# Patient Record
Sex: FEMALE | Race: BLACK OR AFRICAN AMERICAN | Employment: OTHER | URBAN - METROPOLITAN AREA
[De-identification: names, ages, dates, MRNs, and addresses within clinical notes are randomized per-mention and may not be internally consistent; named-entity substitution may affect disease eponyms.]

---

## 2021-04-23 ENCOUNTER — APPOINTMENT (OUTPATIENT)
Dept: GENERAL RADIOLOGY | Age: 71
End: 2021-04-23
Payer: MEDICARE

## 2021-04-23 ENCOUNTER — HOSPITAL ENCOUNTER (OUTPATIENT)
Age: 71
Setting detail: OBSERVATION
Discharge: HOME | End: 2021-04-24
Attending: HOSPITALIST | Admitting: HOSPITALIST
Payer: MEDICARE

## 2021-04-23 DIAGNOSIS — R06.01 ORTHOPNEA: ICD-10-CM

## 2021-04-23 DIAGNOSIS — R06.02 SHORTNESS OF BREATH: Primary | ICD-10-CM

## 2021-04-23 DIAGNOSIS — R06.09 DOE (DYSPNEA ON EXERTION): ICD-10-CM

## 2021-04-23 LAB
A/G RATIO: 1.4 (ref 1.1–2.2)
ALBUMIN SERPL-MCNC: 4.3 G/DL (ref 3.4–5)
ALP BLD-CCNC: 78 U/L (ref 40–129)
ALT SERPL-CCNC: 14 U/L (ref 10–40)
ANION GAP SERPL CALCULATED.3IONS-SCNC: 8 MMOL/L (ref 3–16)
AST SERPL-CCNC: 17 U/L (ref 15–37)
BACTERIA: ABNORMAL /HPF
BASOPHILS ABSOLUTE: 0 K/UL (ref 0–0.2)
BASOPHILS RELATIVE PERCENT: 0.4 %
BILIRUB SERPL-MCNC: 0.3 MG/DL (ref 0–1)
BILIRUBIN URINE: NEGATIVE
BLOOD, URINE: NEGATIVE
BUN BLDV-MCNC: 12 MG/DL (ref 7–20)
CALCIUM SERPL-MCNC: 9.6 MG/DL (ref 8.3–10.6)
CHLORIDE BLD-SCNC: 100 MMOL/L (ref 99–110)
CLARITY: CLEAR
CO2: 32 MMOL/L (ref 21–32)
COLOR: YELLOW
CREAT SERPL-MCNC: 0.8 MG/DL (ref 0.6–1.2)
D DIMER: 206 NG/ML DDU (ref 0–229)
EKG ATRIAL RATE: 80 BPM
EKG DIAGNOSIS: NORMAL
EKG P AXIS: 48 DEGREES
EKG P-R INTERVAL: 182 MS
EKG Q-T INTERVAL: 408 MS
EKG QRS DURATION: 88 MS
EKG QTC CALCULATION (BAZETT): 470 MS
EKG R AXIS: 0 DEGREES
EKG T AXIS: 55 DEGREES
EKG VENTRICULAR RATE: 80 BPM
EOSINOPHILS ABSOLUTE: 0.3 K/UL (ref 0–0.6)
EOSINOPHILS RELATIVE PERCENT: 5.3 %
EPITHELIAL CELLS, UA: 2 /HPF (ref 0–5)
GFR AFRICAN AMERICAN: >60
GFR NON-AFRICAN AMERICAN: >60
GLOBULIN: 3.1 G/DL
GLUCOSE BLD-MCNC: 111 MG/DL (ref 70–99)
GLUCOSE BLD-MCNC: 111 MG/DL (ref 70–99)
GLUCOSE BLD-MCNC: 79 MG/DL (ref 70–99)
GLUCOSE BLD-MCNC: 94 MG/DL (ref 70–99)
GLUCOSE URINE: NEGATIVE MG/DL
HCT VFR BLD CALC: 38.1 % (ref 36–48)
HEMOGLOBIN: 12.1 G/DL (ref 12–16)
HYALINE CASTS: 0 /LPF (ref 0–8)
KETONES, URINE: NEGATIVE MG/DL
LEUKOCYTE ESTERASE, URINE: ABNORMAL
LYMPHOCYTES ABSOLUTE: 1.8 K/UL (ref 1–5.1)
LYMPHOCYTES RELATIVE PERCENT: 28.1 %
MCH RBC QN AUTO: 27 PG (ref 26–34)
MCHC RBC AUTO-ENTMCNC: 31.7 G/DL (ref 31–36)
MCV RBC AUTO: 85.1 FL (ref 80–100)
MICROSCOPIC EXAMINATION: YES
MONOCYTES ABSOLUTE: 0.4 K/UL (ref 0–1.3)
MONOCYTES RELATIVE PERCENT: 6 %
NEUTROPHILS ABSOLUTE: 3.8 K/UL (ref 1.7–7.7)
NEUTROPHILS RELATIVE PERCENT: 60.2 %
NITRITE, URINE: NEGATIVE
PDW BLD-RTO: 15.3 % (ref 12.4–15.4)
PERFORMED ON: ABNORMAL
PERFORMED ON: NORMAL
PERFORMED ON: NORMAL
PH UA: 7.5 (ref 5–8)
PLATELET # BLD: 193 K/UL (ref 135–450)
PMV BLD AUTO: 8.9 FL (ref 5–10.5)
POTASSIUM SERPL-SCNC: 4.2 MMOL/L (ref 3.5–5.1)
PRO-BNP: 361 PG/ML (ref 0–124)
PROTEIN UA: NEGATIVE MG/DL
RBC # BLD: 4.48 M/UL (ref 4–5.2)
RBC UA: 1 /HPF (ref 0–4)
SARS-COV-2, NAAT: NOT DETECTED
SODIUM BLD-SCNC: 140 MMOL/L (ref 136–145)
SPECIFIC GRAVITY UA: 1.01 (ref 1–1.03)
TOTAL PROTEIN: 7.4 G/DL (ref 6.4–8.2)
TROPONIN: <0.01 NG/ML
URINE REFLEX TO CULTURE: ABNORMAL
URINE TYPE: ABNORMAL
UROBILINOGEN, URINE: 0.2 E.U./DL
WBC # BLD: 6.4 K/UL (ref 4–11)
WBC UA: 2 /HPF (ref 0–5)

## 2021-04-23 PROCEDURE — 85379 FIBRIN DEGRADATION QUANT: CPT

## 2021-04-23 PROCEDURE — 80053 COMPREHEN METABOLIC PANEL: CPT

## 2021-04-23 PROCEDURE — 93005 ELECTROCARDIOGRAM TRACING: CPT | Performed by: PHYSICIAN ASSISTANT

## 2021-04-23 PROCEDURE — 83880 ASSAY OF NATRIURETIC PEPTIDE: CPT

## 2021-04-23 PROCEDURE — 93010 ELECTROCARDIOGRAM REPORT: CPT | Performed by: INTERNAL MEDICINE

## 2021-04-23 PROCEDURE — 36415 COLL VENOUS BLD VENIPUNCTURE: CPT

## 2021-04-23 PROCEDURE — 96372 THER/PROPH/DIAG INJ SC/IM: CPT

## 2021-04-23 PROCEDURE — 87635 SARS-COV-2 COVID-19 AMP PRB: CPT

## 2021-04-23 PROCEDURE — 85025 COMPLETE CBC W/AUTO DIFF WBC: CPT

## 2021-04-23 PROCEDURE — 6360000002 HC RX W HCPCS: Performed by: HOSPITALIST

## 2021-04-23 PROCEDURE — 81001 URINALYSIS AUTO W/SCOPE: CPT

## 2021-04-23 PROCEDURE — G0378 HOSPITAL OBSERVATION PER HR: HCPCS

## 2021-04-23 PROCEDURE — 6370000000 HC RX 637 (ALT 250 FOR IP): Performed by: HOSPITALIST

## 2021-04-23 PROCEDURE — 83036 HEMOGLOBIN GLYCOSYLATED A1C: CPT

## 2021-04-23 PROCEDURE — 2580000003 HC RX 258: Performed by: HOSPITALIST

## 2021-04-23 PROCEDURE — 99283 EMERGENCY DEPT VISIT LOW MDM: CPT

## 2021-04-23 PROCEDURE — 71045 X-RAY EXAM CHEST 1 VIEW: CPT

## 2021-04-23 PROCEDURE — 84484 ASSAY OF TROPONIN QUANT: CPT

## 2021-04-23 PROCEDURE — 6370000000 HC RX 637 (ALT 250 FOR IP): Performed by: PHYSICIAN ASSISTANT

## 2021-04-23 RX ORDER — OMEGA-3S/DHA/EPA/FISH OIL/D3 300MG-1000
5000 CAPSULE ORAL DAILY
COMMUNITY
End: 2021-04-27

## 2021-04-23 RX ORDER — ASPIRIN 81 MG/1
81 TABLET, CHEWABLE ORAL DAILY
Status: DISCONTINUED | OUTPATIENT
Start: 2021-04-23 | End: 2021-04-23 | Stop reason: SDUPTHER

## 2021-04-23 RX ORDER — FUROSEMIDE 20 MG/1
20 TABLET ORAL DAILY
Status: DISCONTINUED | OUTPATIENT
Start: 2021-04-23 | End: 2021-04-24 | Stop reason: HOSPADM

## 2021-04-23 RX ORDER — ACETAMINOPHEN 325 MG/1
650 TABLET ORAL EVERY 6 HOURS PRN
Status: DISCONTINUED | OUTPATIENT
Start: 2021-04-23 | End: 2021-04-24 | Stop reason: HOSPADM

## 2021-04-23 RX ORDER — POLYETHYLENE GLYCOL 3350 17 G/17G
17 POWDER, FOR SOLUTION ORAL DAILY PRN
Status: DISCONTINUED | OUTPATIENT
Start: 2021-04-23 | End: 2021-04-24 | Stop reason: HOSPADM

## 2021-04-23 RX ORDER — SODIUM CHLORIDE 9 MG/ML
25 INJECTION, SOLUTION INTRAVENOUS PRN
Status: DISCONTINUED | OUTPATIENT
Start: 2021-04-23 | End: 2021-04-24 | Stop reason: HOSPADM

## 2021-04-23 RX ORDER — ONDANSETRON 2 MG/ML
4 INJECTION INTRAMUSCULAR; INTRAVENOUS EVERY 6 HOURS PRN
Status: DISCONTINUED | OUTPATIENT
Start: 2021-04-23 | End: 2021-04-24 | Stop reason: HOSPADM

## 2021-04-23 RX ORDER — ASPIRIN 81 MG/1
81 TABLET, CHEWABLE ORAL DAILY
Status: DISCONTINUED | OUTPATIENT
Start: 2021-04-24 | End: 2021-04-24 | Stop reason: HOSPADM

## 2021-04-23 RX ORDER — FAMOTIDINE 40 MG/1
40 TABLET, FILM COATED ORAL DAILY
COMMUNITY

## 2021-04-23 RX ORDER — FAMOTIDINE 20 MG/1
20 TABLET, FILM COATED ORAL 2 TIMES DAILY
Status: DISCONTINUED | OUTPATIENT
Start: 2021-04-23 | End: 2021-04-24 | Stop reason: HOSPADM

## 2021-04-23 RX ORDER — PRAVASTATIN SODIUM 40 MG
40 TABLET ORAL DAILY
Status: DISCONTINUED | OUTPATIENT
Start: 2021-04-23 | End: 2021-04-23 | Stop reason: SDUPTHER

## 2021-04-23 RX ORDER — TELMISARTAN AND HYDROCHLORTHIAZIDE 40; 12.5 MG/1; MG/1
1 TABLET ORAL DAILY
Status: ON HOLD | COMMUNITY
Start: 2021-04-06 | End: 2021-04-23

## 2021-04-23 RX ORDER — ASCORBIC ACID 500 MG
250 TABLET ORAL DAILY
Status: DISCONTINUED | OUTPATIENT
Start: 2021-04-24 | End: 2021-04-24 | Stop reason: HOSPADM

## 2021-04-23 RX ORDER — DEXTROSE MONOHYDRATE 50 MG/ML
100 INJECTION, SOLUTION INTRAVENOUS PRN
Status: DISCONTINUED | OUTPATIENT
Start: 2021-04-23 | End: 2021-04-24 | Stop reason: HOSPADM

## 2021-04-23 RX ORDER — INSULIN GLARGINE 100 [IU]/ML
30 INJECTION, SOLUTION SUBCUTANEOUS NIGHTLY
COMMUNITY

## 2021-04-23 RX ORDER — PROMETHAZINE HYDROCHLORIDE 25 MG/1
12.5 TABLET ORAL EVERY 6 HOURS PRN
Status: DISCONTINUED | OUTPATIENT
Start: 2021-04-23 | End: 2021-04-24 | Stop reason: HOSPADM

## 2021-04-23 RX ORDER — ACETAMINOPHEN 650 MG/1
650 SUPPOSITORY RECTAL EVERY 6 HOURS PRN
Status: DISCONTINUED | OUTPATIENT
Start: 2021-04-23 | End: 2021-04-24 | Stop reason: HOSPADM

## 2021-04-23 RX ORDER — ACETAMINOPHEN 500 MG
500 TABLET ORAL EVERY 6 HOURS PRN
Status: DISCONTINUED | OUTPATIENT
Start: 2021-04-23 | End: 2021-04-23

## 2021-04-23 RX ORDER — SODIUM CHLORIDE 0.9 % (FLUSH) 0.9 %
5-40 SYRINGE (ML) INJECTION PRN
Status: DISCONTINUED | OUTPATIENT
Start: 2021-04-23 | End: 2021-04-24 | Stop reason: HOSPADM

## 2021-04-23 RX ORDER — INSULIN LISPRO 100 [IU]/ML
0-6 INJECTION, SOLUTION INTRAVENOUS; SUBCUTANEOUS
Status: DISCONTINUED | OUTPATIENT
Start: 2021-04-23 | End: 2021-04-24 | Stop reason: HOSPADM

## 2021-04-23 RX ORDER — ATORVASTATIN CALCIUM 80 MG/1
80 TABLET, FILM COATED ORAL NIGHTLY
Status: DISCONTINUED | OUTPATIENT
Start: 2021-04-23 | End: 2021-04-24 | Stop reason: HOSPADM

## 2021-04-23 RX ORDER — INSULIN LISPRO 100 [IU]/ML
0-3 INJECTION, SOLUTION INTRAVENOUS; SUBCUTANEOUS NIGHTLY
Status: DISCONTINUED | OUTPATIENT
Start: 2021-04-23 | End: 2021-04-24 | Stop reason: HOSPADM

## 2021-04-23 RX ORDER — LISINOPRIL AND HYDROCHLOROTHIAZIDE 12.5; 1 MG/1; MG/1
1 TABLET ORAL DAILY
Status: DISCONTINUED | OUTPATIENT
Start: 2021-04-23 | End: 2021-04-23 | Stop reason: CLARIF

## 2021-04-23 RX ORDER — NICOTINE POLACRILEX 4 MG
15 LOZENGE BUCCAL PRN
Status: DISCONTINUED | OUTPATIENT
Start: 2021-04-23 | End: 2021-04-24 | Stop reason: HOSPADM

## 2021-04-23 RX ORDER — ASCORBIC ACID 250 MG
250 TABLET ORAL DAILY
COMMUNITY
End: 2022-02-15

## 2021-04-23 RX ORDER — METOPROLOL SUCCINATE 50 MG/1
50 TABLET, EXTENDED RELEASE ORAL DAILY
COMMUNITY
Start: 2021-04-21 | End: 2022-02-15 | Stop reason: SDUPTHER

## 2021-04-23 RX ORDER — LOSARTAN POTASSIUM 50 MG/1
50 TABLET ORAL DAILY
COMMUNITY
Start: 2021-04-21 | End: 2022-02-11

## 2021-04-23 RX ORDER — POTASSIUM CHLORIDE 750 MG/1
10 TABLET, EXTENDED RELEASE ORAL 2 TIMES DAILY
COMMUNITY
Start: 2021-03-23 | End: 2021-04-27 | Stop reason: ALTCHOICE

## 2021-04-23 RX ORDER — FUROSEMIDE 40 MG/1
40 TABLET ORAL DAILY
Status: ON HOLD | COMMUNITY
End: 2021-04-24 | Stop reason: SDUPTHER

## 2021-04-23 RX ORDER — METOPROLOL SUCCINATE 50 MG/1
50 TABLET, EXTENDED RELEASE ORAL DAILY
Status: DISCONTINUED | OUTPATIENT
Start: 2021-04-24 | End: 2021-04-24 | Stop reason: HOSPADM

## 2021-04-23 RX ORDER — ASPIRIN 81 MG/1
243 TABLET, CHEWABLE ORAL ONCE
Status: COMPLETED | OUTPATIENT
Start: 2021-04-23 | End: 2021-04-23

## 2021-04-23 RX ORDER — LOSARTAN POTASSIUM 100 MG/1
50 TABLET ORAL DAILY
Status: DISCONTINUED | OUTPATIENT
Start: 2021-04-23 | End: 2021-04-24 | Stop reason: HOSPADM

## 2021-04-23 RX ORDER — SODIUM CHLORIDE 0.9 % (FLUSH) 0.9 %
5-40 SYRINGE (ML) INJECTION EVERY 12 HOURS SCHEDULED
Status: DISCONTINUED | OUTPATIENT
Start: 2021-04-23 | End: 2021-04-24 | Stop reason: HOSPADM

## 2021-04-23 RX ORDER — DEXTROSE MONOHYDRATE 25 G/50ML
12.5 INJECTION, SOLUTION INTRAVENOUS PRN
Status: DISCONTINUED | OUTPATIENT
Start: 2021-04-23 | End: 2021-04-24 | Stop reason: HOSPADM

## 2021-04-23 RX ORDER — PANTOPRAZOLE SODIUM 40 MG/1
40 TABLET, DELAYED RELEASE ORAL DAILY
COMMUNITY
Start: 2020-12-29

## 2021-04-23 RX ADMIN — ENOXAPARIN SODIUM 40 MG: 40 INJECTION SUBCUTANEOUS at 20:37

## 2021-04-23 RX ADMIN — LOSARTAN POTASSIUM 50 MG: 100 TABLET, FILM COATED ORAL at 18:38

## 2021-04-23 RX ADMIN — INSULIN GLARGINE 28 UNITS: 100 INJECTION, SOLUTION SUBCUTANEOUS at 20:47

## 2021-04-23 RX ADMIN — ATORVASTATIN CALCIUM 80 MG: 80 TABLET, FILM COATED ORAL at 20:36

## 2021-04-23 RX ADMIN — NITROGLYCERIN 1 INCH: 20 OINTMENT TOPICAL at 13:24

## 2021-04-23 RX ADMIN — FUROSEMIDE 20 MG: 20 TABLET ORAL at 18:38

## 2021-04-23 RX ADMIN — FAMOTIDINE 20 MG: 20 TABLET ORAL at 20:36

## 2021-04-23 RX ADMIN — Medication 10 ML: at 20:37

## 2021-04-23 RX ADMIN — ASPIRIN 243 MG: 81 TABLET, CHEWABLE ORAL at 13:24

## 2021-04-23 RX ADMIN — ACETAMINOPHEN 650 MG: 325 TABLET ORAL at 18:41

## 2021-04-23 ASSESSMENT — ENCOUNTER SYMPTOMS
COUGH: 1
WHEEZING: 0
BACK PAIN: 0
CONSTIPATION: 0
STRIDOR: 0
DIARRHEA: 0
SHORTNESS OF BREATH: 1
NAUSEA: 0
ABDOMINAL PAIN: 0
VOMITING: 0
COLOR CHANGE: 0
ABDOMINAL DISTENTION: 0

## 2021-04-23 ASSESSMENT — PAIN SCALES - GENERAL: PAINLEVEL_OUTOF10: 0

## 2021-04-23 NOTE — ED NOTES
Bedside report given to ZACKERY Gautam. Chart provided. VSS & alert at this time. Taken by stretcher without incident.        Troy Singer RN  04/23/21 4470

## 2021-04-23 NOTE — ED PROVIDER NOTES
EKG NOTE:    Sinus rhythm at a rate of 80 beats a minute with no acute ST elevations or depressions or pathologic Q waves. Normal axis. I was not involved with the care of this patient.        Manfred Beyer MD  04/23/21 0032

## 2021-04-23 NOTE — ED NOTES
Presents to the ED rt SOB, worsening with exertion that began in March. Hx CHF; takes \"water pill. \"  Denies increased swelling or weight gain at this time. Reports seeing a cardiologist that would like her to be evaluated for sleep apnea. Complaints of intermittent headaches, dizziness & fatigue. Monitors in place.        Tawana Holloway, ZACKERY  04/23/21 9878

## 2021-04-23 NOTE — H&P
[facility]    4/23/2021 1:15 PM    Patient Information:  [place] is a 65 y.o. female [id number]  PCP:  Rupal Xavier MD (Tel: [phone] )    Chief complaint:    Chief Complaint   Patient presents with    Shortness of Breath     Pt coming in with shortness of breath, weakness, dizziness, unable to lay flat        History of Present Illness:    Nelida Kamara is a 65 y.o. present with chest pain presented with shortness of breath dizziness unable to lay flat. Patient has ongoing symptoms for 1 has been getting worse. Was seen by cardiology. Echocardiogram done recently at [address] [place] [address]. Patient said no fevers no chills no cough had both Covid vaccines. Patient takes water pill for his leg swelling. Denies any nausea vomiting or diarrhea   onset about denies any nausea vomiting or diarrhea      History obtained from patient  Old medical records show   REVIEW OF SYSTEMS:   Constitutional: Negative for fever,chills or night sweats  ENT: Negative for rhinorrhea, epistaxis, hoarseness, sore throat. Respiratory: Negative for shortness of breath,wheezing  Cardiovascular:postive  for chest pain, negative for palpitations   Gastrointestinal: Negative for nausea, vomiting, diarrhea  Genitourinary: Negative for polyuria, dysuria   Hematologic/Lymphatic: Negative for bleeding tendency, easy bruising  Musculoskeletal: Negative for myalgias and arthralgias  Neurologic: Negative for confusion,dysarthria. Skin: Negative for itching,rash  Psychiatric: Negative for depression,anxiety, agitation. Endocrine: Negative for polydipsia,polyuria,heat /cold intolerance.     Past Medical History:   has a past medical history of Arthritis, Back pain, CHF (congestive heart failure) ([facility] Chronic diastolic heart failure ([facility] [facility] Diabetes mellitus ([facility] [facility] Diabetes mellitus ([facility] [address] [facility] GERD (gastroesophageal reflux disease), High cholesterol, Hypertension, Polio, Polio, Shingles, and Urinary problem. Past Surgical History:   has a past surgical history that includes joint replacement; back surgery; knee surgery; Colonoscopy; other surgical history (01/13/14); hernia repair; joint replacement; and back surgery. Medications:  No current facility-administered medications on file prior to encounter. Current Outpatient Medications on File Prior to Encounter   Medication Sig Dispense Refill    losartan (COZAAR) 50 MG tablet Take 50 mg by mouth daily      metoprolol succinate (TOPROL XL) 50 MG extended release tablet Take 50 mg by mouth daily      pantoprazole (PROTONIX) 40 MG tablet Take 40 mg by mouth daily      potassium chloride (KLOR-CON M) 10 MEQ extended release tablet Take 10 mEq by mouth 2 times daily      telmisartan-hydrochlorothiazide (MICARDIS HCT) 40-12.5 MG per tablet Take 1 tablet by mouth daily      furosemide (LASIX) 40 MG tablet Take 40 mg by mouth daily      insulin glargine (LANTUS) 100 UNIT/ML injection vial Inject 28 Units into the skin nightly      ascorbic acid (VITAMIN C) 250 MG tablet Take 250 mg by mouth daily      vitamin D3 (CHOLECALCIFEROL) 10 MCG (400 UNIT) TABS tablet Take 5,000 Units by mouth daily      famotidine (PEPCID) 40 MG tablet Take 40 mg by mouth 2 times daily      aspirin 81 MG chewable tablet Take 1 tablet by mouth daily. 30 tablet 3    UNABLE TO FIND \" SOME KIND OF PCN TWICE A DAY\"      benzonatate (TESSALON) 100 MG capsule Take 200 mg by mouth 3 times daily as needed for Cough.  acetaminophen (TYLENOL) 500 MG tablet Take 500 mg by mouth every 6 hours as needed for Pain.  pravastatin (PRAVACHOL) 40 MG tablet Take 40 mg by mouth daily.  lisinopril-hydrochlorothiazide (PRINZIDE;ZESTORETIC) 20-25 MG per tablet Take 1 tablet by mouth daily.  meloxicam (MOBIC) 15 MG tablet Take 15 mg by mouth daily.       ranitidine (ZANTAC) 300 MG tablet Take 300 mg by mouth daily.  omeprazole (PRILOSEC) 20 MG capsule Take 20 mg by mouth daily.  Omeprazole Magnesium (PRILOSEC OTC PO) Take  by mouth.  Liraglutide (VICTOZA SC) Inject 1.2 mg/day into the skin.  lisinopril-hydrochlorothiazide (PRINZIDE;ZESTORETIC) 10-12.5 MG per tablet Take 1 tablet by mouth daily.  Liraglutide (VICTOZA SC) Inject 1.2 mg into the skin daily. Allergies: Allergies   Allergen Reactions    Adhesive Tape      Skin irritation and blisters        Social History:   reports that she has never smoked. She has never used smokeless tobacco. She reports that she does not drink alcohol or use drugs. Family History:  family history includes Cancer in her mother; Diabetes in her sister; Early Death in her father; Heart Disease in her mother. ,     Physical Exam:  BP (!) 158/58   Pulse 79   Temp 97.6 °F (36.4 °C) (Temporal)   Resp 16   Ht 5' 6\" (1.676 m)   Wt 258 lb (117 kg)   SpO2 97%   BMI 41.64 kg/m²     General appearance:  Appears comfortable. Well nourished  Eyes: Sclera clear, pupils equal  ENT: Moist mucus membranes, no thrush. Trachea midline. Cardiovascular: Regular rhythm, normal S1, S2. No murmur, gallop, rub. No edema in lower extremities  Respiratory: Clear to auscultation bilaterally, no wheeze, good inspiratory effort  Gastrointestinal: Abdomen soft, non-tender, not distended, normal bowel sounds  Musculoskeletal: No cyanosis in digits, neck supple  Neurology: Cranial nerves grossly intact. Alert and oriented in time, place and person. No speech or motor deficits  Psychiatry: Appropriate affect.  Not agitated  Skin: Warm, dry, normal turgor, no rash    Labs:  CBC:   Lab Results   Component Value Date    WBC 6.4 04/23/2021    RBC 4.48 04/23/2021    HGB 12.1 04/23/2021    HCT 38.1 04/23/2021    MCV 85.1 04/23/2021    MCH 27.0 04/23/2021    MCHC 31.7 04/23/2021    RDW 15.3 04/23/2021     04/23/2021    MPV 8.9 04/23/2021     BMP:    Lab Results

## 2021-04-23 NOTE — ED NOTES
Bed: 22  Expected date:   Expected time:   Means of arrival:   Comments:  Amita Rooney, ZACKERY  04/23/21 1151

## 2021-04-23 NOTE — ED PROVIDER NOTES
[facility]        Pt Name: Candace Francisco  MRN: [id number]  [place] 1950  Date of evaluation: 4/23/2021  Provider: Mary Do PA-C  PCP: Lucille Chapman MD    ANTONIO. I have evaluated this patient. My supervising physician was available for consultation. CHIEF COMPLAINT       Chief Complaint   Patient presents with    Shortness of Breath     Pt coming in with shortness of breath, weakness, dizziness, unable to lay flat       HISTORY OF PRESENT ILLNESS   (Location, Timing/Onset, Context/Setting, Quality, Duration, Modifying Factors, Severity, Associated Signs and Symptoms)  Note limiting factors. Candace Francisco is a 65 y.o. female with history of obesity, CHF, diabetes, hyperlipidemia, hypertension, polio who presents to the emergency department complaining of shortness of breath, generalized weakness and fatigue and lightheadedness. Shortness of breath tends to get worse when she lays flat or does any minor exertion. She reports bilateral leg swelling but does improve with water pills. She just followed up with cardiologist, Dr. Brynn Hills, at [facility].  She states that she had a recent echocardiogram.  She has not had a recent stress test.  She denies chest pain or productive cough. She reports dry cough. She did get both Covid vaccinations this year. 3/25/21 echocardiogram  Study Conclusions    - Left ventricle: The cavity size is normal. Wall thickness is    normal. Systolic function was mildly reduced. The calculated    ejection fraction was in the range of 47% to 53%. Wall motion was    normal; there were no regional wall motion abnormalities. Doppler    parameters are consistent with abnormal left ventricular    relaxation (grade 1 diastolic dysfunction). The stroke volume is    94ml. The stroke index is 42ml/m^2. - Aortic valve: There was mild regurgitation.  The mean systolic    gradient is 10mm Hg. The peak systolic gradient is 94CX Hg. The    valve area by the velocity-time integral method is 2.1cm^2. The    valve area index by the velocity-time integral method is    0.92cm^2/m^2. The valve area by the mean velocity method is    1.6cm^2.  - Inferior vena cava: The vessel was normal in size; the    respirophasic diameter changes were in the normal range (&gt;= 50%);    findings are consistent with normal central venous pressure. Impressions:    - Prior study date 2018. - Compared to the prior study, there has been no significant    interval change. Nursing Notes were all reviewed and agreed with or any disagreements were addressed in the HPI. REVIEW OF SYSTEMS    (2-9 systems for level 4, 10 or more for level 5)     Review of Systems   Constitutional: Positive for fatigue. Negative for chills and fever. HENT: Negative. Eyes: Negative for visual disturbance. Respiratory: Positive for cough and shortness of breath. Negative for wheezing and stridor. Cardiovascular: Positive for leg swelling. Negative for chest pain and palpitations. Gastrointestinal: Negative for abdominal distention, abdominal pain, constipation, diarrhea, nausea and vomiting. Endocrine: Negative. Genitourinary: Negative. Musculoskeletal: Negative for back pain, neck pain and neck stiffness. Skin: Negative for color change, pallor, rash and wound. Neurological: Positive for weakness and light-headedness. Negative for dizziness, tremors, seizures, syncope, facial asymmetry, speech difficulty, numbness and headaches. Psychiatric/Behavioral: Negative for confusion. All other systems reviewed and are negative. Positives and Pertinent negatives as per HPI. Except as noted above in the ROS, all other systems were reviewed and negative.        PAST MEDICAL HISTORY     Past Medical History:   Diagnosis Date    Arthritis     Back pain     CHF (congestive heart failure) (HCC)     Chronic diastolic heart failure (HCC)     Diabetes mellitus ([facility]     Diabetes mellitus ([facility] [facility]     TYPE 2    GERD (gastroesophageal reflux disease)     High cholesterol     Hypertension     Polio     Polio     AS INFANT    Shingles     OCT 2013    Urinary problem     HAS BASHFUL BLADDER         SURGICAL HISTORY     Past Surgical History:   Procedure Laterality Date    BACK SURGERY      BACK SURGERY      1/13/14    COLONOSCOPY      HERNIA REPAIR      JOINT REPLACEMENT      bilateral knee replacements    JOINT REPLACEMENT      [place] KNEES    KNEE SURGERY      OTHER SURGICAL HISTORY  01/13/14    L4-5 DECOMPRESSION, POSTERIOR LATERAL FUSION AND PEDICLE         CURRENTMEDICATIONS       Previous Medications    ACETAMINOPHEN (TYLENOL) 500 MG TABLET    Take 500 mg by mouth every 6 hours as needed for Pain. ASCORBIC ACID (VITAMIN C) 250 MG TABLET    Take 250 mg by mouth daily    ASPIRIN 81 MG CHEWABLE TABLET    Take 1 tablet by mouth daily. BENZONATATE (TESSALON) 100 MG CAPSULE    Take 200 mg by mouth 3 times daily as needed for Cough. FAMOTIDINE (PEPCID) 40 MG TABLET    Take 40 mg by mouth 2 times daily    FUROSEMIDE (LASIX) 40 MG TABLET    Take 40 mg by mouth daily    INSULIN GLARGINE (LANTUS) 100 UNIT/ML INJECTION VIAL    Inject 28 Units into the skin nightly    LIRAGLUTIDE (VICTOZA SC)    Inject 1.2 mg into the skin daily. LIRAGLUTIDE (VICTOZA SC)    Inject 1.2 mg/day into the skin. LISINOPRIL-HYDROCHLOROTHIAZIDE (PRINZIDE;ZESTORETIC) 10-12.5 MG PER TABLET    Take 1 tablet by mouth daily. LISINOPRIL-HYDROCHLOROTHIAZIDE (PRINZIDE;ZESTORETIC) 20-25 MG PER TABLET    Take 1 tablet by mouth daily. LOSARTAN (COZAAR) 50 MG TABLET    Take 50 mg by mouth daily    MELOXICAM (MOBIC) 15 MG TABLET    Take 15 mg by mouth daily. METOPROLOL SUCCINATE (TOPROL XL) 50 MG EXTENDED RELEASE TABLET    Take 50 mg by mouth daily    OMEPRAZOLE (PRILOSEC) 20 MG CAPSULE    Take 20 mg by mouth daily.     OMEPRAZOLE MAGNESIUM (PRILOSEC OTC PO)    Take  by mouth. PANTOPRAZOLE (PROTONIX) 40 MG TABLET    Take 40 mg by mouth daily    POTASSIUM CHLORIDE (KLOR-CON M) 10 MEQ EXTENDED RELEASE TABLET    Take 10 mEq by mouth 2 times daily    PRAVASTATIN (PRAVACHOL) 40 MG TABLET    Take 40 mg by mouth daily. RANITIDINE (ZANTAC) 300 MG TABLET    Take 300 mg by mouth daily. TELMISARTAN-HYDROCHLOROTHIAZIDE (MICARDIS HCT) 40-12.5 MG PER TABLET    Take 1 tablet by mouth daily    UNABLE TO FIND    \" SOME KIND OF PCN TWICE A DAY\"    VITAMIN D3 (CHOLECALCIFEROL) 10 MCG (400 UNIT) TABS TABLET    Take 5,000 Units by mouth daily         ALLERGIES     Adhesive tape    FAMILYHISTORY       Family History   Problem Relation Age of Onset    Heart Disease Mother     Cancer Mother         breast cancer, 5    Early Death Father     Diabetes Sister           SOCIAL HISTORY       Social History     Tobacco Use    Smoking status: Never Smoker    Smokeless tobacco: Never Used   Substance Use Topics    Alcohol use: No    Drug use: No       SCREENINGS             PHYSICAL EXAM    (up to 7 for level 4, 8 or more for level 5)     ED Triage Vitals   BP Temp Temp Source Pulse Resp SpO2 Height Weight   04/23/21 1050 04/23/21 1125 04/23/21 1125 04/23/21 1050 04/23/21 1050 04/23/21 1050 04/23/21 1050 04/23/21 1050   (!) 207/76 97.6 °F (36.4 °C) Temporal 86 26 100 % 5' 6\" (1.676 m) 258 lb (117 kg)       Physical Exam  Vitals signs and nursing note reviewed. Constitutional:       Appearance: She is well-developed. She is obese. She is not toxic-appearing or diaphoretic. HENT:      Head: Normocephalic and atraumatic. Right Ear: External ear normal.      Left Ear: External ear normal.      Nose: Nose normal.      Mouth/Throat:      Mouth: Mucous membranes are moist.      Pharynx: Oropharynx is clear. Eyes:      General: No scleral icterus. Right eye: No discharge. Left eye: No discharge.       Extraocular Movements: Extraocular Performed at:  [facility] [address] [facility]  [place], [address]   Phone [phone]   MICROSCOPIC URINALYSIS - Abnormal; Notable for the following components:    Bacteria, UA RARE (*)     All other components within normal limits    Narrative:     Performed at:  [facility] [address] [facility],  [place], [address]   Phone [phone], RAPID   CBC WITH AUTO DIFFERENTIAL    Narrative:     Performed at:  [facility] [address], [place], [address]   Phone [phone]   TROPONIN    Narrative:     Performed at:  [facility] [address], [place], [address]   Phone [phone]   D-DIMER, QUANTITATIVE    Narrative:     Performed at:  [facility] [address] [facility], [address]   Phone [phone]       All other labs were within normal range or not returned as of this dictation. EKG: All EKG's are interpreted by the Emergency Department Physician in the absence of a cardiologist.  Please see their note for interpretation of EKG. RADIOLOGY:   Non-plain film images such as CT, Ultrasound and MRI are read by the radiologist. Plain radiographic images are visualized and preliminarily interpreted by the ED Provider with the below findings:        Interpretation per the Radiologist below, if available at the time of this note:    XR CHEST PORTABLE   Final Result   Stable appearance of the chest with no acute abnormality.                  PROCEDURES   Unless otherwise noted below, none     Procedures    CRITICAL CARE TIME   N/A    CONSULTS:  IP CONSULT TO HOSPITALIST      EMERGENCY DEPARTMENT COURSE and DIFFERENTIAL DIAGNOSIS/MDM:   Vitals:    Vitals:    04/23/21 1050 04/23/21 1125 04/23/21 1245   BP: (!) 207/76  (!) 158/58   Pulse: 86  79   Resp: 26  16   Temp:  97.6 °F (36.4 °C)    TempSrc:  Temporal    SpO2: 100%  97% Weight: 258 lb (117 kg)     Height: 5' 6\" (1.676 m)         Patient was given the following medications:  Medications   nitroglycerin (NITRO-BID) 2 % ointment 1 inch (has no administration in time range)   aspirin chewable tablet 243 mg (has no administration in time range)           This patient presents to the emergency department complaining of orthopnea and dyspnea on exertion for several weeks. Denies productive cough. Chest x-ray does not show any evidence of acute intrathoracic pathology at this time. EKG morphology appears stable. Although she did just have an echocardiogram, she does have several risk factors and I do feel admission is warranted to rule out anginal equivalent. At this time, troponin is negative. I had lengthy discussion with the patient and she understands and agrees with plan for admission. My suspicion is low for ACS, PE, myocarditis, pericarditis, endocarditis, acute pulmonary edema, pleural effusion, pericardial effusion, cardiac tamponade,  thoracic aortic dissection, esophageal rupture, other life-threatening arrhythmia, hypertensive urgency or emergency, hemothorax, pulmonary contusion, subcutaneous emphysema, flail chest, pneumo mediastinum, rib fracture, pneumonia, pneumothorax, COVID-19, cerebellar compromise, posterior stroke, carotid dissection, sinus abscess, acute fracture, acute CVA, ICH, SAH, TIA, meningitis, encephalitis, pseudotumor cerebri, temporal arteritis, sentinel bleed from ruptured aneurysm, h subdural hematoma, epidural hematoma or other concerning pathology. FINAL IMPRESSION      1. Shortness of breath    2. Orthopnea    3. GAYLE (dyspnea on exertion)          DISPOSITION/PLAN   DISPOSITION Decision To Admit 04/23/2021 12:58:58 PM      PATIENT REFERREDTO:  No follow-up provider specified.     DISCHARGE MEDICATIONS:  New Prescriptions    No medications on file       DISCONTINUED MEDICATIONS:  Discontinued Medications    No medications on file (Please note that portions of this note were completed with a voice recognition program.  Efforts were made to edit the dictations but occasionally words are mis-transcribed.)    Miguelina Lieberman PA-C (electronically signed)           Miguelina Lieberman PA-C  04/23/21 6185

## 2021-04-24 VITALS
HEART RATE: 81 BPM | BODY MASS INDEX: 40.82 KG/M2 | SYSTOLIC BLOOD PRESSURE: 157 MMHG | RESPIRATION RATE: 18 BRPM | WEIGHT: 254 LBS | OXYGEN SATURATION: 97 % | HEIGHT: 66 IN | DIASTOLIC BLOOD PRESSURE: 69 MMHG | TEMPERATURE: 97.9 F

## 2021-04-24 LAB
EKG ATRIAL RATE: 77 BPM
EKG DIAGNOSIS: NORMAL
EKG P AXIS: 55 DEGREES
EKG P-R INTERVAL: 200 MS
EKG Q-T INTERVAL: 414 MS
EKG QRS DURATION: 90 MS
EKG QTC CALCULATION (BAZETT): 468 MS
EKG R AXIS: 5 DEGREES
EKG T AXIS: 71 DEGREES
EKG VENTRICULAR RATE: 77 BPM
ESTIMATED AVERAGE GLUCOSE: 162.8 MG/DL
GLUCOSE BLD-MCNC: 112 MG/DL (ref 70–99)
HBA1C MFR BLD: 7.3 %
HCT VFR BLD CALC: 35.2 % (ref 36–48)
HEMOGLOBIN: 11.3 G/DL (ref 12–16)
LV EF: 40 %
LVEF MODALITY: NORMAL
MCH RBC QN AUTO: 27.4 PG (ref 26–34)
MCHC RBC AUTO-ENTMCNC: 32.2 G/DL (ref 31–36)
MCV RBC AUTO: 85.2 FL (ref 80–100)
PDW BLD-RTO: 15.5 % (ref 12.4–15.4)
PERFORMED ON: ABNORMAL
PLATELET # BLD: 182 K/UL (ref 135–450)
PMV BLD AUTO: 8.7 FL (ref 5–10.5)
RBC # BLD: 4.13 M/UL (ref 4–5.2)
WBC # BLD: 6.5 K/UL (ref 4–11)

## 2021-04-24 PROCEDURE — G0378 HOSPITAL OBSERVATION PER HR: HCPCS

## 2021-04-24 PROCEDURE — A9502 TC99M TETROFOSMIN: HCPCS | Performed by: HOSPITALIST

## 2021-04-24 PROCEDURE — 93005 ELECTROCARDIOGRAM TRACING: CPT | Performed by: HOSPITALIST

## 2021-04-24 PROCEDURE — 93010 ELECTROCARDIOGRAM REPORT: CPT | Performed by: INTERNAL MEDICINE

## 2021-04-24 PROCEDURE — 3430000000 HC RX DIAGNOSTIC RADIOPHARMACEUTICAL: Performed by: HOSPITALIST

## 2021-04-24 PROCEDURE — 78452 HT MUSCLE IMAGE SPECT MULT: CPT | Performed by: INTERNAL MEDICINE

## 2021-04-24 PROCEDURE — 93017 CV STRESS TEST TRACING ONLY: CPT | Performed by: INTERNAL MEDICINE

## 2021-04-24 PROCEDURE — 36415 COLL VENOUS BLD VENIPUNCTURE: CPT

## 2021-04-24 PROCEDURE — 85027 COMPLETE CBC AUTOMATED: CPT

## 2021-04-24 PROCEDURE — 6370000000 HC RX 637 (ALT 250 FOR IP): Performed by: HOSPITALIST

## 2021-04-24 RX ORDER — FUROSEMIDE 40 MG/1
40 TABLET ORAL DAILY
Qty: 60 TABLET | Refills: 1 | Status: SHIPPED | OUTPATIENT
Start: 2021-04-24 | End: 2021-04-27

## 2021-04-24 RX ADMIN — OXYCODONE HYDROCHLORIDE AND ACETAMINOPHEN 250 MG: 500 TABLET ORAL at 10:24

## 2021-04-24 RX ADMIN — ASPIRIN 81 MG: 81 TABLET, CHEWABLE ORAL at 10:24

## 2021-04-24 RX ADMIN — LOSARTAN POTASSIUM 50 MG: 100 TABLET, FILM COATED ORAL at 10:24

## 2021-04-24 RX ADMIN — FAMOTIDINE 20 MG: 20 TABLET ORAL at 10:24

## 2021-04-24 RX ADMIN — METOPROLOL SUCCINATE 50 MG: 50 TABLET, EXTENDED RELEASE ORAL at 10:24

## 2021-04-24 RX ADMIN — FUROSEMIDE 20 MG: 20 TABLET ORAL at 10:24

## 2021-04-24 RX ADMIN — TETROFOSMIN 30 MILLICURIE: 1.38 INJECTION, POWDER, LYOPHILIZED, FOR SOLUTION INTRAVENOUS at 10:11

## 2021-04-24 RX ADMIN — TETROFOSMIN 10 MILLICURIE: 1.38 INJECTION, POWDER, LYOPHILIZED, FOR SOLUTION INTRAVENOUS at 07:58

## 2021-04-24 ASSESSMENT — PAIN SCALES - GENERAL: PAINLEVEL_OUTOF10: 0

## 2021-04-24 NOTE — DISCHARGE SUMMARY
[facility] DISCHARGE SUMMARY    Patient Demographics    Patient. Faraz Lopez  Date of Birth. 1950  MRN. [id number]     Primary care provider. Tylor Bobo MD  (Tel: [phone])    Admit date: 4/23/2021    Discharge date (blank if same as Note Date): Note Date: 4/24/2021     Reason for Hospitalization. Chief Complaint   Patient presents with    Shortness of Breath     Pt coming in with shortness of breath, weakness, dizziness, unable to lay flat           [facility] Course. Chest pain   Patient admitted for chest pain. Initial evaluation w as negative for any acute ischemia. Underwent stress test which showed no acute ischemia  troponin and ekg negative  Pt was discharged stable       Consults. IP CONSULT TO HOSPITALIST    Physical examination on discharge day. BP (!) 157/69   Pulse 81   Temp 97.9 °F (36.6 °C) (Oral)   Resp 18   Ht 5' 6\" (1.676 m)   Wt 254 lb (115.2 kg)   SpO2 97%   BMI 41.00 kg/m²   General appearance. Alert. Looks comfortable. HEENT. Sclera clear. Moist mucus membranes. Cardiovascular. Regular rate and rhythm, normal S1, S2. No murmur. Respiratory. Not using accessory muscles. Clear to auscultation bilaterally, no wheeze. Gastrointestinal. Abdomen soft, non-tender, not distended, normal bowel sounds  Neurology. Facial symmetry. No speech deficits. Moving all extremities equally. Extremities. No edema in lower extremities. Skin. Warm, dry, normal turgor    Condition at time of discharge stable     Medication instructions provided to patient at discharge. Medication List      CONTINUE taking these medications    acetaminophen 500 MG tablet  Commonly known as: TYLENOL     ascorbic acid 250 MG tablet  Commonly known as: VITAMIN C     aspirin 81 MG chewable tablet  Take 1 tablet by mouth daily.      famotidine 40 MG tablet  Commonly known as: PEPCID     furosemide 40 MG tablet Commonly known as: LASIX  Take 1 tablet by mouth daily     insulin glargine 100 UNIT/ML injection vial  Commonly known as: LANTUS     losartan 50 MG tablet  Commonly known as: COZAAR     meloxicam 15 MG tablet  Commonly known as: MOBIC     metoprolol succinate 50 MG extended release tablet  Commonly known as: TOPROL XL     pantoprazole 40 MG tablet  Commonly known as: PROTONIX     potassium chloride 10 MEQ extended release tablet  Commonly known as: KLOR-CON M     pravastatin 40 MG tablet  Commonly known as: PRAVACHOL     VICTOZA SC     vitamin D3 10 MCG (400 UNIT) Tabs tablet  Commonly known as: CHOLECALCIFEROL           Where to Get Your Medications      These medications were sent to [address], [address]  [address] [place] [address]    Phone: [phone]   · furosemide 40 MG tablet         Discharge recommendations given to patient. Follow Up. pcp in 1 week   Disposition. home  Activity. activity as tolerated  Diet: DIET CARB CONTROL;      Spent 45  minutes in discharge process.     Signed:  Christine Marshall MD     4/24/2021 3:23 PM

## 2021-04-24 NOTE — PROGRESS NOTES
Pt given discharge instructions including to f/u with cardiologist at [facility] in 1 week, patient verbalized understanding of discharge instructions, no homecare needs, discharged at 1655.

## 2021-04-24 NOTE — DISCHARGE INSTR - DIET
-Follow a cardiac diet including low sodium 2gram/day and 2 Liter/day fluid restriction, no added salt    -daily weight

## 2021-04-26 NOTE — PROGRESS NOTES
[address]   Cardiac Consultation    Referring Provider:  Frederick Celestin MD     Chief Complaint   Patient presents with    New Patient    Establish Cardiologist    Hypertension    Hyperlipidemia        History of Present Illness:  Beth Xie is a 65 y.o. female with a history of diabetes, hyperlipidemia, and hypertension. She was previously following with Dr. Bob Araiza with [facility].     She reports in March she began with sudden onset exertional shortness of breath, short of breath just walking across the room. She saw her Cardiologist at the time, Echo was ordered, Lasix started, she felt some improvement with Lasix at the time. Over the weekend she began to feel very shortness of breath again, generalized weakness and fatigue and lightheadedness. Shortness of breath was worse lying or minor exertion. Today she feels \"much better\". She feels this is due to CLASEMOVIL coming off\" , weight is back to baseline. Past Medical History:   has a past medical history of Arthritis, Back pain, CHF (congestive heart failure) ([address] Chronic diastolic heart failure ([address] Diabetes mellitus ([address] Diabetes mellitus ([address] GERD (gastroesophageal reflux disease), High cholesterol, Hypertension, Polio, Polio, Shingles, and Urinary problem. Surgical History:   has a past surgical history that includes joint replacement; back surgery; knee surgery; Colonoscopy; other surgical history (01/13/14); hernia repair; joint replacement; and back surgery. Social History:   reports that she has never smoked. She has never used smokeless tobacco. She reports that she does not drink alcohol or use drugs. Family History:  family history includes Cancer in her mother; Diabetes in her sister; Early Death in her father; Heart Disease in her mother. Home Medications:  Prior to Admission medications    Medication Sig Start Date End Date Taking?  Authorizing Provider   Cholecalciferol (VITAMIN D3) 125 MCG (5000 UT) TABS Take by mouth   Yes Historical Provider, MD   meloxicam (MOBIC) 7.5 MG tablet Take 7.5 mg by mouth daily 1/15/21  Yes Historical Provider, MD   spironolactone (ALDACTONE) 25 MG tablet Take 0.5 tablets by mouth daily 4/27/21  Yes Gasper Jarrell MD   furosemide (LASIX) 20 MG tablet Take 1 tablet by mouth daily 4/27/21  Yes Gasper Pill, MD   insulin glargine (LANTUS) 100 UNIT/ML injection vial Inject 28 Units into the skin nightly   Yes Historical Provider, MD   losartan (COZAAR) 50 MG tablet Take 50 mg by mouth daily 4/21/21 10/18/21 Yes Historical Provider, MD   metoprolol succinate (TOPROL XL) 50 MG extended release tablet Take 50 mg by mouth daily 4/21/21  Yes Historical Provider, MD   pantoprazole (PROTONIX) 40 MG tablet Take 40 mg by mouth daily 12/29/20  Yes Historical Provider, MD   ascorbic acid (VITAMIN C) 250 MG tablet Take 250 mg by mouth daily   Yes Historical Provider, MD   famotidine (PEPCID) 40 MG tablet Take 40 mg by mouth 2 times daily   Yes Historical Provider, MD   aspirin 81 MG chewable tablet Take 1 tablet by mouth daily. 2/26/15  Yes Iraida Garcia MD   acetaminophen (TYLENOL) 500 MG tablet Take 500 mg by mouth every 6 hours as needed for Pain. Yes Historical Provider, MD   Liraglutide (VICTOZA SC) Inject 1.8 mg into the skin daily    Yes Historical Provider, MD   pravastatin (PRAVACHOL) 40 MG tablet Take 40 mg by mouth daily. Historical Provider, MD        Allergies:  Adhesive tape     Review of Systems:   · Constitutional: there has been no unanticipated weight loss. There's been no change in energy level, sleep pattern, or activity level. · Eyes: No visual changes or diplopia. No scleral icterus. · ENT: No Headaches, hearing loss or vertigo. No mouth sores or sore throat. · Cardiovascular: Reviewed in HPI  · Respiratory: No cough or wheezing, no sputum production. No hematemesis. · Gastrointestinal: No abdominal pain, appetite loss, blood in stools.  No change Renal panel in 1 month    2. Essential hypertension -controlled-Blood pressure 128/66, pulse 86, resp. rate 20, height 5' 6\" (1.676 m), weight 252 lb (114.3 kg), SpO2 98 %. 3. Hyperlipidemia- Pravastatin 40mg    4. Type 2 diabetes mellitus -controlled- managed by PCP    5. Likely JOSE MANUEL (obstructive sleep apnea) - referral  to sleep medicine -Referral to sleep medicine for suspected sleep apnea- snores, daytime fatigue          Plan:  Rosalva Toussaint has a stable cardiac status. Cardiac test and lab results personally reviewed by me during this office visit and discussed. Decrease Lasix to 20mg daily   Stop Potassium   Start spirolactone 12.5mg daily   Renal panel in 1 month   Follow up in 1 month   Referral to sleep medicine for suspected sleep apnea- snores, daytime fatigue       I appreciate the opportunity of cooperating in the care of this individual.    Werner Mcelroy M.D., [facility]    Patient's problem list, medications, allergies, past medical, surgical, social and family histories were reviewed and updated as appropriate. Scribe's attestation: This note was scribed in the presence of Dr. Arti Mcelroy MD, by Rigo Reddy RN. The scribe's documentation has been prepared under my direction and personally reviewed by me in its entirety. I confirm that the note above accurately reflects all work, treatment, procedures, and medical decision making performed by me.

## 2021-04-27 ENCOUNTER — TELEPHONE (OUTPATIENT)
Dept: CARDIOLOGY CLINIC | Age: 71
End: 2021-04-27

## 2021-04-27 ENCOUNTER — OFFICE VISIT (OUTPATIENT)
Dept: CARDIOLOGY CLINIC | Age: 71
End: 2021-04-27
Payer: MEDICARE

## 2021-04-27 VITALS
RESPIRATION RATE: 20 BRPM | WEIGHT: 252 LBS | HEART RATE: 86 BPM | HEIGHT: 66 IN | DIASTOLIC BLOOD PRESSURE: 66 MMHG | SYSTOLIC BLOOD PRESSURE: 128 MMHG | OXYGEN SATURATION: 98 % | BODY MASS INDEX: 40.5 KG/M2

## 2021-04-27 DIAGNOSIS — I10 ESSENTIAL HYPERTENSION: ICD-10-CM

## 2021-04-27 DIAGNOSIS — Z79.4 TYPE 2 DIABETES MELLITUS WITHOUT COMPLICATION, WITH LONG-TERM CURRENT USE OF INSULIN (HCC): ICD-10-CM

## 2021-04-27 DIAGNOSIS — E78.5 HYPERLIPIDEMIA, UNSPECIFIED HYPERLIPIDEMIA TYPE: ICD-10-CM

## 2021-04-27 DIAGNOSIS — I51.89 DIASTOLIC DYSFUNCTION: Primary | ICD-10-CM

## 2021-04-27 DIAGNOSIS — G47.33 OSA (OBSTRUCTIVE SLEEP APNEA): ICD-10-CM

## 2021-04-27 DIAGNOSIS — E11.9 TYPE 2 DIABETES MELLITUS WITHOUT COMPLICATION, WITH LONG-TERM CURRENT USE OF INSULIN (HCC): ICD-10-CM

## 2021-04-27 PROCEDURE — 99203 OFFICE O/P NEW LOW 30 MIN: CPT | Performed by: INTERNAL MEDICINE

## 2021-04-27 PROCEDURE — 1123F ACP DISCUSS/DSCN MKR DOCD: CPT | Performed by: INTERNAL MEDICINE

## 2021-04-27 PROCEDURE — 4040F PNEUMOC VAC/ADMIN/RCVD: CPT | Performed by: INTERNAL MEDICINE

## 2021-04-27 PROCEDURE — G8399 PT W/DXA RESULTS DOCUMENT: HCPCS | Performed by: INTERNAL MEDICINE

## 2021-04-27 PROCEDURE — G8417 CALC BMI ABV UP PARAM F/U: HCPCS | Performed by: INTERNAL MEDICINE

## 2021-04-27 PROCEDURE — 1036F TOBACCO NON-USER: CPT | Performed by: INTERNAL MEDICINE

## 2021-04-27 PROCEDURE — G8427 DOCREV CUR MEDS BY ELIG CLIN: HCPCS | Performed by: INTERNAL MEDICINE

## 2021-04-27 PROCEDURE — 2022F DILAT RTA XM EVC RTNOPTHY: CPT | Performed by: INTERNAL MEDICINE

## 2021-04-27 PROCEDURE — 1090F PRES/ABSN URINE INCON ASSESS: CPT | Performed by: INTERNAL MEDICINE

## 2021-04-27 PROCEDURE — 3017F COLORECTAL CA SCREEN DOC REV: CPT | Performed by: INTERNAL MEDICINE

## 2021-04-27 PROCEDURE — 3051F HG A1C>EQUAL 7.0%<8.0%: CPT | Performed by: INTERNAL MEDICINE

## 2021-04-27 RX ORDER — FUROSEMIDE 20 MG/1
20 TABLET ORAL DAILY
Qty: 90 TABLET | Refills: 3 | Status: SHIPPED | OUTPATIENT
Start: 2021-04-27 | End: 2022-02-15 | Stop reason: SDUPTHER

## 2021-04-27 RX ORDER — MELOXICAM 7.5 MG/1
7.5 TABLET ORAL DAILY
COMMUNITY
Start: 2021-01-15 | End: 2021-05-26

## 2021-04-27 RX ORDER — SPIRONOLACTONE 25 MG/1
12.5 TABLET ORAL DAILY
Qty: 90 TABLET | Refills: 1 | Status: SHIPPED | OUTPATIENT
Start: 2021-04-27 | End: 2021-09-09

## 2021-04-27 NOTE — PATIENT INSTRUCTIONS
Decrease Lasix to 20mg daily   Stop Potassium   spirolactone 12.5mg daily   Have your labs drawn a few days before your next visit   Follow up in 4-6 weeks       [facility], [address] [facility], [address], [address]   phone [phone] fax [phone]

## 2021-04-27 NOTE — TELEPHONE ENCOUNTER
Pt calling she was in to see LES today and he wants her to see a sleep study Dr and return to see him in 4-6 wks. Pt got a call from Dr Jasmin Bowman office and they shanti her for 08/03/2021 3pm. Pt has an appt with LES 06/08/2021 3pm. Pt thought she was supposed to have the sleep study done before seeing LES back. Is this true? Can LES get Dr Jasmin Bowman appt moved up? Needs to know what to do?  Pls call to advise Thank you

## 2021-05-03 ENCOUNTER — VIRTUAL VISIT (OUTPATIENT)
Dept: PULMONOLOGY | Age: 71
End: 2021-05-03
Payer: MEDICARE

## 2021-05-03 DIAGNOSIS — I10 ESSENTIAL HYPERTENSION: ICD-10-CM

## 2021-05-03 DIAGNOSIS — I50.32 CHRONIC DIASTOLIC HEART FAILURE (HCC): ICD-10-CM

## 2021-05-03 DIAGNOSIS — G47.10 HYPERSOMNOLENCE: Primary | ICD-10-CM

## 2021-05-03 DIAGNOSIS — E11.69 DIABETES MELLITUS TYPE 2 IN OBESE (HCC): ICD-10-CM

## 2021-05-03 DIAGNOSIS — E66.9 DIABETES MELLITUS TYPE 2 IN OBESE (HCC): ICD-10-CM

## 2021-05-03 DIAGNOSIS — R06.83 SNORING: ICD-10-CM

## 2021-05-03 PROCEDURE — 4040F PNEUMOC VAC/ADMIN/RCVD: CPT | Performed by: NURSE PRACTITIONER

## 2021-05-03 PROCEDURE — 3017F COLORECTAL CA SCREEN DOC REV: CPT | Performed by: NURSE PRACTITIONER

## 2021-05-03 PROCEDURE — 1090F PRES/ABSN URINE INCON ASSESS: CPT | Performed by: NURSE PRACTITIONER

## 2021-05-03 PROCEDURE — 2022F DILAT RTA XM EVC RTNOPTHY: CPT | Performed by: NURSE PRACTITIONER

## 2021-05-03 PROCEDURE — G8427 DOCREV CUR MEDS BY ELIG CLIN: HCPCS | Performed by: NURSE PRACTITIONER

## 2021-05-03 PROCEDURE — 3051F HG A1C>EQUAL 7.0%<8.0%: CPT | Performed by: NURSE PRACTITIONER

## 2021-05-03 PROCEDURE — 99204 OFFICE O/P NEW MOD 45 MIN: CPT | Performed by: NURSE PRACTITIONER

## 2021-05-03 PROCEDURE — 1123F ACP DISCUSS/DSCN MKR DOCD: CPT | Performed by: NURSE PRACTITIONER

## 2021-05-03 PROCEDURE — G8399 PT W/DXA RESULTS DOCUMENT: HCPCS | Performed by: NURSE PRACTITIONER

## 2021-05-03 ASSESSMENT — SLEEP AND FATIGUE QUESTIONNAIRES
HOW LIKELY ARE YOU TO NOD OFF OR FALL ASLEEP WHILE SITTING AND READING: 2
HOW LIKELY ARE YOU TO NOD OFF OR FALL ASLEEP WHILE WATCHING TV: 3
HOW LIKELY ARE YOU TO NOD OFF OR FALL ASLEEP WHILE SITTING AND TALKING TO SOMEONE: 0

## 2021-05-03 ASSESSMENT — ENCOUNTER SYMPTOMS
CHEST TIGHTNESS: 1
SHORTNESS OF BREATH: 1
APNEA: 1
CHOKING: 1
COUGH: 1

## 2021-05-03 NOTE — PROGRESS NOTES
Nya Enriquez MD, Arva Duverney, [facility]  Tiffanie Kehrt [facility]  Sleep   [address],   [address]  [address] [place], [address] [phone]   [facility] [address] Letty Kehr. [address] [facility] [address] [phone]     Video Visit- Consult    Pursuant to the emergency declaration under the [facility], [facility] waiver authority and the [employer] Act, this Virtual  Visit was conducted, with patient's consent, to reduce the patient's risk of exposure to COVID-19. Services were provided through a video synchronous discussion virtually to substitute for in-person clinic visit. Patient was located in their home. Assessment:   1. Hypersomnolence  Assessment & Plan:  Needing follow up   Orders:  -     Baseline Diagnostic Sleep Study; Future  2. Essential hypertension  Assessment & Plan:  Chronic- stable. After speaking with patient:    Agree with current plan, and would agree to continue this plan per prescribing and managing physician. Orders:  -     Baseline Diagnostic Sleep Study; Future  3. Diabetes mellitus type 2 in obese [facility])  Assessment & Plan:  Chronic- stable. After speaking with patient:    Agree with current plan, and would agree to continue this plan per prescribing and managing physician. Orders:  -     Baseline Diagnostic Sleep Study; Future  4. Chronic diastolic heart failure (HCC)  Assessment & Plan:  Chronic- stable. After speaking with patient:    Agree with current plan, and would agree to continue this plan per prescribing and managing physician. Orders:  -     Baseline Diagnostic Sleep Study; Future  5. Snoring  Assessment & Plan:  Needing follow up   Orders:  -     Baseline Diagnostic Sleep Study;  Future       Visit Diagnoses and Associated Orders     Hypersomnolence    -  Primary    Baseline Diagnostic Sleep Study [[id number] Custom]   - Future Order Essential hypertension        Baseline Diagnostic Sleep Study [[id number] Custom]   - Future Order         Diabetes mellitus type 2 in obese [facility])        Baseline Diagnostic Sleep Study [[id number] Custom]   - Future Order         Chronic diastolic heart failure [facility])        Baseline Diagnostic Sleep Study [85620 Custom]   - Future Order         Snoring        Baseline Diagnostic Sleep Study [[id number] Custom]   - Future Order                Plan: Will order a formal in lab study and potentially titration     Continue meds for: HTN, DM. CHF. Pt would medically benefit from wt loss for JOSE MANUEL (diet, exercise, surgical). Orders Placed This Encounter   Procedures    Baseline Diagnostic Sleep Study          Subjective:     Patient ID: Denis Ruff is a 65 y.o. female. Chief Complaint   Patient presents with    Snoring       HPI:      Denis Ruff is a 65 y.o. female referred by Prashant Fiore MD for a sleep evaluation. She complains of: snoring, excessive daytime sleepiness , non-restorative sleep, nocturia, waking choking/gasping, snorting awake and tossing and turning at night. She denies: cataplexy and hypnagogic hallucinations. Symptoms began 3 month ago, gradually worsening since that time. Bedtime 8:30-9:30pm     Waking 3-4 am     Napping <60 min     Previous evaluation and treatment has included- none    DOT/CDL - No  FAA/'s license -No    Previous Report(s) Reviewed: historical medical records, office notes, andreferral letter(s). Pertinent data has been documented.     Lejunior - Total score: 9    Caffeine Intake - Coffee: 3 cup(s) per week    Social History     Socioeconomic History    Marital status:      Spouse name: Not on file    Number of children: Not on file    Years of education: Not on file    Highest education level: Not on file   Occupational History    Not on file   Social Needs    Financial resource strain: Not on file    Food insecurity     Worry: Not on file Inability: Not on file    Transportation needs     Medical: Not on file     Non-medical: Not on file   Tobacco Use    Smoking status: Never Smoker    Smokeless tobacco: Never Used   Substance and Sexual Activity    Alcohol use: No    Drug use: No    Sexual activity: Yes     Partners: Male   Lifestyle    Physical activity     Days per week: Not on file     Minutes per session: Not on file    Stress: Not on file   Relationships    Social connections     Talks on phone: Not on file     Gets together: Not on file     Attends Sabianism service: Not on file     Active member of club or organization: Not on file     Attends meetings of clubs or organizations: Not on file     Relationship status: Not on file    Intimate partner violence     Fear of current or ex partner: Not on file     Emotionally abused: Not on file     Physically abused: Not on file     Forced sexual activity: Not on file   Other Topics Concern    Not on file   Social History Narrative    ** Merged History Encounter **             Current Outpatient Medications   Medication Sig Dispense Refill    Cholecalciferol (VITAMIN D3) 125 MCG (5000 UT) TABS Take by mouth      meloxicam (MOBIC) 7.5 MG tablet Take 7.5 mg by mouth daily      spironolactone (ALDACTONE) 25 MG tablet Take 0.5 tablets by mouth daily 90 tablet 1    furosemide (LASIX) 20 MG tablet Take 1 tablet by mouth daily 90 tablet 3    insulin glargine (LANTUS) 100 UNIT/ML injection vial Inject 28 Units into the skin nightly      losartan (COZAAR) 50 MG tablet Take 50 mg by mouth daily      metoprolol succinate (TOPROL XL) 50 MG extended release tablet Take 50 mg by mouth daily      pantoprazole (PROTONIX) 40 MG tablet Take 40 mg by mouth daily      ascorbic acid (VITAMIN C) 250 MG tablet Take 250 mg by mouth daily      famotidine (PEPCID) 40 MG tablet Take 40 mg by mouth 2 times daily      aspirin 81 MG chewable tablet Take 1 tablet by mouth daily.  30 tablet 3    acetaminophen (TYLENOL) 500 MG tablet Take 500 mg by mouth every 6 hours as needed for Pain.  pravastatin (PRAVACHOL) 40 MG tablet Take 40 mg by mouth daily.  Liraglutide (VICTOZA SC) Inject 1.8 mg into the skin daily        No current facility-administered medications for this visit. Allergies as of 05/03/2021 - Review Complete 05/03/2021   Allergen Reaction Noted    Adhesive tape  02/01/2014       Patient Active Problem List   Diagnosis    HTN (hypertension)    High cholesterol    Diabetes mellitus ([address]    Lumbar spinal stenosis    TIA (transient ischemic attack)    Chest pain    Diabetes mellitus type 2 in obese (HCC)    Chronic diastolic heart failure (HCC)    Snoring    Hypersomnolence       Past Medical History:   Diagnosis Date    Arthritis     Back pain     CHF (congestive heart failure) (HCC)     Chronic diastolic heart failure (HCC)     Diabetes mellitus ([address]     Diabetes mellitus ([address]     TYPE 2    GERD (gastroesophageal reflux disease)     High cholesterol     Hypertension     Polio     Polio     AS INFANT    Shingles     OCT 2013    Urinary problem     HAS BASHFUL BLADDER       Past Surgical History:   Procedure Laterality Date    BACK SURGERY      BACK SURGERY      1/13/14    COLONOSCOPY      HERNIA REPAIR      JOINT REPLACEMENT      bilateral knee replacements    JOINT REPLACEMENT      [place] KNEES    KNEE SURGERY      OTHER SURGICAL HISTORY  01/13/14    L4-5 DECOMPRESSION, POSTERIOR LATERAL FUSION AND PEDICLE       Family History   Problem Relation Age of Onset    Heart Disease Mother     Cancer Mother         breast cancer, 5    Early Death Father     Diabetes Sister        Review of Systems   Constitutional: Positive for fatigue. Respiratory: Positive for apnea, cough, choking, chest tightness and shortness of breath. All other systems reviewed and are negative.       Objective:     Vitals:  Weight BMI   Wt Readings from Last 3 Encounters:   04/27/21 252 lb (114.3 kg)   04/24/21 254 lb (115.2 kg)   01/02/14 247 lb 8 oz (112.3 kg)    There is no height or weight on file to calculate BMI. Due to COVID-19 this was a virtual visit and physical exam was deferred.     Electronically signed by BRICE Vazquez CNP on5/3/2021 at 1:38 PM

## 2021-05-14 ENCOUNTER — OFFICE VISIT (OUTPATIENT)
Dept: PRIMARY CARE CLINIC | Age: 71
End: 2021-05-14
Payer: MEDICARE

## 2021-05-14 DIAGNOSIS — Z01.811 PREOP PULMONARY/RESPIRATORY EXAM: Primary | ICD-10-CM

## 2021-05-14 LAB — SARS-COV-2: NOT DETECTED

## 2021-05-14 PROCEDURE — G8428 CUR MEDS NOT DOCUMENT: HCPCS | Performed by: NURSE PRACTITIONER

## 2021-05-14 PROCEDURE — 99211 OFF/OP EST MAY X REQ PHY/QHP: CPT | Performed by: NURSE PRACTITIONER

## 2021-05-14 PROCEDURE — G8417 CALC BMI ABV UP PARAM F/U: HCPCS | Performed by: NURSE PRACTITIONER

## 2021-05-18 ENCOUNTER — HOSPITAL ENCOUNTER (OUTPATIENT)
Dept: SLEEP CENTER | Age: 71
Discharge: HOME | End: 2021-05-18
Payer: MEDICARE

## 2021-05-18 DIAGNOSIS — I50.32 CHRONIC DIASTOLIC HEART FAILURE (HCC): ICD-10-CM

## 2021-05-18 DIAGNOSIS — G47.10 HYPERSOMNOLENCE: ICD-10-CM

## 2021-05-18 DIAGNOSIS — I10 ESSENTIAL HYPERTENSION: ICD-10-CM

## 2021-05-18 DIAGNOSIS — R06.83 SNORING: ICD-10-CM

## 2021-05-18 DIAGNOSIS — E11.69 DIABETES MELLITUS TYPE 2 IN OBESE (HCC): ICD-10-CM

## 2021-05-18 DIAGNOSIS — E66.9 DIABETES MELLITUS TYPE 2 IN OBESE (HCC): ICD-10-CM

## 2021-05-18 PROCEDURE — 95810 POLYSOM 6/> YRS 4/> PARAM: CPT | Performed by: INTERNAL MEDICINE

## 2021-05-18 PROCEDURE — 95810 POLYSOM 6/> YRS 4/> PARAM: CPT

## 2021-05-20 NOTE — PATIENT INSTRUCTIONS
You have received a viral test for COVID-19. Below is education on quarantine per the CDC guidelines. For any symptoms, seek care from your PCP, call [phone] to establish care with a doctor, or go directly to an urgent care or the emergency room. Test results will take 2-7 days and will be sent to you in your [employer] account. If you test positive, you will be contacted via phone. If you test negative, the ONLY communication will be through [address] GO TO [employer] AND SIGN UP FOR [employer]  (LOWER LEFT OF THE HOME PAGE)  No test is 100%. If you have symptoms, you should follow the guidance of quarantine as previously stated. You can still be contagious if you have symptoms. Your [facility] will reach out to you if you have a positive result. They will provide you with a return to work date and note. If you were tested for a pre-op, then you should remain in quarantine until your procedure. How do I know if I need to be in quarantine? If you live in a community where COVID-19 is or might be spreading (currently, that is virtually everywhere in the [place])  Be alert for symptoms. Watch for fever, cough, shortness of breath, or other symptoms of COVID-19.  Take your temperature if symptoms develop.  Practice social distancing. Maintain 6 feet of distance from others and stay out of crowded places.  Follow CDC guidance if symptoms develop. If you feel healthy but:   Recently had close contact with a person with COVID-19 you need to Quarantine:   Stay home until 14 days after your last exposure.  Check your temperature twice a day and watch for symptoms of COVID-19.  If possible, stay away from people who are at higher-risk for getting very sick from COVID-19.   Stay Home and Monitor Your Health if you:   Have been diagnosed with COVID-19, or   Are waiting for test results, or   Have cough, fever, or shortness of breath, or symptoms of COVID-19      When You Can

## 2021-05-21 ENCOUNTER — TELEPHONE (OUTPATIENT)
Dept: PULMONOLOGY | Age: 71
End: 2021-05-21

## 2021-05-21 DIAGNOSIS — G47.33 OSA (OBSTRUCTIVE SLEEP APNEA): Primary | ICD-10-CM

## 2021-05-21 NOTE — TELEPHONE ENCOUNTER
Spoke with the patient to let her know that her PSG showed severe obstructive sleep apnea with low )2 81%. Informed the patient the next step would be to have a formal in lab titration, then we would discuss the options for DME.      Patient states understanding at this time will have the sleep lab call to schedule the titration at this tiim

## 2021-05-24 ENCOUNTER — TELEPHONE (OUTPATIENT)
Dept: SLEEP CENTER | Age: 71
End: 2021-05-24

## 2021-05-24 ENCOUNTER — TELEPHONE (OUTPATIENT)
Dept: PULMONOLOGY | Age: 71
End: 2021-05-24

## 2021-05-24 NOTE — TELEPHONE ENCOUNTER
I just spoke with pt and scheduled cpap sleep study on 6/17 at [place]. She only had a virtual appt thus far with Radha and had PSG done and now is scheduled for CPAP. She would like to set up a f/u appt from this sleep study. She has questions/ wants to know about other routes if she's not wanting to use the machine route / asked about an implant, etc.     Put msg into physicians office to have pt called.

## 2021-05-26 ENCOUNTER — TELEPHONE (OUTPATIENT)
Dept: CARDIOLOGY CLINIC | Age: 71
End: 2021-05-26

## 2021-05-26 ENCOUNTER — HOSPITAL ENCOUNTER (OUTPATIENT)
Age: 71
Discharge: HOME | End: 2021-05-26
Payer: MEDICARE

## 2021-05-26 LAB
ALBUMIN SERPL-MCNC: 4 G/DL (ref 3.4–5)
ANION GAP SERPL CALCULATED.3IONS-SCNC: 12 MMOL/L (ref 3–16)
BUN BLDV-MCNC: 11 MG/DL (ref 7–20)
CALCIUM SERPL-MCNC: 9.1 MG/DL (ref 8.3–10.6)
CHLORIDE BLD-SCNC: 102 MMOL/L (ref 99–110)
CO2: 27 MMOL/L (ref 21–32)
CREAT SERPL-MCNC: 0.8 MG/DL (ref 0.6–1.2)
GFR AFRICAN AMERICAN: >60
GFR NON-AFRICAN AMERICAN: >60
GLUCOSE BLD-MCNC: 121 MG/DL (ref 70–99)
PHOSPHORUS: 3.3 MG/DL (ref 2.5–4.9)
POTASSIUM SERPL-SCNC: 4.2 MMOL/L (ref 3.5–5.1)
SODIUM BLD-SCNC: 141 MMOL/L (ref 136–145)

## 2021-05-26 PROCEDURE — 80069 RENAL FUNCTION PANEL: CPT

## 2021-05-26 PROCEDURE — 36415 COLL VENOUS BLD VENIPUNCTURE: CPT

## 2021-05-26 RX ORDER — MELOXICAM 15 MG/1
7.5 TABLET ORAL DAILY
COMMUNITY

## 2021-05-26 NOTE — TELEPHONE ENCOUNTER
Patient is wanting to go back on the meloxicam or have something else sent into the pharmacy for her arthritic pain. Please see and advise.

## 2021-05-26 NOTE — TELEPHONE ENCOUNTER
Pt asking if she can go back on meloxicam 7.5 mg or some thing else for her arthritis. Tylenol is not working.  Please call to advise

## 2021-05-26 NOTE — TELEPHONE ENCOUNTER
Called and spoke to the patient about the message below and she verbalized understanding. Patient stated that she didn't need a RX because she still has some.

## 2021-06-08 ENCOUNTER — OFFICE VISIT (OUTPATIENT)
Dept: CARDIOLOGY CLINIC | Age: 71
End: 2021-06-08
Payer: MEDICARE

## 2021-06-08 VITALS
BODY MASS INDEX: 41.14 KG/M2 | DIASTOLIC BLOOD PRESSURE: 74 MMHG | HEIGHT: 66 IN | WEIGHT: 256 LBS | SYSTOLIC BLOOD PRESSURE: 146 MMHG | HEART RATE: 84 BPM | OXYGEN SATURATION: 98 % | RESPIRATION RATE: 18 BRPM

## 2021-06-08 DIAGNOSIS — Z79.4 TYPE 2 DIABETES MELLITUS WITHOUT COMPLICATION, WITH LONG-TERM CURRENT USE OF INSULIN (HCC): ICD-10-CM

## 2021-06-08 DIAGNOSIS — E11.9 TYPE 2 DIABETES MELLITUS WITHOUT COMPLICATION, WITH LONG-TERM CURRENT USE OF INSULIN (HCC): ICD-10-CM

## 2021-06-08 DIAGNOSIS — I10 ESSENTIAL HYPERTENSION: ICD-10-CM

## 2021-06-08 DIAGNOSIS — I51.89 DIASTOLIC DYSFUNCTION: Primary | ICD-10-CM

## 2021-06-08 DIAGNOSIS — E78.5 HYPERLIPIDEMIA, UNSPECIFIED HYPERLIPIDEMIA TYPE: ICD-10-CM

## 2021-06-08 DIAGNOSIS — G47.33 OSA (OBSTRUCTIVE SLEEP APNEA): ICD-10-CM

## 2021-06-08 PROCEDURE — 4040F PNEUMOC VAC/ADMIN/RCVD: CPT | Performed by: INTERNAL MEDICINE

## 2021-06-08 PROCEDURE — 1036F TOBACCO NON-USER: CPT | Performed by: INTERNAL MEDICINE

## 2021-06-08 PROCEDURE — 1090F PRES/ABSN URINE INCON ASSESS: CPT | Performed by: INTERNAL MEDICINE

## 2021-06-08 PROCEDURE — G8417 CALC BMI ABV UP PARAM F/U: HCPCS | Performed by: INTERNAL MEDICINE

## 2021-06-08 PROCEDURE — 1123F ACP DISCUSS/DSCN MKR DOCD: CPT | Performed by: INTERNAL MEDICINE

## 2021-06-08 PROCEDURE — G8399 PT W/DXA RESULTS DOCUMENT: HCPCS | Performed by: INTERNAL MEDICINE

## 2021-06-08 PROCEDURE — 3051F HG A1C>EQUAL 7.0%<8.0%: CPT | Performed by: INTERNAL MEDICINE

## 2021-06-08 PROCEDURE — 3017F COLORECTAL CA SCREEN DOC REV: CPT | Performed by: INTERNAL MEDICINE

## 2021-06-08 PROCEDURE — G8427 DOCREV CUR MEDS BY ELIG CLIN: HCPCS | Performed by: INTERNAL MEDICINE

## 2021-06-08 PROCEDURE — 2022F DILAT RTA XM EVC RTNOPTHY: CPT | Performed by: INTERNAL MEDICINE

## 2021-06-08 PROCEDURE — 99214 OFFICE O/P EST MOD 30 MIN: CPT | Performed by: INTERNAL MEDICINE

## 2021-07-16 ENCOUNTER — HOSPITAL ENCOUNTER (OUTPATIENT)
Dept: NEUROLOGY | Age: 71
Discharge: HOME | End: 2021-07-16
Payer: MEDICARE

## 2021-07-16 DIAGNOSIS — M79.605 BILATERAL LEG PAIN: ICD-10-CM

## 2021-07-16 DIAGNOSIS — M79.604 BILATERAL LEG PAIN: ICD-10-CM

## 2021-07-16 PROCEDURE — 95886 MUSC TEST DONE W/N TEST COMP: CPT

## 2021-07-16 PROCEDURE — 95909 NRV CNDJ TST 5-6 STUDIES: CPT

## 2021-07-16 PROCEDURE — 95909 NRV CNDJ TST 5-6 STUDIES: CPT | Performed by: PSYCHIATRY & NEUROLOGY

## 2021-07-16 PROCEDURE — 95886 MUSC TEST DONE W/N TEST COMP: CPT | Performed by: PSYCHIATRY & NEUROLOGY

## 2021-07-16 NOTE — PROCEDURES
upt\[facility]\"" [address]                     [address] [facility], [address]                             ELECTROMYOGRAM REPORT    PATIENT NAME: Shannon Thornton                  :        1950  MED REC NO:   [id number]                          ROOM:  ACCOUNT NO:   [de-identified]                           ADMIT DATE: 2021  PROVIDER:     Lexus Pelletier MD    DATE OF EM2021    REASON FOR EMG:  Bilateral leg pain and numbness, probable peripheral  neuropathy. SUMMARY:  The left peroneal motor nerve study had a low amplitude and  borderline conduction velocity. The left posterior tibial motor had a  low amplitude and a total conduction block between the knee and the  ankle. The right peroneal and posterior tibial motor nerve studies had  low amplitudes and slow conduction velocities. Bilateral superficial  peroneal sensory nerve studies were not recordable. Needle EMG of  several muscles in both lower extremities was normal.    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         Fay Jarvis MD    D: 2021 13:33:55       T: 2021 13:43:19     [email or web address]  Job#: [id number]     Doc#: [id number]    CC:

## 2021-07-22 ENCOUNTER — HOSPITAL ENCOUNTER (OUTPATIENT)
Dept: SLEEP CENTER | Age: 71
Discharge: HOME | End: 2021-07-22
Payer: MEDICARE

## 2021-07-22 DIAGNOSIS — G47.33 OSA (OBSTRUCTIVE SLEEP APNEA): ICD-10-CM

## 2021-07-22 PROCEDURE — 95811 POLYSOM 6/>YRS CPAP 4/> PARM: CPT

## 2021-07-27 PROCEDURE — 95811 POLYSOM 6/>YRS CPAP 4/> PARM: CPT | Performed by: INTERNAL MEDICINE

## 2021-07-28 ENCOUNTER — TELEPHONE (OUTPATIENT)
Dept: PULMONOLOGY | Age: 71
End: 2021-07-28

## 2021-07-28 NOTE — PROGRESS NOTES
Cecy Mayberry         : 1950    Diagnosis: [x] JOSE MANUEL (G47.33) [] CSA (G47.31) [] Apnea (G47.30)   Length of Need: [x] 12 Months [] 99 Months [] Other:    Machine (APARNA!): [] Respironics Dream Station   2   Auto [] ResMed AirSense     Auto [] Other:     []  CPAP () [] Bilevel ()   Mode: [] Auto [] Spontaneous    Mode: [] Auto [] Spontaneous      APAP - Settings  Pressure Min: 13 cmH2O  Pressure Max: 20 cmH2O  APAP - Settings  Pressure Min: 13 cmH2O  Pressure Max: 20 cmH2O             Comfort Settings:   - Ramp Pressure:  cmH2O                                        - Ramp time: 15 min                                     -  Flex/EPR - 3 full time                                    - For ResMed Bilevel (TiMax-4 sec   TiMin- 0.2 sec)     Humidifier: [x] Heated ()        [x] Water chamber replacement ()/ 1 per 6 months        Mask:   [] Nasal () /1 per 3 months [x] Full Face () /1 per 3 months   [] Patient choice -Size and fit mask [x] Patient Choice - Size and fit mask   [] Dispense:  [] Dispense:    [] Headgear () / 1 per 3 months [x] Headgear () / 1 per 3 months   [] Replacement Nasal Cushion ()/2 per month [x] Interface Replacement ()/1 per month   [] Replacement Nasal Pillows ()/2 per month         Tubing: [x] Heated ()/1 per 3 months    [] Standard ()/1 per 3 months [] Other:           Filters: [x] Non-disposable ()/1 per 6 months     [x] Ultra-Fine, Disposable ()/2 per month        Miscellaneous: [] Chin Strap ()/ 1 per 6 months [] O2 bleed-in:       LPM   [] Oximetry on CPAP/Bilevel []  Other:    [x] Modem: ()         Start Order Date: 21    MEDICAL JUSTIFICATION:  I, the undersigned, certify that the above prescribed supplies are medically necessary for this patients wellbeing.   In my opinion, the supplies are both reasonable and necessary in reference to accepted standards of medicalpractice in treatment of this patients condition. BRICE Barrios CNP      NPI: [id number]       Order Signed Date: 21    Electronically signed by BRICE Barrios CNP on 2021 at 12:06 PM    Modesta Coffman  1950  [address] [place] [address]  [phone] (home)   [phone] (mobile)      Insurance Info (confirm with patient if correct):  Payor/Plan Subscr  Sex Relation Sub.  Ins. ID Effective Group Num

## 2021-07-28 NOTE — PROGRESS NOTES
Adriana Lay         : 1950    Diagnosis: [x] JOSE MANUEL (G47.33) [] CSA (G47.31) [] Apnea (G47.30)   Length of Need: [x] 12 Months [] 99 Months [] Other:    Machine (APARNA!): [] Respironics Dream Station   2   Auto [x] ResMed AirSense     Auto [] Other:     [x]  CPAP () [] Bilevel ()   Mode: [x] Auto [] Spontaneous    Mode: [] Auto [] Spontaneous      APAP - Settings  Pressure Min: 13 cmH2O  Pressure Max: 20 cmH2O  APAP - Settings  Pressure Min: 13 cmH2O  Pressure Max: 20 cmH2O             Comfort Settings:   - Ramp Pressure: 6 cmH2O                                        - Ramp time: 15 min                                     -  Flex/EPR - 3 full time                                    - For ResMed Bilevel (TiMax-4 sec   TiMin- 0.2 sec)     Humidifier: [x] Heated ()        [x] Water chamber replacement ()/ 1 per 6 months        Mask:   [] Nasal () /1 per 3 months [] Full Face () /1 per 3 months   [] Patient choice -Size and fit mask [] Patient Choice - Size and fit mask   [] Dispense:  [] Dispense:    [] Headgear () / 1 per 3 months [] Headgear () / 1 per 3 months   [] Replacement Nasal Cushion ()/2 per month [] Interface Replacement ()/1 per month   [] Replacement Nasal Pillows ()/2 per month         Tubing: [x] Heated ()/1 per 3 months    [] Standard ()/1 per 3 months [] Other:           Filters: [x] Non-disposable ()/1 per 6 months     [x] Ultra-Fine, Disposable ()/2 per month        Miscellaneous: [] Chin Strap ()/ 1 per 6 months [] O2 bleed-in:       LPM   [] Oximetry on CPAP/Bilevel []  Other:    [x] Modem: ()         Start Order Date: 21    MEDICAL JUSTIFICATION:  I, the undersigned, certify that the above prescribed supplies are medically necessary for this patients wellbeing.   In my opinion, the supplies are both reasonable and necessary in reference to accepted standards of medicalpractice in treatment of this patients condition. BRICE Mccord CNP      NPI: [id number]       Order Signed Date: 21    Electronically signed by BRICE Mccord CNP on 2021 at 12:06 PM    Galen Kam  1950  [address] [place] [address]  [phone] (home)   [phone] (mobile)      Insurance Info (confirm with patient if correct):  Payor/Plan Subscr  Sex Relation Sub.  Ins. ID Effective Group Num

## 2021-07-28 NOTE — PROGRESS NOTES
Hortencia Shelter         : 1950    Diagnosis: [x] JOSE MANUEL (G47.33) [] CSA (G47.31) [] Apnea (G47.30)   Length of Need: [x] 12 Months [] 99 Months [] Other:    Machine (APARNA!): [] Respironics Dream Station   2   Auto [] ResMed AirSense     Auto [] Other:     []  CPAP () [] Bilevel ()   Mode: [] Auto [] Spontaneous    Mode: [] Auto [] Spontaneous      APAP - Settings  Pressure Min: 13 cmH2O  Pressure Max: 20 cmH2O  APAP - Settings  Pressure Min: 13 cmH2O  Pressure Max: 20 cmH2O             Comfort Settings:   - Ramp Pressure:  cmH2O                                        - Ramp time: 15 min                                     -  Flex/EPR - 3 full time                                    - For ResMed Bilevel (TiMax-4 sec   TiMin- 0.2 sec)     Humidifier: [x] Heated ()        [x] Water chamber replacement ()/ 1 per 6 months        Mask:   [x] Nasal () /1 per 3 months [] Full Face () /1 per 3 months   [x] Patient choice -Size and fit mask [] Patient Choice - Size and fit mask   [] Dispense:  [] Dispense:    [x] Headgear () / 1 per 3 months [] Headgear () / 1 per 3 months   [x] Replacement Nasal Cushion ()/2 per month [] Interface Replacement ()/1 per month   [] Replacement Nasal Pillows ()/2 per month         Tubing: [x] Heated ()/1 per 3 months    [] Standard ()/1 per 3 months [] Other:           Filters: [x] Non-disposable ()/1 per 6 months     [x] Ultra-Fine, Disposable ()/2 per month        Miscellaneous: [] Chin Strap ()/ 1 per 6 months [] O2 bleed-in:       LPM   [] Oximetry on CPAP/Bilevel []  Other:    [x] Modem: ()         Start Order Date: 21    MEDICAL JUSTIFICATION:  I, the undersigned, certify that the above prescribed supplies are medically necessary for this patients wellbeing.   In my opinion, the supplies are both reasonable and necessary in reference to accepted standards of medicalpractice in treatment of this patients condition. BRICE Buckley CNP      NPI: [id number]       Order Signed Date: 21    Electronically signed by BRICE Buckley CNP on 2021 at 12:06 PM    Cecy Mayberry  1950  [address] [place], [place] [address]  [phone] (home)   [phone] (mobile)      Insurance Info (confirm with patient if correct):  Payor/Plan Subscr  Sex Relation Sub.  Ins. ID Effective Group Num

## 2021-07-28 NOTE — TELEPHONE ENCOUNTER
Called patient to let her know that we have all of our results at this time.     LMOM    Will send orders once the patient returns the call with a DME choice     Scheduled follow up also needed

## 2021-07-28 NOTE — PROGRESS NOTES
Adriana Lay         : 1950    Diagnosis: [x] JOSE MANUEL (G47.33) [] CSA (G47.31) [] Apnea (G47.30)   Length of Need: [x] 12 Months [] 99 Months [] Other:    Machine (APARNA!): [] Respironics Dream Station   2   Auto [] ResMed AirSense     Auto [] Other:     []  CPAP () [] Bilevel ()   Mode: [] Auto [] Spontaneous    Mode: [] Auto [] Spontaneous      APAP - Settings  Pressure Min: 13 cmH2O  Pressure Max: 20 cmH2O  APAP - Settings  Pressure Min: 13 cmH2O  Pressure Max: 20 cmH2O             Comfort Settings:   - Ramp Pressure:  cmH2O                                        - Ramp time: 15 min                                     -  Flex/EPR - 3 full time                                    - For ResMed Bilevel (TiMax-4 sec   TiMin- 0.2 sec)     Humidifier: [x] Heated ()        [x] Water chamber replacement ()/ 1 per 6 months        Mask:   [x] Nasal () /1 per 3 months [] Full Face () /1 per 3 months   [x] Patient choice -Size and fit mask [] Patient Choice - Size and fit mask   [] Dispense:  [] Dispense:    [x] Headgear () / 1 per 3 months [] Headgear () / 1 per 3 months   [] Replacement Nasal Cushion ()/2 per month [] Interface Replacement ()/1 per month   [x] Replacement Nasal Pillows ()/2 per month         Tubing: [x] Heated ()/1 per 3 months    [] Standard ()/1 per 3 months [] Other:           Filters: [x] Non-disposable ()/1 per 6 months     [x] Ultra-Fine, Disposable ()/2 per month        Miscellaneous: [] Chin Strap ()/ 1 per 6 months [] O2 bleed-in:       LPM   [] Oximetry on CPAP/Bilevel []  Other:    [x] Modem: ()         Start Order Date: 21    MEDICAL JUSTIFICATION:  I, the undersigned, certify that the above prescribed supplies are medically necessary for this patients wellbeing.   In my opinion, the supplies are both reasonable and necessary in reference to accepted standards of medicalpractice in treatment of this patients condition. BRICE Garcia CNP      NPI: [id number]       Order Signed Date: 21    Electronically signed by BRICE Garcia CNP on 2021 at 12:06 PM    Marcos Snyder  1950  [address] [place], [place] [id number]  [phone] (home)   [phone] (mobile)      Insurance Info (confirm with patient if correct):  Payor/Plan Subscr  Sex Relation Sub.  Ins. ID Effective Group Num

## 2021-07-29 ENCOUNTER — TELEPHONE (OUTPATIENT)
Dept: PULMONOLOGY | Age: 71
End: 2021-07-29

## 2021-07-29 NOTE — TELEPHONE ENCOUNTER
Patients DME has called regarding order done on (7-28-21). Wants to know if order is just for supplies or a machine. Call Back# ([phone]). Carlos Eduardo Tavera). Fax# ([phone]).

## 2021-07-29 NOTE — TELEPHONE ENCOUNTER
Patient called and choose [facility] in [place]. Order, study, OV, demographics, and insurance were faxed. Scheduled patient for f/u appointment. Patient instructed to call if she doesn't receive machine within a month.

## 2021-07-30 NOTE — TELEPHONE ENCOUNTER
Clarified with [facility] that they did not have an order for a machine. Efaxed order for machine to fax number given.

## 2021-09-02 NOTE — PROGRESS NOTES
[facility]   Cardiac Consultation    Referring Provider:  Linda Hall MD     Chief Complaint   Patient presents with    3 Month Follow-Up    Hypertension    Hyperlipidemia        History of Present Illness:  Abena Mcfarlane is a 66 y.o. female with a history of diabetes, hyperlipidemia, and hypertension. She was previously following with Dr. Satnam Mendoza with [facility] is feeling good today. She is working on Hybrente with low salt. Her swelling has improved. She says she has never received her cpap machine and has not contacted her regarding the CPAP machine. She is now sleeping in a bed that elevates and is feeling good. Past Medical History:   has a past medical history of Arthritis, Back pain, CHF (congestive heart failure) ([facility] [address] Chronic diastolic heart failure ([facility] [address] Diabetes mellitus ([facility] [address] Diabetes mellitus ([facility] [address] GERD (gastroesophageal reflux disease), High cholesterol, Hypertension, Polio, Polio, Shingles, and Urinary problem. Surgical History:   has a past surgical history that includes joint replacement; back surgery; knee surgery; Colonoscopy; other surgical history (01/13/14); hernia repair; joint replacement; and back surgery. Social History:   reports that she has never smoked. She has never used smokeless tobacco. She reports that she does not drink alcohol and does not use drugs. Family History:  family history includes Cancer in her mother; Diabetes in her sister; Early Death in her father; Heart Disease in her mother. Home Medications:  Prior to Admission medications    Medication Sig Start Date End Date Taking?  Authorizing Provider   Semaglutide,0.25 or 0.5MG/DOS, (OZEMPIC, 0.25 OR 0.5 MG/DOSE,) 2 MG/1.5ML SOPN 4 samples boxes given   3- [id number]  2-[id number] 7/1/21  Yes Historical Provider, MD   meloxicam (MOBIC) 15 MG tablet Take 7.5 mg by mouth daily   Yes Historical Provider, MD   Cholecalciferol (VITAMIN D3) 125 MCG (5000 UT) TABS Take by mouth   Yes Historical Provider, MD   spironolactone (ALDACTONE) 25 MG tablet Take 0.5 tablets by mouth daily 4/27/21  Yes Kevin Gallego MD   furosemide (LASIX) 20 MG tablet Take 1 tablet by mouth daily 4/27/21  Yes Kevin Gallego MD   insulin glargine (LANTUS) 100 UNIT/ML injection vial Inject 28 Units into the skin nightly   Yes Historical Provider, MD   losartan (COZAAR) 50 MG tablet Take 50 mg by mouth daily 4/21/21 10/18/21 Yes Historical Provider, MD   metoprolol succinate (TOPROL XL) 50 MG extended release tablet Take 50 mg by mouth daily 4/21/21  Yes Historical Provider, MD   pantoprazole (PROTONIX) 40 MG tablet Take 40 mg by mouth daily 12/29/20  Yes Historical Provider, MD   ascorbic acid (VITAMIN C) 250 MG tablet Take 250 mg by mouth daily   Yes Historical Provider, MD   famotidine (PEPCID) 40 MG tablet Take 40 mg by mouth daily    Yes Historical Provider, MD   aspirin 81 MG chewable tablet Take 1 tablet by mouth daily. 2/26/15  Yes Eliza Mathias MD   acetaminophen (TYLENOL) 500 MG tablet Take 500 mg by mouth every 6 hours as needed for Pain. Yes Historical Provider, MD        Allergies:  Adhesive tape     Review of Systems:   · Constitutional: there has been no unanticipated weight loss. There's been no change in energy level, sleep pattern, or activity level. · Eyes: No visual changes or diplopia. No scleral icterus. · ENT: No Headaches, hearing loss or vertigo. No mouth sores or sore throat. · Cardiovascular: Reviewed in HPI  · Respiratory: No cough or wheezing, no sputum production. No hematemesis. · Gastrointestinal: No abdominal pain, appetite loss, blood in stools. No change in bowel or bladder habits. · Genitourinary: No dysuria, trouble voiding, or hematuria. · Musculoskeletal:  No gait disturbance, weakness or joint complaints. · Integumentary: No rash or pruritis. · Neurological: No headache, diplopia, change in muscle strength, numbness or tingling.  No change in gait, balance, coordination, mood, affect, memory, mentation, behavior. · Psychiatric: No anxiety, no depression. · Endocrine: No malaise, fatigue or temperature intolerance. No excessive thirst, fluid intake, or urination. No tremor. · Hematologic/Lymphatic: No abnormal bruising or bleeding, blood clots or swollen lymph nodes. · Allergic/Immunologic: No nasal congestion or hives. Physical Examination:    Vitals:    09/09/21 1429   BP: (!) 146/64   Pulse: 86   SpO2: 98%        Wt Readings from Last 1 Encounters:   09/09/21 255 lb (115.7 kg)       Constitutional and General Appearance: NAD      Skin:good turgor,intact without lesions  HEENT: EOMI ,normal  Neck:no JVD    Respiratory:  · Normal excursion and expansion without use of accessory muscles  · Resp Auscultation: Normal breath sounds without dullness  Cardiovascular:  · The apical impulses not displaced  · Heart tones are crisp and normal  · Cervical veins are not engorged  · The carotid upstroke is normal in amplitude and contour without delay or bruit  · Peripheral pulses are symmetrical and full  · There is no clubbing, cyanosis of the extremities. · Mild BLE   · Femoral Arteries: 2+ and equal  · Pedal Pulses: 2+ and equal   Abdomen:  · No masses or tenderness  · Liver/Spleen: No Abnormalities Noted  Neurological/Psychiatric:  · Alert and oriented in all spheres  · Moves all extremities well  · Exhibits normal gait balance and coordination  · No abnormalities of mood, affect, memory, mentation, or behavior are noted      Assessment/Plan:    1. Diastolic dysfunction -Decrease Lasix to 20mg daily   Stop Potassium   Start spironolactone 12.5mg daily   No med changes today    2. Essential hypertension -controlled-Blood pressure (!) 146/64, pulse 86, height 5' 6\" (1.676 m), weight 255 lb (115.7 kg), SpO2 98 %. 3. Hyperlipidemia- Pravastatin 40mg   1/2021   HDL 55   8/2021  TG 89 HDL 49    4.  Type 2 diabetes mellitus -controlled- managed by PCP    5. JOSE MANUEL (obstructive sleep apnea) -  Will communicate with sleep medicine to assist in getting her machine. Plan:  Marc Waldron has a stable cardiac status. Cardiac test and lab results personally reviewed by me during this office visit and discussed. Will contact Dr Jeremy Costa office to get cpap  with patient  Increase spironolactone to 25 mg  Follow up in 6 months     I appreciate the opportunity of cooperating in the care of this individual.    Brandi Argueta M.D., [facility]    Patient's problem list, medications, allergies, past medical, surgical, social and family histories were reviewed and updated as appropriate. Scribe's Attestation: This note was scribed in the presence of Dr. Radha Coretz MD by Judy Joy RN. 09/09/21     The scribe's documentation has been prepared under my direction and personally reviewed by me in its entirety. I confirm that the note above accurately reflects all work, treatment, procedures, and medical decision making performed by me.

## 2021-09-09 ENCOUNTER — TELEPHONE (OUTPATIENT)
Dept: PULMONOLOGY | Age: 71
End: 2021-09-09

## 2021-09-09 ENCOUNTER — OFFICE VISIT (OUTPATIENT)
Dept: CARDIOLOGY CLINIC | Age: 71
End: 2021-09-09
Payer: MEDICARE

## 2021-09-09 VITALS
HEIGHT: 66 IN | HEART RATE: 86 BPM | SYSTOLIC BLOOD PRESSURE: 146 MMHG | BODY MASS INDEX: 40.98 KG/M2 | OXYGEN SATURATION: 98 % | DIASTOLIC BLOOD PRESSURE: 64 MMHG | WEIGHT: 255 LBS

## 2021-09-09 DIAGNOSIS — I10 ESSENTIAL HYPERTENSION: Primary | ICD-10-CM

## 2021-09-09 DIAGNOSIS — G47.33 OSA (OBSTRUCTIVE SLEEP APNEA): ICD-10-CM

## 2021-09-09 PROCEDURE — G8427 DOCREV CUR MEDS BY ELIG CLIN: HCPCS | Performed by: INTERNAL MEDICINE

## 2021-09-09 PROCEDURE — 1090F PRES/ABSN URINE INCON ASSESS: CPT | Performed by: INTERNAL MEDICINE

## 2021-09-09 PROCEDURE — 1123F ACP DISCUSS/DSCN MKR DOCD: CPT | Performed by: INTERNAL MEDICINE

## 2021-09-09 PROCEDURE — G8417 CALC BMI ABV UP PARAM F/U: HCPCS | Performed by: INTERNAL MEDICINE

## 2021-09-09 PROCEDURE — 99214 OFFICE O/P EST MOD 30 MIN: CPT | Performed by: INTERNAL MEDICINE

## 2021-09-09 PROCEDURE — 4040F PNEUMOC VAC/ADMIN/RCVD: CPT | Performed by: INTERNAL MEDICINE

## 2021-09-09 PROCEDURE — 3017F COLORECTAL CA SCREEN DOC REV: CPT | Performed by: INTERNAL MEDICINE

## 2021-09-09 PROCEDURE — G8399 PT W/DXA RESULTS DOCUMENT: HCPCS | Performed by: INTERNAL MEDICINE

## 2021-09-09 PROCEDURE — 1036F TOBACCO NON-USER: CPT | Performed by: INTERNAL MEDICINE

## 2021-09-09 RX ORDER — SPIRONOLACTONE 25 MG/1
25 TABLET ORAL DAILY
Qty: 90 TABLET | Refills: 3 | Status: SHIPPED | OUTPATIENT
Start: 2021-09-09

## 2021-09-09 RX ORDER — SEMAGLUTIDE 1.34 MG/ML
INJECTION, SOLUTION SUBCUTANEOUS WEEKLY
COMMUNITY
Start: 2021-07-01

## 2021-09-09 NOTE — TELEPHONE ENCOUNTER
Rep from [facility] to call patient. Called [facility] and canceled setup.

## 2021-09-09 NOTE — TELEPHONE ENCOUNTER
Patient called back and would like to have her order sent to a DME company that will come to her home for a set up and she lives in [facility]. Will discuss with RT to see if she is aware of any.

## 2021-09-09 NOTE — TELEPHONE ENCOUNTER
Order will be sent to [address] [place] [address] so patient can in-home set up. Will call rep to make him aware of order.

## 2021-09-09 NOTE — PATIENT INSTRUCTIONS
Dr Parth Almendarez office  [phone] - Dr Angie Vanegas will contact that office to hopefully get your machine to you soon.   Increase spironolactone to 25 mg (1 tab)  Follow up in 6 months

## 2021-09-09 NOTE — TELEPHONE ENCOUNTER
Notified by cardiology that patient was having issues with her CPAP machine. Called and left a message for patient to call office so we could help her with any issues.   [phone]

## 2021-11-02 ENCOUNTER — TELEPHONE (OUTPATIENT)
Dept: PULMONOLOGY | Age: 71
End: 2021-11-02

## 2021-11-17 ENCOUNTER — VIRTUAL VISIT (OUTPATIENT)
Dept: PULMONOLOGY | Age: 71
End: 2021-11-17
Payer: MEDICARE

## 2021-11-17 DIAGNOSIS — I10 PRIMARY HYPERTENSION: ICD-10-CM

## 2021-11-17 DIAGNOSIS — G47.33 OSA ON CPAP: Primary | ICD-10-CM

## 2021-11-17 DIAGNOSIS — E11.69 DIABETES MELLITUS TYPE 2 IN OBESE (HCC): ICD-10-CM

## 2021-11-17 DIAGNOSIS — E66.9 DIABETES MELLITUS TYPE 2 IN OBESE (HCC): ICD-10-CM

## 2021-11-17 DIAGNOSIS — Z99.89 OSA ON CPAP: Primary | ICD-10-CM

## 2021-11-17 DIAGNOSIS — I50.32 CHRONIC DIASTOLIC HEART FAILURE (HCC): ICD-10-CM

## 2021-11-17 PROCEDURE — 4040F PNEUMOC VAC/ADMIN/RCVD: CPT | Performed by: NURSE PRACTITIONER

## 2021-11-17 PROCEDURE — 3017F COLORECTAL CA SCREEN DOC REV: CPT | Performed by: NURSE PRACTITIONER

## 2021-11-17 PROCEDURE — G8427 DOCREV CUR MEDS BY ELIG CLIN: HCPCS | Performed by: NURSE PRACTITIONER

## 2021-11-17 PROCEDURE — 1090F PRES/ABSN URINE INCON ASSESS: CPT | Performed by: NURSE PRACTITIONER

## 2021-11-17 PROCEDURE — 3051F HG A1C>EQUAL 7.0%<8.0%: CPT | Performed by: NURSE PRACTITIONER

## 2021-11-17 PROCEDURE — G8399 PT W/DXA RESULTS DOCUMENT: HCPCS | Performed by: NURSE PRACTITIONER

## 2021-11-17 PROCEDURE — 1123F ACP DISCUSS/DSCN MKR DOCD: CPT | Performed by: NURSE PRACTITIONER

## 2021-11-17 PROCEDURE — 2022F DILAT RTA XM EVC RTNOPTHY: CPT | Performed by: NURSE PRACTITIONER

## 2021-11-17 PROCEDURE — 99214 OFFICE O/P EST MOD 30 MIN: CPT | Performed by: NURSE PRACTITIONER

## 2021-11-17 ASSESSMENT — SLEEP AND FATIGUE QUESTIONNAIRES
HOW LIKELY ARE YOU TO NOD OFF OR FALL ASLEEP WHILE SITTING QUIETLY AFTER LUNCH WITHOUT ALCOHOL: 0
HOW LIKELY ARE YOU TO NOD OFF OR FALL ASLEEP WHILE LYING DOWN TO REST IN THE AFTERNOON WHEN CIRCUMSTANCES PERMIT: 0
HOW LIKELY ARE YOU TO NOD OFF OR FALL ASLEEP WHILE SITTING AND TALKING TO SOMEONE: 0
HOW LIKELY ARE YOU TO NOD OFF OR FALL ASLEEP WHEN YOU ARE A PASSENGER IN A CAR FOR AN HOUR WITHOUT A BREAK: 0
ESS TOTAL SCORE: 0
HOW LIKELY ARE YOU TO NOD OFF OR FALL ASLEEP WHILE WATCHING TV: 0
HOW LIKELY ARE YOU TO NOD OFF OR FALL ASLEEP WHILE SITTING AND READING: 0
HOW LIKELY ARE YOU TO NOD OFF OR FALL ASLEEP IN A CAR, WHILE STOPPED FOR A FEW MINUTES IN TRAFFIC: 0
HOW LIKELY ARE YOU TO NOD OFF OR FALL ASLEEP WHILE SITTING INACTIVE IN A PUBLIC PLACE: 0

## 2021-11-17 NOTE — PROGRESS NOTES
Freddy Campbell         : 1950    Diagnosis: [x] JOSE MANUEL (G47.33) [] CSA (G47.31) [] Apnea (G47.30)   Length of Need: [x] 12 Months [] 99 Months [] Other:    Machine (APARNA!): [] Respironics Dream Station   2   Auto [] ResMed AirSense     Auto [] Other:     []  CPAP () [] Bilevel ()   Mode: [] Auto [] Spontaneous    Mode: [] Auto [] Spontaneous            Comfort Settings:   - Ramp Pressure:  cmH2O                                        - Ramp time: 15 min                                     -  Flex/EPR - 3 full time                                    - For ResMed Bilevel (TiMax-4 sec   TiMin- 0.2 sec)     Humidifier: [x] Heated ()        [x] Water chamber replacement ()/ 1 per 6 months        Mask:   [] Nasal () /1 per 3 months [x] Full Face () /1 per 3 months   [] Patient choice -Size and fit mask [x] Patient Choice - Size and fit mask   [] Dispense:  [] Dispense:    [] Headgear () / 1 per 3 months [x] Headgear () / 1 per 3 months   [] Replacement Nasal Cushion ()/2 per month [x] Interface Replacement ()/1 per month   [] Replacement Nasal Pillows ()/2 per month         Tubing: [x] Heated ()/1 per 3 months    [] Standard ()/1 per 3 months [] Other:           Filters: [x] Non-disposable ()/1 per 6 months     [x] Ultra-Fine, Disposable ()/2 per month        Miscellaneous: [] Chin Strap ()/ 1 per 6 months [] O2 bleed-in:       LPM   [] Oximetry on CPAP/Bilevel []  Other:    [x] Modem: ()         Start Order Date: 21    MEDICAL JUSTIFICATION:  I, the undersigned, certify that the above prescribed supplies are medically necessary for this patients wellbeing. In my opinion, the supplies are both reasonable and necessary in reference to accepted standards of medicalpractice in treatment of this patients condition.     BRICE Quintero CNP      NPI: [id number]       Order Signed Date: 21    Electronically signed by Mary Garcia, APRN - CNP on 2021 at 11:30 AM    Gina Diaz  1950  [address] [place] [address]  [phone] (home)   [phone] (mobile)      Insurance Info (confirm with patient if correct):  Payor/Plan Subscr  Sex Relation Sub.  Ins. ID Effective Group Num

## 2021-11-17 NOTE — PROGRESS NOTES
Jorge Crabtree MD, FAASM, [facility]  Eddi Martinez, MSN, RN, [address] [facility] [phone]  Dept: [phone]  Dept Fax: [phone]  Loc: [phone]    Subjective:     Patient ID: Krzysztof Billy is a 66 y.o. female. Chief Complaint   Patient presents with    Sleep Apnea       HPI:      Sleep Medicine Video Visit    Pursuant to the emergency declaration under the [facility] [place] [address] [facility], [facility] authority and the [employer] Act this Telephone Visit was insisted, with patient's consent, to reduce the patient's risk of exposure to COVID-19 and provide continuity of care for an established patient. Services were provided through a synchronous discussion over a telephone and/or Video chat to substitute for in-person clinic visit, and coded as such. While patient is at home.     Machine Modem/Download Info:  Compliance (hours/night): 5.5 hrs/night  Download AHI (/hour): 1.8 /HR  Average CPAP Pressure : 15.3 cmH2O           APAP - Settings  Pressure Min: 13 cmH2O  Pressure Max: 20 cmH2O                 Comfort Settings  Flex/EPR (0-3): 3 PAP Mask  Mask Type: Full Face mask  Clinically Relevant Leak: No     [place] - Total score: 0    Follow-up :     Last Visit : May 2021      Subjective Health Changes: None      Over Night Oximetry: [] Yes  [] No  [x] NA [] WNL   Using O2: [] Yes  [] No  [x] NA   Patient is compliant with the machine  [x] Yes  [] No [] Per patient   Feeling rested when using the machine   [x] Yes  [] No     Pressure is comfortable with inspiration and expiration  [x] Yes  [] No   ([x] NA   [] Feeling of suffocation  [] Feeling like not enough air    [] To much pressure)     Noticed changes in pressure  [x] NA  [] Yes    [] No     Mask is fitting well  [x] Yes  [] No   Noting Mask Air Leak  [] Yes  [x] No   Having painful Aerophagia  [] Yes  [x] No   Nocturia   0-1  per night. Having  HA upon waking  [] Yes  [x] No   Dry mouth upon waking   Dry Nose  Dry Eyes  [] Yes  [x] No   Congestion upon waking   [] Yes  [x] No    Nose Bleeds  [] Yes  [x] No   Using Sleep Aides  [x] NA  [] OTC  [] Per our office   [] Per another provider   Understands how to change humidification and/or tubing temperature for comfort while at home  [x] Yes  [] No     Difficulties falling asleep  [] Yes  [x] No   Difficulties staying asleep  [] Yes  [x] No   Approximate time to bed  9:15pm   Approximate wake time  6:30am   Taking Naps  no   If taking naps usual length  [x] NA   If taking naps using the machine [x] NA  [] Yes    [] No    [] With and With out    Drowsy when driving  [] Yes  [x] No     Does patient carry a DOT/CDL  [] Yes  [x] No     Does patient carry FAA/[employer] License   [] Yes  [x] No      Any concerns noted with the machine at this time  [] Yes  [x] No       Assessment/Plan:   1. JOSE MANUEL on CPAP  Assessment & Plan:  After downloading data and reviewing  Reviewed compliance download with pt. Supplies and parts as needed for his machine. These are medically necessary. Continue medications per his PCP and other physicians. Limit caffeine use after 3pm.    The chronic medical conditions listed are directly related to the primary diagnosis listed above. The management of the primary diagnosis affects the secondary diagnosis and vice versa    Patient is complaint encouraged to maintain compliance to aide on controlling other stated healthcare concerns. 2. Primary hypertension  Assessment & Plan:  Chronic- stable. After speaking with patient:    Agree with current plan, and would agree to continue this plan per prescribing and managing physician. 3. Diabetes mellitus type 2 in obese [facility])  Assessment & Plan:  Chronic- stable.       After speaking with patient:    Agree with current plan, and would agree to continue this plan per prescribing and managing physician. 4. Chronic diastolic heart failure (HCC)  Assessment & Plan:  Chronic- stable. After speaking with patient:    Agree with current plan, and would agree to continue this plan per prescribing and managing physician. - After pulling data and reviewing it   - Reviewed compliance download with patient    -Medically necessary supplies and parts as needed for her machine.   - Continue medications per his primary care provider and other physicians.   - Encouraged to limit caffeine use after 3pm.    -  Encouraged her to work on weight loss through diet and exercise  - Educated not to drive when feeling sleepy   - Patient [address] [facility]  Compliance  Follow up  The risk of undercontrolling Obstructive sleep apnea  After speaking to the patient, patient is currently stable. We will continue with the current machine settings    The chronic medical conditions listed are directly related to the primary diagnosis listed above. The management of the primary diagnosis affects the secondary diagnosis and vice versa.     - Will follow up in off in 12 months    Electronically signed by  Gerri Callahan, MSN, RN, CNP on 11/17/2021 at 11:27 AM

## 2021-12-06 NOTE — PROGRESS NOTES
[facility]   Cardiac Consultation    Referring Provider:  Renay Marte MD     Chief Complaint   Patient presents with    1 Month Follow-Up    Hypertension    Hyperlipidemia        History of Present Illness:  Quynh Dickens is a 65 y.o. female with a history of diabetes, hyperlipidemia, and hypertension. She was previously following with Dr. Esperanza Archuleta with [facility].     She reports she feels she has to sleep elevated in her adjustable bed to be able to breathe at night. She recently completed a sleep study showed severe sleep apnea. She has a visit next week for treatment. She continues with her diuretics. Recent labs reviewed. Past Medical History:   has a past medical history of Arthritis, Back pain, CHF (congestive heart failure) ([address] Chronic diastolic heart failure ([address] Diabetes mellitus ([address] Diabetes mellitus ([address] GERD (gastroesophageal reflux disease), High cholesterol, Hypertension, Polio, Polio, Shingles, and Urinary problem. Surgical History:   has a past surgical history that includes joint replacement; back surgery; knee surgery; Colonoscopy; other surgical history (01/13/14); hernia repair; joint replacement; and back surgery. Social History:   reports that she has never smoked. She has never used smokeless tobacco. She reports that she does not drink alcohol and does not use drugs. Family History:  family history includes Cancer in her mother; Diabetes in her sister; Early Death in her father; Heart Disease in her mother. Home Medications:  Prior to Admission medications    Medication Sig Start Date End Date Taking?  Authorizing Provider   meloxicam (MOBIC) 15 MG tablet Take 7.5 mg by mouth daily   Yes Historical Provider, MD   Cholecalciferol (VITAMIN D3) 125 MCG (5000 UT) TABS Take by mouth   Yes Historical Provider, MD   spironolactone (ALDACTONE) 25 MG tablet Take 0.5 tablets by mouth daily 4/27/21  Yes Natali Rogers MD   furosemide (LASIX) 20 MG Per vo of Dr. Dowd, f/u colon in 5 years.  Colonoscopy results letter mailed to patient and reminder sent to the nurse recall pool.     in muscle strength, numbness or tingling. No change in gait, balance, coordination, mood, affect, memory, mentation, behavior. · Psychiatric: No anxiety, no depression. · Endocrine: No malaise, fatigue or temperature intolerance. No excessive thirst, fluid intake, or urination. No tremor. · Hematologic/Lymphatic: No abnormal bruising or bleeding, blood clots or swollen lymph nodes. · Allergic/Immunologic: No nasal congestion or hives. Physical Examination:    Vitals:    06/08/21 1458   BP: (!) 146/74   Pulse: 84   Resp: 18   SpO2: 98%        Wt Readings from Last 1 Encounters:   06/08/21 256 lb (116.1 kg)       Constitutional and General Appearance: NAD      Skin:good turgor,intact without lesions  HEENT: EOMI ,normal  Neck:no JVD    Respiratory:  · Normal excursion and expansion without use of accessory muscles  · Resp Auscultation: Normal breath sounds without dullness  Cardiovascular:  · The apical impulses not displaced  · Heart tones are crisp and normal  · Cervical veins are not engorged  · The carotid upstroke is normal in amplitude and contour without delay or bruit  · Peripheral pulses are symmetrical and full  · There is no clubbing, cyanosis of the extremities. · Mild BLE   · Femoral Arteries: 2+ and equal  · Pedal Pulses: 2+ and equal   Abdomen:  · No masses or tenderness  · Liver/Spleen: No Abnormalities Noted  Neurological/Psychiatric:  · Alert and oriented in all spheres  · Moves all extremities well  · Exhibits normal gait balance and coordination  · No abnormalities of mood, affect, memory, mentation, or behavior are noted      Assessment/Plan:    1. Diastolic dysfunction -Decrease Lasix to 20mg daily   Stop Potassium   Start spironolactone 12.5mg daily   No med changes today    2. Essential hypertension -controlled-Blood pressure (!) 146/74, pulse 84, resp. rate 18, height 5' 6\" (1.676 m), weight 256 lb (116.1 kg), SpO2 98 %. 3. Hyperlipidemia- Pravastatin 40mg    4.  Type 2 diabetes mellitus -controlled- managed by PCP    5. JOSE MANUEL (obstructive sleep apnea) - follow up scheduled w/ sleep medicine next week          Plan:  Ne Oh has a stable cardiac status. Cardiac test and lab results personally reviewed by me during this office visit and discussed. No med changes   Follow up in 3 months         I appreciate the opportunity of cooperating in the care of this individual.    Dean Lucas M.D., [facility]    Patient's problem list, medications, allergies, past medical, surgical, social and family histories were reviewed and updated as appropriate. Scribe's attestation: This note was scribed in the presence of Dr. Yulissa Lucas MD, by Aaliyah Rose RN. The scribe's documentation has been prepared under my direction and personally reviewed by me in its entirety. I confirm that the note above accurately reflects all work, treatment, procedures, and medical decision making performed by me.

## 2022-02-11 ENCOUNTER — TELEPHONE (OUTPATIENT)
Dept: CARDIOLOGY CLINIC | Age: 72
End: 2022-02-11

## 2022-02-11 RX ORDER — LOSARTAN POTASSIUM 100 MG/1
100 TABLET ORAL DAILY
Qty: 30 TABLET | Refills: 5 | Status: SHIPPED | OUTPATIENT
Start: 2022-02-11 | End: 2022-07-28 | Stop reason: SINTOL

## 2022-02-11 NOTE — TELEPHONE ENCOUNTER
Pt has been having swelling in left foot going up leg. Pt having lightheadedness and  Unbalance  . Pt BP is increasing  2/9/22  166/76,  2/10/22 175/86 and today 208/85. Please call to advise.

## 2022-02-14 ENCOUNTER — TELEPHONE (OUTPATIENT)
Dept: CARDIOLOGY CLINIC | Age: 72
End: 2022-02-14

## 2022-02-14 NOTE — TELEPHONE ENCOUNTER
Pt called wanting to get appt with L[facility] today, her BP is 197/87, she does have SOB when is up moving around, pt feels unstable at times when she us walking but not all the time. Pt stated she does have a headache and she never has a head ache. Pt stated she did started the 100 mg of the losartan, 2/11. Pt thought she was going to see her PCP this week, however It is next week.     Pls advise thank you     Milton Graham  [phone]

## 2022-02-14 NOTE — TELEPHONE ENCOUNTER
Dr. Izabel Frey has an opening tomorrow at 1:15 pm and patient was scheduled in this slot. Called and left message for patient to come in at 1:15 pm instead of 12:45 pm if possible.

## 2022-02-15 ENCOUNTER — OFFICE VISIT (OUTPATIENT)
Dept: CARDIOLOGY CLINIC | Age: 72
End: 2022-02-15
Payer: MEDICARE

## 2022-02-15 VITALS
OXYGEN SATURATION: 95 % | HEART RATE: 85 BPM | RESPIRATION RATE: 20 BRPM | HEIGHT: 66 IN | SYSTOLIC BLOOD PRESSURE: 158 MMHG | WEIGHT: 262.5 LBS | BODY MASS INDEX: 42.19 KG/M2 | DIASTOLIC BLOOD PRESSURE: 62 MMHG

## 2022-02-15 DIAGNOSIS — R06.09 DOE (DYSPNEA ON EXERTION): ICD-10-CM

## 2022-02-15 DIAGNOSIS — I51.89 DIASTOLIC DYSFUNCTION: Primary | ICD-10-CM

## 2022-02-15 PROCEDURE — 1090F PRES/ABSN URINE INCON ASSESS: CPT | Performed by: INTERNAL MEDICINE

## 2022-02-15 PROCEDURE — G8399 PT W/DXA RESULTS DOCUMENT: HCPCS | Performed by: INTERNAL MEDICINE

## 2022-02-15 PROCEDURE — 3017F COLORECTAL CA SCREEN DOC REV: CPT | Performed by: INTERNAL MEDICINE

## 2022-02-15 PROCEDURE — G8428 CUR MEDS NOT DOCUMENT: HCPCS | Performed by: INTERNAL MEDICINE

## 2022-02-15 PROCEDURE — 4040F PNEUMOC VAC/ADMIN/RCVD: CPT | Performed by: INTERNAL MEDICINE

## 2022-02-15 PROCEDURE — 99214 OFFICE O/P EST MOD 30 MIN: CPT | Performed by: INTERNAL MEDICINE

## 2022-02-15 PROCEDURE — G8484 FLU IMMUNIZE NO ADMIN: HCPCS | Performed by: INTERNAL MEDICINE

## 2022-02-15 PROCEDURE — G8417 CALC BMI ABV UP PARAM F/U: HCPCS | Performed by: INTERNAL MEDICINE

## 2022-02-15 PROCEDURE — 1036F TOBACCO NON-USER: CPT | Performed by: INTERNAL MEDICINE

## 2022-02-15 PROCEDURE — 1123F ACP DISCUSS/DSCN MKR DOCD: CPT | Performed by: INTERNAL MEDICINE

## 2022-02-15 RX ORDER — MELATONIN
2000 DAILY
Qty: 60 TABLET | Refills: 0
Start: 2022-02-15 | End: 2022-03-22

## 2022-02-15 RX ORDER — METOPROLOL SUCCINATE 25 MG/1
25 TABLET, EXTENDED RELEASE ORAL DAILY
Qty: 90 TABLET | Refills: 3 | Status: SHIPPED | OUTPATIENT
Start: 2022-02-15

## 2022-02-15 RX ORDER — FUROSEMIDE 20 MG/1
40 TABLET ORAL DAILY
Qty: 180 TABLET | Refills: 3 | Status: SHIPPED | OUTPATIENT
Start: 2022-02-15

## 2022-02-15 RX ORDER — METOPROLOL SUCCINATE 50 MG/1
50 TABLET, EXTENDED RELEASE ORAL DAILY
Qty: 90 TABLET | Refills: 3 | Status: SHIPPED | OUTPATIENT
Start: 2022-02-15

## 2022-02-15 RX ORDER — M-VIT,TX,IRON,MINS/CALC/FOLIC 27MG-0.4MG
1 TABLET ORAL DAILY
COMMUNITY

## 2022-02-15 NOTE — PROGRESS NOTES
[address]   Cardiac Consultation    Referring Provider:  Rex Clay MD     Chief Complaint   Patient presents with    Hypertension     patient here today due to elevated blood pressure     Hyperlipidemia    Transient Ischemic Attack        History of Present Illness:  Adrienne Barbour is a 66 y.o. female with a history of diabetes, hyperlipidemia, and hypertension. She was previously following with Dr. Yi Meyers with [facility].     She started losartan 2/11/22 but B/P yesterday at home was 197/87. She called and asked to be seen. Today, she states she is mildly SOB but it is \"not that bad. \" She denies exertional chest pain, PND, palpitations, light-headedness, edema. She has been very concerned about her blood pressure, very careful with her salt intake. Past Medical History:   has a past medical history of Arthritis, Back pain, CHF (congestive heart failure) ([address] Chronic diastolic heart failure ([address] Diabetes mellitus ([address] Diabetes mellitus ([address] GERD (gastroesophageal reflux disease), High cholesterol, Hypertension, Polio, Polio, Shingles, and Urinary problem. Surgical History:   has a past surgical history that includes joint replacement; back surgery; knee surgery; Colonoscopy; other surgical history (01/13/14); hernia repair; joint replacement; and back surgery. Social History:   reports that she quit smoking about 54 years ago. Her smoking use included cigarettes. She has a 0.25 pack-year smoking history. She has never used smokeless tobacco. She reports that she does not drink alcohol and does not use drugs. Family History:  family history includes Cancer in her mother; Diabetes in her sister; Early Death in her father; Heart Disease in her mother. Home Medications:  Prior to Admission medications    Medication Sig Start Date End Date Taking?  Authorizing Provider   Multiple Vitamins-Minerals (THERAPEUTIC MULTIVITAMIN-MINERALS) tablet Take 1 tablet by mouth daily Essential 1 Pro caps   Yes Historical Provider, MD   vitamin D3 (CHOLECALCIFEROL) 25 MCG (1000 UT) TABS tablet Take 2 tablets by mouth daily 2/15/22  Yes Juan A Mann MD   losartan (COZAAR) 100 MG tablet Take 1 tablet by mouth daily 2/11/22 8/10/22 Yes Juan A Mann MD   Semaglutide,0.25 or 0.5MG/DOS, (OZEMPIC, 0.25 OR 0.5 MG/DOSE,) 2 MG/1.5ML SOPN 4 samples boxes given   3- [id number]  [id number] 7/1/21  Yes Historical Provider, MD   spironolactone (ALDACTONE) 25 MG tablet Take 1 tablet by mouth daily 9/9/21  Yes Juan A Mann MD   meloxicam (MOBIC) 15 MG tablet Take 7.5 mg by mouth daily   Yes Historical Provider, MD   furosemide (LASIX) 20 MG tablet Take 1 tablet by mouth daily 4/27/21  Yes Juan A Mann MD   insulin glargine (LANTUS) 100 UNIT/ML injection vial Inject 28 Units into the skin nightly   Yes Historical Provider, MD   metoprolol succinate (TOPROL XL) 50 MG extended release tablet Take 50 mg by mouth daily 4/21/21  Yes Historical Provider, MD   pantoprazole (PROTONIX) 40 MG tablet Take 40 mg by mouth daily 12/29/20  Yes Historical Provider, MD   famotidine (PEPCID) 40 MG tablet Take 40 mg by mouth daily    Yes Historical Provider, MD   acetaminophen (TYLENOL) 500 MG tablet Take 500 mg by mouth every 6 hours as needed for Pain. Yes Historical Provider, MD   aspirin 81 MG chewable tablet Take 1 tablet by mouth daily. Patient not taking: Reported on 2/15/2022 2/26/15   Tiffanie Lauren MD        Allergies:  Adhesive tape     Review of Systems:   · Constitutional: there has been no unanticipated weight loss. There's been no change in energy level, sleep pattern, or activity level. · Eyes: No visual changes or diplopia. No scleral icterus. · ENT: No Headaches, hearing loss or vertigo. No mouth sores or sore throat. · Cardiovascular: Reviewed in HPI  · Respiratory: No cough or wheezing, no sputum production. No hematemesis.     · Gastrointestinal: No abdominal pain, appetite loss, blood in stools. No change in bowel or bladder habits. · Genitourinary: No dysuria, trouble voiding, or hematuria. · Musculoskeletal:  No gait disturbance, weakness or joint complaints. · Integumentary: No rash or pruritis. · Neurological: No headache, diplopia, change in muscle strength, numbness or tingling. No change in gait, balance, coordination, mood, affect, memory, mentation, behavior. · Psychiatric: No anxiety, no depression. · Endocrine: No malaise, fatigue or temperature intolerance. No excessive thirst, fluid intake, or urination. No tremor. · Hematologic/Lymphatic: No abnormal bruising or bleeding, blood clots or swollen lymph nodes. · Allergic/Immunologic: No nasal congestion or hives. Physical Examination:    Vitals:    02/15/22 1308   BP: (!) 158/62   Pulse: 85   Resp: 20   SpO2: 95%        Wt Readings from Last 1 Encounters:   02/15/22 262 lb 8 oz (119.1 kg)       Constitutional and General Appearance: NAD      Skin:good turgor,intact without lesions  HEENT: EOMI ,normal  Neck:no JVD    Respiratory:  · Normal excursion and expansion without use of accessory muscles  · Resp Auscultation: Normal breath sounds without dullness  Cardiovascular:  · The apical impulses not displaced  · Heart tones are crisp and normal  · Cervical veins are not engorged  · The carotid upstroke is normal in amplitude and contour without delay or bruit  · Peripheral pulses are symmetrical and full  · There is no clubbing, cyanosis of the extremities. · Mild BLE   · Femoral Arteries: 2+ and equal  · Pedal Pulses: 2+ and equal   Abdomen:  · No masses or tenderness  · Liver/Spleen: No Abnormalities Noted  Neurological/Psychiatric:  · Alert and oriented in all spheres  · Moves all extremities well  · Exhibits normal gait balance and coordination  · No abnormalities of mood, affect, memory, mentation, or behavior are noted    Assessment:  1.  Diastolic dysfunction: Stable, compensated  -Remains on spironolactone 12.5mg qd  -Double Lasix to 40mg daily &     2. Essential hypertension: SBP elevated. 156/56 on recheck by me LA, 146/56 LA   Blood pressure (!) 158/62, pulse 85, resp. rate 20, height 5' 6\" (1.676 m), weight 262 lb 8 oz (119.1 kg), SpO2 95%  -Increase metoprolol 50mg qd to 75mg qd  -Continue losartan 100mg qd     3. Hyperlipidemia: Taking pravastatin 40mg   8/2021  TG 89 HDL 49   1/2021   HDL 55      4. JOSE MANUEL (obstructive sleep apnea): Using C-pap     5. Type 2 diabetes mellitus: Managed by PCP          Plan:  Loki Sorto has a stable cardiac status. Cardiac test and lab results personally reviewed by me during this office visit and discussed. 1. Increase BB & Lasix  2. BMP in 1 week  3. OV with ECHO same day in 4-5 weeks in [facility]    I appreciate the opportunity of cooperating in the care of this individual.    Andre Gaytan M.D., [facility]    Patient's problem list, medications, allergies, past medical, surgical, social and family histories were reviewed and updated as appropriate. Scribe's attestation: This note was scribed in the presence of Dr. Selvin Gaytan MD, by Cherry Talamantes RN. The scribe's documentation has been prepared under my direction and personally reviewed by me in its entirety. I confirm that the note above accurately reflects all work, treatment, procedures, and medical decision making performed by me.

## 2022-03-03 ENCOUNTER — TELEPHONE (OUTPATIENT)
Dept: PULMONOLOGY | Age: 72
End: 2022-03-03

## 2022-03-03 NOTE — TELEPHONE ENCOUNTER
Not sure I understand the message, is she asking us to review the compliance with her?   If so we will need to see the compliance off her machine

## 2022-03-03 NOTE — TELEPHONE ENCOUNTER
Pt called wanting documentation about her usage. Pt states she has been dealing with COVID and has not been able to use her pap unit. Pt states she will try to restart use.

## 2022-03-15 ENCOUNTER — HOSPITAL ENCOUNTER (OUTPATIENT)
Dept: WOMENS IMAGING | Age: 72
Discharge: HOME | End: 2022-03-15
Payer: MEDICARE

## 2022-03-15 VITALS — BODY MASS INDEX: 39.55 KG/M2 | HEIGHT: 67 IN | WEIGHT: 252 LBS

## 2022-03-15 DIAGNOSIS — Z12.31 BREAST CANCER SCREENING BY MAMMOGRAM: ICD-10-CM

## 2022-03-15 PROCEDURE — 77067 SCR MAMMO BI INCL CAD: CPT

## 2022-03-16 NOTE — PROGRESS NOTES
[facility]   Cardiac Consultation    Referring Provider:  Huy Addison MD     Chief Complaint   Patient presents with    Hypertension    Hyperlipidemia    Other     echo today        History of Present Illness:  Dick Arguelles is a 66 y.o. female with a history of diabetes, hyperlipidemia, and hypertension. She was previously following with Dr. Jefry Barfield with [facility].     Today, Ms Steph Eli is here for echo and follow up. She states she recently had Covid. She had all of her shots prior to; felt like she had the flu. She has recovered. Ms Steph Eli denies chest pain, palpitations, GAYLE, dizziness. She has edema in her LE's that comes and goes. Past Medical History:   has a past medical history of Arthritis, Back pain, CHF (congestive heart failure) ([address] Chronic diastolic heart failure ([address] Diabetes mellitus ([address] Diabetes mellitus ([address] GERD (gastroesophageal reflux disease), High cholesterol, Hypertension, Polio, Polio, Shingles, and Urinary problem. Surgical History:   has a past surgical history that includes joint replacement; back surgery; knee surgery; Colonoscopy; other surgical history (01/13/14); hernia repair; joint replacement; and back surgery. Social History:   reports that she quit smoking about 54 years ago. Her smoking use included cigarettes. She has a 0.25 pack-year smoking history. She has never used smokeless tobacco. She reports that she does not drink alcohol and does not use drugs. Family History:  family history includes Breast Cancer in her mother; Cancer in her mother; Diabetes in her sister; Early Death in her father; Heart Disease in her mother. Home Medications:  Prior to Admission medications    Medication Sig Start Date End Date Taking?  Authorizing Provider   vitamin C (ASCORBIC ACID) 500 MG tablet Take 500 mg by mouth daily   Yes Historical Provider, MD   Multiple Vitamins-Minerals (THERAPEUTIC MULTIVITAMIN-MINERALS) tablet Take 1 tablet by mouth daily Essential 1 Pro caps   Yes Historical Provider, MD   metoprolol succinate (TOPROL XL) 50 MG extended release tablet Take 1 tablet by mouth daily 2/15/22  Yes Betty Esteves MD   metoprolol succinate (TOPROL XL) 25 MG extended release tablet Take 1 tablet by mouth daily 2/15/22  Yes Betty Esteves MD   furosemide (LASIX) 20 MG tablet Take 2 tablets by mouth daily 2/15/22  Yes Betty Esteves MD   losartan (COZAAR) 100 MG tablet Take 1 tablet by mouth daily 2/11/22 8/10/22 Yes Betty Esteves MD   Semaglutide,0.25 or 0.5MG/DOS, (OZEMPIC, 0.25 OR 0.5 MG/DOSE,) 2 MG/1.5ML SOPN 4 samples boxes given   3- [id number]  [id number] 7/1/21  Yes Historical Provider, MD   spironolactone (ALDACTONE) 25 MG tablet Take 1 tablet by mouth daily 9/9/21  Yes Betty Esteves MD   meloxicam (MOBIC) 15 MG tablet Take 7.5 mg by mouth daily   Yes Historical Provider, MD   insulin glargine (LANTUS) 100 UNIT/ML injection vial Inject 30 Units into the skin nightly    Yes Historical Provider, MD   pantoprazole (PROTONIX) 40 MG tablet Take 40 mg by mouth daily 12/29/20  Yes Historical Provider, MD   famotidine (PEPCID) 40 MG tablet Take 40 mg by mouth daily    Yes Historical Provider, MD   aspirin 81 MG chewable tablet Take 1 tablet by mouth daily. 2/26/15  Yes Angie Ackerman MD   acetaminophen (TYLENOL) 500 MG tablet Take 500 mg by mouth every 6 hours as needed for Pain. Yes Historical Provider, MD        Allergies:  Metformin and Adhesive tape     Review of Systems:   · Constitutional: there has been no unanticipated weight loss. There's been no change in energy level, sleep pattern, or activity level. · Eyes: No visual changes or diplopia. No scleral icterus. · ENT: No Headaches, hearing loss or vertigo. No mouth sores or sore throat. · Cardiovascular: Reviewed in HPI  · Respiratory: No cough or wheezing, no sputum production. No hematemesis. · Gastrointestinal: No abdominal pain, appetite loss, blood in stools. No change in bowel or bladder habits. · Genitourinary: No dysuria, trouble voiding, or hematuria. · Musculoskeletal:  No gait disturbance, weakness or joint complaints. · Integumentary: No rash or pruritis. · Neurological: No headache, diplopia, change in muscle strength, numbness or tingling. No change in gait, balance, coordination, mood, affect, memory, mentation, behavior. · Psychiatric: No anxiety, no depression. · Endocrine: No malaise, fatigue or temperature intolerance. No excessive thirst, fluid intake, or urination. No tremor. · Hematologic/Lymphatic: No abnormal bruising or bleeding, blood clots or swollen lymph nodes. · Allergic/Immunologic: No nasal congestion or hives. Physical Examination:    Vitals:    03/22/22 0913   BP: 138/72   Pulse: 85   SpO2: 99%        Wt Readings from Last 1 Encounters:   03/22/22 261 lb 12.8 oz (118.8 kg)       Constitutional and General Appearance: NAD      Skin:good turgor,intact without lesions  HEENT: EOMI ,normal  Neck:no JVD    Respiratory:  · Normal excursion and expansion without use of accessory muscles  · Resp Auscultation: Normal breath sounds without dullness  Cardiovascular:  · The apical impulses not displaced  · Heart tones are crisp and normal  · Cervical veins are not engorged  · The carotid upstroke is normal in amplitude and contour without delay or bruit  · Peripheral pulses are symmetrical and full  · There is no clubbing, cyanosis of the extremities. · Mild BLE   · Femoral Arteries: 2+ and equal  · Pedal Pulses: 2+ and equal   Abdomen:  · No masses or tenderness  · Liver/Spleen: No Abnormalities Noted  Neurological/Psychiatric:  · Alert and oriented in all spheres  · Moves all extremities well  · Exhibits normal gait balance and coordination  · No abnormalities of mood, affect, memory, mentation, or behavior are noted    Assessment:  1.  Diastolic dysfunction: Stable, compensated  -Remains on spironolactone 25mg qd and Lasix 40mg daily    Echo 3/22/22: Left ventricular cavity size is normal. Normal left ventricular wall thickness. Left ventricular function is low normal with ejection fraction estimated at 50%. No regional wall motion abnormalities are noted. Diastolic filling parameters suggest grade II diastolic dysfunction. Thickening of leaflets of mitral valve. Mitral annular calcification is present. Mild mitral regurgitation is present. Bi-atrial enlargement. Mild aortic stenosis. Moderate aortic regurgitation. Mild tricuspid regurgitation. Estimated pulmonary artery systolic pressure is at 44 mmHg assuming a right   atrial pressure of 3 mmHg. 2. Essential hypertension: stable   -previously increased metoprolol to 75mg qd  -Continue losartan 100mg qd     3. Hyperlipidemia: ? statin  Lipids 2/18/22: ; TRIG 79; HDL 49;   8/2021  TG 89 HDL 49   1/2021   HDL 55      4. JOSE MANUEL (obstructive sleep apnea): Using C-pap         Plan:  Bucky Ferreira has a stable cardiac status, echo findings reviewed. B/p is stable. No med changes. FU 4 months. Cardiac test and lab results personally reviewed by me during this office visit and discussed. I appreciate the opportunity of cooperating in the care of this individual.    Gila Booker. Irma Rodriguez M.D., [facility]    Patient's problem list, medications, allergies, past medical, surgical, social and family histories were reviewed and updated as appropriate. Scribe's attestation: This note was scribed in the presence of Dr Irma Rodriguez MD by Marisol Retana RN. The scribe's documentation has been prepared under my direction and personally reviewed by me in its entirety. I confirm that the note above accurately reflects all work, treatment, procedures, and medical decision making performed by me.

## 2022-03-21 ENCOUNTER — TELEPHONE (OUTPATIENT)
Dept: WOMENS IMAGING | Age: 72
End: 2022-03-21

## 2022-03-21 NOTE — TELEPHONE ENCOUNTER
IN [facility] regarding screening mammogram results and follow up imaging recommendations.

## 2022-03-22 ENCOUNTER — OFFICE VISIT (OUTPATIENT)
Dept: CARDIOLOGY CLINIC | Age: 72
End: 2022-03-22
Payer: MEDICARE

## 2022-03-22 ENCOUNTER — PROCEDURE VISIT (OUTPATIENT)
Dept: CARDIOLOGY CLINIC | Age: 72
End: 2022-03-22
Payer: MEDICARE

## 2022-03-22 VITALS
BODY MASS INDEX: 41.09 KG/M2 | HEIGHT: 67 IN | OXYGEN SATURATION: 99 % | WEIGHT: 261.8 LBS | SYSTOLIC BLOOD PRESSURE: 138 MMHG | HEART RATE: 85 BPM | DIASTOLIC BLOOD PRESSURE: 72 MMHG

## 2022-03-22 DIAGNOSIS — I51.89 DIASTOLIC DYSFUNCTION: ICD-10-CM

## 2022-03-22 DIAGNOSIS — I10 PRIMARY HYPERTENSION: Primary | ICD-10-CM

## 2022-03-22 DIAGNOSIS — R06.09 DOE (DYSPNEA ON EXERTION): ICD-10-CM

## 2022-03-22 DIAGNOSIS — I50.32 CHRONIC DIASTOLIC HEART FAILURE (HCC): ICD-10-CM

## 2022-03-22 DIAGNOSIS — E78.00 HIGH CHOLESTEROL: ICD-10-CM

## 2022-03-22 LAB
LV EF: 50 %
LVEF MODALITY: NORMAL

## 2022-03-22 PROCEDURE — 3017F COLORECTAL CA SCREEN DOC REV: CPT | Performed by: INTERNAL MEDICINE

## 2022-03-22 PROCEDURE — G8417 CALC BMI ABV UP PARAM F/U: HCPCS | Performed by: INTERNAL MEDICINE

## 2022-03-22 PROCEDURE — G8399 PT W/DXA RESULTS DOCUMENT: HCPCS | Performed by: INTERNAL MEDICINE

## 2022-03-22 PROCEDURE — G8484 FLU IMMUNIZE NO ADMIN: HCPCS | Performed by: INTERNAL MEDICINE

## 2022-03-22 PROCEDURE — 1036F TOBACCO NON-USER: CPT | Performed by: INTERNAL MEDICINE

## 2022-03-22 PROCEDURE — 93306 TTE W/DOPPLER COMPLETE: CPT | Performed by: INTERNAL MEDICINE

## 2022-03-22 PROCEDURE — G8427 DOCREV CUR MEDS BY ELIG CLIN: HCPCS | Performed by: INTERNAL MEDICINE

## 2022-03-22 PROCEDURE — 1090F PRES/ABSN URINE INCON ASSESS: CPT | Performed by: INTERNAL MEDICINE

## 2022-03-22 PROCEDURE — 1123F ACP DISCUSS/DSCN MKR DOCD: CPT | Performed by: INTERNAL MEDICINE

## 2022-03-22 PROCEDURE — 4040F PNEUMOC VAC/ADMIN/RCVD: CPT | Performed by: INTERNAL MEDICINE

## 2022-03-22 PROCEDURE — 99214 OFFICE O/P EST MOD 30 MIN: CPT | Performed by: INTERNAL MEDICINE

## 2022-03-22 RX ORDER — ASCORBIC ACID 500 MG
500 TABLET ORAL DAILY
COMMUNITY

## 2022-04-01 ENCOUNTER — HOSPITAL ENCOUNTER (OUTPATIENT)
Dept: WOMENS IMAGING | Age: 72
Discharge: HOME | End: 2022-04-01
Payer: MEDICARE

## 2022-04-01 ENCOUNTER — PRE-PROCEDURE TELEPHONE (OUTPATIENT)
Dept: WOMENS IMAGING | Age: 72
End: 2022-04-01

## 2022-04-01 DIAGNOSIS — R92.8 ABNORMAL MAMMOGRAM: ICD-10-CM

## 2022-04-01 PROCEDURE — G0279 TOMOSYNTHESIS, MAMMO: HCPCS

## 2022-04-06 ENCOUNTER — HOSPITAL ENCOUNTER (OUTPATIENT)
Dept: WOMENS IMAGING | Age: 72
Discharge: HOME | End: 2022-04-06
Payer: MEDICARE

## 2022-04-06 DIAGNOSIS — R92.8 ABNORMAL MAMMOGRAM: ICD-10-CM

## 2022-04-06 PROCEDURE — 88341 IMHCHEM/IMCYTCHM EA ADD ANTB: CPT

## 2022-04-06 PROCEDURE — 76098 X-RAY EXAM SURGICAL SPECIMEN: CPT

## 2022-04-06 PROCEDURE — 88360 TUMOR IMMUNOHISTOCHEM/MANUAL: CPT

## 2022-04-06 PROCEDURE — 77065 DX MAMMO INCL CAD UNI: CPT

## 2022-04-06 PROCEDURE — A4648 IMPLANTABLE TISSUE MARKER: HCPCS

## 2022-04-06 PROCEDURE — 88305 TISSUE EXAM BY PATHOLOGIST: CPT

## 2022-04-06 PROCEDURE — 88342 IMHCHEM/IMCYTCHM 1ST ANTB: CPT

## 2022-04-06 PROCEDURE — 2500000003 HC RX 250 WO HCPCS: Performed by: INTERNAL MEDICINE

## 2022-04-06 RX ORDER — LIDOCAINE HYDROCHLORIDE 10 MG/ML
5 INJECTION, SOLUTION EPIDURAL; INFILTRATION; INTRACAUDAL; PERINEURAL ONCE
Status: COMPLETED | OUTPATIENT
Start: 2022-04-06 | End: 2022-04-06

## 2022-04-06 RX ORDER — LIDOCAINE HYDROCHLORIDE AND EPINEPHRINE 10; 10 MG/ML; UG/ML
20 INJECTION, SOLUTION INFILTRATION; PERINEURAL ONCE
Status: COMPLETED | OUTPATIENT
Start: 2022-04-06 | End: 2022-04-06

## 2022-04-06 RX ADMIN — LIDOCAINE HYDROCHLORIDE 5 ML: 10 INJECTION, SOLUTION EPIDURAL; INFILTRATION; INTRACAUDAL; PERINEURAL at 16:00

## 2022-04-06 RX ADMIN — LIDOCAINE HYDROCHLORIDE,EPINEPHRINE BITARTRATE 20 ML: 10; .01 INJECTION, SOLUTION INFILTRATION; PERINEURAL at 16:01

## 2022-04-06 NOTE — PROGRESS NOTES
Patient here for breast biopsy. NN reviewed the health history, allergies and medications. Family member  Daughter Constanza Cordero drove her. Radiologist reviews procedure with patient, consent signed. Patient tolerates procedure well. Compression held. Site cleansed with chloraprep, steri strips and dry dressing applied. Ice pack in place. Reviewed discharge instructions with patient and signed copy. Patient verbalized understanding and agreed to contact Nurse Navigator with any questions. Patient A&Ox3, steady on feet and discharged home.

## 2022-04-11 ENCOUNTER — FOLLOWUP TELEPHONE ENCOUNTER (OUTPATIENT)
Dept: WOMENS IMAGING | Age: 72
End: 2022-04-11

## 2022-04-11 NOTE — TELEPHONE ENCOUNTER
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 NN explained the terminology and reviewed the results for ER/ND and the additional HER2 test. We discussed several questions and process for establishing a care team and possible additional testing. Patient offered [address] [facility] and patient prefers to stay with [address] and would prefer Dr Keysha Morin at [facility]. Anxious to move forward quickly if possible. NN offered additional website reading if interested. Given ACS, [email or web address], NCCN pt, and [email or web address].  Pt/family given NN phone number for further assistance. Pt has been followed for anemia by an oncologist whom she really likes,  Dr Gurwinder Damian, in [facility]. Lili Bhakta has four daughters, one of whom lives in [facility] [place] and two out of state, four grandchildren. Lives alone, has been  twice, very strong eugenie.

## 2022-04-14 ENCOUNTER — TELEPHONE (OUTPATIENT)
Dept: CARDIOLOGY CLINIC | Age: 72
End: 2022-04-14

## 2022-04-14 ENCOUNTER — HOSPITAL ENCOUNTER (OUTPATIENT)
Age: 72
Setting detail: SPECIMEN
Discharge: HOME | End: 2022-04-14

## 2022-04-14 NOTE — TELEPHONE ENCOUNTER
Spoke w/ pt and let her know Dr Nicole Ely will return 4/18/22 and address at that time. She verbalized understanding and is agreeable to this.

## 2022-04-14 NOTE — LETTER
[address] [address] [address] [address]  Phone: [phone]  Fax: [phone]    Clotilde Pool MD        April 18, 2022     Patient: Natalya Hernandez   YOB: 1950   Date of Visit: 4/14/2022       To Whom It May Concern: It is my medical opinion that Gi Hughes is stable for this procedure. There are no apparent cardiac contraindications to the proposed procedure using standard anesthetic technique. There are no apparent interventions to ameliorate the cardiac risk. This clinical assessment assumes a full Anesthesia evaluation for overall risk of airway management, type and route of anesthetic, and other relevant anesthesia-specific considerations. If you have any questions or concerns, please don't hesitate to call.     Sincerely,        Clotilde Pool MD

## 2022-04-14 NOTE — TELEPHONE ENCOUNTER
CARDIAC CLEARANCE     What type of procedure are you having? Breast surgery    Which physician is performing your procedure? Dr Pop Frost    When is your procedure scheduled for? 05.03    Where are you having this procedure? [facility]     Are you taking Blood Thinners? If so what? Aspirin (Name/dose/frequesncy)     Does the surgeon want you to stop your blood thinner? If so for how long?  5 days    Phone Number and Contact Name for Physicians office: [phone]    Fax: [phone]

## 2022-04-22 ENCOUNTER — HOSPITAL ENCOUNTER (OUTPATIENT)
Dept: WOMENS IMAGING | Age: 72
Discharge: HOME | End: 2022-04-22
Payer: MEDICARE

## 2022-04-22 ENCOUNTER — HOSPITAL ENCOUNTER (OUTPATIENT)
Dept: ULTRASOUND IMAGING | Age: 72
Discharge: HOME | End: 2022-04-22
Payer: MEDICARE

## 2022-04-22 DIAGNOSIS — R92.8 ABNORMAL MAMMOGRAM: ICD-10-CM

## 2022-04-22 DIAGNOSIS — C50.111 MALIGNANT NEOPLASM OF CENTRAL PORTION OF RIGHT FEMALE BREAST, UNSPECIFIED ESTROGEN RECEPTOR STATUS (HCC): ICD-10-CM

## 2022-04-22 PROCEDURE — 2709999900 MAM NEEDLE BREAST LOCALIZATION RIGHT

## 2022-04-22 PROCEDURE — 76642 ULTRASOUND BREAST LIMITED: CPT

## 2022-04-22 NOTE — PROGRESS NOTES
Patient here for breast needle localization. NN reviewed the health history, allergies and medications. Family member amada Rob with her. Radiologist reviews procedure with patient, consent signed. Patient tolerates procedure well. Compression held. Site cleansed with chloraprep, steri strips and dry dressing applied. Ice pack in place. Reviewed discharge instructions with patient and signed copy. Patient verbalized understanding and agreed to contact Nurse Navigator with any questions. Patient A&Ox3, steady on feet and discharged home.

## 2022-04-22 NOTE — PROGRESS NOTES
Place patient label inside box (if no patient label, complete below)  Name:  :  MR#:   Roman Kip / PROCEDURE  1. I (we), David Analy (Patient Name) authorize CEFERINO [address] [place] [address] (Provider / [facility]) and/or such assistants as may be selected by him/her, to perform the following operation/procedure(s): RIGHT RADIOFREQUENCY IDENTIFICATION TAG LOCALIZED PARTIAL MASTECTOMY RIGHT SENTINEL LYMPH NODE BIOPSY        Note: If unable to obtain consent prior to an emergent procedure, document the emergent reason in the medical record. This procedure has been explained to my (our) satisfaction and included in the explanation was:  A) The intended benefit, nature, and extent of the procedure to be performed;  B) The significant risks involved and the probability of success;  C) Alternative procedures and methods of treatment;  D) The dangers and probable consequences of such alternatives (including no procedure or treatment); E) The expected consequences of the procedure on my future health;  F) Whether other qualified individuals would be performing important surgical tasks and/or whether  would be present to advise or support the procedure. I (we) understand that there are other risks of infection and other serious complications in the pre-operative/procedural and postoperative/procedural stages of my (our) care. I (we) have asked all of the questions which I (we) thought were important in deciding whether or not to undergo treatment or diagnosis. These questions have been answered to my (our) satisfaction. I (we) understand that no assurance can be given that the procedure will be a success, and no guarantee or warranty of success has been given to me (us).     2. It has been explained to me (us) that during the course of the operation/procedure, unforeseen conditions may be revealed that necessitate extension of the original procedure(s) or different procedure(s) than those set forth in Paragraph 1. I (we) authorize and request that the above-named physician, his/her assistants or his/her designees, perform procedures as necessary and desirable if deemed to be in my (our) best interest.     Revised 8/2/2021                                                                          Page 1 of 2         3. I acknowledge that health care personnel may be observing this procedure for the purpose of medical education or other specified purposes as may be necessary as requested and/or approved by my (our) physician. 4. I (we) consent to the disposal by the hospital Pathologist of the removed tissue, parts or organs in accordance with hospital policy. 5. I do ____ do not ____ consent to the use of a local infiltration pain blocking agent that will be used by my provider/surgical provider to help alleviate pain during my procedure. 6. I do ____ do not ____ consent to an emergent blood transfusion in the case of a life-threatening situation that requires blood components to be administered. This consent is valid for 24 hours from the beginning of the procedure. 7. This patient does ____ or does not ____ currently have a DNR status/order. If DNR order is in place, obtain Addendum to the Surgical Consent for ALL Patients with a DNR Order to address doris-operative status for limited intervention or DNR suspension.      8. I have read and fully understand the above Consent for Operation/Procedure and that all blanks were completed before I signed the consent.   _____________________________       _____________________      ____/____am/pm  Signature of Patient or legal representative      Printed Name / Relationship            Date / Time   ____________________________       _____________________      ____/____am/pm  Witness to Signature                                    Printed Name                    Date / Time     If patient is unable to sign or is a minor, complete the following)  Patient is a minor, ____ years of age, or unable to sign because:   ______________________________________________________________________________________________    Demetriusell Halim If a phone consent is obtained, consent will be documented by using two health care professionals, each affirming that the consenting party has no questions and gives consent for the procedure discussed with the physician/provider.   _____________________          ____________________       _____/_____am/pm   2nd witness to phone consent        Printed name           Date / Time    Informed Consent:  I have provided the explanation described above in section 1 to the patient and/or legal representative.  I have provided the patient and/or legal representative with an opportunity to ask any questions about the proposed operation/procedure.   ___________________________          ____________________         ____/____am/pm  Provider / Proceduralist                            Printed name            Date / Time  Revised 8/2/2021                                                                      Page 2 of 2

## 2022-04-22 NOTE — PROGRESS NOTES
[facility] PRE-SURGICAL TESTING INSTRUCTIONS                                  PRIOR TO PROCEDURE DATE:        1. PLEASE FOLLOW ANY  GUIDELINES/ INSTRUCTIONS PRIOR TO YOUR PROCEDURE AS ADVISED BY YOUR SURGEON. 2. Arrange for someone to drive you home and be with you for the first 24 hours after discharge for your safety after your procedure for which you received sedation. Ensure it is someone we can share information with regarding your discharge. 3. You must contact your surgeon for instructions IF:   You are taking any blood thinners, aspirin, anti-inflammatory or vitamin E.   There is a change in your physical condition such as a cold, fever, rash, cuts, sores or any other infection, especially near your surgical site. 4. Do not drink alcohol the day before or day of your procedure. 5. A Pre-op History and Physical for surgery MUST be completed by your Physician or Urgent Care within 30 days of your procedure date. Please bring a copy with you on the day of your procedure and along with any other testing performed. THE DAY OF YOUR PROCEDURE:  1. Follow instructions for ARRIVAL TIME as DIRECTED BY YOUR SURGEON. 2. Enter the MAIN entrance from [address] and follow the signs to the free [employer] or [employer] parking (offered free of charge 6am-5pm). 3. Enter the Main Entrance of the hospital (do not enter from the lower level of the parking garage). Upon entrance, check in with the  at the main desk on your left. If no one is available at the desk, proceed into the [facility] Waiting Room and go through the door directly into the [facility]. There is a Check-in desk ACROSS from Room 5 (marked with a sign hanging from the ceiling). The phone number for the surgery center is [phone]. 4. Please call [phone] option #2 option #2 if you have not been preregistered yet.   On the day of your procedure bring your insurance card and photo ID. You will be registered at your bedside once brought back to your room. 5. DO NOT EAT ANYTHING eight hours prior to your arrival for surgery. May have 8 ounces of water 4 hours prior to your arrival for surgery. NOTE: ALL Gastric, Bariatric and Bowel surgery patients MUST follow their surgeon's instructions. 6. MEDICATIONS    Take the following medications with a SMALL sip of water: metoprolol, protonix   Bariatric patient's call surgeon if on diabetic medications as some need to be stopped 1 week preop   Use your usual dose of inhalers the morning of surgery. BRING your rescue inhaler with you to hospital.    Anesthesia does NOT want you to take insulin the morning of surgery. They will control your blood sugar while you are at the hospital. Please contact your ordering physician for instructions regarding your insulin the night before your procedure. If you have an insulin pump, please keep it set on basal rate. 7. Do not swallow water when brushing teeth. No gum, candy, mints or ice chips. Refrain from smoking or at least decrease the amount. 8. Dress in loose, comfortable clothing appropriate for redressing after your procedure. Do not wear jewelry (including body piercings), make-up (especially NO eye make-up), fingernail polish (NO toenail polish if foot/leg surgery), lotion, powders or metal hairclips. 9. Dentures, glasses, or contacts will need to be removed before your procedure. Bring cases for your glasses, contacts, dentures, or hearing aids to protect them while you are in surgery. 10. If you use a CPAP, please bring it with you on the day of your procedure. 11. We recommend that valuable personal  belongings such as cash, cell phones, e-tablets or jewelry, be left at home during your stay. The hospital will not be responsible for valuables that are not secured in the hospital safe.  However, if your insurance requires a co-pay, you may want to bring a method of payment, i.e. Check or credit card, if you wish to pay your co-pay the day of surgery. 12. If you are to stay overnight, you may bring a bag with personal items. Please have any large items you may need brought in by your family after your arrival to your hospital room. 15. If you have a Living Will or Durable Power of , please bring a copy on the day of your procedure. 15. With your permission, one family member may accompany you while you are being prepared for surgery. Once you are ready, additional family members may join you. HOW WE KEEP YOU SAFE and WORK TO PREVENT SURGICAL SITE INFECTIONS:  1. Health care workers should always check your ID bracelet to verify your name and birth date. You will be asked many times to state your name, date of birth, and allergies. 2. Health care workers should always clean their hands with soap or alcohol gel before providing care to you. It is okay to ask anyone if they cleaned their hands before they touch you. 3. You will be actively involved in verifying the type of procedure you are having and ensuring the correct surgical site. This will be confirmed multiple times prior to your procedure. Do NOT jacky your surgery site UNLESS instructed to by your surgeon. 4. Do not shave or wax for 72 hours prior to procedure near your operative site. Shaving with a razor can irritate your skin and make it easier to develop an infection. On the day of your procedure, any hair that needs to be removed near the surgical site will be clipped by a healthcare worker using a special clippers designed to avoid skin irritation. 5. When you are in the operating room, your surgical site will be cleansed with a special soap, and in most cases, you will be given an antibiotic before the surgery begins. What to expect AFTER YOUR PROCEDURE:  1. Immediately following your procedure, your will be taken to the PACU for the first phase of your recovery.   Your nurse will help you recover from any potential side effects of anesthesia, such as extreme drowsiness, changes in your vital signs or breathing patterns. Nausea, headache, muscle aches, or sore throat may also occur after anesthesia. Your nurse will help you manage these potential side effects. 2. For comfort and safety, arrange to have someone at home with you for the first 24 hours after discharge. 3. You and your family will be given written instructions about your diet, activity, dressing care, medications, and return visits. 4. Once at home, should issues with nausea, pain, or bleeding occur, or should you notice any signs of infection, you should call your surgeon. 5. Always clean your hands before and after caring for your wound. Do not let your family touch your surgery site without cleaning their hands. 6. Narcotic pain medications can cause significant constipation. You may want to add a stool softener to your postoperative medication schedule or speak to your surgeon on how best to manage this SIDE EFFECT. SPECIAL INSTRUCTIONS     Thank you for allowing us to care for you. We strive to exceed your expectations in the delivery of care and service provided to you and your family. If you need to contact the [address] [address] staff for any reason, please call us at [phone]    Instructions reviewed with patient during preadmission testing phone interview.   Travon Mary RN.4/22/2022 .12:59 PM      ADDITIONAL EDUCATIONAL INFORMATION REVIEWED PER PHONE WITH YOU AND/OR YOUR FAMILY:  No Hibiclens® Bathing Instructions   Yes Antibacterial Soap

## 2022-05-03 ENCOUNTER — HOSPITAL ENCOUNTER (OUTPATIENT)
Dept: NUCLEAR MEDICINE | Age: 72
Discharge: HOME | End: 2022-05-03
Payer: MEDICARE

## 2022-05-03 ENCOUNTER — ANESTHESIA (OUTPATIENT)
Dept: OPERATING ROOM | Age: 72
End: 2022-05-03
Payer: MEDICARE

## 2022-05-03 ENCOUNTER — APPOINTMENT (OUTPATIENT)
Dept: MAMMOGRAPHY | Age: 72
End: 2022-05-03
Attending: SURGERY
Payer: MEDICARE

## 2022-05-03 ENCOUNTER — HOSPITAL ENCOUNTER (OUTPATIENT)
Age: 72
Setting detail: OUTPATIENT SURGERY
Discharge: HOME | End: 2022-05-03
Attending: SURGERY | Admitting: SURGERY
Payer: MEDICARE

## 2022-05-03 ENCOUNTER — ANESTHESIA EVENT (OUTPATIENT)
Dept: OPERATING ROOM | Age: 72
End: 2022-05-03
Payer: MEDICARE

## 2022-05-03 VITALS
RESPIRATION RATE: 13 BRPM | WEIGHT: 257.8 LBS | SYSTOLIC BLOOD PRESSURE: 145 MMHG | BODY MASS INDEX: 41.43 KG/M2 | OXYGEN SATURATION: 99 % | HEART RATE: 81 BPM | HEIGHT: 66 IN | DIASTOLIC BLOOD PRESSURE: 91 MMHG | TEMPERATURE: 97.5 F

## 2022-05-03 VITALS — DIASTOLIC BLOOD PRESSURE: 54 MMHG | OXYGEN SATURATION: 97 % | TEMPERATURE: 97 F | SYSTOLIC BLOOD PRESSURE: 98 MMHG

## 2022-05-03 DIAGNOSIS — R93.89 ABNORMAL FINDING ON IMAGING: ICD-10-CM

## 2022-05-03 DIAGNOSIS — C50.911 MALIGNANT NEOPLASM OF RIGHT FEMALE BREAST, UNSPECIFIED ESTROGEN RECEPTOR STATUS, UNSPECIFIED SITE OF BREAST (HCC): ICD-10-CM

## 2022-05-03 LAB
GLUCOSE BLD-MCNC: 119 MG/DL (ref 70–99)
GLUCOSE BLD-MCNC: 88 MG/DL (ref 70–99)
PERFORMED ON: ABNORMAL
PERFORMED ON: NORMAL

## 2022-05-03 PROCEDURE — A9520 TC99 TILMANOCEPT DIAG 0.5MCI: HCPCS

## 2022-05-03 PROCEDURE — 3600000014 HC SURGERY LEVEL 4 ADDTL 15MIN: Performed by: SURGERY

## 2022-05-03 PROCEDURE — 2720000010 HC SURG SUPPLY STERILE: Performed by: SURGERY

## 2022-05-03 PROCEDURE — 88305 TISSUE EXAM BY PATHOLOGIST: CPT

## 2022-05-03 PROCEDURE — 3430000000 HC RX DIAGNOSTIC RADIOPHARMACEUTICAL: Performed by: SURGERY

## 2022-05-03 PROCEDURE — 78195 LYMPH SYSTEM IMAGING: CPT

## 2022-05-03 PROCEDURE — 3700000001 HC ADD 15 MINUTES (ANESTHESIA): Performed by: SURGERY

## 2022-05-03 PROCEDURE — 6360000002 HC RX W HCPCS: Performed by: NURSE ANESTHETIST, CERTIFIED REGISTERED

## 2022-05-03 PROCEDURE — 3430000000 HC RX DIAGNOSTIC RADIOPHARMACEUTICAL

## 2022-05-03 PROCEDURE — 2500000003 HC RX 250 WO HCPCS: Performed by: SURGERY

## 2022-05-03 PROCEDURE — 7100000001 HC PACU RECOVERY - ADDTL 15 MIN: Performed by: SURGERY

## 2022-05-03 PROCEDURE — 2580000003 HC RX 258: Performed by: SURGERY

## 2022-05-03 PROCEDURE — 76098 X-RAY EXAM SURGICAL SPECIMEN: CPT

## 2022-05-03 PROCEDURE — 3700000000 HC ANESTHESIA ATTENDED CARE: Performed by: SURGERY

## 2022-05-03 PROCEDURE — 7100000011 HC PHASE II RECOVERY - ADDTL 15 MIN: Performed by: SURGERY

## 2022-05-03 PROCEDURE — 2580000003 HC RX 258: Performed by: ANESTHESIOLOGY

## 2022-05-03 PROCEDURE — 88307 TISSUE EXAM BY PATHOLOGIST: CPT

## 2022-05-03 PROCEDURE — A4217 STERILE WATER/SALINE, 500 ML: HCPCS | Performed by: SURGERY

## 2022-05-03 PROCEDURE — 3600000004 HC SURGERY LEVEL 4 BASE: Performed by: SURGERY

## 2022-05-03 PROCEDURE — A9520 TC99 TILMANOCEPT DIAG 0.5MCI: HCPCS | Performed by: SURGERY

## 2022-05-03 PROCEDURE — 2709999900 HC NON-CHARGEABLE SUPPLY: Performed by: SURGERY

## 2022-05-03 PROCEDURE — 2500000003 HC RX 250 WO HCPCS: Performed by: NURSE ANESTHETIST, CERTIFIED REGISTERED

## 2022-05-03 PROCEDURE — 7100000010 HC PHASE II RECOVERY - FIRST 15 MIN: Performed by: SURGERY

## 2022-05-03 PROCEDURE — 88342 IMHCHEM/IMCYTCHM 1ST ANTB: CPT

## 2022-05-03 PROCEDURE — 6360000002 HC RX W HCPCS: Performed by: SURGERY

## 2022-05-03 PROCEDURE — 7100000000 HC PACU RECOVERY - FIRST 15 MIN: Performed by: SURGERY

## 2022-05-03 RX ORDER — PROPOFOL 10 MG/ML
INJECTION, EMULSION INTRAVENOUS PRN
Status: DISCONTINUED | OUTPATIENT
Start: 2022-05-03 | End: 2022-05-03 | Stop reason: SDUPTHER

## 2022-05-03 RX ORDER — TRAMADOL HYDROCHLORIDE 50 MG/1
50 TABLET ORAL EVERY 6 HOURS PRN
Qty: 8 TABLET | Refills: 0 | Status: SHIPPED | OUTPATIENT
Start: 2022-05-03 | End: 2022-05-06

## 2022-05-03 RX ORDER — BUPIVACAINE HYDROCHLORIDE 5 MG/ML
INJECTION, SOLUTION EPIDURAL; INTRACAUDAL PRN
Status: DISCONTINUED | OUTPATIENT
Start: 2022-05-03 | End: 2022-05-03 | Stop reason: ALTCHOICE

## 2022-05-03 RX ORDER — SODIUM CHLORIDE 9 MG/ML
25 INJECTION, SOLUTION INTRAVENOUS PRN
Status: DISCONTINUED | OUTPATIENT
Start: 2022-05-03 | End: 2022-05-03 | Stop reason: HOSPADM

## 2022-05-03 RX ORDER — DIPHENHYDRAMINE HYDROCHLORIDE 50 MG/ML
12.5 INJECTION INTRAMUSCULAR; INTRAVENOUS
Status: DISCONTINUED | OUTPATIENT
Start: 2022-05-03 | End: 2022-05-03 | Stop reason: HOSPADM

## 2022-05-03 RX ORDER — MAGNESIUM HYDROXIDE 1200 MG/15ML
LIQUID ORAL CONTINUOUS PRN
Status: COMPLETED | OUTPATIENT
Start: 2022-05-03 | End: 2022-05-03

## 2022-05-03 RX ORDER — SODIUM CHLORIDE, SODIUM LACTATE, POTASSIUM CHLORIDE, CALCIUM CHLORIDE 600; 310; 30; 20 MG/100ML; MG/100ML; MG/100ML; MG/100ML
INJECTION, SOLUTION INTRAVENOUS CONTINUOUS
Status: DISCONTINUED | OUTPATIENT
Start: 2022-05-03 | End: 2022-05-03 | Stop reason: HOSPADM

## 2022-05-03 RX ORDER — SODIUM CHLORIDE 0.9 % (FLUSH) 0.9 %
5-40 SYRINGE (ML) INJECTION EVERY 12 HOURS SCHEDULED
Status: DISCONTINUED | OUTPATIENT
Start: 2022-05-03 | End: 2022-05-03 | Stop reason: HOSPADM

## 2022-05-03 RX ORDER — SODIUM CHLORIDE 9 MG/ML
INJECTION, SOLUTION INTRAVENOUS CONTINUOUS
Status: DISCONTINUED | OUTPATIENT
Start: 2022-05-03 | End: 2022-05-03 | Stop reason: HOSPADM

## 2022-05-03 RX ORDER — SODIUM CHLORIDE 0.9 % (FLUSH) 0.9 %
5-40 SYRINGE (ML) INJECTION PRN
Status: DISCONTINUED | OUTPATIENT
Start: 2022-05-03 | End: 2022-05-03 | Stop reason: HOSPADM

## 2022-05-03 RX ORDER — MIDAZOLAM HYDROCHLORIDE 1 MG/ML
INJECTION INTRAMUSCULAR; INTRAVENOUS PRN
Status: DISCONTINUED | OUTPATIENT
Start: 2022-05-03 | End: 2022-05-03 | Stop reason: SDUPTHER

## 2022-05-03 RX ORDER — PHENYLEPHRINE HYDROCHLORIDE 10 MG/ML
INJECTION INTRAVENOUS PRN
Status: DISCONTINUED | OUTPATIENT
Start: 2022-05-03 | End: 2022-05-03 | Stop reason: SDUPTHER

## 2022-05-03 RX ORDER — GLYCOPYRROLATE 1 MG/5 ML
SYRINGE (ML) INTRAVENOUS PRN
Status: DISCONTINUED | OUTPATIENT
Start: 2022-05-03 | End: 2022-05-03 | Stop reason: SDUPTHER

## 2022-05-03 RX ORDER — ONDANSETRON 2 MG/ML
INJECTION INTRAMUSCULAR; INTRAVENOUS PRN
Status: DISCONTINUED | OUTPATIENT
Start: 2022-05-03 | End: 2022-05-03 | Stop reason: SDUPTHER

## 2022-05-03 RX ORDER — SODIUM CHLORIDE 9 MG/ML
INJECTION, SOLUTION INTRAVENOUS PRN
Status: DISCONTINUED | OUTPATIENT
Start: 2022-05-03 | End: 2022-05-03 | Stop reason: HOSPADM

## 2022-05-03 RX ORDER — ROCURONIUM BROMIDE 10 MG/ML
INJECTION, SOLUTION INTRAVENOUS PRN
Status: DISCONTINUED | OUTPATIENT
Start: 2022-05-03 | End: 2022-05-03 | Stop reason: SDUPTHER

## 2022-05-03 RX ORDER — ISOSULFAN BLUE 50 MG/5ML
INJECTION, SOLUTION SUBCUTANEOUS PRN
Status: DISCONTINUED | OUTPATIENT
Start: 2022-05-03 | End: 2022-05-03 | Stop reason: ALTCHOICE

## 2022-05-03 RX ORDER — LABETALOL HYDROCHLORIDE 5 MG/ML
10 INJECTION, SOLUTION INTRAVENOUS
Status: DISCONTINUED | OUTPATIENT
Start: 2022-05-03 | End: 2022-05-03 | Stop reason: HOSPADM

## 2022-05-03 RX ORDER — DEXAMETHASONE SODIUM PHOSPHATE 4 MG/ML
INJECTION, SOLUTION INTRA-ARTICULAR; INTRALESIONAL; INTRAMUSCULAR; INTRAVENOUS; SOFT TISSUE PRN
Status: DISCONTINUED | OUTPATIENT
Start: 2022-05-03 | End: 2022-05-03 | Stop reason: SDUPTHER

## 2022-05-03 RX ORDER — OXYCODONE HYDROCHLORIDE 5 MG/1
5 TABLET ORAL PRN
Status: DISCONTINUED | OUTPATIENT
Start: 2022-05-03 | End: 2022-05-03 | Stop reason: HOSPADM

## 2022-05-03 RX ORDER — LIDOCAINE HYDROCHLORIDE 10 MG/ML
1 INJECTION, SOLUTION EPIDURAL; INFILTRATION; INTRACAUDAL; PERINEURAL
Status: DISCONTINUED | OUTPATIENT
Start: 2022-05-03 | End: 2022-05-03 | Stop reason: HOSPADM

## 2022-05-03 RX ORDER — LORAZEPAM 2 MG/ML
0.5 INJECTION INTRAMUSCULAR
Status: DISCONTINUED | OUTPATIENT
Start: 2022-05-03 | End: 2022-05-03 | Stop reason: HOSPADM

## 2022-05-03 RX ORDER — FENTANYL CITRATE 50 UG/ML
25 INJECTION, SOLUTION INTRAMUSCULAR; INTRAVENOUS EVERY 5 MIN PRN
Status: DISCONTINUED | OUTPATIENT
Start: 2022-05-03 | End: 2022-05-03 | Stop reason: HOSPADM

## 2022-05-03 RX ORDER — FENTANYL CITRATE 50 UG/ML
INJECTION, SOLUTION INTRAMUSCULAR; INTRAVENOUS PRN
Status: DISCONTINUED | OUTPATIENT
Start: 2022-05-03 | End: 2022-05-03 | Stop reason: SDUPTHER

## 2022-05-03 RX ORDER — OXYCODONE HYDROCHLORIDE 5 MG/1
10 TABLET ORAL PRN
Status: DISCONTINUED | OUTPATIENT
Start: 2022-05-03 | End: 2022-05-03 | Stop reason: HOSPADM

## 2022-05-03 RX ORDER — ONDANSETRON 2 MG/ML
4 INJECTION INTRAMUSCULAR; INTRAVENOUS
Status: DISCONTINUED | OUTPATIENT
Start: 2022-05-03 | End: 2022-05-03 | Stop reason: HOSPADM

## 2022-05-03 RX ADMIN — PROPOFOL 160 MG: 10 INJECTION, EMULSION INTRAVENOUS at 07:37

## 2022-05-03 RX ADMIN — MIDAZOLAM HYDROCHLORIDE 2 MG: 2 INJECTION, SOLUTION INTRAMUSCULAR; INTRAVENOUS at 07:29

## 2022-05-03 RX ADMIN — FENTANYL CITRATE 50 MCG: 50 INJECTION, SOLUTION INTRAMUSCULAR; INTRAVENOUS at 08:06

## 2022-05-03 RX ADMIN — ROCURONIUM BROMIDE 40 MG: 50 INJECTION, SOLUTION INTRAVENOUS at 07:37

## 2022-05-03 RX ADMIN — PHENYLEPHRINE HYDROCHLORIDE 100 MCG: 10 INJECTION INTRAVENOUS at 08:33

## 2022-05-03 RX ADMIN — ONDANSETRON 4 MG: 2 INJECTION INTRAMUSCULAR; INTRAVENOUS at 08:02

## 2022-05-03 RX ADMIN — FENTANYL CITRATE 25 MCG: 50 INJECTION, SOLUTION INTRAMUSCULAR; INTRAVENOUS at 09:03

## 2022-05-03 RX ADMIN — PHENYLEPHRINE HYDROCHLORIDE 100 MCG: 10 INJECTION INTRAVENOUS at 10:14

## 2022-05-03 RX ADMIN — SODIUM CHLORIDE, POTASSIUM CHLORIDE, SODIUM LACTATE AND CALCIUM CHLORIDE: 600; 310; 30; 20 INJECTION, SOLUTION INTRAVENOUS at 10:02

## 2022-05-03 RX ADMIN — Medication 80 MG: at 07:37

## 2022-05-03 RX ADMIN — SODIUM CHLORIDE, POTASSIUM CHLORIDE, SODIUM LACTATE AND CALCIUM CHLORIDE: 600; 310; 30; 20 INJECTION, SOLUTION INTRAVENOUS at 07:09

## 2022-05-03 RX ADMIN — ROCURONIUM BROMIDE 10 MG: 50 INJECTION, SOLUTION INTRAVENOUS at 09:03

## 2022-05-03 RX ADMIN — DEXAMETHASONE SODIUM PHOSPHATE 4 MG: 4 INJECTION, SOLUTION INTRAMUSCULAR; INTRAVENOUS at 08:02

## 2022-05-03 RX ADMIN — Medication 20 MG: at 07:43

## 2022-05-03 RX ADMIN — PHENYLEPHRINE HYDROCHLORIDE 100 MCG: 10 INJECTION INTRAVENOUS at 08:51

## 2022-05-03 RX ADMIN — Medication 0.3 MG: at 08:22

## 2022-05-03 RX ADMIN — Medication 0.1 MG: at 08:33

## 2022-05-03 RX ADMIN — PROPOFOL 40 MG: 10 INJECTION, EMULSION INTRAVENOUS at 07:43

## 2022-05-03 RX ADMIN — PHENYLEPHRINE HYDROCHLORIDE 100 MCG: 10 INJECTION INTRAVENOUS at 08:43

## 2022-05-03 RX ADMIN — TILMANOCEPT 0.8 MILLICURIE: KIT at 07:52

## 2022-05-03 RX ADMIN — PHENYLEPHRINE HYDROCHLORIDE 100 MCG: 10 INJECTION INTRAVENOUS at 08:25

## 2022-05-03 RX ADMIN — FENTANYL CITRATE 25 MCG: 50 INJECTION, SOLUTION INTRAMUSCULAR; INTRAVENOUS at 08:02

## 2022-05-03 ASSESSMENT — PULMONARY FUNCTION TESTS
PIF_VALUE: 2
PIF_VALUE: 22
PIF_VALUE: 22
PIF_VALUE: 21
PIF_VALUE: 21
PIF_VALUE: 22
PIF_VALUE: 20
PIF_VALUE: 22
PIF_VALUE: 22
PIF_VALUE: 21
PIF_VALUE: 22
PIF_VALUE: 21
PIF_VALUE: 23
PIF_VALUE: 22
PIF_VALUE: 22
PIF_VALUE: 21
PIF_VALUE: 22
PIF_VALUE: 21
PIF_VALUE: 2
PIF_VALUE: 22
PIF_VALUE: 20
PIF_VALUE: 22
PIF_VALUE: 26
PIF_VALUE: 22
PIF_VALUE: 21
PIF_VALUE: 20
PIF_VALUE: 22
PIF_VALUE: 22
PIF_VALUE: 21
PIF_VALUE: 22
PIF_VALUE: 21
PIF_VALUE: 22
PIF_VALUE: 22
PIF_VALUE: 23
PIF_VALUE: 22
PIF_VALUE: 22
PIF_VALUE: 21
PIF_VALUE: 21
PIF_VALUE: 22
PIF_VALUE: 20
PIF_VALUE: 21
PIF_VALUE: 21
PIF_VALUE: 22
PIF_VALUE: 21
PIF_VALUE: 21
PIF_VALUE: 22
PIF_VALUE: 22
PIF_VALUE: 19
PIF_VALUE: 22
PIF_VALUE: 20
PIF_VALUE: 4
PIF_VALUE: 21
PIF_VALUE: 2
PIF_VALUE: 22
PIF_VALUE: 21
PIF_VALUE: 3
PIF_VALUE: 22
PIF_VALUE: 22
PIF_VALUE: 21
PIF_VALUE: 22
PIF_VALUE: 0
PIF_VALUE: 21
PIF_VALUE: 21
PIF_VALUE: 2
PIF_VALUE: 22
PIF_VALUE: 22
PIF_VALUE: 21
PIF_VALUE: 21
PIF_VALUE: 20
PIF_VALUE: 21
PIF_VALUE: 20
PIF_VALUE: 21
PIF_VALUE: 22
PIF_VALUE: 21
PIF_VALUE: 23
PIF_VALUE: 20
PIF_VALUE: 21
PIF_VALUE: 22
PIF_VALUE: 0
PIF_VALUE: 1
PIF_VALUE: 20
PIF_VALUE: 21
PIF_VALUE: 21
PIF_VALUE: 22
PIF_VALUE: 20
PIF_VALUE: 22
PIF_VALUE: 22
PIF_VALUE: 3
PIF_VALUE: 21
PIF_VALUE: 21
PIF_VALUE: 22
PIF_VALUE: 22
PIF_VALUE: 23
PIF_VALUE: 21
PIF_VALUE: 20
PIF_VALUE: 22
PIF_VALUE: 19
PIF_VALUE: 21
PIF_VALUE: 23
PIF_VALUE: 22
PIF_VALUE: 21
PIF_VALUE: 22
PIF_VALUE: 21
PIF_VALUE: 22
PIF_VALUE: 0
PIF_VALUE: 24
PIF_VALUE: 22
PIF_VALUE: 23
PIF_VALUE: 23
PIF_VALUE: 24
PIF_VALUE: 21
PIF_VALUE: 22
PIF_VALUE: 1
PIF_VALUE: 2
PIF_VALUE: 22
PIF_VALUE: 22
PIF_VALUE: 27
PIF_VALUE: 22
PIF_VALUE: 22
PIF_VALUE: 21
PIF_VALUE: 21
PIF_VALUE: 20
PIF_VALUE: 1
PIF_VALUE: 20
PIF_VALUE: 22
PIF_VALUE: 22
PIF_VALUE: 20
PIF_VALUE: 20
PIF_VALUE: 3
PIF_VALUE: 22
PIF_VALUE: 23
PIF_VALUE: 0
PIF_VALUE: 21
PIF_VALUE: 21
PIF_VALUE: 22
PIF_VALUE: 20
PIF_VALUE: 21
PIF_VALUE: 20
PIF_VALUE: 22
PIF_VALUE: 22
PIF_VALUE: 28
PIF_VALUE: 21
PIF_VALUE: 22
PIF_VALUE: 21
PIF_VALUE: 22
PIF_VALUE: 19
PIF_VALUE: 22
PIF_VALUE: 22
PIF_VALUE: 21
PIF_VALUE: 1
PIF_VALUE: 22
PIF_VALUE: 23
PIF_VALUE: 22
PIF_VALUE: 21
PIF_VALUE: 1
PIF_VALUE: 21
PIF_VALUE: 22
PIF_VALUE: 1
PIF_VALUE: 22
PIF_VALUE: 21
PIF_VALUE: 23
PIF_VALUE: 22
PIF_VALUE: 21
PIF_VALUE: 22
PIF_VALUE: 23
PIF_VALUE: 22
PIF_VALUE: 3
PIF_VALUE: 22
PIF_VALUE: 20
PIF_VALUE: 0
PIF_VALUE: 20
PIF_VALUE: 27
PIF_VALUE: 21
PIF_VALUE: 22
PIF_VALUE: 22
PIF_VALUE: 21
PIF_VALUE: 22
PIF_VALUE: 22

## 2022-05-03 ASSESSMENT — PAIN DESCRIPTION - FREQUENCY: FREQUENCY: CONTINUOUS

## 2022-05-03 ASSESSMENT — PAIN - FUNCTIONAL ASSESSMENT: PAIN_FUNCTIONAL_ASSESSMENT: NONE - DENIES PAIN

## 2022-05-03 ASSESSMENT — PAIN SCALES - GENERAL
PAINLEVEL_OUTOF10: 4
PAINLEVEL_OUTOF10: 4
PAINLEVEL_OUTOF10: 0

## 2022-05-03 ASSESSMENT — PAIN DESCRIPTION - DESCRIPTORS: DESCRIPTORS: ACHING

## 2022-05-03 ASSESSMENT — PAIN DESCRIPTION - PAIN TYPE: TYPE: SURGICAL PAIN

## 2022-05-03 ASSESSMENT — PAIN DESCRIPTION - ORIENTATION: ORIENTATION: RIGHT

## 2022-05-03 ASSESSMENT — PAIN DESCRIPTION - LOCATION: LOCATION: BREAST

## 2022-05-03 NOTE — PROGRESS NOTES
PACU Transfer to [facility]    Procedure(s):  RIGHT RADIOFREQUENCY IDENTIFICATION TAG LOCALIZED PARTIAL MASTECTOMY RIGHT SENTINEL LYMPH NODE BIOPSY  . Pt's Current Allergies: Metformin and Adhesive tape    Pt meets criteria to transfer to next phase of care per EVANS SCORE and ASPAN standards    Recent Labs     05/03/22  0712 05/03/22  1038   POCGLU 88 119*       Vitals:    05/03/22 1130   BP: (!) 167/70   Pulse: 71   Resp: 10   Temp: 97 °F (36.1 °C)   SpO2: 95%      BP within 20% of pt's admitting BP as per Evans Score      Intake/Output Summary (Last 24 hours) at 5/3/2022 1150  Last data filed at 5/3/2022 1040  Gross per 24 hour   Intake 1200 ml   Output 30 ml   Net 1170 ml       Pain assessment:  none  Pain Level: 4 (rates 4-5 and tolerable)    Patient was assessed for unknown alterations to skin integrity. There were not unknown alterations observed. Patient transferred to care of [facility] RN.    Daughter Aldair Guerra updated and directed to [facility]    5/3/2022 11:50 AM

## 2022-05-03 NOTE — PROGRESS NOTES
1240 Report given to [facility]  Tolerates po well  DC instructions given to pt and family with verbalization of understanding of pt and 2 daughters.    Inc to Right breast and axillary well approx with surgical glue intact  Assisted pt to BR to Void QS with blue colored urine noted X2

## 2022-05-03 NOTE — ANESTHESIA POSTPROCEDURE EVALUATION
Department of Anesthesiology  Postprocedure Note    Patient: Guillermo Solis  MRN: [id number]  YOB: 1950  Date of evaluation: 5/3/2022  Time:  1:33 PM     Procedure Summary     Date: 05/03/22 Room / Location: [address] [place] [address] [address] [place] [address] / [facility]    Anesthesia Start: 0730 Anesthesia Stop: 1042    Procedures:       RIGHT RADIOFREQUENCY IDENTIFICATION TAG LOCALIZED PARTIAL MASTECTOMY RIGHT SENTINEL LYMPH NODE BIOPSY (Right )      . (Right ) Diagnosis:       Malignant neoplasm of right female breast, unspecified estrogen receptor status, unspecified site of breast ([facility] [address]      (Malignant neoplasm of right female breast, unspecified estrogen receptor status, unspecified site of breast ([facility] [address] Herve Hightower)    Surgeons: Orlie Crigler, MD Responsible Provider: Catia Masters MD    Anesthesia Type: general ASA Status: 3          Anesthesia Type: general    Aimee Phase I: Aimee Score: 10    Aimee Phase II: Aimee Score: 10    Last vitals: Reviewed and per EMR flowsheets.        Anesthesia Post Evaluation    Patient location during evaluation: PACU  Level of consciousness: awake  Complications: no  Multimodal analgesia pain management approach

## 2022-05-03 NOTE — H&P
[facility] Same Day Surgery Update H & P  Department of General Surgery   Surgical Service   Pre-operative History and Physical  Last H & P within the last 30 days. DIAGNOSIS:   Malignant neoplasm of right female breast, unspecified estrogen receptor status, unspecified site of breast ([address] [C50.911]    Procedure(s):  RIGHT RADIOFREQUENCY IDENTIFICATION TAG LOCALIZED PARTIAL MASTECTOMY RIGHT SENTINEL LYMPH NODE BIOPSY  . History obtained from: Patient interview and EHR      HISTORY OF PRESENT ILLNESS:   Patient is a morbidly obese (Body mass index is 41.61 kg/m².), 66 y.o. female with biopsy demonstrated right breast cancer presents today for the above procedure. Illness Screening: Patient denies fever, chills, worsening cough, or close contact with sick individuals. Past Medical History:        Diagnosis Date    Arthritis     Back pain     CHF (congestive heart failure) (HCC)     Chronic diastolic heart failure (HCC)     Diabetes mellitus (HCC)     Diabetes mellitus (HCC)     TYPE 2    GERD (gastroesophageal reflux disease)     High cholesterol     Hypertension     Polio     Polio     AS INFANT    Shingles     OCT 2013    Urinary problem     HAS BASHFUL BLADDER     Past Surgical History:        Procedure Laterality Date    BACK SURGERY      BACK SURGERY      1/13/14 x2    COLONOSCOPY      HERNIA REPAIR      JOINT REPLACEMENT      bilateral knee replacements    JOINT REPLACEMENT      [place] KNEES    KNEE SURGERY      [facility] STEROTACT[facility] LOC BREAST BIOPSY RIGHT Right 4/6/2022    [facility] STEROTACT[facility] LOC BREAST BIOPSY RIGHT 4/6/2022 [facility] [address]    OTHER SURGICAL HISTORY  01/13/14    L4-5 DECOMPRESSION, POSTERIOR LATERAL FUSION AND PEDICLE       Medications Prior to Admission:      Prior to Admission medications    Medication Sig Start Date End Date Taking?  Authorizing Provider   Multiple Vitamins-Minerals (HAIR SKIN AND NAILS FORMULA) TABS Take by mouth   Yes Historical Provider, MD   vitamin C (ASCORBIC ACID) 500 MG tablet Take 500 mg by mouth daily    Historical Provider, MD   Multiple Vitamins-Minerals (THERAPEUTIC MULTIVITAMIN-MINERALS) tablet Take 1 tablet by mouth daily Essential 1 Pro caps    Historical Provider, MD   metoprolol succinate (TOPROL XL) 50 MG extended release tablet Take 1 tablet by mouth daily 2/15/22   Raysa Medrano MD   metoprolol succinate (TOPROL XL) 25 MG extended release tablet Take 1 tablet by mouth daily 2/15/22   Raysa Medrano MD   furosemide (LASIX) 20 MG tablet Take 2 tablets by mouth daily 2/15/22   Raysa Medrano MD   losartan (COZAAR) 100 MG tablet Take 1 tablet by mouth daily 2/11/22 8/10/22  Raysa Medrano MD   Semaglutide,0.25 or 0.5MG/DOS, (OZEMPIC, 0.25 OR 0.5 MG/DOSE,) 2 MG/1.5ML SOPN once a week  7/1/21   Historical Provider, MD   spironolactone (ALDACTONE) 25 MG tablet Take 1 tablet by mouth daily 9/9/21   Raysa Medrano MD   meloxicam (MOBIC) 15 MG tablet Take 7.5 mg by mouth daily    Historical Provider, MD   insulin glargine (LANTUS) 100 UNIT/ML injection vial Inject 30 Units into the skin nightly     Historical Provider, MD   pantoprazole (PROTONIX) 40 MG tablet Take 40 mg by mouth daily 12/29/20   Historical Provider, MD   famotidine (PEPCID) 40 MG tablet Take 40 mg by mouth daily     Historical Provider, MD   aspirin 81 MG chewable tablet Take 1 tablet by mouth daily. 2/26/15   Nohelia Srinivasan MD   acetaminophen (TYLENOL) 500 MG tablet Take 500 mg by mouth every 6 hours as needed for Pain.     Historical Provider, MD         Allergies:  Metformin and Adhesive tape    PHYSICAL EXAM:      BP (!) 142/68   Pulse 71   Resp 16   Ht 5' 6\" (1.676 m)   Wt 257 lb 12.8 oz (116.9 kg)   SpO2 98%   BMI 41.61 kg/m²      Airway:  Airway patent with no audible stridor    Heart:  Regular rate and rhythm, No murmur noted    Lungs:  No increased work of breathing, good air exchange, clear to auscultation bilaterally, no crackles or wheezing    Abdomen:  Soft, non-distended, non-tender, no rebound tenderness or guarding, and no masses palpated    ASSESSMENT AND PLAN     Patient is a 66 y.o. female with above specified procedure planned. 1.  The patients history and physical was obtained and signed off by the pre-admission testing department. Patient seen and focused exam done today- no new changes since last physical exam on 4/25/22    2. Access to ancillary services are available per request of the provider.     BRICE Malone - CNP     5/3/2022

## 2022-05-03 NOTE — ANESTHESIA PRE PROCEDURE
Department of Anesthesiology  Preprocedure Note       Name:  Kylee Park   Age:  66 y.o.  :  1950                                          MRN:  [id number]         Date:  5/3/2022      Surgeon: Aki Maxwell):  Nury España MD    Procedure: Procedure(s):  RIGHT RADIOFREQUENCY IDENTIFICATION TAG LOCALIZED PARTIAL MASTECTOMY RIGHT SENTINEL LYMPH NODE BIOPSY  . Medications prior to admission:   Prior to Admission medications    Medication Sig Start Date End Date Taking?  Authorizing Provider   Multiple Vitamins-Minerals (HAIR SKIN AND NAILS FORMULA) TABS Take by mouth   Yes Historical Provider, MD   vitamin C (ASCORBIC ACID) 500 MG tablet Take 500 mg by mouth daily    Historical Provider, MD   Multiple Vitamins-Minerals (THERAPEUTIC MULTIVITAMIN-MINERALS) tablet Take 1 tablet by mouth daily Essential 1 Pro caps    Historical Provider, MD   metoprolol succinate (TOPROL XL) 50 MG extended release tablet Take 1 tablet by mouth daily 2/15/22   Jorge Taylor MD   metoprolol succinate (TOPROL XL) 25 MG extended release tablet Take 1 tablet by mouth daily 2/15/22   Jorge Taylor MD   furosemide (LASIX) 20 MG tablet Take 2 tablets by mouth daily 2/15/22   Jorge Taylor MD   losartan (COZAAR) 100 MG tablet Take 1 tablet by mouth daily 2/11/22 8/10/22  Jorge Taylor MD   Semaglutide,0.25 or 0.5MG/DOS, (OZEMPIC, 0.25 OR 0.5 MG/DOSE,) 2 MG/1.5ML SOPN once a week  21   Historical Provider, MD   spironolactone (ALDACTONE) 25 MG tablet Take 1 tablet by mouth daily 21   Jorge Taylor MD   meloxicam (MOBIC) 15 MG tablet Take 7.5 mg by mouth daily    Historical Provider, MD   insulin glargine (LANTUS) 100 UNIT/ML injection vial Inject 30 Units into the skin nightly     Historical Provider, MD   pantoprazole (PROTONIX) 40 MG tablet Take 40 mg by mouth daily 20   Historical Provider, MD   famotidine (PEPCID) 40 MG tablet Take 40 mg by mouth daily     Historical Provider, MD   aspirin 81 MG chewable tablet Take 1 tablet by mouth daily. 2/26/15   Nadiya Murrell MD   acetaminophen (TYLENOL) 500 MG tablet Take 500 mg by mouth every 6 hours as needed for Pain.     Historical Provider, MD       Current medications:    Current Facility-Administered Medications   Medication Dose Route Frequency Provider Last Rate Last Admin    ceFAZolin (ANCEF) 2000 mg in dextrose 5 % 50 mL IVPB  2,000 mg IntraVENous Once Kamar Ray MD        lidocaine PF 1 % injection 1 mL  1 mL IntraDERmal Once PRN Alexander Grove MD        lactated ringers infusion   IntraVENous Continuous Alexander Grove MD        sodium chloride flush 0.9 % injection 5-40 mL  5-40 mL IntraVENous 2 times per day Alexander Grove MD        sodium chloride flush 0.9 % injection 5-40 mL  5-40 mL IntraVENous PRN Alexander Grove MD        0.9 % sodium chloride infusion   IntraVENous PRN Alexander Grove MD        0.9 % sodium chloride infusion   IntraVENous Continuous Emmanuel Barillas MD        sodium chloride flush 0.9 % injection 5-40 mL  5-40 mL IntraVENous 2 times per day Emmanuel Barillas MD        sodium chloride flush 0.9 % injection 5-40 mL  5-40 mL IntraVENous PRN Emmanuel Barillas MD        0.9 % sodium chloride infusion  25 mL IntraVENous PRN Emmanuel Barillas MD        fentaNYL (SUBLIMAZE) injection 25 mcg  25 mcg IntraVENous Q5 Min PRN Emmanuel Barillas MD        HYDROmorphone (DILAUDID) injection 0.5 mg  0.5 mg IntraVENous Q5 Min PRN Emmanuel Barillas MD        oxyCODONE (ROXICODONE) immediate release tablet 5 mg  5 mg Oral PRN Emmanuel Barillas MD        Or    oxyCODONE (ROXICODONE) immediate release tablet 10 mg  10 mg Oral PRN Emmanuel Barillas MD        [facility]ron [facility]) injection 4 mg  4 mg IntraVENous Once PRN Emmanuel Barillas MD        prochlorperazine (COMPAZINE) 5 mg in sodium chloride (PF) 10 mL injection  5 mg IntraVENous Once PRN Emmanuel Barillas MD        LORazepam (ATIVAN) injection 0.5 mg  0.5 mg IntraVENous Once PRN Real Nur Jazmín Espinosa MD        diphenhydrAMINE (BENADRYL) injection 12.5 mg  12.5 mg IntraVENous Once PRN Emmanuel Barillas MD        labetalol (NORMODYNE;TRANDATE) injection 10 mg  10 mg IntraVENous Q15 Min PRN Emmanuel Barillas MD           Allergies:     Allergies   Allergen Reactions    Metformin Nausea Only and Other (See Comments)     \"ate the lining of her stomach\"      Adhesive Tape      Skin irritation and blisters  - does okay with steri strips and tegaderm       Problem List:    Patient Active Problem List   Diagnosis Code    HTN (hypertension) I10    High cholesterol E78.00    [facility] E11.9    Lumbar spinal stenosis M48.061    TIA (transient ischemic attack) G45.9    Chest pain R07.9    Diabetes mellitus type 2 in obese ([facility] E11.69, E66.9    Chronic diastolic heart failure (HCC) I50.32    Snoring R06.83    Hypersomnolence G47.10    Bilateral leg pain M79.604, M79.605    JOSE MANUEL on CPAP G47.33, Z99.89       Past Medical History:        Diagnosis Date    Arthritis     Back pain     CHF (congestive heart failure) (HCC)     Chronic diastolic heart failure (HCC)     Diabetes mellitus (HCC)     Diabetes mellitus (HCC)     TYPE 2    GERD (gastroesophageal reflux disease)     High cholesterol     Hypertension     Polio     Polio     AS INFANT    Shingles     OCT 2013    Urinary problem     HAS BASHFUL BLADDER       Past Surgical History:        Procedure Laterality Date    BACK SURGERY      BACK SURGERY      1/13/14 x2    COLONOSCOPY      HERNIA REPAIR      JOINT REPLACEMENT      bilateral knee replacements    JOINT REPLACEMENT      [place] KNEES    KNEE SURGERY      S[facility] STEROTACTIC LOC BREAST BIOPSY RIGHT Right 4/6/2022    SABRINA STEROTACTIC LOC BREAST BIOPSY RIGHT 4/6/2022 [facility] [address]    OTHER SURGICAL HISTORY  01/13/14    L4-5 DECOMPRESSION, POSTERIOR LATERAL FUSION AND PEDICLE       Social History:    Social History     Tobacco Use    Smoking status: Former Smoker Packs/day: 0.25     Years: 1.00     Pack years: 0.25     Types: Cigarettes     Quit date: 1967     Years since quittin.5    Smokeless tobacco: Never Used   Substance Use Topics    Alcohol use: No                                Counseling given: Not Answered      Vital Signs (Current):   Vitals:    22 1253 22 0615   BP:  (!) 142/68   Pulse:  71   Resp:  16   SpO2:  98%   Weight: 259 lb (117.5 kg) 257 lb 12.8 oz (116.9 kg)   Height: 5' 6\" (1.676 m) 5' 6\" (1.676 m)                                              BP Readings from Last 3 Encounters:   22 (!) 142/68   22 138/72   02/15/22 (!) 158/62       NPO Status:                                                                                 BMI:   Wt Readings from Last 3 Encounters:   22 257 lb 12.8 oz (116.9 kg)   22 261 lb 12.8 oz (118.8 kg)   03/15/22 252 lb (114.3 kg)     Body mass index is 41.61 kg/m². CBC:   Lab Results   Component Value Date    WBC 6.5 2021    RBC 4.13 2021    HGB 11.3 2021    HCT 35.2 2021    MCV 85.2 2021    RDW 15.5 2021     2021       CMP:   Lab Results   Component Value Date     2022    K 4.2 2022     2022    CO2 29 2022    BUN 25 2022    CREATININE 0.81 2022    GFRAA >60 2021    AGRATIO 1.4 2021    LABGLOM >60 2021    GLUCOSE 112 2022    PROT 6.1 2022    PROT 7.1 2012    CALCIUM 9.5 2022    BILITOT 0.2 2022    ALKPHOS 72 2022    AST 15 2022    ALT 13 2022       POC Tests: No results for input(s): POCGLU, POCNA, POCK, POCCL, POCBUN, POCHEMO, POCHCT in the last 72 hours.     Coags:   Lab Results   Component Value Date    PROTIME 11.0 2015    INR 1.02 2015    APTT 28.5 2014       HCG (If Applicable): No results found for: PREGTESTUR, PREGSERUM, HCG, HCGQUANT     ABGs: No results found for: PHART, PO2ART, ZVP6PCQ, KUS0RKB, BEART, Y3PNIHKO     Type & Screen (If Applicable):  No results found for: LABABO, LABRH    Drug/Infectious Status (If Applicable):  No results found for: HIV, HEPCAB    COVID-19 Screening (If Applicable):   Lab Results   Component Value Date    COVID19 Not Detected 05/14/2021           Anesthesia Evaluation    Airway: Mallampati: II  TM distance: >3 FB   Neck ROM: full  Mouth opening: > = 3 FB Dental:          Pulmonary:   (+) sleep apnea:                             Cardiovascular:    (+) hypertension:, CHF:,         Rhythm: regular  Rate: normal                    Neuro/Psych:   (+) TIA,             GI/Hepatic/Renal:   (+) GERD:, morbid obesity          Endo/Other:    (+) Diabetes, . Abdominal:             Vascular: Other Findings:            Conclusions      Summary   Left ventricular cavity size is normal.   Normal left ventricular wall thickness. Left ventricular function is low normal with ejection fraction estimated at   50%. No regional wall motion abnormalities are noted. Diastolic filling parameters suggest grade II diastolic dysfunction. Thickening of leaflets of mitral valve. Mitral annular calcification is present. Mild mitral regurgitation is present. Bi-atrial enlargement. Mild aortic stenosis. Moderate aortic regurgitation. Mild tricuspid regurgitation. Estimated pulmonary artery systolic pressure is at 44 mmHg assuming a right   atrial pressure of 3 mmHg. Signature      ------------------------------------------------------------------   Electronically signed by Helga Ontiveros MD, [facility] (Interpreting   physician) on 03/22/2022 at 09:40 AM   ------------------------------------------------------------------       Anesthesia Plan      general     ASA 3       Induction: intravenous. Anesthetic plan and risks discussed with patient. Plan discussed with CRNA.                   Soledad Newton MD   5/3/2022

## 2022-05-03 NOTE — BRIEF OP NOTE
Brief Postoperative Note      Patient: Chilo Wick  YOB: 1950  MRN: [id number]    Date of Procedure: 5/3/2022    Pre-Op Diagnosis: Malignant neoplasm of right female breast, unspecified estrogen receptor status, unspecified site [facility] [C50.911]    Post-Op Diagnosis: Same       Procedure(s):  RIGHT RADIOFREQUENCY IDENTIFICATION TAG LOCALIZED PARTIAL MASTECTOMY RIGHT SENTINEL LYMPH NODE BIOPSY  .     Surgeon(s):  Gabriel Da Silva MD    Assistant:  Resident: Yue Aguirre DO    Anesthesia: General    Estimated Blood Loss (mL): Minimal    Complications: None    Specimens:   ID Type Source Tests Collected by Time Destination   A : RIGHT BREAST TISSUE SHORT STITCH SUPERIOR WITH 1 CLIP LONG STITCH LATERAL WITH 2 CLIPS  Tissue Tissue SURGICAL PATHOLOGY Gabriel Da Silva MD 5/3/2022 7987    B : RIGHT BREAST TISSUE NEW MEDIAL MARGIN, STITCH AT NEW MARGIN  Tissue Tissue SURGICAL PATHOLOGY Gabriel Da Silva MD 5/3/2022 0026    C : RIGHT BREAST TISSUE NEW SUPERIOR MARGIN STITCH AT NEW MARGIN Tissue Tissue SURGICAL PATHOLOGY Gabriel Da Silva MD 5/3/2022 1178    D : RIGHT BREAST TISSUE NEW INFERIOR MARGIN STITCH AT NEW MARGIN Tissue Tissue [address] Craig Baird MD 5/3/2022 4001    E : RIGHT BREAST TISSUE NEW POSTERIOR MARGIN STITCH AT NEW MARGIN Tissue Tissue SURGICAL PATHOLOGY Gabriel Da Silva MD 5/3/2022 3146    F : RIGHT BREAST TISSUE NEW LATERAL MARGIN STITCH AT NEW MARGIN Tissue Tissue 160 Craig Baird MD 5/3/2022 9993    G : RIGHT BREAST TISSUE ANTERIOR MARGIN SKIN IS ANTERIOR TO MARGIN Tissue Tissue SURGICAL PATHOLOGY Gabriel Da Silva MD 5/3/2022 0891    H : RIGHT AXILLARY SENTINEL NODES  Tissue Tissue SURGICAL PATHOLOGY Gabriel Da Silva MD 5/3/2022 9149        Implants:  * No implants in log *      Drains: * No LDAs found *    Findings: R breast lumpectomy, specimen with localizing clip and multiple calcifications; intraoperatively, mass-like lesion on lateral superior portion of R areola, overlying sin excised and sent for pathology; sentinel lymph node biopsy with highest value of 3800    Electronically signed by Mikki Turpin DO on 5/3/2022 at 10:39 AM

## 2022-05-03 NOTE — PROGRESS NOTES
Patient arrived from OR to PACU # 5 s/p    RIGHT RADIOFREQUENCY IDENTIFICATION TAG LOCALIZED PARTIAL MASTECTOMY RIGHT SENTINEL LYMPH NODE BIOPSY (Right ) per . Attached to PACU monitoring device, report received from CRNA who reported no problems intraoperatively. Arrived drowsy from anesthesia.

## 2022-05-04 NOTE — OP NOTE
Operative Note    Stanley Abarca  YOB: 1950  MRN: [id number]    PREOPERATIVE DIAGNOSIS  right breast cancer     POSTOPERATIVE DIAGNOSIS  right breast cancer    PROCEDURE  1. Injection of blue dye to the right breast  2. right radiofrequency identification tag localized partial mastectomy  3. right sentinel lymph node biopsy    ANESTHESIA  General anesthesia. SURGEON  Manish Caicedo MD     ASSISTANT  Resident: Mohini Lynne DO    ESTIMATED BLOOD LOSS  less than 50 ml    COMPLICATIONS  None apparent by completion of procedure    SPECIMENS REMOVED  1. right breast tissue which was marked with a short stitch on the superior margin and a long stitch on the lateral margin  2. right breast tissue, new medial margin which was marked with a stitch on the new true margin  3. right breast tissue, new superior margin which was marked with a stitch on the new true margin  4. right breast tissue, new inferior margin which was marked with a stitch on the new true margin  5. right breast tissue, new posterior margin which was marked with a stitch on the new true margin  6. right breast tissue, new lateral margin which was marked with a stitch on the new true margin  7. right breast tissue, new anterior margin, skin is anterior margin  8. right sentinel lymph nodes     FINDINGS  The right breast tissue was excised using radiofrequency identification tag localization. Specimen radiograph demonstrated that the lesion and clip and tag were located within the specimen. The right sentinel lymph nodes were identified and were grossly normal.     POST-OP CONDITION  Stable    DISPOSITION  To recovery room    INDICATION FOR PROCEDURE  Stanley Abarca is a 66 y.o. woman who was found to have an abnormality in her right breast on imaging.   A core biopsy was performed and revealed an invasive right breast cancer in the upper outer breast. I felt that she was an acceptable candidate for attempted breast conservation for which she was highly motivated. She presents today for that purpose as well as a sentinel lymph node biopsy for axillary staging. The indications for the planned procedure, along with the potential benefits and risks which include but are not limited to: anesthetic risk, bleeding, infection, wound complications, sensation changes, unappealing cosmetics, lymphedema, arm numbness, nerve injury, and the need to return to the operating room for a second procedure based on final pathology were reviewed. All questions were answered, and she agrees to proceed. DESCRIPTION OF PROCEDURE   Adalgisa Finley underwent an image guided localization procedure preoperatively in the radiology department. She was then brought to the operating room and placed supine on the operating room table with her arms extended on boards. She was appropriately positioned and padded. Bilateral sequential compression devices were applied to both lower extremities and a single dose of antibiotics was administered intravenously within 60 minutes prior to the incision time. Breast imaging was available in the room. After induction of anesthesia, a timeout procedure was performed. Radioactive colloid was injected into the right breast by the nuclear medicine technician. I then injected 5 ml of isosulfan blue dye in the right upper outer breast and a 5 minute massage was performed. The patient was prepped and draped in the standard surgical fashion. We directed our attention to the right breast.  A periareolar incision was planned based on the location of the greatest radiofrequency identification tag signal.  This was anteriormedial to the signal.  The incision was made and carried down through the dermis with electrocautery.   The radiofrequency identification tag signal was then used to create flaps and excise tissue anterior, superior, inferior, medial, and lateral to the signal.  During the anterior dissection beneath the areola, grossly it appeared that tumor was being transected due to close proximity to the skin. The anterior dissection was continued and completed as superficial as possible in order to obtain grossly clear margins. The tissue was then transected from the posterior attachments. Dissection was not carried to the chest wall. The main specimen was then marked with a short stitch on the superior margin and a long stitch on the lateral marginand a specimen radiograph was obtained. Specimen radiograph demonstrated that the lesion and clip and tag were located within the specimen. Additional shave margins were obtained superiorly, medially, inferiorly, laterally, posteriorly, and anteriorly. The new anterior margin was obtained as an ellipse of skin overlying where the grossly palpable abnormal tissue was. This ellipse of skin included the areola extending from the center of the periareolar incision to the base of the nipple. Attention was then turned to the wound. Aggressive hemostasis was obtained with electrocautery. The wound was irrigated with copious amounts of sterile saline. 0.5% bupivacaine was infiltrated into the soft tissues surrounding the cavity and hemostasis was again confirmed. Clips were placed in the lumpectomy cavity in the area where the tumor had been located. Hemostasis was again confirmed. Attention was then turned to the right axilla. A curvilinear incision was made at the lower edge of the axillary hairline and carried down through the dermis with electrocautery. The subcutaneous tissues were opened and the clavipectoral fascia was incised. Upon entering the axilla, the gamma probe and blue dye were utilized to guide localization of the sentinel nodes. 2 blue and radioactive lymph node was identified and excised. Ex vivo counts were 3800. These were then passed off the field.   The residual gamma probe activity within the axillary region was minimal.  The axilla was then assessed for other blue channels/nodes and palpably abnormal lymph nodes. All blue nodes, non colored nodes extending from colored lymphatics, radioactive and palpably suspicious nodes were removed. Attention was then turned to the wound. Aggressive hemostasis was obtained with electrocautery. The wound was irrigated with copious amounts of sterile saline. 0.5% bupivacaine was infiltrated into the soft tissues surrounding the cavity and hemostasis was again confirmed. The deep dermal layer was then reapproximated with interrupted 3-0 Vicryl stitches for both incisions. The skin of both incisions was reapproximated with a running subcuticular using 4-0 Monocryl. Surgical glue dressing was applied. The patient tolerated this procedure well, was awakened and transferred to the recovery room. The instrument, sponge, and needle counts were correct. Her family was notified of intraoperative findings.     Electronically signed by Sridhar Ferreira MD on 5/4/22 at 7:26 AM EDT

## 2022-07-25 NOTE — PROGRESS NOTES
[facility]   Cardiac Consultation    Referring Provider:  Lizzie French MD     Chief Complaint   Patient presents with    Hypertension    Hyperlipidemia    Congestive Heart Failure    Follow-up     4 mo          History of Present Illness:  Syed Olivares is a 74 y.o. female with a history of diabetes, dHF, hyperlipidemia, and hypertension. She was previously following with Dr. Jeferson Nova with [facility].     Today she reports that she was diagnosed with breast cancer, she has had it removed at [facility] and gone through 5 radiation treatments. She reports that between 2 pm and dinner she feels \"staggery\" and holds the wall, she has not thought to check her bp during this time, but also states that her bp is usually not as low as it is today in office. She does note swelling in her left leg, she tries to stay away from salt. Past Medical History:   has a past medical history of Arthritis, Back pain, CHF (congestive heart failure) ([address] Chronic diastolic heart failure ([address] Diabetes mellitus ([address] Diabetes mellitus ([address] GERD (gastroesophageal reflux disease), High cholesterol, Hypertension, Malignant neoplasm of right female breast ([address] Polio, Polio, Shingles, and Urinary problem. Surgical History:   has a past surgical history that includes joint replacement; knee surgery; Colonoscopy; other surgical history (01/13/14); hernia repair; joint replacement; SABRINA STEREO BREAST BX W LOC DEVICE 1ST LESION RIGHT (Right, 4/6/2022); back surgery; back surgery; Breast biopsy (Right, 5/3/2022); and Breast surgery (Right, 5/3/2022). Social History:   reports that she has never smoked. She has never used smokeless tobacco. She reports that she does not drink alcohol and does not use drugs. Family History:  family history includes Breast Cancer in her mother; Cancer in her mother; Diabetes in her sister; Early Death in her father; Heart Disease in her mother.      Home Medications:  Prior to Admission medications    Medication Sig Start Date End Date Taking? Authorizing Provider   Cholecalciferol (VITAMIN D3 PO) Take 1,000 mg by mouth   Yes Historical Provider, MD   Multiple Vitamins-Minerals (HAIR SKIN AND NAILS FORMULA) TABS Take by mouth   Yes Historical Provider, MD   vitamin C (ASCORBIC ACID) 500 MG tablet Take 500 mg by mouth daily   Yes Historical Provider, MD   Multiple Vitamins-Minerals (THERAPEUTIC MULTIVITAMIN-MINERALS) tablet Take 1 tablet by mouth daily Essential 1 Pro caps   Yes Historical Provider, MD   metoprolol succinate (TOPROL XL) 50 MG extended release tablet Take 1 tablet by mouth daily 2/15/22  Yes Yessi Barrera MD   metoprolol succinate (TOPROL XL) 25 MG extended release tablet Take 1 tablet by mouth daily 2/15/22  Yes Yessi Barrera MD   furosemide (LASIX) 20 MG tablet Take 2 tablets by mouth daily 2/15/22  Yes Yessi Barrera MD   losartan (COZAAR) 100 MG tablet Take 1 tablet by mouth daily 2/11/22 8/10/22 Yes Yessi Barrera MD   Semaglutide,0.25 or 0.5MG/DOS, (OZEMPIC, 0.25 OR 0.5 MG/DOSE,) 2 MG/1.5ML SOPN once a week  7/1/21  Yes Historical Provider, MD   spironolactone (ALDACTONE) 25 MG tablet Take 1 tablet by mouth daily 9/9/21  Yes Yessi Barrera MD   meloxicam (MOBIC) 15 MG tablet Take 7.5 mg by mouth daily   Yes Historical Provider, MD   insulin glargine (LANTUS) 100 UNIT/ML injection vial Inject 30 Units into the skin nightly    Yes Historical Provider, MD   pantoprazole (PROTONIX) 40 MG tablet Take 40 mg by mouth daily 12/29/20  Yes Historical Provider, MD   famotidine (PEPCID) 40 MG tablet Take 40 mg by mouth daily    Yes Historical Provider, MD   aspirin 81 MG chewable tablet Take 1 tablet by mouth daily. 2/26/15  Yes Samira Perkins MD   acetaminophen (TYLENOL) 500 MG tablet Take 500 mg by mouth every 6 hours as needed for Pain.    Yes Historical Provider, MD        Allergies:  Metformin and Adhesive tape     Review of Systems:   Constitutional: there has been no unanticipated weight loss. There's been no change in energy level, sleep pattern, or activity level. Eyes: No visual changes or diplopia. No scleral icterus. ENT: No Headaches, hearing loss or vertigo. No mouth sores or sore throat. Cardiovascular: Reviewed in HPI  Respiratory: No cough or wheezing, no sputum production. No hematemesis. Gastrointestinal: No abdominal pain, appetite loss, blood in stools. No change in bowel or bladder habits. Genitourinary: No dysuria, trouble voiding, or hematuria. Musculoskeletal:  No gait disturbance, weakness or joint complaints. Integumentary: No rash or pruritis. Neurological: No headache, diplopia, change in muscle strength, numbness or tingling. No change in gait, balance, coordination, mood, affect, memory, mentation, behavior. Psychiatric: No anxiety, no depression. Endocrine: No malaise, fatigue or temperature intolerance. No excessive thirst, fluid intake, or urination. No tremor. Hematologic/Lymphatic: No abnormal bruising or bleeding, blood clots or swollen lymph nodes. Allergic/Immunologic: No nasal congestion or hives. Physical Examination:    Vitals:    07/28/22 1420   BP: 116/62   Pulse: 79   SpO2: 98%          Wt Readings from Last 1 Encounters:   07/28/22 261 lb 4.8 oz (118.5 kg)       Constitutional and General Appearance: NAD      Skin:good turgor,intact without lesions  HEENT: EOMI ,normal  Neck:no JVD    Respiratory:  Normal excursion and expansion without use of accessory muscles  Resp Auscultation: Normal breath sounds without dullness  Cardiovascular: The apical impulses not displaced  Heart tones are crisp and normal  Cervical veins are not engorged  The carotid upstroke is normal in amplitude and contour without delay or bruit  Peripheral pulses are symmetrical and full  There is no clubbing, cyanosis of the extremities.   Mild BLE   Femoral Arteries: 2+ and equal  Pedal Pulses: 2+ and equal   Abdomen:  No masses or tenderness  Liver/Spleen: No Abnormalities Noted  Neurological/Psychiatric:  Alert and oriented in all spheres  Moves all extremities well  Exhibits normal gait balance and coordination  No abnormalities of mood, affect, memory, mentation, or behavior are noted  Stress 4/23/21  Summary The overall quality of the study is good. There is subdiaphragmatic  attenuation. Left ventricular cavity is noted to be moderately dilated. The  right ventricle is normal.   SPECT images demonstrate a small area of decreased perfusion of mild  intensity in the apical inferior and mid inferior segments at rest and  stress. There is no corresponding wall motion abnormality. The defect is  most consistent with artifact. Sum stress score of 3. No visual TID. Calculated TID of 1.08   Left ventricular ejection fraction is 40% with mild global hypokinesis. Abnormal study due to increased LV volumes and decreased ejection fraction. No reversible ischemia. There is an inferior defect most consistent with  subdiaphragmatic artifact. Moderate risk scan. Echo 3/22/22:   Left ventricular cavity size is normal. Normal left ventricular wall thickness. Left ventricular function is low normal with ejection fraction estimated at 50%. No regional wall motion abnormalities are noted. Diastolic filling parameters suggest grade II diastolic dysfunction. Thickening of leaflets of mitral valve. Mitral annular calcification is present. Mild mitral regurgitation is present. Bi-atrial enlargement. Mild aortic stenosis. Moderate aortic regurgitation. Mild tricuspid regurgitation. Estimated pulmonary artery systolic pressure is at 44 mmHg assuming a right atrial pressure of 3 mmHg. Assessment:  1. Diastolic dysfunction: Stable, compensated  -Remains on spironolactone 25mg qd and Lasix 40mg daily  -echo 3/2022 EF 50%  -continue daily weights   2.  Essential hypertension: feeling strange in the afternoon \"stagery\"  /62 (Site: Left Upper Arm, Position: Sitting, Cuff Size: Large Adult)   Pulse 79   Ht 5' 6\" (1.676 m)   Wt 261 lb 4.8 oz (118.5 kg)   SpO2 98%   BMI 42.17 kg/m²   -previously increased metoprolol to 75mg qd  -Continue losartan 100mg qd  - rechecked bp sitting and standing orthostatics slightly positive, she reports dizzy during this time   -stop losartan and recheck with Dr Ritika Cadena in august   3. Hyperlipidemia:   - stable   2/18/22: ; TRIG 79; HDL 49;   8/2021  TG 89 HDL 49        4.      5.  JOSE MANUEL (obstructive sleep apnea): Using C-pap       AR  - Echo 3/2022  Soft murmur on assessment   Stable          Plan:  Veronika Gordillo has a stable cardiac status. Cardiac test and lab results personally reviewed by me during this office visit and discussed. Stop your losartan - recheck with f/u in Dr Juan Coleman office. Continue risk factor modifications. Call for any change in symptoms. Return for regular follow up in 6 months. Cardiac test and lab results personally reviewed by me during this office visit and discussed. I appreciate the opportunity of cooperating in the care of this individual.    Baldev Camara. Lena Garcia M.D., [facility]    Patient's problem list, medications, allergies, past medical, surgical, social and family histories were reviewed and updated as appropriate. Scribe's Attestation: This note was scribed in the presence of Dr. Marco Alonzo MD by Enriqueta Funez RN. The scribe's documentation has been prepared under my direction and personally reviewed by me in its entirety. I confirm that the note above accurately reflects all work, treatment, procedures, and medical decision making performed by me.

## 2022-07-28 ENCOUNTER — OFFICE VISIT (OUTPATIENT)
Dept: CARDIOLOGY CLINIC | Age: 72
End: 2022-07-28
Payer: MEDICARE

## 2022-07-28 VITALS
BODY MASS INDEX: 41.99 KG/M2 | HEIGHT: 66 IN | DIASTOLIC BLOOD PRESSURE: 62 MMHG | SYSTOLIC BLOOD PRESSURE: 116 MMHG | OXYGEN SATURATION: 98 % | HEART RATE: 79 BPM | WEIGHT: 261.3 LBS

## 2022-07-28 DIAGNOSIS — I10 PRIMARY HYPERTENSION: ICD-10-CM

## 2022-07-28 DIAGNOSIS — I50.32 CHRONIC DIASTOLIC HEART FAILURE (HCC): Primary | ICD-10-CM

## 2022-07-28 DIAGNOSIS — E78.00 HIGH CHOLESTEROL: ICD-10-CM

## 2022-07-28 PROCEDURE — 1036F TOBACCO NON-USER: CPT | Performed by: INTERNAL MEDICINE

## 2022-07-28 PROCEDURE — G8417 CALC BMI ABV UP PARAM F/U: HCPCS | Performed by: INTERNAL MEDICINE

## 2022-07-28 PROCEDURE — G8399 PT W/DXA RESULTS DOCUMENT: HCPCS | Performed by: INTERNAL MEDICINE

## 2022-07-28 PROCEDURE — 99214 OFFICE O/P EST MOD 30 MIN: CPT | Performed by: INTERNAL MEDICINE

## 2022-07-28 PROCEDURE — 3017F COLORECTAL CA SCREEN DOC REV: CPT | Performed by: INTERNAL MEDICINE

## 2022-07-28 PROCEDURE — G8427 DOCREV CUR MEDS BY ELIG CLIN: HCPCS | Performed by: INTERNAL MEDICINE

## 2022-07-28 PROCEDURE — 1090F PRES/ABSN URINE INCON ASSESS: CPT | Performed by: INTERNAL MEDICINE

## 2022-07-28 PROCEDURE — G9899 SCRN MAM PERF RSLTS DOC: HCPCS | Performed by: INTERNAL MEDICINE

## 2022-07-28 PROCEDURE — 1123F ACP DISCUSS/DSCN MKR DOCD: CPT | Performed by: INTERNAL MEDICINE

## 2022-08-15 ENCOUNTER — APPOINTMENT (OUTPATIENT)
Dept: GENERAL RADIOLOGY | Age: 72
End: 2022-08-15
Payer: MEDICARE

## 2022-08-15 ENCOUNTER — HOSPITAL ENCOUNTER (EMERGENCY)
Age: 72
Discharge: HOME | End: 2022-08-16
Payer: MEDICARE

## 2022-08-15 DIAGNOSIS — S42.302A CLOSED FRACTURE OF SHAFT OF LEFT HUMERUS, UNSPECIFIED FRACTURE MORPHOLOGY, INITIAL ENCOUNTER: Primary | ICD-10-CM

## 2022-08-15 PROCEDURE — 99283 EMERGENCY DEPT VISIT LOW MDM: CPT

## 2022-08-15 PROCEDURE — 73030 X-RAY EXAM OF SHOULDER: CPT

## 2022-08-15 ASSESSMENT — PAIN - FUNCTIONAL ASSESSMENT: PAIN_FUNCTIONAL_ASSESSMENT: 0-10

## 2022-08-15 ASSESSMENT — PAIN SCALES - GENERAL: PAINLEVEL_OUTOF10: 9

## 2022-08-16 ENCOUNTER — APPOINTMENT (OUTPATIENT)
Dept: GENERAL RADIOLOGY | Age: 72
End: 2022-08-16
Payer: MEDICARE

## 2022-08-16 VITALS
SYSTOLIC BLOOD PRESSURE: 171 MMHG | OXYGEN SATURATION: 100 % | HEART RATE: 79 BPM | RESPIRATION RATE: 16 BRPM | TEMPERATURE: 98.1 F | DIASTOLIC BLOOD PRESSURE: 93 MMHG

## 2022-08-16 PROCEDURE — 6370000000 HC RX 637 (ALT 250 FOR IP): Performed by: PHYSICIAN ASSISTANT

## 2022-08-16 PROCEDURE — 6370000000 HC RX 637 (ALT 250 FOR IP): Performed by: EMERGENCY MEDICINE

## 2022-08-16 PROCEDURE — 73060 X-RAY EXAM OF HUMERUS: CPT

## 2022-08-16 RX ORDER — OXYCODONE HYDROCHLORIDE AND ACETAMINOPHEN 5; 325 MG/1; MG/1
1 TABLET ORAL ONCE
Status: COMPLETED | OUTPATIENT
Start: 2022-08-16 | End: 2022-08-16

## 2022-08-16 RX ADMIN — OXYCODONE HYDROCHLORIDE AND ACETAMINOPHEN 1 TABLET: 5; 325 TABLET ORAL at 03:36

## 2022-08-16 RX ADMIN — OXYCODONE HYDROCHLORIDE AND ACETAMINOPHEN 1 TABLET: 5; 325 TABLET ORAL at 00:18

## 2022-08-16 ASSESSMENT — PAIN SCALES - GENERAL
PAINLEVEL_OUTOF10: 10
PAINLEVEL_OUTOF10: 8

## 2022-08-16 ASSESSMENT — PAIN DESCRIPTION - LOCATION: LOCATION: ARM;HAND

## 2022-08-16 ASSESSMENT — PAIN DESCRIPTION - DESCRIPTORS: DESCRIPTORS: ACHING

## 2022-08-16 ASSESSMENT — PAIN DESCRIPTION - ORIENTATION: ORIENTATION: LEFT

## 2022-08-16 NOTE — ED PROVIDER NOTES
VITAMINS-MINERALS (THERAPEUTIC MULTIVITAMIN-MINERALS) TABLET    Take 1 tablet by mouth daily Essential 1 Pro caps    PANTOPRAZOLE (PROTONIX) 40 MG TABLET    Take 40 mg by mouth daily    SEMAGLUTIDE,0.25 OR 0.5MG/DOS, (OZEMPIC, 0.25 OR 0.5 MG/DOSE,) 2 MG/1.5ML SOPN    once a week     SPIRONOLACTONE (ALDACTONE) 25 MG TABLET    Take 1 tablet by mouth daily    VITAMIN C (ASCORBIC ACID) 500 MG TABLET    Take 500 mg by mouth daily         ALLERGIES     Metformin and Adhesive tape    FAMILYHISTORY       Family History   Problem Relation Age of Onset    Heart Disease Mother     Cancer Mother         breast cancer, 5    Breast Cancer Mother     Early Death Father     Diabetes Sister           SOCIAL HISTORY       Social History     Tobacco Use    Smoking status: Never    Smokeless tobacco: Never   Vaping Use    Vaping Use: Never used   Substance Use Topics    Alcohol use: No    Drug use: No       SCREENINGS    [place] Coma Scale  Eye Opening: Spontaneous  Best Verbal Response: Oriented  Best Motor Response: Obeys commands  [place] Coma Scale Score: 15        PHYSICAL EXAM    (up to 7 for level 4, 8 or more for level 5)     ED Triage Vitals   BP Temp Temp Source Heart Rate Resp SpO2 Height Weight   08/15/22 2145 08/15/22 2147 08/15/22 2145 08/15/22 2145 08/15/22 2145 08/15/22 2145 -- --   (!) 167/61 98.1 °F (36.7 °C) Oral 79 16 100 %         Physical Exam  Vitals and nursing note reviewed. Constitutional:       Appearance: She is well-developed. She is not diaphoretic. HENT:      Head: Atraumatic. Nose: Nose normal.   Eyes:      General:         Right eye: No discharge. Left eye: No discharge. Cardiovascular:      Pulses: Normal pulses. Comments: 2+ radial pulse in left wrist  Musculoskeletal:         General: Swelling and tenderness present. Cervical back: Normal range of motion. Comments: There are some tenderness and swelling with ecchymosis over the left shoulder.   Sensation light touch in the left upper extremity intact. Arm is warm to touch. Skin:     General: Skin is warm and dry. Findings: No erythema or rash. Neurological:      Mental Status: She is alert and oriented to person, place, and time. Cranial Nerves: No cranial nerve deficit. Psychiatric:         Behavior: Behavior normal.       DIAGNOSTIC RESULTS   LABS:    Labs Reviewed - No data to display    When ordered only abnormal lab results are displayed. All other labs were within normal range or not returned as of this dictation. EKG: When ordered, EKG's are interpreted by the Emergency Department Physician in the absence of a cardiologist.  Please see their note for interpretation of EKG. RADIOLOGY:   Non-plain film images such as CT, Ultrasound and MRI are read by the radiologist. Plain radiographic images are visualized and preliminarily interpreted by the ED Provider with the below findings:        Interpretation per the Radiologist below, if available at the time of this note:    XR SHOULDER LEFT (MIN 2 VIEWS)   Final Result   Acute comminuted and displaced fracture involving the proximal to mid   humerus. The main shaft components are displaced by of least a full shaft   with and angled. There is a large butterfly fragment with longitudinal   fracture line running up the humeral neck to the base of the greater   tubercle. The superior edge of the tubercle appears intact without cortical   disruption. The articular humeral head is spared and no dislocation from the glenoid. Arthritic changes at the acromioclavicular joint. XR HUMERUS LEFT (MIN 2 VIEWS)    (Results Pending)     No results found.         PROCEDURES   Unless otherwise noted below, none     Procedures    CRITICAL CARE TIME       CONSULTS: Dr. Melina Mann and DIFFERENTIAL DIAGNOSIS/MDM:   Vitals:    Vitals:    08/15/22 2145 08/15/22 2147   BP: (!) 167/61    Pulse: 79 Resp: 16    Temp:  98.1 °F (36.7 °C)   TempSrc: Oral    SpO2: 100%        Patient was given the following medications:  Medications   oxyCODONE-acetaminophen (PERCOCET) 5-325 MG per tablet 1 tablet (1 tablet Oral Given 8/16/22 0018)         Is this patient to be included in the SEP-1 Core Measure due to severe sepsis or septic shock? No   Exclusion criteria - the patient is NOT to be included for SEP-1 Core Measure due to: Infection is not suspected    Patient presented with left arm pain. Had some tingling in her left hand. Her splint that was placed on over 2 weeks ago was starting to fall off. This was removed and the tingling improved. She states she only has some tingling in the tips of her fourth and fifth finger now. Has good sensation over her thumb. Able to fully extend fingers. Some slight decreased range of motion of wrist secondary to pain but low suspicion for radial nerve injury. Note from care everywhere stating that an x-ray was done for surgical neck fracture. However it appears that she actually has a humeral shaft fracture that is displaced with a fragment that goes into the surgical neck. I did discuss this with orthopedics. They are recommending a sling at this time and visualized x-rays to. They are asking for a humerus x-ray. They can see the patient on 8/17 in office. Patient will call to make an appointment. Already has pain medication at home. Distally neurovascularly intact. Return here for any worsening of symptoms or problems at home. FINAL IMPRESSION      1.  Closed fracture of shaft of left humerus, unspecified fracture morphology, initial encounter          DISPOSITION/PLAN   DISPOSITION Decision To Discharge 08/16/2022 01:43:05 AM      PATIENT REFERRED TO:  Juana Sharpe MD  [address] [address]  [address]  [phone]    Schedule an appointment as soon as possible for a visit in 1 day  For re-check    [facility] [address] [facility]  [phone]    As needed    DISCHARGE MEDICATIONS:  New Prescriptions    No medications on file       DISCONTINUED MEDICATIONS:  Discontinued Medications    No medications on file              (Please note that portions of this note were completed with a voice recognition program.  Efforts were made to edit the dictations but occasionally words are mis-transcribed.)    Margoth Harris PA-C (electronically signed)           Margoth Harris PA-C  08/16/22 0145

## 2022-08-16 NOTE — DISCHARGE INSTR - COC
Continuity of Care Form    Patient Name: Veronika Gordillo   :  1950  MRN:  [id number]    Admit date:  8/15/2022  Discharge date:  ***    Code Status Order: Prior   Advance Directives:     Admitting Physician:  No admitting provider for patient encounter. PCP: Chata Frazier MD    Discharging Nurse: [facility] Unit/Room#: [place] [address]  Discharging Unit Phone Number: ***    Emergency Contact:   Extended Emergency Contact Information  Primary Emergency Contact: Krystal Rosario  Address: [address] [place] [address], [address] [place] [address] Phone: [phone]  Relation: Child  Secondary Emergency Contact: [address] Phone: [phone]  Relation: Child    Past Surgical History:  Past Surgical History:   Procedure Laterality Date    BACK SURGERY      BACK SURGERY      1/13/14 x2    BREAST BIOPSY Right 5/3/2022    RIGHT RADIOFREQUENCY IDENTIFICATION TAG LOCALIZED PARTIAL MASTECTOMY RIGHT SENTINEL LYMPH NODE BIOPSY performed by Jesus To MD at [address] [place] [address] Right 5/3/2022    .  performed by Jesus To MD at [id number] [place] [address]      bilateral knee replacements    JOINT REPLACEMENT      [place] KNEES    KNEE SURGERY      SABRINA STEROTACTIC LOC BREAST BIOPSY RIGHT Right 2022    [facility]ABRINA STEROTACTIC LOC BREAST [facility]OPSY RIGHT [facility]    OTHER SURGICAL HISTORY  14    L4-5 DECOMPRESSION, POSTERIOR LATERAL FUSION AND PEDICLE       Immunization History:   Immunization History   Administered Date(s) Administered    Influenza Whole 10/02/2013    Pneumococcal Conjugate 7-valent (Moises Miranda) 2005       Active Problems:  Patient Active Problem List   Diagnosis Code    HTN (hypertension) I10    High cholesterol E78.00    Diabetes mellitus ([facility] E11.9    Lumbar spinal stenosis M48.061    TIA (transient ischemic attack) G45.9    Chest pain R07.9    Diabetes mellitus type 2 in obese ([facility] E11.69, E66.9    Chronic diastolic heart failure (HCC) I50.32    Snoring R06.83    Hypersomnolence G47.10    Bilateral leg pain M79.604, M79.605    JOSE MANUEL on CPAP G47.33, Z99.89    Malignant neoplasm of right female breast ([facility] C50.911       Isolation/Infection:   Isolation            No Isolation           Unreconciled Outside Infections       Enable clinical decision support by reconciling outside information with the patient's chart. .      Infection Onset Last Indicated Last Received Source    COVID-19 22 [facility]           Patient Infection Status       Infection Onset Added Last Indicated Last Indicated By Review Planned Expiration Resolved Resolved By    None active    Resolved    COVID-19 (Rule Out) 21 COVID-19 (Ordered)   21     COVID-19 (Rule Out) 21 COVID-19, Rapid (Ordered)   21 Rule-Out Test Resulted            Nurse Assessment:  Last Vital Signs: BP (!) 167/61   Pulse 79   Temp 98.1 °F (36.7 °C)   Resp 16   SpO2 100%     Last documented pain score (0-10 scale): Pain Level: 10  Last Weight:   Wt Readings from Last 1 Encounters:   22 261 lb 4.8 oz (118.5 kg)     Mental Status:  {IP PT MENTAL STATUS:56927}    IV Access:  { YESENIA IV ACCESS:235007668}    Nursing Mobility/ADLs:  Walking   {CHP DME JJUN:600349595}  Transfer  {CHP DME VMI}  Bathing  {CHP DME EFTF:267276068}  Dressing  {CHP DME QSHJ:629206036}  Toileting  {CHP DME JMLU:929189232}  Feeding  {CHP DME LSWW:098172160}  Med Admin  {CHP DME PRWL:079984233}  Med Delivery   { YESENIA MED Delivery:134439105}    Wound Care Documentation and Therapy:  Incision 14 Back Lower (Active)   Number of days: 1297       Incision 22 Breast Right (Active)   Number of days: 105        Elimination:  Continence:    Bowel: {YES / XX:09368}  Bladder: {YES / LI:51685}  Urinary Catheter: {Urinary Catheter:919643356}   Colostomy/Ileostomy/Ileal Conduit: {YES / RC:31261}       Date of Last BM: ***  No intake or output data in the 24 hours ending 22 0143  No intake/output data recorded.     Safety Concerns:     [address] Safety Concerns:894533680}    Impairments/Disabilities:      [address] Impairments/Disabilities:418097971}    Nutrition Therapy:  Current Nutrition Therapy:   [address] Diet List:008706696}    Routes of Feeding: {CHP DME Other Feedings:178498198}  Liquids: {Slp liquid thickness:63997}  Daily Fluid Restriction: {CHP DME Yes amt example:742901024}  Last Modified Barium Swallow with Video (Video Swallowing Test): {Done Not Done QNVJ:503933650}    Treatments at the Time of Hospital Discharge:   Respiratory Treatments: ***  Oxygen Therapy:  {Therapy; copd oxygen:10701}  Ventilator:    { CC Vent DTVT:245766022}    Rehab Therapies: {THERAPEUTIC INTERVENTION:5124625199}  Weight Bearing Status/Restrictions: [facility] Terese Fernando  Weight Bearin}  Other Medical Equipment (for information only, NOT a DME order):  {EQUIPMENT:138614415}  Other Treatments: ***    Patient's personal belongings (please select all that are sent with patient):  {[facility] DME Belongings:886008929}    RN SIGNATURE:  {Esignature:673447592}    CASE MANAGEMENT/SOCIAL WORK SECTION    Inpatient Status Date: ***    Readmission Risk Assessment Score:  Readmission Risk              Risk of Unplanned Readmission:  0           Discharging to Facility/ Agency   Name:   Address:  Phone:  Fax:    Dialysis Facility (if applicable)   Name:  Address:  Dialysis Schedule:  Phone:  Fax:    / signature: {Esignature:703100897}    PHYSICIAN SECTION    Prognosis: {Prognosis:0594649060}    Condition at Discharge: [address] Patient Condition:493489300}    Rehab Potential (if transferring to Rehab): {Prognosis:9439081922}    Recommended Labs or Other Treatments After Discharge: ***    Physician Certification: I certify the above information and transfer of [place]  is necessary for the continuing treatment of the diagnosis listed and that she requires {Admit to Appropriate Level of Care:50347} for {GREATER/LESS:042679069} 30 days.      Update Admission H&P: {[facility]}    PHYSICIAN SIGNATURE:  {Esignature:312616789}

## 2022-08-19 ENCOUNTER — HOSPITAL ENCOUNTER (OUTPATIENT)
Age: 72
Discharge: HOME | End: 2022-08-19
Payer: MEDICARE

## 2022-08-19 ENCOUNTER — TELEPHONE (OUTPATIENT)
Dept: ORTHOPEDIC SURGERY | Age: 72
End: 2022-08-19

## 2022-08-19 ENCOUNTER — OFFICE VISIT (OUTPATIENT)
Dept: ORTHOPEDIC SURGERY | Age: 72
End: 2022-08-19
Payer: MEDICARE

## 2022-08-19 VITALS — HEIGHT: 66 IN | BODY MASS INDEX: 41.95 KG/M2 | WEIGHT: 261 LBS | RESPIRATION RATE: 16 BRPM

## 2022-08-19 DIAGNOSIS — Z01.818 PRE-OP TESTING: ICD-10-CM

## 2022-08-19 DIAGNOSIS — S42.352A CLOSED DISPLACED COMMINUTED FRACTURE OF SHAFT OF LEFT HUMERUS, INITIAL ENCOUNTER: ICD-10-CM

## 2022-08-19 DIAGNOSIS — S42.292A OTHER CLOSED DISPLACED FRACTURE OF PROXIMAL END OF LEFT HUMERUS, INITIAL ENCOUNTER: ICD-10-CM

## 2022-08-19 DIAGNOSIS — S42.292A OTHER CLOSED DISPLACED FRACTURE OF PROXIMAL END OF LEFT HUMERUS, INITIAL ENCOUNTER: Primary | ICD-10-CM

## 2022-08-19 LAB
A/G RATIO: 1.2 (ref 1.1–2.2)
ALBUMIN SERPL-MCNC: 3.9 G/DL (ref 3.4–5)
ALP BLD-CCNC: 119 U/L (ref 40–129)
ALT SERPL-CCNC: 15 U/L (ref 10–40)
ANION GAP SERPL CALCULATED.3IONS-SCNC: 11 MMOL/L (ref 3–16)
APTT: 25.8 SEC (ref 23–34.3)
AST SERPL-CCNC: 15 U/L (ref 15–37)
BASOPHILS ABSOLUTE: 0.1 K/UL (ref 0–0.2)
BASOPHILS RELATIVE PERCENT: 0.9 %
BILIRUB SERPL-MCNC: <0.2 MG/DL (ref 0–1)
BUN BLDV-MCNC: 12 MG/DL (ref 7–20)
CALCIUM SERPL-MCNC: 9.5 MG/DL (ref 8.3–10.6)
CHLORIDE BLD-SCNC: 102 MMOL/L (ref 99–110)
CO2: 27 MMOL/L (ref 21–32)
CREAT SERPL-MCNC: 0.9 MG/DL (ref 0.6–1.2)
EOSINOPHILS ABSOLUTE: 0.3 K/UL (ref 0–0.6)
EOSINOPHILS RELATIVE PERCENT: 5 %
GFR AFRICAN AMERICAN: >60
GFR NON-AFRICAN AMERICAN: >60
GLUCOSE BLD-MCNC: 140 MG/DL (ref 70–99)
HCT VFR BLD CALC: 32.7 % (ref 36–48)
HEMOGLOBIN: 10.6 G/DL (ref 12–16)
INR BLD: 1.04 (ref 0.87–1.14)
LYMPHOCYTES ABSOLUTE: 1.5 K/UL (ref 1–5.1)
LYMPHOCYTES RELATIVE PERCENT: 23 %
MCH RBC QN AUTO: 27.8 PG (ref 26–34)
MCHC RBC AUTO-ENTMCNC: 32.4 G/DL (ref 31–36)
MCV RBC AUTO: 85.7 FL (ref 80–100)
MONOCYTES ABSOLUTE: 0.4 K/UL (ref 0–1.3)
MONOCYTES RELATIVE PERCENT: 6.1 %
NEUTROPHILS ABSOLUTE: 4.3 K/UL (ref 1.7–7.7)
NEUTROPHILS RELATIVE PERCENT: 65 %
PDW BLD-RTO: 14.8 % (ref 12.4–15.4)
PLATELET # BLD: 294 K/UL (ref 135–450)
PMV BLD AUTO: 7.8 FL (ref 5–10.5)
POTASSIUM SERPL-SCNC: 4.4 MMOL/L (ref 3.5–5.1)
PROTHROMBIN TIME: 13.5 SEC (ref 11.7–14.5)
RBC # BLD: 3.81 M/UL (ref 4–5.2)
SODIUM BLD-SCNC: 140 MMOL/L (ref 136–145)
TOTAL PROTEIN: 7.1 G/DL (ref 6.4–8.2)
WBC # BLD: 6.6 K/UL (ref 4–11)

## 2022-08-19 PROCEDURE — 85730 THROMBOPLASTIN TIME PARTIAL: CPT

## 2022-08-19 PROCEDURE — 85610 PROTHROMBIN TIME: CPT

## 2022-08-19 PROCEDURE — G8417 CALC BMI ABV UP PARAM F/U: HCPCS | Performed by: ORTHOPAEDIC SURGERY

## 2022-08-19 PROCEDURE — G8427 DOCREV CUR MEDS BY ELIG CLIN: HCPCS | Performed by: ORTHOPAEDIC SURGERY

## 2022-08-19 PROCEDURE — 80053 COMPREHEN METABOLIC PANEL: CPT

## 2022-08-19 PROCEDURE — G9899 SCRN MAM PERF RSLTS DOC: HCPCS | Performed by: ORTHOPAEDIC SURGERY

## 2022-08-19 PROCEDURE — 1090F PRES/ABSN URINE INCON ASSESS: CPT | Performed by: ORTHOPAEDIC SURGERY

## 2022-08-19 PROCEDURE — 99204 OFFICE O/P NEW MOD 45 MIN: CPT | Performed by: ORTHOPAEDIC SURGERY

## 2022-08-19 PROCEDURE — 36415 COLL VENOUS BLD VENIPUNCTURE: CPT

## 2022-08-19 PROCEDURE — 85025 COMPLETE CBC W/AUTO DIFF WBC: CPT

## 2022-08-19 NOTE — PROGRESS NOTES
Name__Aiyana Catalan_____________________________________Printed:____________________  Date and time of surgery_8/23/22    [facility]_______________________Arrival Time:__1215______________   1. The instructions given regarding when and if a patient needs to stop oral intake prior to surgery varies. Follow the specific instructions you were given                  _x__Nothing to eat or to drink after Midnight the night before.                   ____Carbo loading or ERAS instructions will be given to select patients-if you have been given those instructions -please do the following                           The evening before your surgery after dinner before midnight drink 40 ounces of gatorade. If you are diabetic use sugar free. The morning of surgery drink 40 ounces of water. This needs to be finished 3 hours prior to your surgery start time. 2. Take the following pills with a small sip of water on the morning of surgery: pantoprazole and metoprolol. Do not take blood pressure medications ending in pril or sartan the desirae prior to surgery or the morning of surgery_   3. Aspirin, Ibuprofen, Advil, Naproxen, Vitamin E and other Anti-inflammatory products and supplements should be stopped for 5 -7days before surgery or as directed by your physician. 4. Check with your Doctor regarding stopping Plavix, Coumadin,Eliquis, Lovenox,Effient,Pradaxa,Xarelto, Fragmin or other blood thinners and follow their instructions. 5. Do not smoke, and do not drink any alcoholic beverages 24 hours prior to surgery. This includes NA Beer. Refrain from the usage of any recreational drugs. 6. You may brush your teeth and gargle the morning of surgery. DO NOT SWALLOW WATER   7. You MUST make arrangements for a responsible adult to stay on site while you are here and take you home after your surgery. You will not be allowed to leave alone or drive yourself home.   It is strongly suggested someone stay with you the first 24 hrs. Your surgery will be cancelled if you do not have a ride home. 8. A parent/legal guardian must accompany a child scheduled for surgery and plan to stay at the hospital until the child is discharged. Please do not bring other children with you. 9. Please wear simple, loose fitting clothing to the hospital.  Katia Whelan not bring valuables (money, credit cards, checkbooks, etc.) Do not wear any makeup (including no eye makeup) or nail polish on your fingers or toes. 10. DO NOT wear any jewelry or piercings on day of surgery. All body piercing jewelry must be removed. 11. If you have ___dentures, they will be removed before going to the OR; we will provide you a container. If you wear ___contact lenses or ___glasses, they will be removed; please bring a case for them. 12. Please see your family doctor/pediatrician for a history & physical and/or concerning medications. Bring any test results/reports from your physician's office. PCP__________________Phone___________H&P Appt. Date________             13 If you  have a Living Will and Durable Power of  for Healthcare, please bring in a copy. 15. Notify your Surgeon if you develop any illness between now and surgery  time, cough, cold, fever, sore throat, nausea, vomiting, etc.  Please notify your surgeon if you experience dizziness, shortness of breath or blurred vision between now & the time of your surgery             15. DO NOT shave your operative site 96 hours prior to surgery. For face & neck surgery, men may use an electric razor 48 hours prior to surgery. 16. Shower the night before or morning of surgery using an antibacterial soap or as you have been instructed. 17. To provide excellent care visitors will be limited to one in the room at any given time. 18.  Please bring picture ID and insurance card.              19.  Visit our web site for additional information:  [email or web address]              20.During flu season no children under the age of 15 are permitted in the hospital for the safety of all patients. 21. If you take a long acting insulin in the evening only  take half of your usual  dose the night  before your procedure              22. If you use a c-pap please bring DOS if staying overnight,             23.For your convenience [facility] has a pharmacy on site to fill your prescriptions. 24. If you use oxygen and have a portable tank please bring it  with you the DOS             25. Bring a complete list of all your medications with name and dose include any supplements. 26. Other: Please fax the H&P when completed to [phone]   *Please call pre admission testing if you any further questions   [place]ita [place] [id number]. [place]  [phone]   [address] [place] [address]       VISITOR POLICY(subject to change)    Current policy is 2 visitors per patient. No children. A mask is required. Visiting hours are 8a-8p. Overnight visitors will be at the discretion of the nurse. All above information reviewed with patient in person or by phone. Patient verbalizes understanding. All questions and concerns addressed.                                                                                                  Patient/Rep__Valarie__________________                                                                                                                                    PRE OP INSTRUCTIONS

## 2022-08-19 NOTE — PROGRESS NOTES
Name_______________________________________Printed:____________________  Date and time of surgery_8/23/22 [address] [facility]_______________________Arrival Time:__1215______________   1. The instructions given regarding when and if a patient needs to stop oral intake prior to surgery varies. Follow the specific instructions you were given                  __x_Nothing to eat or to drink after Midnight the night before.                   ____Carbo loading or ERAS instructions will be given to select patients-if you have been given those instructions -please do the following                           The evening before your surgery after dinner before midnight drink 40 ounces of gatorade. If you are diabetic use sugar free. The morning of surgery drink 40 ounces of water. This needs to be finished 3 hours prior to your surgery start time. 2. Take the following pills with a small sip of water on the morning of surgery: will verify with [facility] meds to be taken day of surgery                  Do not take blood pressure medications ending in pril or sartan the desirae prior to surgery or the morning of surgery_   3. Aspirin, Ibuprofen, Advil, Naproxen, Vitamin E and other Anti-inflammatory products and supplements should be stopped for 5 -7days before surgery or as directed by your physician. 4. Check with your Doctor regarding stopping Plavix, Coumadin,Eliquis, Lovenox,Effient,Pradaxa,Xarelto, Fragmin or other blood thinners and follow their instructions. 5. Do not smoke, and do not drink any alcoholic beverages 24 hours prior to surgery. This includes NA Beer. Refrain from the usage of any recreational drugs. 6. You may brush your teeth and gargle the morning of surgery. DO NOT SWALLOW WATER   7. You MUST make arrangements for a responsible adult to stay on site while you are here and take you home after your surgery. You will not be allowed to leave alone or drive yourself home.   It is strongly suggested someone stay with you the first 24 hrs. Your surgery will be cancelled if you do not have a ride home. 8. A parent/legal guardian must accompany a child scheduled for surgery and plan to stay at the hospital until the child is discharged. Please do not bring other children with you. 9. Please wear simple, loose fitting clothing to the hospital.  Lary Garayad not bring valuables (money, credit cards, checkbooks, etc.) Do not wear any makeup (including no eye makeup) or nail polish on your fingers or toes. 10. DO NOT wear any jewelry or piercings on day of surgery. All body piercing jewelry must be removed. 11. If you have _x__dentures, they will be removed before going to the OR; we will provide you a container. If you wear ___contact lenses or __x_glasses, they will be removed; please bring a case for them. 12. Please see your family doctor/pediatrician for a history & physical and/or concerning medications. Bring any test results/reports from your physician's office. PCP__________________Phone___________H&P Appt. Date________             13 If you  have a Living Will and Durable Power of  for Healthcare, please bring in a copy. 15. Notify your Surgeon if you develop any illness between now and surgery  time, cough, cold, fever, sore throat, nausea, vomiting, etc.  Please notify your surgeon if you experience dizziness, shortness of breath or blurred vision between now & the time of your surgery             15. DO NOT shave your operative site 96 hours prior to surgery. For face & neck surgery, men may use an electric razor 48 hours prior to surgery. 16. Shower the night before or morning of surgery using an antibacterial soap or as you have been instructed. 17. To provide excellent care visitors will be limited to one in the room at any given time. 18.  Please bring picture ID and insurance card.              19.  Visit our web site for additional information:  [email or web address]              20.During flu season no children under the age of 15 are permitted in the hospital for the safety of all patients. 21. If you take a long acting insulin in the evening only  take half of your usual  dose the night  before your procedure              22. If you use a c-pap please bring DOS if staying overnight,             23.For your convenience [id number] [place] [address] has a pharmacy on site to fill your prescriptions. 24. If you use oxygen and have a portable tank please bring it  with you the DOS             25. Bring a complete list of all your medications with name and dose include any supplements. 26. Other__________________________________________   *Please call pre admission testing if you any further questions   [address] [place]  [phone]   [address] [place] [address]       VISITOR POLICY(subject to change)    Current policy is 2 visitors per patient. No children. A mask is required. Visiting hours are 8a-8p. Overnight visitors will be at the discretion of the nurse. All above information reviewed with patient in person or by phone. Patient verbalizes understanding. All questions and concerns addressed.                                                                                                  Patient/Rep_patient___________________                                                                                                                                    PRE OP INSTRUCTIONS

## 2022-08-19 NOTE — PROGRESS NOTES
ORTHOPAEDIC NEW PATIENT NOTE    Chief Complaint   Patient presents with    Shoulder Injury     Left shoulder injury DOI 7-31-22         HPI  8/19/22  74 y.o. female RHD seen for evaluation of left shoulder/arm injury/fracture:   The patient reports she injured her left arm on July 31 when she traveled to [facility] for a [employer] convention  She reports she fell into a wall and hit her left shoulder  She lives in [place], [place]  After her injury in [facility], she was admitted to [address] [place] [address]  She reports she was evaluated by orthopedics there, who recommended surgical fixation there, however she did not have any friends or family there, therefore she did not want to have surgery there  She reports she was placed initially in a coaptation splint, then a De brace, then back to a coaptation splint prior to transfer back to [facility]  She reports she was admitted in [facility] until August 10, 2022 at which point she was transferred back to [place] to [facility]  She was then transferred to the [place] [facility]  She has not seen any orthopedics since she has been back in [facility]  She presented to the emergency department a couple nights ago at which point she was given follow-up to my office today  She is now in a sling  When she went to the emergency department a couple nights ago, she reported some numbness and tingling in the ring finger and small finger tips  However this has since improved  She is a diabetic, on insulin  She does not smoke      Allergies   Allergen Reactions    Metformin Nausea Only and Other (See Comments)     \"ate the lining of her stomach\"      Adhesive Tape      Skin irritation and blisters  - does okay with steri strips and tegaderm        Current Outpatient Medications   Medication Sig Dispense Refill    Cholecalciferol (VITAMIN D3 PO) Take 1,000 mg by mouth      Multiple Vitamins-Minerals (HAIR SKIN AND NAILS FORMULA) TABS Take by mouth      vitamin C (ASCORBIC ACID) 500 MG tablet Take 500 mg by mouth daily      Multiple Vitamins-Minerals (THERAPEUTIC MULTIVITAMIN-MINERALS) tablet Take 1 tablet by mouth daily Essential 1 Pro caps      metoprolol succinate (TOPROL XL) 50 MG extended release tablet Take 1 tablet by mouth daily 90 tablet 3    metoprolol succinate (TOPROL XL) 25 MG extended release tablet Take 1 tablet by mouth daily 90 tablet 3    furosemide (LASIX) 20 MG tablet Take 2 tablets by mouth daily 180 tablet 3    Semaglutide,0.25 or 0.5MG/DOS, (OZEMPIC, 0.25 OR 0.5 MG/DOSE,) 2 MG/1.5ML SOPN once a week       spironolactone (ALDACTONE) 25 MG tablet Take 1 tablet by mouth daily 90 tablet 3    meloxicam (MOBIC) 15 MG tablet Take 7.5 mg by mouth daily      insulin glargine (LANTUS) 100 UNIT/ML injection vial Inject 30 Units into the skin nightly       pantoprazole (PROTONIX) 40 MG tablet Take 40 mg by mouth daily      famotidine (PEPCID) 40 MG tablet Take 40 mg by mouth daily       aspirin 81 MG chewable tablet Take 1 tablet by mouth daily. 30 tablet 3    acetaminophen (TYLENOL) 500 MG tablet Take 500 mg by mouth every 6 hours as needed for Pain. No current facility-administered medications for this visit.        Past Medical History:   Diagnosis Date    Arthritis     Back pain     CHF (congestive heart failure) (HCC)     Chronic diastolic heart failure (HCC)     Diabetes mellitus ([facility]     Diabetes mellitus ([facility]     TYPE 2    GERD (gastroesophageal reflux disease)     High cholesterol     Hypertension     Malignant neoplasm of right female breast ([facility] 5/3/2022    Polio     Polio     AS INFANT    Shingles     OCT 2013    Urinary problem     HAS BASHFUL BLADDER        Past Surgical History:   Procedure Laterality Date    BACK SURGERY      BACK SURGERY      1/13/14 x2    BREAST BIOPSY Right 5/3/2022    RIGHT RADIOFREQUENCY IDENTIFICATION TAG LOCALIZED PARTIAL MASTECTOMY RIGHT SENTINEL LYMPH NODE BIOPSY performed by Maren Payton MD at [address] SURGERY Right 5/3/2022    . performed by Nathalia Donis MD at [address] [facility]      bilateral knee replacements    JOINT REPLACEMENT      [place] KNEES    KNEE SURGERY      [facility] LOC BREAST BIOPSY RIGHT Right 4/6/2022    [facility] LOC BREAST BIOPSY RIGHT 4/6/2022 [facility] [phone]    OTHER SURGICAL HISTORY  01/13/14    L4-5 DECOMPRESSION, POSTERIOR LATERAL FUSION AND PEDICLE       Family History   Problem Relation Age of Onset    Heart Disease Mother     Cancer Mother         breast cancer, 5    Breast Cancer Mother     Early Death Father     Diabetes Sister        Social History     Socioeconomic History    Marital status:      Spouse name: Not on file    Number of children: Not on file    Years of education: Not on file    Highest education level: Not on file   Occupational History    Not on file   Tobacco Use    Smoking status: Never    Smokeless tobacco: Never   Vaping Use    Vaping Use: Never used   Substance and Sexual Activity    Alcohol use: No    Drug use: No    Sexual activity: Yes     Partners: Male   Other Topics Concern    Not on file   Social History Narrative    ** Merged History Encounter **          Social Determinants of Health     Financial Resource Strain: Not on file   Food Insecurity: Not on file   Transportation Needs: Not on file   Physical Activity: Not on file   Stress: Not on file   Social Connections: Not on file   Intimate Partner Violence: Not on file   Housing Stability: Not on file        Vitals:    08/19/22 0911   Resp: 16   Weight: 261 lb (118.4 kg)   Height: 5' 6\" (1.676 m)       Physical Exam  Constitutional - well-groomed, well-nourished, Body mass index is 42.13 kg/m².   Psychiatric - pleasant, normal mood & affect  Cardiovascular - RRR, left radial pulse 2+  Respiratory - respirations unlabored, on room air; mask on  Skin - no rashes, wounds, or lesions seen on exposed skin  Neurological - LYNDSAY SALMON disruption. The articular humeral head is spared and no dislocation from the glenoid. Arthritic changes at the acromioclavicular joint. Narrative   EXAMINATION:   TWO XRAY VIEWS OF THE LEFT HUMERUS       8/16/2022 12:20 am       COMPARISON:   None. HISTORY:   ORDERING SYSTEM PROVIDED HISTORY: injury   TECHNOLOGIST PROVIDED HISTORY:   Reason for exam:->injury   Reason for Exam: L/arm pain and swelling       FINDINGS:   Acute fracture of the mid to proximal left humerus is again noted. There is   a oblique main fracture in the mid to upper shaft with displacement of the   main shaft components by to a cm on the current position. There is a large   free butterfly fragment at the lateral aspect coursing up to the humeral   neck. Longitudinal fracture line runs superiorly up the humeral neck and is   overlapped on the current position. The distal humerus appears intact. The elbow is not positioned for detailed   evaluation. Impression   Acute comminuted and displaced fracture involving the mid to upper left   humerus with large free fragment and involvement up to the edge of the   humeral neck. The distal humerus appears intact. Labs:  No results for input(s): WBC, HGB, HCT, MCV, PLT in the last 720 hours.   Lab Results   Component Value Date     02/18/2022    K 4.2 02/18/2022     02/18/2022    CO2 29 02/18/2022    BUN 25 02/18/2022    CREATININE 0.81 02/18/2022    GLUCOSE 112 (H) 02/18/2022    CALCIUM 9.5 02/18/2022    PROT 6.1 (L) 02/18/2022    LABALBU 3.7 02/18/2022    BILITOT 0.2 02/18/2022    ALKPHOS 72 02/18/2022    AST 15 02/18/2022    ALT 13 02/18/2022    LABGLOM >60 05/26/2021    GFRAA >60 05/26/2021    AGRATIO 1.4 04/23/2021    GLOB 3.1 04/23/2021     Lab Results   Component Value Date    INR 1.04 08/19/2022    INR 1.02 02/24/2015    INR 0.98 01/02/2014     Lab Results   Component Value Date    APTT 25.8 08/19/2022    APTT 28.5 01/02/2014     Lab Results   Component Value Date    LABA1C 6.3 (H) 02/18/2022    LABA1C 7.3 04/23/2021    LABA1C 7.0 02/24/2015     No results found for: VITD25       Assessment & Plan:  74 y.o. female who presents with    Diagnosis Orders   1. Other closed displaced fracture of proximal end of left humerus, initial encounter        2. Closed displaced comminuted fracture of shaft of left humerus, initial encounter        3. Adult BMI 40.0-44.9 kg/sq m ([facility]        4. Pre-op testing  Comprehensive Metabolic Panel    CBC with Auto Differential    APTT    Protime-INR          No orders of the defined types were placed in this encounter. Approaching 3 weeks post injury/fracture  History is somewhat confusing, was admitted in [address] [place] [address] for 10 days after her fracture at which point she could have undergone operative fixation already  However, it appears the patient refused surgical intervention there and instead wanted to be transferred back to [place]  She has been back in town for over a week and has not seen orthopedics until today  Complex, comminuted, displaced left proximal humerus and humeral shaft fractures  It appears she was treated in a coaptation splint and then a De brace initially in [place]  Given the displacement present, we discussed possible surgical intervention, she reports she was told in [address] [place] [address] that this would require surgery and she is interested in proceeding with that    Open reduction internal fixation left proximal humerus and shaft fractures  Risks and benefits of surgery were discussed at length with the patient and an opportunity for questions was afforded.   Possible complications of this surgery/fracture include, but are not limited to, non-union, malunion, persistent pain, post-traumatic arthritis, joint stiffness, infection, weakness, blood clots, bleeding, neurovascular injury, numbness around the surgical incision, hardware failure, periprosthetic fracture, painful hardware, and wound healing problems. The patient demonstrated a good understanding of the procedure, anticipated outcomes, possible complications, the post-operative restrictions and therapy required, and at this time voiced desire to proceed. Will check some preop labs. Strict glycemic control.     Santy Mena MD

## 2022-08-19 NOTE — LETTER
Dr Tommy Hart [phone] F: [phone]  Surgery Scheduling Form:  DEMOGRAPHICS:                                                                                                                  Patient Name:  Mallika Uriarte    Patient :  1950   Patient SS#:  [id number]      Patient Phone:  [phone] (home)      Patient Address:  [address] [place] [address]    Comment:    Insurance:  Payor: Margareth Medina / Plan: MEDICARE PART A AND B / Product Type: *No Product type* /     DIAGNOSIS & PROCEDURE:                                                                                                Diagnosis:  Left proximal humerus and shaft fractures  S42.292A  S42.352A    Operation:  Open reduction internal fixation left proximal humerus and shaft fractures  [id number]  [id number]    Location:  [address] [place] [address]    [facility]    [facility] INFORMATION:                                                                                         .    Requested Date:  22  OR Time: 1;45pm   Patient Arrival Time: 11:45am     OR Time Required:  120  Minutes     2 hours    Anesthesia:  General +/- regional   SA Required:  Yes    Equipment:  Synthes, [employer] beach chair attachment, Spider arm carrington; large C-arm. Irrisept irrigation solution.   If patient qualifies for IV tranexamic acid - 1 dose preop, 1 dose postop    Status:  same day surgery      PAT Required:  Yes    Latex Allergy:  no Defibrilator or Pacemaker:  no    Isolation Precautions:  no                      Mireille Lord MD     22   BILLING INFORMATION:                                                                                                    .                          CPT Code Modifier

## 2022-08-19 NOTE — TELEPHONE ENCOUNTER
CPT: 09992, [id number]  BODY PART: left humerus  STATUS: outpatient  LOCATION: [facility]  AUTHORIZATION: NPR    Medicare is primary, NPR

## 2022-08-19 NOTE — PROGRESS NOTES
Spoke with Jessica Larson the nurse from the [employer] ([phone]). Will fax paperwork with needed items from the ECF. Faxed the pre op instructions with the paperwork needed to th ECF. The ECF will be arranging transportation for patient at [address] [place] [address]. Have requested that they fax the H&P to [facility] when completed.

## 2022-08-22 NOTE — PROGRESS NOTES
Called and spoke with Ruma at the [facility] concerning patient's H&P. She stated that she has a progress note from the weekend from the physician. I asked her to fax the progress notes to me for review. Have messaged Vanesa Carbajal at Dr. Darrell Tobin office to see if he would be willing to complete the H&P day of surgery.

## 2022-08-23 ENCOUNTER — APPOINTMENT (OUTPATIENT)
Dept: GENERAL RADIOLOGY | Age: 72
End: 2022-08-23
Attending: ORTHOPAEDIC SURGERY
Payer: MEDICARE

## 2022-08-23 ENCOUNTER — ANESTHESIA EVENT (OUTPATIENT)
Dept: OPERATING ROOM | Age: 72
End: 2022-08-23
Payer: MEDICARE

## 2022-08-23 ENCOUNTER — ANESTHESIA (OUTPATIENT)
Dept: OPERATING ROOM | Age: 72
End: 2022-08-23
Payer: MEDICARE

## 2022-08-23 ENCOUNTER — HOSPITAL ENCOUNTER (OUTPATIENT)
Age: 72
Setting detail: OUTPATIENT SURGERY
Discharge: HOME | End: 2022-08-23
Attending: ORTHOPAEDIC SURGERY | Admitting: ORTHOPAEDIC SURGERY
Payer: MEDICARE

## 2022-08-23 VITALS
DIASTOLIC BLOOD PRESSURE: 92 MMHG | SYSTOLIC BLOOD PRESSURE: 168 MMHG | BODY MASS INDEX: 40.18 KG/M2 | RESPIRATION RATE: 17 BRPM | OXYGEN SATURATION: 94 % | HEIGHT: 66 IN | HEART RATE: 82 BPM | WEIGHT: 250 LBS | TEMPERATURE: 97 F

## 2022-08-23 DIAGNOSIS — S42.292A OTHER CLOSED DISPLACED FRACTURE OF PROXIMAL END OF LEFT HUMERUS, INITIAL ENCOUNTER: ICD-10-CM

## 2022-08-23 DIAGNOSIS — S42.352A CLOSED DISPLACED COMMINUTED FRACTURE OF SHAFT OF LEFT HUMERUS, INITIAL ENCOUNTER: Primary | ICD-10-CM

## 2022-08-23 PROBLEM — S42.302A FRACTURE OF HUMERAL SHAFT, LEFT, CLOSED: Status: ACTIVE | Noted: 2022-08-23

## 2022-08-23 PROBLEM — S42.202A CLOSED FRACTURE OF LEFT PROXIMAL HUMERUS: Status: ACTIVE | Noted: 2022-08-23

## 2022-08-23 LAB
GLUCOSE BLD-MCNC: 93 MG/DL (ref 70–99)
PERFORMED ON: NORMAL

## 2022-08-23 PROCEDURE — 2500000003 HC RX 250 WO HCPCS: Performed by: NURSE ANESTHETIST, CERTIFIED REGISTERED

## 2022-08-23 PROCEDURE — 6360000002 HC RX W HCPCS: Performed by: NURSE ANESTHETIST, CERTIFIED REGISTERED

## 2022-08-23 PROCEDURE — 6360000002 HC RX W HCPCS: Performed by: ORTHOPAEDIC SURGERY

## 2022-08-23 PROCEDURE — 73060 X-RAY EXAM OF HUMERUS: CPT

## 2022-08-23 PROCEDURE — 3700000001 HC ADD 15 MINUTES (ANESTHESIA): Performed by: ORTHOPAEDIC SURGERY

## 2022-08-23 PROCEDURE — 2709999900 HC NON-CHARGEABLE SUPPLY: Performed by: ORTHOPAEDIC SURGERY

## 2022-08-23 PROCEDURE — 3600000014 HC SURGERY LEVEL 4 ADDTL 15MIN: Performed by: ORTHOPAEDIC SURGERY

## 2022-08-23 PROCEDURE — 24515 OPTX HUMRL SHFT FX PLATE/SCR: CPT | Performed by: ORTHOPAEDIC SURGERY

## 2022-08-23 PROCEDURE — 3700000000 HC ANESTHESIA ATTENDED CARE: Performed by: ORTHOPAEDIC SURGERY

## 2022-08-23 PROCEDURE — 2580000003 HC RX 258: Performed by: ORTHOPAEDIC SURGERY

## 2022-08-23 PROCEDURE — A4217 STERILE WATER/SALINE, 500 ML: HCPCS | Performed by: ORTHOPAEDIC SURGERY

## 2022-08-23 PROCEDURE — 64415 NJX AA&/STRD BRCH PLXS IMG: CPT | Performed by: ANESTHESIOLOGY

## 2022-08-23 PROCEDURE — 7100000000 HC PACU RECOVERY - FIRST 15 MIN: Performed by: ORTHOPAEDIC SURGERY

## 2022-08-23 PROCEDURE — 3600000004 HC SURGERY LEVEL 4 BASE: Performed by: ORTHOPAEDIC SURGERY

## 2022-08-23 PROCEDURE — C1713 ANCHOR/SCREW BN/BN,TIS/BN: HCPCS | Performed by: ORTHOPAEDIC SURGERY

## 2022-08-23 PROCEDURE — 23410 REPAIR ROTATOR CUFF ACUTE: CPT | Performed by: ORTHOPAEDIC SURGERY

## 2022-08-23 PROCEDURE — 7100000001 HC PACU RECOVERY - ADDTL 15 MIN: Performed by: ORTHOPAEDIC SURGERY

## 2022-08-23 PROCEDURE — 2500000003 HC RX 250 WO HCPCS: Performed by: ORTHOPAEDIC SURGERY

## 2022-08-23 PROCEDURE — 2720000010 HC SURG SUPPLY STERILE: Performed by: ORTHOPAEDIC SURGERY

## 2022-08-23 PROCEDURE — 2580000003 HC RX 258: Performed by: NURSE ANESTHETIST, CERTIFIED REGISTERED

## 2022-08-23 PROCEDURE — 7100000011 HC PHASE II RECOVERY - ADDTL 15 MIN: Performed by: ORTHOPAEDIC SURGERY

## 2022-08-23 PROCEDURE — 7100000010 HC PHASE II RECOVERY - FIRST 15 MIN: Performed by: ORTHOPAEDIC SURGERY

## 2022-08-23 PROCEDURE — 23615 OPTX PROX HUMRL FX W/INT FIX: CPT | Performed by: ORTHOPAEDIC SURGERY

## 2022-08-23 DEVICE — IMPLANTABLE DEVICE: Type: IMPLANTABLE DEVICE | Site: HUMERUS | Status: FUNCTIONAL

## 2022-08-23 DEVICE — SCREW BNE L36MM DIA3.5MM CORT S STL ST LOK FULL THRD: Type: IMPLANTABLE DEVICE | Site: HUMERUS | Status: FUNCTIONAL

## 2022-08-23 DEVICE — SCREW BNE L28MM DIA3.5MM CORT S STL ST NONCANNULATED LOK: Type: IMPLANTABLE DEVICE | Site: HUMERUS | Status: FUNCTIONAL

## 2022-08-23 DEVICE — SCREW BNE L26MM DIA3.5MM CORT S STL ST NONCANNULATED LOK: Type: IMPLANTABLE DEVICE | Site: HUMERUS | Status: FUNCTIONAL

## 2022-08-23 DEVICE — SCREW BNE L45MM DIA3.5MM CORT S STL ST LOK FULL THRD: Type: IMPLANTABLE DEVICE | Site: HUMERUS | Status: FUNCTIONAL

## 2022-08-23 DEVICE — SCREW BNE L40MM DIA3.5MM CORT S STL ST LOK FULL THRD: Type: IMPLANTABLE DEVICE | Site: HUMERUS | Status: FUNCTIONAL

## 2022-08-23 DEVICE — SCREW BNE L40MM DIA3.5MM CORT S STL ST NONCANNULATED LOK: Type: IMPLANTABLE DEVICE | Site: HUMERUS | Status: FUNCTIONAL

## 2022-08-23 DEVICE — SCREW BNE L30MM DIA3.5MM CORT S STL ST NONCANNULATED LOK: Type: IMPLANTABLE DEVICE | Site: HUMERUS | Status: FUNCTIONAL

## 2022-08-23 DEVICE — SCREW BNE L36MM DIA3.5MM CORT S STL ST NONCANNULATED LOK: Type: IMPLANTABLE DEVICE | Site: HUMERUS | Status: FUNCTIONAL

## 2022-08-23 DEVICE — SCREW BNE L38MM DIA3.5MM CORT S STL ST NONCANNULATED LOK: Type: IMPLANTABLE DEVICE | Site: HUMERUS | Status: FUNCTIONAL

## 2022-08-23 DEVICE — SCREW BNE L24MM DIA3.5MM CORT S STL ST NONCANNULATED LOK: Type: IMPLANTABLE DEVICE | Site: HUMERUS | Status: FUNCTIONAL

## 2022-08-23 DEVICE — SUPER / RC INSERTER: Type: IMPLANTABLE DEVICE | Site: HUMERUS | Status: FUNCTIONAL

## 2022-08-23 RX ORDER — FENTANYL CITRATE 50 UG/ML
INJECTION, SOLUTION INTRAMUSCULAR; INTRAVENOUS PRN
Status: DISCONTINUED | OUTPATIENT
Start: 2022-08-23 | End: 2022-08-23 | Stop reason: SDUPTHER

## 2022-08-23 RX ORDER — SODIUM CHLORIDE 9 MG/ML
INJECTION, SOLUTION INTRAVENOUS PRN
Status: DISCONTINUED | OUTPATIENT
Start: 2022-08-23 | End: 2022-08-23 | Stop reason: HOSPADM

## 2022-08-23 RX ORDER — MEPERIDINE HYDROCHLORIDE 25 MG/ML
12.5 INJECTION INTRAMUSCULAR; INTRAVENOUS; SUBCUTANEOUS EVERY 5 MIN PRN
Status: DISCONTINUED | OUTPATIENT
Start: 2022-08-23 | End: 2022-08-23 | Stop reason: HOSPADM

## 2022-08-23 RX ORDER — EPHEDRINE SULFATE/0.9% NACL/PF 50 MG/5 ML
SYRINGE (ML) INTRAVENOUS PRN
Status: DISCONTINUED | OUTPATIENT
Start: 2022-08-23 | End: 2022-08-23 | Stop reason: SDUPTHER

## 2022-08-23 RX ORDER — LIDOCAINE HYDROCHLORIDE 20 MG/ML
INJECTION, SOLUTION EPIDURAL; INFILTRATION; INTRACAUDAL; PERINEURAL PRN
Status: DISCONTINUED | OUTPATIENT
Start: 2022-08-23 | End: 2022-08-23 | Stop reason: SDUPTHER

## 2022-08-23 RX ORDER — SODIUM CHLORIDE 0.9 % (FLUSH) 0.9 %
5-40 SYRINGE (ML) INJECTION EVERY 12 HOURS SCHEDULED
Status: DISCONTINUED | OUTPATIENT
Start: 2022-08-23 | End: 2022-08-23 | Stop reason: HOSPADM

## 2022-08-23 RX ORDER — BUPIVACAINE HYDROCHLORIDE AND EPINEPHRINE 5; 5 MG/ML; UG/ML
INJECTION, SOLUTION PERINEURAL
Status: COMPLETED | OUTPATIENT
Start: 2022-08-23 | End: 2022-08-23

## 2022-08-23 RX ORDER — DEXAMETHASONE SODIUM PHOSPHATE 4 MG/ML
INJECTION, SOLUTION INTRA-ARTICULAR; INTRALESIONAL; INTRAMUSCULAR; INTRAVENOUS; SOFT TISSUE PRN
Status: DISCONTINUED | OUTPATIENT
Start: 2022-08-23 | End: 2022-08-23 | Stop reason: SDUPTHER

## 2022-08-23 RX ORDER — SODIUM CHLORIDE 9 MG/ML
INJECTION, SOLUTION INTRAVENOUS CONTINUOUS PRN
Status: DISCONTINUED | OUTPATIENT
Start: 2022-08-23 | End: 2022-08-23 | Stop reason: SDUPTHER

## 2022-08-23 RX ORDER — CEFAZOLIN 2 G/1
INJECTION, POWDER, FOR SOLUTION INTRAMUSCULAR; INTRAVENOUS
Status: DISCONTINUED
Start: 2022-08-23 | End: 2022-08-23 | Stop reason: HOSPADM

## 2022-08-23 RX ORDER — SODIUM CHLORIDE 0.9 % (FLUSH) 0.9 %
5-40 SYRINGE (ML) INJECTION PRN
Status: DISCONTINUED | OUTPATIENT
Start: 2022-08-23 | End: 2022-08-23 | Stop reason: HOSPADM

## 2022-08-23 RX ORDER — MAGNESIUM HYDROXIDE 1200 MG/15ML
LIQUID ORAL
Status: COMPLETED | OUTPATIENT
Start: 2022-08-23 | End: 2022-08-23

## 2022-08-23 RX ORDER — LIDOCAINE HYDROCHLORIDE 20 MG/ML
INJECTION, SOLUTION EPIDURAL; INFILTRATION; INTRACAUDAL; PERINEURAL PRN
Status: DISCONTINUED | OUTPATIENT
Start: 2022-08-23 | End: 2022-08-23

## 2022-08-23 RX ORDER — ROCURONIUM BROMIDE 10 MG/ML
INJECTION, SOLUTION INTRAVENOUS PRN
Status: DISCONTINUED | OUTPATIENT
Start: 2022-08-23 | End: 2022-08-23 | Stop reason: SDUPTHER

## 2022-08-23 RX ORDER — VANCOMYCIN HYDROCHLORIDE 1 G/20ML
INJECTION, POWDER, LYOPHILIZED, FOR SOLUTION INTRAVENOUS
Status: COMPLETED | OUTPATIENT
Start: 2022-08-23 | End: 2022-08-23

## 2022-08-23 RX ORDER — HYDROMORPHONE HCL 110MG/55ML
0.5 PATIENT CONTROLLED ANALGESIA SYRINGE INTRAVENOUS EVERY 5 MIN PRN
Status: DISCONTINUED | OUTPATIENT
Start: 2022-08-23 | End: 2022-08-23 | Stop reason: HOSPADM

## 2022-08-23 RX ORDER — OXYCODONE HYDROCHLORIDE AND ACETAMINOPHEN 5; 325 MG/1; MG/1
1 TABLET ORAL EVERY 6 HOURS PRN
Qty: 28 TABLET | Refills: 0 | Status: SHIPPED | OUTPATIENT
Start: 2022-08-23 | End: 2022-08-30

## 2022-08-23 RX ORDER — PROPOFOL 10 MG/ML
INJECTION, EMULSION INTRAVENOUS PRN
Status: DISCONTINUED | OUTPATIENT
Start: 2022-08-23 | End: 2022-08-23 | Stop reason: SDUPTHER

## 2022-08-23 RX ORDER — ONDANSETRON 2 MG/ML
INJECTION INTRAMUSCULAR; INTRAVENOUS PRN
Status: DISCONTINUED | OUTPATIENT
Start: 2022-08-23 | End: 2022-08-23 | Stop reason: SDUPTHER

## 2022-08-23 RX ORDER — TRANEXAMIC ACID 100 MG/ML
INJECTION, SOLUTION INTRAVENOUS PRN
Status: DISCONTINUED | OUTPATIENT
Start: 2022-08-23 | End: 2022-08-23 | Stop reason: SDUPTHER

## 2022-08-23 RX ORDER — ONDANSETRON 2 MG/ML
4 INJECTION INTRAMUSCULAR; INTRAVENOUS
Status: DISCONTINUED | OUTPATIENT
Start: 2022-08-23 | End: 2022-08-23 | Stop reason: HOSPADM

## 2022-08-23 RX ADMIN — PHENYLEPHRINE HYDROCHLORIDE 100 MCG: 10 INJECTION INTRAVENOUS at 14:00

## 2022-08-23 RX ADMIN — CEFAZOLIN 2000 MG: 2 INJECTION, POWDER, FOR SOLUTION INTRAMUSCULAR; INTRAVENOUS at 13:23

## 2022-08-23 RX ADMIN — Medication 5 MG: at 14:05

## 2022-08-23 RX ADMIN — TRANEXAMIC ACID 1000 MG: 1 INJECTION, SOLUTION INTRAVENOUS at 13:09

## 2022-08-23 RX ADMIN — PHENYLEPHRINE HYDROCHLORIDE 200 MCG: 10 INJECTION INTRAVENOUS at 14:03

## 2022-08-23 RX ADMIN — Medication 10 MG: at 14:01

## 2022-08-23 RX ADMIN — ONDANSETRON 4 MG: 2 INJECTION INTRAMUSCULAR; INTRAVENOUS at 13:26

## 2022-08-23 RX ADMIN — SODIUM CHLORIDE: 9 INJECTION, SOLUTION INTRAVENOUS at 15:37

## 2022-08-23 RX ADMIN — PHENYLEPHRINE HYDROCHLORIDE 100 MCG: 10 INJECTION INTRAVENOUS at 13:52

## 2022-08-23 RX ADMIN — FENTANYL CITRATE 50 MCG: 50 INJECTION, SOLUTION INTRAMUSCULAR; INTRAVENOUS at 13:00

## 2022-08-23 RX ADMIN — SUGAMMADEX 200 MG: 100 INJECTION, SOLUTION INTRAVENOUS at 15:30

## 2022-08-23 RX ADMIN — PHENYLEPHRINE HYDROCHLORIDE 100 MCG: 10 INJECTION INTRAVENOUS at 13:56

## 2022-08-23 RX ADMIN — Medication 10 MG: at 13:26

## 2022-08-23 RX ADMIN — FENTANYL CITRATE 25 MCG: 50 INJECTION, SOLUTION INTRAMUSCULAR; INTRAVENOUS at 15:25

## 2022-08-23 RX ADMIN — PHENYLEPHRINE HYDROCHLORIDE 100 MCG: 10 INJECTION INTRAVENOUS at 13:24

## 2022-08-23 RX ADMIN — SODIUM CHLORIDE: 9 INJECTION, SOLUTION INTRAVENOUS at 13:08

## 2022-08-23 RX ADMIN — DEXAMETHASONE SODIUM PHOSPHATE 8 MG: 4 INJECTION, SOLUTION INTRAMUSCULAR; INTRAVENOUS at 13:26

## 2022-08-23 RX ADMIN — Medication 10 MG: at 13:53

## 2022-08-23 RX ADMIN — TRANEXAMIC ACID 1000 MG: 1 INJECTION, SOLUTION INTRAVENOUS at 15:20

## 2022-08-23 RX ADMIN — ROCURONIUM BROMIDE 50 MG: 10 INJECTION, SOLUTION INTRAVENOUS at 13:13

## 2022-08-23 RX ADMIN — FENTANYL CITRATE 25 MCG: 50 INJECTION, SOLUTION INTRAMUSCULAR; INTRAVENOUS at 15:31

## 2022-08-23 RX ADMIN — Medication 5 MG: at 14:24

## 2022-08-23 RX ADMIN — LIDOCAINE HYDROCHLORIDE 100 MG: 20 INJECTION, SOLUTION EPIDURAL; INFILTRATION; INTRACAUDAL; PERINEURAL at 13:13

## 2022-08-23 RX ADMIN — Medication 10 MG: at 13:32

## 2022-08-23 RX ADMIN — PROPOFOL 150 MG: 10 INJECTION, EMULSION INTRAVENOUS at 13:13

## 2022-08-23 ASSESSMENT — PAIN SCALES - GENERAL
PAINLEVEL_OUTOF10: 0

## 2022-08-23 ASSESSMENT — PAIN DESCRIPTION - LOCATION: LOCATION: SHOULDER

## 2022-08-23 ASSESSMENT — PAIN DESCRIPTION - PAIN TYPE: TYPE: SURGICAL PAIN

## 2022-08-23 ASSESSMENT — PAIN - FUNCTIONAL ASSESSMENT: PAIN_FUNCTIONAL_ASSESSMENT: 0-10

## 2022-08-23 ASSESSMENT — PAIN DESCRIPTION - ORIENTATION: ORIENTATION: LEFT

## 2022-08-23 NOTE — ANESTHESIA POSTPROCEDURE EVALUATION
Department of Anesthesiology  Postprocedure Note    Patient: Huma Bernstein  MRN: [id number]  YOB: 1950  Date of evaluation: 8/23/2022      Procedure Summary     Date: 08/23/22 Room / Location: [address]    Anesthesia Start: 1308 Anesthesia Stop: [id number]    Procedure: OPEN REDUCTION INTERNAL FIXATION LEFT PROXIMAL HUMERUS AND SHAFT FRACTURES-REGIONAL-SYNTHES (Left: Shoulder) Diagnosis:       Other closed displaced fracture of proximal end of left humerus, initial encounter      Closed displaced comminuted fracture of shaft of left humerus, initial encounter      (S42.292A LEFT PROXIMAL HUMERUS )      (S42.352A SHAFT FRACTURES)    Surgeons: David Holguin MD Responsible Provider: Darin Maldonado MD    Anesthesia Type: general, regional ASA Status: 3          Anesthesia Type: No value filed.     Aimee Phase I: Aimee Score: 8    Aimee Phase II:        Anesthesia Post Evaluation    Patient location during evaluation: PACU  Patient participation: complete - patient participated  Level of consciousness: awake  Airway patency: patent  Nausea & Vomiting: no vomiting and no nausea  Complications: no  Cardiovascular status: hemodynamically stable  Respiratory status: acceptable  Hydration status: stable  Multimodal analgesia pain management approach

## 2022-08-23 NOTE — H&P
PLANNED PROCEDURE:  ORIF left proximal humerus and humeral shaft fractures    I have reviewed my notes and patient's imaging, examined the patient, and no pertinent changes are required. Past Medical History:   Diagnosis Date    Arthritis     Back pain     CHF (congestive heart failure) (HCC)     Chronic diastolic heart failure (HCC)     Diabetes mellitus ([facility]     Diabetes mellitus ([facility]     TYPE 2    GERD (gastroesophageal reflux disease)     High cholesterol     Hypertension     Malignant neoplasm of right female breast ([facility] 05/03/2022    Polio     Polio     AS INFANT    Shingles     OCT 2013    Sleep apnea     Urinary problem     HAS BASHFUL BLADDER       Past Surgical History:   Procedure Laterality Date    BACK SURGERY      BACK SURGERY      1/13/14 x2    BREAST BIOPSY Right 5/3/2022    RIGHT RADIOFREQUENCY IDENTIFICATION TAG LOCALIZED PARTIAL MASTECTOMY RIGHT SENTINEL LYMPH NODE BIOPSY performed by Seng Bagley MD at [address] Right 5/3/2022    .  performed by Seng Bagley MD at [address] [facility]      bilateral knee replacements    JOINT REPLACEMENT      [place] KNEES    KNEE SURGERY      [facility] BREAST BIOPSY RIGHT Right 4/6/2022    [facility]SY [facility]    OTHER SURGICAL HISTORY  01/13/14    L4-5 DECOMPRESSION, POSTERIOR LATERAL FUSION AND PEDICLE       Social History     Tobacco Use    Smoking status: Never    Smokeless tobacco: Never   Vaping Use    Vaping Use: Never used   Substance Use Topics    Alcohol use: No    Drug use: No       Allergies   Allergen Reactions    Metformin Nausea Only and Other (See Comments)     \"ate the lining of her stomach\"      Adhesive Tape      Skin irritation and blisters  - does okay with steri strips and tegaderm       Medications Prior to Admission: Cholecalciferol (VITAMIN D3 PO), Take 1,000 mg by mouth  Multiple Vitamins-Minerals (HAIR SKIN AND NAILS FORMULA) TABS, Take by mouth  vitamin C (ASCORBIC ACID) 500 MG tablet, Take 500 mg by mouth daily  Multiple Vitamins-Minerals (THERAPEUTIC MULTIVITAMIN-MINERALS) tablet, Take 1 tablet by mouth daily Essential 1 Pro caps  metoprolol succinate (TOPROL XL) 50 MG extended release tablet, Take 1 tablet by mouth daily  metoprolol succinate (TOPROL XL) 25 MG extended release tablet, Take 1 tablet by mouth daily  furosemide (LASIX) 20 MG tablet, Take 2 tablets by mouth daily  Semaglutide,0.25 or 0.5MG/DOS, (OZEMPIC, 0.25 OR 0.5 MG/DOSE,) 2 MG/1.5ML SOPN, once a week   spironolactone (ALDACTONE) 25 MG tablet, Take 1 tablet by mouth daily  meloxicam (MOBIC) 15 MG tablet, Take 7.5 mg by mouth daily  insulin glargine (LANTUS) 100 UNIT/ML injection vial, Inject 30 Units into the skin nightly   pantoprazole (PROTONIX) 40 MG tablet, Take 40 mg by mouth daily  famotidine (PEPCID) 40 MG tablet, Take 40 mg by mouth daily   aspirin 81 MG chewable tablet, Take 1 tablet by mouth daily. acetaminophen (TYLENOL) 500 MG tablet, Take 500 mg by mouth every 6 hours as needed for Pain. Current Facility-Administered Medications:     tranexamic acid (CYKLOKAPRON) 1,000 mg in sodium chloride 0.9 % 60 mL IVPB, 1,000 mg, IntraVENous, On Call to OR, Marylen Banas, MD    ceFAZolin (ANCEF) 2,000 mg in dextrose 5 % 50 mL IVPB (mini-bag), 2,000 mg, IntraVENous, On Call to OR, Marylen Banas, MD    ceFAZolin (ANCEF) 2 g injection, , , ,     dextrose 5 % infusion, , , ,     Vitals:    08/19/22 1455 08/23/22 1219   BP:  (!) 181/73   Pulse:  75   Resp:  16   Temp:  98.8 °F (37.1 °C)   TempSrc:  Temporal   SpO2:  94%   Weight: 250 lb (113.4 kg) 250 lb (113.4 kg)   Height: 5' 6\" (1.676 m) 5' 6\" (1.676 m)       Body mass index is 40.35 kg/m².   Left shoulder/brachium skin clear  LUE SILT M/U/R/A/LABC nerve distributions; AIN/PIN/IO intact   Finger stiffness (mainly flexion) still present, however, improved compared to office visit few days

## 2022-08-23 NOTE — DISCHARGE INSTRUCTIONS
Dr Barbra Childers shoulder surgery discharge instructions:    Sling at all times for 4-6 weeks  You should rotate your forearm and move your wrist/hand/fingers freely  You should loosen the sling two to three times a day to fully extend and flex your elbow to prevent stiffness in the elbow  No shoulder exercises/range of motion for now   Handout of exercises given   Operative arm non weight bearing    Perform incentive spirometry every hour while awake, including at home    Leave the surgical dressing clean/dry/intact, do NOT get it wet  Replace the dressing as needed for soiling or saturation  After 7 days, you can remove the dressing and if the incision is dry without drainage, it can be left open to air. Do not peel the steristrips. Ice the surgical site frequently - 15 minutes on, 15 minutes off - to aid in pain and swelling. Make sure there is a barrier between the ice pack and your skin, such as a clean dry towel. Make sure your ice pack is leak proof as you do not want to get your dressing wet. Hold off on physical therapy until I see you back for your postoperative appointments. I will let you know at the appointments when you should start formal therapy. No driving for the foreseeable future. Although you have a prescription for a strong narcotic pain medication, many patients are able to handle the discomfort postoperatively with a milder medication such as Extra-Strength Tylenol and/or ibuprofen. Take the narcotic medication as needed only, and wean off this medication as soon as possible. Take Aspirin 81mg twice a day for 2 weeks after surgery, starting the morning after you leave the hospital.  Perform quad sets, ankle pumps throughout the day to promote blood flow. Follow up with Dr Thuan Lovelace 10-14 days after surgery. ANESTHESIA DISCHARGE INSTRUCTIONS    Wear your seatbelt home.   You are under the influence of drugs-do not drink alcohol, drive, operate machinery, make any important decisions or sign any legal documents for 24 hours. Children should not ride bikes, [place] or play on gym sets for 24 hours after surgery. A responsible adult needs to be with you for 24 hours. You may experience lightheadedness, dizziness, or sleepiness following surgery. Rest at home today- increase activity as tolerated. Progress slowly to a regular diet unless your physician has instructed you otherwise. Drink plenty of water. If persistent nausea and vomiting becomes a problem, call your physician. Coughing, sore throat and muscle aches are other side effects of anesthesia, and should improve with time. Do not drive or operate machinery while taking narcotics. Females of childbearing potential and on hormonal birth control, should use two forms of contraception following procedure if given a medication called sugammadex and/or emend. Additional contraception should be used for 7 days for sugammadex and/or 28 days for emend. These medications have a potential to reduce the effectiveness of hormonal birth control.

## 2022-08-23 NOTE — ANESTHESIA PROCEDURE NOTES
Peripheral Block    Patient location during procedure: pre-op  Reason for block: post-op pain management and at surgeon's request  Start time: 8/23/2022 3:57 PM  End time: 8/23/2022 3:57 PM  Staffing  Performed: anesthesiologist   Preanesthetic Checklist  Completed: patient identified, IV checked, site marked, risks and benefits discussed, surgical/procedural consents, equipment checked, pre-op evaluation, timeout performed, anesthesia consent given, oxygen available and monitors applied/VS acknowledged  Peripheral Block   Patient position: sitting  Prep: ChloraPrep  Provider prep: mask and sterile gloves  Patient monitoring: cardiac monitor, continuous pulse ox, frequent blood pressure checks and IV access  Block type: Brachial plexus  Interscalene  Laterality: left  Injection technique: single-shot  Guidance: ultrasound guided  Local infiltration: lidocaine  Infiltration strength: 1 %  Local infiltration: lidocaine  Dose: 3 mL    Needle   Needle gauge: 21 G  Needle localization: ultrasound guidance  Needle length: 10 cm  Assessment   Injection assessment: negative aspiration for heme, no paresthesia on injection and local visualized surrounding nerve on ultrasound  Paresthesia pain: none  Slow fractionated injection: yes  Hemodynamics: stable    Additional Notes  U/S [id number]. (1) Under ultrasound guidance, gauge needle was inserted and placed in close proximity to thenerve. (2) Ultrasound was also used to visualize the spread of the anesthetic in close proximity to the nerve being blocked. (3) The nerve appeared anatomically normal, and (4 there were no apparent abnormal pathological findings on the image that were readily visible and related to the nerve being blocked. (5) A permanent ultrasound image was saved in the patient's record.

## 2022-08-23 NOTE — PROGRESS NOTES
Pt safely dressed and transported to the bathroom to void, tolerated well. IV removed without complications. Discharge instructions and new medication prescription reviewed with pt and pts daughter, both verbalized understanding. [facility] transport here to take pt back to [facility]. Pt discharged in wheelchair with all belongings to transport by Tori Vasquez. Pt tolerated well.

## 2022-08-23 NOTE — ANESTHESIA PRE PROCEDURE
Department of Anesthesiology  Preprocedure Note       Name:  Zhanna Jensen   Age:  74 y.o.  :  1950                                          MRN:  [id number]         Date:  2022      Surgeon: Osvaldo Manzano):  Beth Eastman MD    Procedure: Procedure(s):  OPEN REDUCTION INTERNAL FIXATION LEFT PROXIMAL HUMERUS AND SHAFT FRACTURES-REGIONAL-SYNTHES    Medications prior to admission:   Prior to Admission medications    Medication Sig Start Date End Date Taking? Authorizing Provider   Cholecalciferol (VITAMIN D3 PO) Take 1,000 mg by mouth    Historical Provider, MD   Multiple Vitamins-Minerals (HAIR SKIN AND NAILS FORMULA) TABS Take by mouth    Historical Provider, MD   vitamin C (ASCORBIC ACID) 500 MG tablet Take 500 mg by mouth daily    Historical Provider, MD   Multiple Vitamins-Minerals (THERAPEUTIC MULTIVITAMIN-MINERALS) tablet Take 1 tablet by mouth daily Essential 1 Pro caps    Historical Provider, MD   metoprolol succinate (TOPROL XL) 50 MG extended release tablet Take 1 tablet by mouth daily 2/15/22   Loco Pelayo MD   metoprolol succinate (TOPROL XL) 25 MG extended release tablet Take 1 tablet by mouth daily 2/15/22   Loco Pelayo MD   furosemide (LASIX) 20 MG tablet Take 2 tablets by mouth daily 2/15/22   Loco Pelayo MD   Semaglutide,0.25 or 0.5MG/DOS, (OZEMPIC, 0.25 OR 0.5 MG/DOSE,) 2 MG/1.5ML SOPN once a week  21   Historical Provider, MD   spironolactone (ALDACTONE) 25 MG tablet Take 1 tablet by mouth daily 21   Loco Pelayo MD   meloxicam (MOBIC) 15 MG tablet Take 7.5 mg by mouth daily    Historical Provider, MD   insulin glargine (LANTUS) 100 UNIT/ML injection vial Inject 30 Units into the skin nightly     Historical Provider, MD   pantoprazole (PROTONIX) 40 MG tablet Take 40 mg by mouth daily 20   Historical Provider, MD   famotidine (PEPCID) 40 MG tablet Take 40 mg by mouth daily     Historical Provider, MD   aspirin 81 MG chewable tablet Take 1 tablet by mouth daily. 2/26/15   Bro Dumont MD   acetaminophen (TYLENOL) 500 MG tablet Take 500 mg by mouth every 6 hours as needed for Pain. Historical Provider, MD       Current medications:    No current facility-administered medications for this visit. No current outpatient medications on file. Facility-Administered Medications Ordered in Other Visits   Medication Dose Route Frequency Provider Last Rate Last Admin    tranexamic acid (CYKLOKAPRON) 1,000 mg in sodium chloride 0.9 % 60 mL IVPB  1,000 mg IntraVENous On Call to [id number] [place] MD           Allergies:     Allergies   Allergen Reactions    Metformin Nausea Only and Other (See Comments)     \"ate the lining of her stomach\"      Adhesive Tape      Skin irritation and blisters  - does okay with steri strips and tegaderm       Problem List:    Patient Active Problem List   Diagnosis Code    HTN (hypertension) I10    High cholesterol E78.00    Diabetes mellitus ([facility] E11.9    Lumbar spinal stenosis M48.061    TIA (transient ischemic attack) G45.9    Chest pain R07.9    Diabetes mellitus type 2 in obese ([facility] E11.69, E66.9    Chronic diastolic heart failure (HCC) I50.32    Snoring R06.83    Hypersomnolence G47.10    Bilateral leg pain M79.604, M79.605    JOSE MANUEL on CPAP G47.33, Z99.89    Malignant neoplasm of right female breast (HCC) C50.911       Past Medical History:        Diagnosis Date    Arthritis     Back pain     CHF (congestive heart failure) (HCC)     Chronic diastolic heart failure (HCC)     Diabetes mellitus ([facility]     Diabetes mellitus (HCC)     TYPE 2    GERD (gastroesophageal reflux disease)     High cholesterol     Hypertension     Malignant neoplasm of right female breast ([facility] 05/03/2022    Polio     Polio     AS INFANT    Shingles     OCT 2013    Sleep apnea     Urinary problem     HAS BASHFUL BLADDER       Past Surgical History:        Procedure Laterality Date    BACK SURGERY      BACK SURGERY      1/13/14 x2   [place] BREAST BIOPSY Right 5/3/2022    RIGHT RADIOFREQUENCY IDENTIFICATION TAG LOCALIZED PARTIAL MASTECTOMY RIGHT SENTINEL LYMPH NODE BIOPSY performed by Yarely Quinonez MD at [address] Right 5/3/2022    . performed by Yarely Quinonez MD at [facility]      JOINT REPLACEMENT      bilateral knee replacements    JOINT REPLACEMENT      [place] KNEES    KNEE SURGERY      [facility] LOC BREAST BIOPSY RIGHT Right 4/6/2022    [facility] LOC BREAST BIOPSY RIGHT 4/6/2022 [facility] [address]    OTHER SURGICAL HISTORY  01/13/14    L4-5 DECOMPRESSION, POSTERIOR LATERAL FUSION AND PEDICLE       Social History:    Social History     Tobacco Use    Smoking status: Never    Smokeless tobacco: Never   Substance Use Topics    Alcohol use: No                                Counseling given: Not Answered      Vital Signs (Current): There were no vitals filed for this visit.                                            BP Readings from Last 3 Encounters:   08/16/22 (!) 171/93   07/28/22 116/62   05/03/22 (!) 145/91       NPO Status:                                                                                 BMI:   Wt Readings from Last 3 Encounters:   08/19/22 250 lb (113.4 kg)   08/19/22 261 lb (118.4 kg)   07/28/22 261 lb 4.8 oz (118.5 kg)     There is no height or weight on file to calculate BMI.    CBC:   Lab Results   Component Value Date/Time    WBC 6.6 08/19/2022 10:48 AM    RBC 3.81 08/19/2022 10:48 AM    HGB 10.6 08/19/2022 10:48 AM    HCT 32.7 08/19/2022 10:48 AM    MCV 85.7 08/19/2022 10:48 AM    RDW 14.8 08/19/2022 10:48 AM     08/19/2022 10:48 AM       CMP:   Lab Results   Component Value Date/Time     08/19/2022 10:48 AM    K 4.4 08/19/2022 10:48 AM     08/19/2022 10:48 AM    CO2 27 08/19/2022 10:48 AM    BUN 12 08/19/2022 10:48 AM    CREATININE 0.9 08/19/2022 10:48 AM    GFRAA >60 08/19/2022 10:48 AM    AGRATIO 1.2 08/19/2022 10:48 AM LABGLOM >60 08/19/2022 10:48 AM    GLUCOSE 140 08/19/2022 10:48 AM    PROT 7.1 08/19/2022 10:48 AM    PROT 7.1 01/07/2012 05:21 PM    CALCIUM 9.5 08/19/2022 10:48 AM    BILITOT <0.2 08/19/2022 10:48 AM    ALKPHOS 119 08/19/2022 10:48 AM    AST 15 08/19/2022 10:48 AM    ALT 15 08/19/2022 10:48 AM       POC Tests: No results for input(s): POCGLU, POCNA, POCK, POCCL, POCBUN, POCHEMO, POCHCT in the last 72 hours. Coags:   Lab Results   Component Value Date/Time    PROTIME 13.5 08/19/2022 10:48 AM    INR 1.04 08/19/2022 10:48 AM    APTT 25.8 08/19/2022 10:48 AM       HCG (If Applicable): No results found for: PREGTESTUR, PREGSERUM, HCG, HCGQUANT     ABGs: No results found for: PHART, PO2ART, GVH6BKX, ZCH9MQB, BEART, R5GUXKLM     Type & Screen (If Applicable):  No results found for: LABABO, LABRH    Drug/Infectious Status (If Applicable):  No results found for: HIV, HEPCAB    COVID-19 Screening (If Applicable):   Lab Results   Component Value Date/Time    COVID19 Not Detected 05/14/2021 10:49 AM           Anesthesia Evaluation    Airway: Mallampati: II  TM distance: >3 FB   Neck ROM: full  Mouth opening: > = 3 FB   Dental:          Pulmonary:   (+) sleep apnea:                             Cardiovascular:    (+) hypertension:, CHF:,         Rhythm: regular  Rate: normal                    Neuro/Psych:   (+) TIA,             GI/Hepatic/Renal:   (+) GERD:, morbid obesity          Endo/Other:    (+) Diabetes, . Abdominal:             Vascular: Other Findings:          Conclusions      Summary   Left ventricular cavity size is normal.   Normal left ventricular wall thickness. Left ventricular function is low normal with ejection fraction estimated at   50%. No regional wall motion abnormalities are noted. Diastolic filling parameters suggest grade II diastolic dysfunction. Thickening of leaflets of mitral valve. Mitral annular calcification is present.    Mild mitral regurgitation is present. Bi-atrial enlargement. Mild aortic stenosis. Moderate aortic regurgitation. Mild tricuspid regurgitation. Estimated pulmonary artery systolic pressure is at 44 mmHg assuming a right   atrial pressure of 3 mmHg. Signature      ------------------------------------------------------------------   Electronically signed by Laura Sandoval MD, [facility] (Interpreting   physician) on 03/22/2022 at 09:40 AM   ------------------------------------------------------------------         Anesthesia Plan      general and regional     ASA 3       Induction: intravenous. Anesthetic plan and risks discussed with patient. Plan discussed with CRNA.                     Kaveh Hair MD   8/23/2022

## 2022-08-24 ENCOUNTER — TELEPHONE (OUTPATIENT)
Dept: ORTHOPEDIC SURGERY | Age: 72
End: 2022-08-24

## 2022-08-24 NOTE — TELEPHONE ENCOUNTER
Called and spoke with patient --  patient feels facility unable to care for her and she wishes to be admitted --  advised patient to needs to talk to PCP --  patient instructions from Dr Aracelis Choe were given at time of discharge -- with movement instructions -- patient should refer to that -- patient is able to move elbow down but should not move the shoulder -- and then otherwise she is to stay In sling -- patient verbalized she with talk with PCP at facility cause she having chills and wants admitted

## 2022-08-24 NOTE — OP NOTE
[place] [address]                     [facility], [address]                                OPERATIVE REPORT    PATIENT NAME: Hannah Griffith                  :        1950  MED REC NO:   [id number]                          ROOM:  ACCOUNT NO:   [de-identified]                           ADMIT DATE: 2022  PROVIDER:     Marylen Banas, MD    DATE OF PROCEDURE:  2022    PREOPERATIVE DIAGNOSES:  1. Left proximal humerus and humeral shaft fractures, subacute (three  weeks old). 2.  Morbid obesity, BMI 40.35 kg/m2. POSTOPERATIVE DIAGNOSES:  1. Left proximal humerus and humeral shaft fractures, subacute (three  weeks old). 2.  Morbid obesity, BMI 40.35 kg/m2. OPERATION PERFORMED:  Open treatment of the left proximal humerus and  humeral shaft fractures with internal fixation. OPERATING SURGEON:  Marylen Banas, MD    FIRST ASSISTANT:  Taya Parsons. ANESTHESIA:  General endotracheal with preoperative interscalene nerve  block. BLOOD LOSS:  400 mL. COMPLICATIONS:  None immediately apparent. IMPLANTS:  Synthes left periarticular proximal humerus plate, 8-holes. INDICATIONS FOR PROCEDURE:  The patient is a 49-year-old female who  injured her left shoulder/arm back on  when she was in [address] [place] [address]  for a convention. She was admitted to a hospital there and there was  discussion of surgical intervention; however, the patient declined to  have surgery in [address] [place] [address] and wished to be transferred back to  [place]. She was apparently admitted there for almost 10 days. She  was in placed in a coaptation splint and then a De brace and then  back to a coaptation splint. She did not undergo surgery while in [place] [address] while waiting to be transferred back to [place]. She was  finally transferred to skilled nursing facility and presented to [facility] Emergency Department on .   Orthopedics was then consulted  and she was seen in the office a few days ago. Given the extensive  comminution and fracture displacement/pattern, the patient wished to  proceed with surgical intervention. Please refer to my office note for  full details of my discussion with her. She was aware of the complex  and comminuted fracture involving the left shoulder and arm as well as  the fact that it is now three weeks old, this procedure will become much  more challenging. DETAILS OF PROCEDURE:  The patient was brought back to the OR. General  anesthesia was administered. She was transferred to the operating room  table with Tenet attachment in place. The Tenet attachment was raised  for beach chair positioning. The head and neck were secured in the  neutral position using the facemask and headrest.  The torso was secured  using the lateral supports. Tension was relieved off the sciatic nerves  using the knee bolster. The entire left upper extremity up to the base  of the neck was subsequently prepped and draped in the usual sterile  fashion. A full time-out was performed with all parties in agreement. The patient did receive Ancef for antibiotic prophylaxis. The Spider  arm carrington was used for surgical positioning and manipulation of the arm  during the procedure. Patient's body mass index is 40.35 kg/m² and the fracture is subacute,   therefore the entire procedure took at least 50% longer. Extra time was   spent moving and positioning the patient, obtaining adequate exposure,   fracture reduction and fixation, and layered closure. A deltopectoral incision was made proximally and extended distally for   an anterolateral approach to the humeral shaft. Thick skin flaps were  elevated radially and ulnarly. Proximally, the deltopectoral interval  was identified which was entered. Given the three week old fracture,  there was a lot of scarring and the anatomy was distorted.   I was able  to develop the subdeltoid recess/space and the deltoid retractor was then  inserted. The pectoralis major tendon was visualized and was preserved  throughout the procedure. Just medial to the pectoralis major tendon,  the long head of the biceps was palpated within the bicipital groove. The dissection was traced distally and the biceps muscle belly was  retracted ulnarly. A brachialis split was then performed at the  junction of the lateral one-third and medial two-thirds. The humeral  shaft fracture was now exposed. Again, given the three week old  fracture, there was a lot of scarring and early soft callus already  present and this had to be removed. There was a large spike/butterfly  fragment more posteriorly and this had to be adequately mobilized. Using standard reduction clamps, I was able to get this large butterfly  piece back in reasonable position and then I inserted two lag  screws using AO technique. At this point, I was able to reduce the  humeral head piece to the rest of the humerus and provisionally held it  with a large standard reduction clamp. Inspection of the rotator cuff  more proximally revealed a bursal sided tear of the supraspinatus. I  was able to repair this with a #5 Ethibond suture in a figure-of-eight  configuration and then I saved the suture to pass through the proximal  humerus plate. I further supplemented the fixation by passing #5  sutures anteriorly in the subscapularis as well as one posteriorly in  the posterior rotator cuff. I then selected the appropriate length long  periarticular proximal humerus plate and positioned it just posterior to  the bicipital groove and along the anterolateral flat surface of the  humeral shaft. I provisionally fixated with two K-wires proximally in  the head fragment and a clamp more distally in the shaft. Fluoroscopy  was brought into check plate length as well as reduction.   The fracture  was in a touch of valgus at the fracture site; however, otherwise there  was good alignment and reduction achieved especially given the  comminuted and complex fracture as well as the subacute nature of this  fracture. Therefore, I proceeded to sequentially fill the screw holes  using locking screws proximally in the humeral head fragment. I then  inserted cortical screws proximally in the proximal shaft in a  sequential fashion to help reduce the proximal shaft onto the plate to  get it out of valgus. I then inserted additional cortical screws   distally past the distal spike to span the entire fracture. The Ethibond suture used to repair the superior rotator cuff tear was  then tied to the plate. The supplemental sutures anteriorly and  posteriorly were tied to the plate as well. Final fluoroscopic spot  images were obtained and then saved. The entire surgical wound was  thoroughly irrigated with chlorhexidine irrigation. It was then closed  in a layered fashion. The deltopectoral interval was reapproximated  with Vicryl sutures. The brachialis split distally was reapproximated  with Vicryl sutures, complete hardware coverage was obtained. The skin  was then reapproximated with inverted Vicryls and skin staples. It was  cleaned with wet and dries and then dressed with a water occlusive  dressing. The drapes were removed and a simple sling was applied. The  patient was transferred back onto the hospital stretcher where she was  extubated and then moved to the PACU.         Channie Mortimer, MD    D: 08/23/2022 19:15:46       T: 08/24/2022 5:05:32     [email or web address]  Job#: [id number]     Doc#: [id number]    CC:

## 2022-08-24 NOTE — ADDENDUM NOTE
Addendum  created 08/24/22 0913 by Leoma Dancer, APRN - CRNA    Intraprocedure Meds edited, Orders acknowledged in Narrator

## 2022-08-24 NOTE — TELEPHONE ENCOUNTER
General Question     Subject: REQUESTING CALL REGARDING POST OP CARE  Patient and /or Facility Request: Carie Curry  Contact Number: [phone]    PATIENT'S DAUGHTER, ANI, CALLED REGARDING PATIENTS' POST OP CARE. ANI STATED THE PATIENT IS CURRENTLY IN A REHAB CENTER AND  HAS CONCERNS ABOUT THE CARE SHE HAS RECEIVED THERE. PLEASE CONTACT PATIENT AT THE ABOVE NUMBER.

## 2022-08-26 PROBLEM — M75.102 TEAR OF LEFT SUPRASPINATUS TENDON: Status: ACTIVE | Noted: 2022-08-26

## 2022-09-07 ENCOUNTER — OFFICE VISIT (OUTPATIENT)
Dept: ORTHOPEDIC SURGERY | Age: 72
End: 2022-09-07

## 2022-09-07 ENCOUNTER — TELEPHONE (OUTPATIENT)
Dept: PULMONOLOGY | Age: 72
End: 2022-09-07

## 2022-09-07 VITALS — HEIGHT: 66 IN | BODY MASS INDEX: 40.18 KG/M2 | WEIGHT: 250 LBS | RESPIRATION RATE: 16 BRPM

## 2022-09-07 DIAGNOSIS — S42.292D OTHER CLOSED DISPLACED FRACTURE OF PROXIMAL END OF LEFT HUMERUS WITH ROUTINE HEALING, SUBSEQUENT ENCOUNTER: Primary | ICD-10-CM

## 2022-09-07 DIAGNOSIS — S42.352D CLOSED DISPLACED COMMINUTED FRACTURE OF SHAFT OF LEFT HUMERUS WITH ROUTINE HEALING, SUBSEQUENT ENCOUNTER: ICD-10-CM

## 2022-09-07 PROCEDURE — 99024 POSTOP FOLLOW-UP VISIT: CPT | Performed by: ORTHOPAEDIC SURGERY

## 2022-09-07 NOTE — PROGRESS NOTES
ORTHOPAEDIC NEW PATIENT NOTE    Chief Complaint   Patient presents with    Post-Op Check       HPI  9/7/22  Postop check left humerus fracture  She reports she is doing well overall  No incisional issues  No numbness or tingling  She is working with therapy at the facility  She is working on finger range of motion, which was very stiff on initial evaluation in the office back on August 19 8/23/22  OPERATION PERFORMED:  Open treatment of the left proximal humerus and humeral shaft fractures with internal fixation. 8/19/22  74 y.o. female RHD seen for evaluation of left shoulder/arm injury/fracture:   The patient reports she injured her left arm on July 31 when she traveled to [facility] [employer] [facility] for a [employer] convention  She reports she fell into a wall and hit her left shoulder  She lives in [place], [place]  After her injury in [address] [place], [place] [address] [facility], she was admitted to [address] [place] [address]  She reports she was evaluated by orthopedics there, who recommended surgical fixation there, however she did not have any friends or family there, therefore she did not want to have surgery there  She reports she was placed initially in a coaptation splint, then a De brace, then back to a coaptation splint prior to transfer back to [address] [place] [address]  She reports she was admitted in [address] [place] [address] until August 10, 2022 at which point she was transferred back to [address] [place] [address] to [facility]  She was then transferred to the [place] [facility]  She has not seen any orthopedics since she has been back in town  She presented to the emergency department a couple nights ago at which point she was given follow-up to my office today  She is now in a sling  When she went to the emergency department a couple nights ago, she reported some numbness and tingling in the ring finger and small finger tips  However this has since improved  She is a diabetic, on insulin  She does not smoke      Allergies   Allergen Reactions    Metformin Nausea Only and Other (See Comments)     \"ate the lining of her stomach\"      Adhesive Tape      Skin irritation and blisters  - does okay with steri strips and tegaderm        Current Outpatient Medications   Medication Sig Dispense Refill    Cholecalciferol (VITAMIN D3 PO) Take 1,000 mg by mouth      Multiple Vitamins-Minerals (HAIR SKIN AND NAILS FORMULA) TABS Take by mouth      vitamin C (ASCORBIC ACID) 500 MG tablet Take 500 mg by mouth daily      Multiple Vitamins-Minerals (THERAPEUTIC MULTIVITAMIN-MINERALS) tablet Take 1 tablet by mouth daily Essential 1 Pro caps      metoprolol succinate (TOPROL XL) 50 MG extended release tablet Take 1 tablet by mouth daily 90 tablet 3    metoprolol succinate (TOPROL XL) 25 MG extended release tablet Take 1 tablet by mouth daily 90 tablet 3    furosemide (LASIX) 20 MG tablet Take 2 tablets by mouth daily 180 tablet 3    Semaglutide,0.25 or 0.5MG/DOS, (OZEMPIC, 0.25 OR 0.5 MG/DOSE,) 2 MG/1.5ML SOPN once a week       spironolactone (ALDACTONE) 25 MG tablet Take 1 tablet by mouth daily 90 tablet 3    meloxicam (MOBIC) 15 MG tablet Take 7.5 mg by mouth daily      insulin glargine (LANTUS) 100 UNIT/ML injection vial Inject 30 Units into the skin nightly       pantoprazole (PROTONIX) 40 MG tablet Take 40 mg by mouth daily      famotidine (PEPCID) 40 MG tablet Take 40 mg by mouth daily       aspirin 81 MG chewable tablet Take 1 tablet by mouth daily. 30 tablet 3    acetaminophen (TYLENOL) 500 MG tablet Take 500 mg by mouth every 6 hours as needed for Pain. No current facility-administered medications for this visit.        Past Medical History:   Diagnosis Date    Arthritis     Back pain     CHF (congestive heart failure) (HCC)     Chronic diastolic heart failure (HCC)     Diabetes mellitus (HCC)     Diabetes mellitus ([facility]     TYPE 2    GERD (gastroesophageal reflux disease)     High cholesterol     Hypertension     Malignant neoplasm of right female breast ([facility] 05/03/2022    Massiel Massiel     AS INFANT    Shingles     OCT 2013    Sleep apnea     Urinary problem     HAS BASHFUL BLADDER        Past Surgical History:   Procedure Laterality Date    BACK SURGERY      BACK SURGERY      1/13/14 x2    BREAST BIOPSY Right 5/3/2022    RIGHT RADIOFREQUENCY IDENTIFICATION TAG LOCALIZED PARTIAL MASTECTOMY RIGHT SENTINEL LYMPH NODE BIOPSY performed by Rebekah Gunn MD at [address] [facility] [address] Right 5/3/2022    . performed by Rebekah Gunn MD at [address] [place] [address] Left 8/23/2022    OPEN REDUCTION INTERNAL FIXATION LEFT PROXIMAL HUMERUS AND SHAFT FRACTURES-REGIONAL-SYNTHES performed by Erin Koehler MD at [facility]      bilateral knee replacements    JOINT REPLACEMENT      [place] KNEES    KNEE SURGERY      [facility] BREAST BIOPSY RIGHT Right 4/6/2022    [facility] BREAST BIOPSY RIGHT 4/6/2022 [facility] [address] [address]    OTHER SURGICAL HISTORY  01/13/14    L4-5 DECOMPRESSION, POSTERIOR LATERAL FUSION AND PEDICLE       Family History   Problem Relation Age of Onset    Heart Disease Mother     Cancer Mother         breast cancer, 5    Breast Cancer Mother     Early Death Father     Diabetes Sister        Social History     Socioeconomic History    Marital status:       Spouse name: Not on file    Number of children: Not on file    Years of education: Not on file    Highest education level: Not on file   Occupational History    Not on file   Tobacco Use    Smoking status: Never    Smokeless tobacco: Never   Vaping Use    Vaping Use: Never used   Substance and Sexual Activity    Alcohol use: No    Drug use: No    Sexual activity: Yes     Partners: Male   Other Topics Concern    Not on file   Social History Narrative    ** Merged History Encounter **          Social Determinants of Health     Financial Resource Strain: Not on file   Food Insecurity: Not on file   Transportation Needs: Not on file   Physical Activity: Not on file   Stress: Not on file   Social Connections: Not on file   Intimate Partner Violence: Not on file   Housing Stability: Not on file        Vitals:    09/07/22 1002   Resp: 16   Weight: 250 lb (113.4 kg)   Height: 5' 6\" (1.676 m)       Physical Exam  Constitutional - well-groomed, well-nourished, Body mass index is 40.35 kg/m². Psychiatric - pleasant, normal mood & affect  Cardiovascular - RRR, left radial pulse 2+  Respiratory - respirations unlabored, on room air; mask on  Skin - no rashes, wounds, or lesions seen on exposed skin  Neurological - ORALE VIKY M/U/R/A/LABC nerve distributions; AIN/PIN/IO intact  Left shoulder/brachium - anterior surgical incision appears to be healing appropriately  Staples removed, Steri-Strips applied   No erythema or drainage  No obvious ecchymosis   Shoulder ROM not tested today   She is able to extend and flex her left wrist, supinate/pronate   She has full finger extension, however limited finger flexion due to stiffness (however improved compared to initial exam)      Imaging:  Images were personally reviewed by myself and discussed with the patient  Narrative   EXAMINATION:   2 XRAY VIEWS OF THE LEFT SHOULDER       8/15/2022 10:48 pm       COMPARISON:   None. HISTORY:   ORDERING SYSTEM PROVIDED HISTORY: Injury   TECHNOLOGIST PROVIDED HISTORY:   Reason for exam:->Injury   Reason for Exam: Injury       FINDINGS:   There is no fracture of the clavicle or displacement of the acromioclavicular   joint. Sclerosis and marginal spurring at the acromioclavicular joint from   arthritis. Positioning of the glenohumeral joint is difficult due to patient trauma. There is no dislocation of the humeral head from the glenoid. The articular   margin of the humeral head is intact. Acute comminuted fracture is present   in the proximal to mid shaft of the humerus. There is large free butter   fragment with moderate to severe displacement.   There is angulation and   displacement of the main shaft components. A longitudinal fracture line runs   up to the humeral neck and at least the base of the greater tubercle region. The superior edge of the greater tubercle does not appear fragmented or have   cortical displacement. .           Impression   Acute comminuted and displaced fracture involving the proximal to mid   humerus. The main shaft components are displaced by of least a full shaft   with and angled. There is a large butterfly fragment with longitudinal   fracture line running up the humeral neck to the base of the greater   tubercle. The superior edge of the tubercle appears intact without cortical   disruption. The articular humeral head is spared and no dislocation from the glenoid. Arthritic changes at the acromioclavicular joint. Narrative   EXAMINATION:   TWO XRAY VIEWS OF THE LEFT HUMERUS       8/16/2022 12:20 am       COMPARISON:   None. HISTORY:   ORDERING SYSTEM PROVIDED HISTORY: injury   TECHNOLOGIST PROVIDED HISTORY:   Reason for exam:->injury   Reason for Exam: L/arm pain and swelling       FINDINGS:   Acute fracture of the mid to proximal left humerus is again noted. There is   a oblique main fracture in the mid to upper shaft with displacement of the   main shaft components by to a cm on the current position. There is a large   free butterfly fragment at the lateral aspect coursing up to the humeral   neck. Longitudinal fracture line runs superiorly up the humeral neck and is   overlapped on the current position. The distal humerus appears intact. The elbow is not positioned for detailed   evaluation. Impression   Acute comminuted and displaced fracture involving the mid to upper left   humerus with large free fragment and involvement up to the edge of the   humeral neck. The distal humerus appears intact.          Left shoulder 4 views performed 9/7/22 -   Status post open reduction internal fixation of the comminuted left proximal humerus and humeral shaft fractures. Fracture reduction/alignment is maintained compared to intraoperative fluoroscopic images. No obvious hardware complications. Glenohumeral joint is reduced. Assessment & Plan:  74 y.o. female who presents with    Diagnosis Orders   1. Other closed displaced fracture of proximal end of left humerus with routine healing, subsequent encounter  XR SHOULDER LEFT (MIN 2 VIEWS)      2. Closed displaced comminuted fracture of shaft of left humerus with routine healing, subsequent encounter            No orders of the defined types were placed in this encounter. Continue sling   Operative arm NWB  BID home exercise program (10 reps each time):   1) passive ER with stick to neutral   2) passive FE using pulley or opposite hand to shoulder level only   3) shoulder shrugs, scapular protraction and retraction exercises   4) continue with elbow, forearm, wrist, finger ROM   Continue with ice and tylenol. Okay to resume NSAIDs as well as needed.     FU in 4 weeks with repeat Neida Nascimento MD

## 2022-09-07 NOTE — TELEPHONE ENCOUNTER
Patient called and stated she will not be using CPAP for a while, she is having a lot of health issues and is now sleeping in a recliner and says that has been helping her apnea and is wanting to speak to a provider or MA to further discuss what is best for her medically. Please call patient to discuss.

## 2022-09-30 ENCOUNTER — OFFICE VISIT (OUTPATIENT)
Dept: ORTHOPEDIC SURGERY | Age: 72
End: 2022-09-30

## 2022-09-30 VITALS — WEIGHT: 250 LBS | HEIGHT: 66 IN | BODY MASS INDEX: 40.18 KG/M2

## 2022-09-30 DIAGNOSIS — S42.352D CLOSED DISPLACED COMMINUTED FRACTURE OF SHAFT OF LEFT HUMERUS WITH ROUTINE HEALING, SUBSEQUENT ENCOUNTER: ICD-10-CM

## 2022-09-30 DIAGNOSIS — S42.292D OTHER CLOSED DISPLACED FRACTURE OF PROXIMAL END OF LEFT HUMERUS WITH ROUTINE HEALING, SUBSEQUENT ENCOUNTER: Primary | ICD-10-CM

## 2022-09-30 PROCEDURE — 99024 POSTOP FOLLOW-UP VISIT: CPT | Performed by: ORTHOPAEDIC SURGERY

## 2022-09-30 NOTE — PROGRESS NOTES
ORTHOPAEDIC NEW PATIENT NOTE    Chief Complaint   Patient presents with    Post-Op Check     Post op 8/23/22 humerus fx       HPI  9/30/22   Approximately 5.5 weeks postop left proximal humerus and humeral shaft fracture ORIF  She is at home now  She has therapy coming to her house  They are working on range of motion, however she still has significant stiffness in her hand/fingers  She reports they are working hard at it  Pain is rated 5-6 out of 10, worse at night  Using sling  No incisional issues      9/7/22  Postop check left humerus fracture  She reports she is doing well overall  No incisional issues  No numbness or tingling  She is working with therapy at the facility  She is working on finger range of motion, which was very stiff on initial evaluation in the office back on August 19 8/23/22  OPERATION PERFORMED:  Open treatment of the left proximal humerus and humeral shaft fractures with internal fixation. 8/19/22  74 y.o. female RHD seen for evaluation of left shoulder/arm injury/fracture:   The patient reports she injured her left arm on July 31 when she traveled to [place] for [facility]  She reports she fell into a wall and hit her left shoulder  She lives in [place], [place]  After her injury in [place], she was admitted to [address] [place] [address]  She reports she was evaluated by orthopedics there, who recommended surgical fixation there, however she did not have any friends or family there, therefore she did not want to have surgery there  She reports she was placed initially in a coaptation splint, then a De brace, then back to a coaptation splint prior to transfer back to [facility]  She reports she was admitted in [place] until August 10, 2022 at which point she was transferred back to [place] to [facility]  She was then [facility] the [facility]  She has not [facility] any [facility]oped[facility]  She presented to the emergency medications for this visit. Past Medical History:   Diagnosis Date    Arthritis     Back pain     CHF (congestive heart failure) (HCC)     Chronic diastolic heart failure (HCC)     Diabetes mellitus ([facility]     Diabetes mellitus ([facility]     TYPE 2    GERD (gastroesophageal reflux disease)     High cholesterol     Hypertension     Malignant neoplasm of right female breast ([facility] 05/03/2022    Polio     Polio     AS INFANT    Shingles     OCT 2013    Sleep apnea     Urinary problem     HAS BASHFUL BLADDER        Past Surgical History:   Procedure Laterality Date    BACK SURGERY      BACK SURGERY      1/13/14 x2    BREAST BIOPSY Right 5/3/2022    RIGHT RADIOFREQUENCY IDENTIFICATION TAG LOCALIZED PARTIAL MASTECTOMY RIGHT SENTINEL LYMPH NODE BIOPSY performed by Werner Oneill MD at [address] Right 5/3/2022    . performed by Werner Oneill MD at [address] Left 8/23/2022    OPEN REDUCTION INTERNAL FIXATION LEFT PROXIMAL HUMERUS AND SHAFT FRACTURES-REGIONAL-SYNTHES performed by Blanca Henderson MD at [address] [facility]      bilateral knee replacements    JOINT REPLACEMENT      [place] KNEES    KNEE SURGERY      [facility] ST[facility] LOC BREAST BIOPSY RIGHT Right 4/6/2022    SABRINA STEROTACTIC LOC BREAST BIOPSY RIGHT 4/6/2022 [facility] [address]    OTHER SURGICAL HISTORY  01/13/14    L4-5 DECOMPRESSION, POSTERIOR LATERAL FUSION AND PEDICLE       Family History   Problem Relation Age of Onset    Heart Disease Mother     Cancer Mother         breast cancer, 5    Breast Cancer Mother     Early Death Father     Diabetes Sister        Social History     Socioeconomic History    Marital status:       Spouse name: Not on file    Number of children: Not on file    Years of education: Not on file    Highest education level: Not on file   Occupational History    Not on file   Tobacco Use    Smoking status: Never    Smokeless tobacco: Never Vaping Use    Vaping Use: Never used   Substance and Sexual Activity    Alcohol use: No    Drug use: No    Sexual activity: Yes     Partners: Male   Other Topics Concern    Not on file   Social History Narrative    ** Merged History Encounter **          Social Determinants of Health     Financial Resource Strain: Not on file   Food Insecurity: Not on file   Transportation Needs: Not on file   Physical Activity: Not on file   Stress: Not on file   Social Connections: Not on file   Intimate Partner Violence: Not on file   Housing Stability: Not on file        Vitals:    09/30/22 1053   Weight: 250 lb (113.4 kg)   Height: 5' 6\" (1.676 m)       Physical Exam  Constitutional - well-groomed, well-nourished, Body mass index is 40.35 kg/m². Psychiatric - pleasant, normal mood & affect  Cardiovascular - RRR, left radial pulse 2+  Skin - no rashes, wounds, or lesions seen on exposed skin  Neurological - ORALE SILT M/U/R/A/LABC nerve distributions; AIN/PIN/IO intact  Left shoulder/brachium - anterior surgical incision nicely healed  No erythema or drainage  No obvious ecchymosis   Passive FE/scaption 70   She is able to extend and flex her left elbow, wrist, supinate/pronate her forearm   She has full finger extension, however limited finger flexion due to stiffness (however improved compared to initial exam)      Imaging:  Images were personally reviewed by myself and discussed with the patient  Narrative   EXAMINATION:   2 XRAY VIEWS OF THE LEFT SHOULDER       8/15/2022 10:48 pm       COMPARISON:   None. HISTORY:   ORDERING SYSTEM PROVIDED HISTORY: Injury   TECHNOLOGIST PROVIDED HISTORY:   Reason for exam:->Injury   Reason for Exam: Injury       FINDINGS:   There is no fracture of the clavicle or displacement of the acromioclavicular   joint. Sclerosis and marginal spurring at the acromioclavicular joint from   arthritis. Positioning of the glenohumeral joint is difficult due to patient trauma.    There is no dislocation of the humeral head from the glenoid. The articular   margin of the humeral head is intact. Acute comminuted fracture is present   in the proximal to mid shaft of the humerus. There is large free butter   fragment with moderate to severe displacement. There is angulation and   displacement of the main shaft components. A longitudinal fracture line runs   up to the humeral neck and at least the base of the greater tubercle region. The superior edge of the greater tubercle does not appear fragmented or have   cortical displacement. .           Impression   Acute comminuted and displaced fracture involving the proximal to mid   humerus. The main shaft components are displaced by of least a full shaft   with and angled. There is a large butterfly fragment with longitudinal   fracture line running up the humeral neck to the base of the greater   tubercle. The superior edge of the tubercle appears intact without cortical   disruption. The articular humeral head is spared and no dislocation from the glenoid. Arthritic changes at the acromioclavicular joint. Narrative   EXAMINATION:   TWO XRAY VIEWS OF THE LEFT HUMERUS       8/16/2022 12:20 am       COMPARISON:   None. HISTORY:   ORDERING SYSTEM PROVIDED HISTORY: injury   TECHNOLOGIST PROVIDED HISTORY:   Reason for exam:->injury   Reason for Exam: L/arm pain and swelling       FINDINGS:   Acute fracture of the mid to proximal left humerus is again noted. There is   a oblique main fracture in the mid to upper shaft with displacement of the   main shaft components by to a cm on the current position. There is a large   free butterfly fragment at the lateral aspect coursing up to the humeral   neck. Longitudinal fracture line runs superiorly up the humeral neck and is   overlapped on the current position. The distal humerus appears intact. The elbow is not positioned for detailed   evaluation.            Impression   Acute comminuted and displaced fracture involving the mid to upper left   humerus with large free fragment and involvement up to the edge of the   humeral neck. The distal humerus appears intact. Left humerus 2 views performed 9/30/22 -   Status post open reduction internal fixation of the comminuted left proximal humerus and humeral shaft fractures. Fracture reduction/alignment is maintained compared to prior images. No obvious hardware complications. Glenohumeral joint is reduced. Assessment & Plan:  74 y.o. female who presents with    Diagnosis Orders   1. Other closed displaced fracture of proximal end of left humerus with routine healing, subsequent encounter  XR HUMERUS LEFT (MIN 2 VIEWS)      2. Closed displaced comminuted fracture of shaft of left humerus with routine healing, subsequent encounter  XR HUMERUS LEFT (MIN 2 VIEWS)          No orders of the defined types were placed in this encounter.       Continue sling use for another week, discontinue 10/8/22  No lifting heavier than a cup of water  Okay for full left shoulder, elbow, forearm, wrist, hand/finger range of motion now  Passive, active assist, and active  She has persistent stiffness in all her left upper extremity joints, really focus on full ROM in next few weeks  No driving for now  Continue with ice and tylenol/NSAIDs PRN    FU in 2 months with repeat Duane Dolores, MD

## 2022-11-09 ENCOUNTER — TELEMEDICINE (OUTPATIENT)
Dept: PULMONOLOGY | Age: 72
End: 2022-11-09
Payer: MEDICARE

## 2022-11-09 DIAGNOSIS — I10 PRIMARY HYPERTENSION: ICD-10-CM

## 2022-11-09 DIAGNOSIS — Z79.4 TYPE 2 DIABETES MELLITUS WITHOUT COMPLICATION, WITH LONG-TERM CURRENT USE OF INSULIN (HCC): ICD-10-CM

## 2022-11-09 DIAGNOSIS — E11.9 TYPE 2 DIABETES MELLITUS WITHOUT COMPLICATION, WITH LONG-TERM CURRENT USE OF INSULIN (HCC): ICD-10-CM

## 2022-11-09 DIAGNOSIS — I50.32 CHRONIC DIASTOLIC HEART FAILURE (HCC): ICD-10-CM

## 2022-11-09 DIAGNOSIS — Z99.89 OSA ON CPAP: Primary | ICD-10-CM

## 2022-11-09 DIAGNOSIS — G47.33 OSA ON CPAP: Primary | ICD-10-CM

## 2022-11-09 PROCEDURE — 99443 PR PHYS/QHP TELEPHONE EVALUATION 21-30 MIN: CPT | Performed by: NURSE PRACTITIONER

## 2022-11-09 ASSESSMENT — SLEEP AND FATIGUE QUESTIONNAIRES
HOW LIKELY ARE YOU TO NOD OFF OR FALL ASLEEP WHILE LYING DOWN TO REST IN THE AFTERNOON WHEN CIRCUMSTANCES PERMIT: 1
ESS TOTAL SCORE: 2
HOW LIKELY ARE YOU TO NOD OFF OR FALL ASLEEP WHILE WATCHING TV: 0
HOW LIKELY ARE YOU TO NOD OFF OR FALL ASLEEP WHILE SITTING AND TALKING TO SOMEONE: 0
HOW LIKELY ARE YOU TO NOD OFF OR FALL ASLEEP IN A CAR, WHILE STOPPED FOR A FEW MINUTES IN TRAFFIC: 0
HOW LIKELY ARE YOU TO NOD OFF OR FALL ASLEEP WHILE SITTING INACTIVE IN A PUBLIC PLACE: 0
HOW LIKELY ARE YOU TO NOD OFF OR FALL ASLEEP WHEN YOU ARE A PASSENGER IN A CAR FOR AN HOUR WITHOUT A BREAK: 0
HOW LIKELY ARE YOU TO NOD OFF OR FALL ASLEEP WHILE SITTING QUIETLY AFTER LUNCH WITHOUT ALCOHOL: 1
HOW LIKELY ARE YOU TO NOD OFF OR FALL ASLEEP WHILE SITTING AND READING: 0

## 2022-11-09 NOTE — PROGRESS NOTES
Monet Worrell MD, Joel Mcnamara, [facility]  [facility] CNP  Rexie Bosworth CNP [address]  Ernesto [address] [facility], [address] [facility] [phone]   [facility]   Ernesto Ronen. [address] [facility]. Vicky [address] [phone]         Sleep Medicine Telephone Visit    Pursuant to the emergency declaration under the [facility], [facility] authority and the Coronavirus Preparedness and [employer] Act this Telephone Visit was insisted, with patient's consent, to reduce the patient's risk of exposure to COVID-19 and provide continuity of care for an established patient. Services were provided through a synchronous discussion over a telephone to substitute for in-person clinic visit. Patient was located in their house. Assessment/Plan:     1. JOSE MANUEL on CPAP  Assessment & Plan:  Chronic-with progression/exacerbation: Visit was switched to a telephone visit due to technical issues. Reviewed and analyzed results of physiologic download from patient's machine and reviewed with patient. Supplies and parts as needed for her machine. These are medically necessary. Limit caffeine use after 3pm. Based on the analyzed data will change following settings: Humidity and tube temperature turned off. Will turn moisture settings off on her machine so she will not have to use distilled water. Encouraged her to contact her DME to try a different style of mask that may be better suited for her limited mobility. Recommended she could try the [facility] terry mask. Discussed she could have a couple sets of masks going at a time to decrease the amount of washing each day. She wants a referral for [facility]. Discussed given her cardiac history and weight, she may not be a candidate. Discussed gold standard treatment for any severity of Obstructive sleep apnea is PAP therapy.  Patient would like to discuss with Dr. Irvin Kennedy in more detail due to her issues with her machine and her limited mobility. Will place consult for Dr. Diannah Najjar for [facility]. Will see her back in 3 months. Encouraged her to contact the office with any questions or concerns. Encouraged consistent use of her machine each night, all night. Discussed the importance of treating Obstructive sleep apnea from a physiological standpoint, especially given her cardiac history. Instructed not to drive unless had 4 hrs of effective therapy for her JOSE MANUEL the night before. Did review the risks of under or untreated JOSE MANUEL including, but not limited to, higher risks of motor vehicle accidents, stroke, heart attacks, and death. She understands and accepts all these risks. Orders:  -     Jumana Singleton MD, Sleep Medicine [facility], [place]  2. Chronic diastolic heart failure (HCC)  Assessment & Plan:  Chronic- Stable. Discussed the importance of treating obstructive sleep apnea as part of the management of this disorder. Cont any meds per PCP and other physicians. 3. Primary hypertension  Assessment & Plan:   Chronic- Stable. Discussed the importance of treating obstructive sleep apnea as part of the management of this disorder. Cont any meds per PCP and other physicians. 4. Type 2 diabetes mellitus without complication, with long-term current use of insulin (HCC)  Assessment & Plan:   Chronic- Stable. Discussed the importance of treating obstructive sleep apnea as part of the management of this disorder. Cont any meds per PCP and other physicians. 5. Adult BMI 40.0-44.9 kg/sq m [facility])  Assessment & Plan:   Chronic-not stable:  Discussed importance of treating obstructive sleep apnea and getting sufficient sleep to assist with weight control. Encouraged her to work on weight loss through diet and exercise. Recommended DASH or Mediterranean diets. Start time: 11: 30 a.m. Stop time: 11: 52 a.m.     Visit duration:  [] 5-10 Min ([id number])      [] 11-20 min (78565)   [x] 21-30 Min (58970)    Subjective: Patient ID: Zita Connor is a 74 y.o. female. Subjective   HPI:    Machine Modem/Download Info:  Compliance (hours/night): 0 hrs/night       APAP - Settings  Pressure Min: 13 cmH2O  Pressure Max: 20 cmH2O      Comfort Settings  Flex/EPR (0-3): 3 PAP Mask  Mask Type: Nasal mask     JOSE MANUEL:    Zita Connor presents today for follow up for sleep apnea. She stopped using her machine 3 months ago after she fell and broke her arm and has been unable to put her mask on due to limited use of her arm. She does not have the money to be able to pay to have a nurse help her. She has not tried contacting her DME to try a different style of mask. She is worried about being able to put the distilled water in the machine and cleaning her PAP equipment due to the limited use of her arm. She is hoping she will have more function of her arm over the next few months. She is interested in learning more about Lila Ganser and is interested in Lila Ganser as an alternative treatment to her sleep apnea given her limited mobility. [address]    [place] Sleepiness Score: 2      Social History     Socioeconomic History    Marital status:       Spouse name: Not on file    Number of children: Not on file    Years of education: Not on file    Highest education level: Not on file   Occupational History    Not on file   Tobacco Use    Smoking status: Never    Smokeless tobacco: Never   Vaping Use    Vaping Use: Never used   Substance and Sexual Activity    Alcohol use: No    Drug use: No    Sexual activity: Yes     Partners: Male   Other Topics Concern    Not on file   Social History Narrative    ** Merged History Encounter **          Social Determinants of Health     Financial Resource Strain: Not on file   Food Insecurity: Not on file   Transportation Needs: Not on file   Physical Activity: Not on file   Stress: Not on file   Social Connections: Not on file   Intimate Partner Violence: Not on file   Housing Stability: Not on file       Current Outpatient Medications   Medication Sig Dispense Refill    Cholecalciferol (VITAMIN D3 PO) Take 1,000 mg by mouth      Multiple Vitamins-Minerals (HAIR SKIN AND NAILS FORMULA) TABS Take by mouth      vitamin C (ASCORBIC ACID) 500 MG tablet Take 500 mg by mouth daily      Multiple Vitamins-Minerals (THERAPEUTIC MULTIVITAMIN-MINERALS) tablet Take 1 tablet by mouth daily Essential 1 Pro caps      metoprolol succinate (TOPROL XL) 50 MG extended release tablet Take 1 tablet by mouth daily 90 tablet 3    metoprolol succinate (TOPROL XL) 25 MG extended release tablet Take 1 tablet by mouth daily 90 tablet 3    furosemide (LASIX) 20 MG tablet Take 2 tablets by mouth daily 180 tablet 3    Semaglutide,0.25 or 0.5MG/DOS, (OZEMPIC, 0.25 OR 0.5 MG/DOSE,) 2 MG/1.5ML SOPN once a week       spironolactone (ALDACTONE) 25 MG tablet Take 1 tablet by mouth daily 90 tablet 3    meloxicam (MOBIC) 15 MG tablet Take 7.5 mg by mouth daily      insulin glargine (LANTUS) 100 UNIT/ML injection vial Inject 30 Units into the skin nightly       pantoprazole (PROTONIX) 40 MG tablet Take 40 mg by mouth daily      famotidine (PEPCID) 40 MG tablet Take 40 mg by mouth daily       aspirin 81 MG chewable tablet Take 1 tablet by mouth daily. 30 tablet 3    acetaminophen (TYLENOL) 500 MG tablet Take 500 mg by mouth every 6 hours as needed for Pain. No current facility-administered medications for this visit.        Allergies as of 11/09/2022 - Fully Reviewed 11/09/2022   Allergen Reaction Noted    Metformin Nausea Only and Other (See Comments) 09/13/2017    Adhesive tape  02/01/2014       Patient Active Problem List   Diagnosis    HTN (hypertension)    High cholesterol    Diabetes mellitus ([facility]    Lumbar spinal stenosis    TIA (transient ischemic attack)    Chest pain    Diabetes mellitus type 2 in obese (HCC)    Chronic diastolic heart failure (HCC)    Snoring    Hypersomnolence    Bilateral leg pain    JOSE MANUEL on CPAP    Malignant neoplasm of right female breast (HCC)    Closed fracture of left proximal humerus    Fracture of humeral shaft, left, closed    Adult BMI 40.0-44.9 kg/sq m ([facility])    Tear of left supraspinatus tendon       Past Medical History:   Diagnosis Date    Arthritis     Back pain     CHF (congestive heart failure) (HCC)     Chronic diastolic heart failure (HCC)     Diabetes mellitus ([facility]     Diabetes mellitus ([facility]     TYPE 2    GERD (gastroesophageal reflux disease)     High cholesterol     Hypertension     Malignant neoplasm of right female breast ([facility] 05/03/2022    Polio     Polio     AS INFANT    Shingles     OCT 2013    Sleep apnea     Urinary problem     HAS BASHFUL BLADDER       Past Surgical History:   Procedure Laterality Date    BACK SURGERY      BACK SURGERY      1/13/14 x2    BREAST BIOPSY Right 5/3/2022    RIGHT RADIOFREQUENCY IDENTIFICATION TAG LOCALIZED PARTIAL MASTECTOMY RIGHT SENTINEL LYMPH NODE BIOPSY performed by Felipe Street MD at [address] Right 5/3/2022    .  performed by Felipe Street MD at [address] Left 8/23/2022    OPEN REDUCTION INTERNAL FIXATION LEFT PROXIMAL HUMERUS AND SHAFT FRACTURES-REGIONAL-SYNTHES performed by Rhonda Soto MD at [address] [facility]      bilateral knee replacements    JOINT REPLACEMENT      [place] KNEES    KNEE SURGERY      [facility] STEROT[facility] LOC BREAST BIOPSY RIGHT Right 4/6/2022    SABRINA STEROTACTIC LOC BREAST BIOPSY RIGHT 4/6/2022 [facility] [address]    OTHER SURGICAL HISTORY  01/13/14    L4-5 DECOMPRESSION, POSTERIOR LATERAL FUSION AND PEDICLE       Family History   Problem Relation Age of Onset    Heart Disease Mother     Cancer Mother         breast cancer, 5    Breast Cancer Mother     Early Death Father     Diabetes Sister            Electronically signed by BRICE Little on 11/9/2022 at 12:44 PM

## 2022-11-09 NOTE — PROGRESS NOTES
Diagnosis: [x] JOSE MANUEL (G47.33) [] CSA (G47.31) [] Apnea (G47.30)   Length of Need: [x] 15 Months [] 99 Months [] Other:   Machine (AAPRNA!): [] Respironics Dream Station      Auto [] ResMed AirSense     Auto [] Other:     []  CPAP () [] Bilevel ()   Mode: [] Auto [] Spontaneous    Mode: [] Auto [] Spontaneous              Comfort Settings:      Humidifier: [] Heated ()        [x] Water chamber replacement ()/ 1 per 6 months        Mask:   [x] Nasal () /1 per 3 months [] Full Face () /1 per 3 months   [x] Patient choice -Size and fit mask [] Patient Choice - Size and fit mask   [] Dispense: [] Dispense:   [x] Headgear () / 1 per 3 months [] Headgear () / 1 per 3 months   [x] Replacement Nasal Cushion ()/2 per month [] Interface Replacement ()/1 per month   [x] Replacement Nasal Pillows ()/2 per month         Tubing: [x] Heated ()/1 per 3 months    [] Standard ()/1 per 3 months [] Other:           Filters: [x] Non-disposable ()/1 per 6 months     [x] Ultra-Fine, Disposable ()/2 per month        Miscellaneous: [] Chin Strap ()/ 1 per 6 months [] O2 bleed-in:        LPM   [] Oxymetry on CPAP/Bilevel []  Other:         Start Order Date: 11/09/22    MEDICAL JUSTIFICATION:  I, the undersigned, certify that the above prescribed supplies are medically necessary for this patients wellbeing. In my opinion, the supplies are both reasonable and necessary in reference to accepted standards of medicalpractice in treatment of this patients condition. Rupa Martinez NP    NPI: [id number]       Order Signed Date: 11/09/22  [address] [place] [address]  Pulmonary, Sleep, and Critical Care    Pulmonary, Sleep, and Critical Care  Counts include 234 beds at the [facility] [address] [place] [address].  [facility], [facility] , [address] [facility]  Phone: [phone]    Fax: Gaurav [address]  [address]  [phone] (home)   [phone] (mobile)      Insurance Info (confirm with patient if correct):  Payer/Plan Subscr  Sex Relation Sub.  Ins. ID Effective Group Num

## 2022-11-09 NOTE — LETTER
[facility] Sleep Medicine  [place] [address] [address] [place] [address]  Phone: [phone]  Fax: [phone]    November 9, 2022       Patient: Tamie Coronado   MR Number: [id number]   YOB: 1950   Date of Visit: 11/9/2022       Moriah Lynne was seen for a follow up visit today. Here is my assessment and plan as well as an attached copy of her visit today:    HTN (hypertension)   Chronic- Stable. Discussed the importance of treating obstructive sleep apnea as part of the management of this disorder. Cont any meds per PCP and other physicians. Diabetes mellitus (HCC)   Chronic- Stable. Discussed the importance of treating obstructive sleep apnea as part of the management of this disorder. Cont any meds per PCP and other physicians. Chronic diastolic heart failure (HCC)  Chronic- Stable. Discussed the importance of treating obstructive sleep apnea as part of the management of this disorder. Cont any meds per PCP and other physicians. Adult BMI 40.0-44.9 kg/sq m (HCC)   Chronic-not stable:  Discussed importance of treating obstructive sleep apnea and getting sufficient sleep to assist with weight control. Encouraged her to work on weight loss through diet and exercise. Recommended DASH or Mediterranean diets. JOSE MANUEL on CPAP  Chronic-with progression/exacerbation: Visit was switched to a telephone visit due to technical issues. Reviewed and analyzed results of physiologic download from patient's machine and reviewed with patient. Supplies and parts as needed for her machine. These are medically necessary. Limit caffeine use after 3pm. Based on the analyzed data will change following settings: Humidity and tube temperature turned off. Will turn moisture settings off on her machine so she will not have to use distilled water. Encouraged her to contact her DME to try a different style of mask that may be better suited for her limited mobility.  Recommended she could try the [facility] terry mask. Discussed she could have a couple sets of masks going at a time to decrease the amount of washing each day. She wants a referral for Libra. Discussed given her cardiac history and weight, she may not be a candidate. Discussed gold standard treatment for any severity of Obstructive sleep apnea is PAP therapy. Patient would like to discuss with Dr. Syeda Johnson in more detail due to her issues with her machine and her limited mobility. Will place consult for Dr. Syeda Johnson for [facility]. Will see her back in 3 months. Encouraged her to contact the office with any questions or concerns. Encouraged consistent use of her machine each night, all night. Discussed the importance of treating Obstructive sleep apnea from a physiological standpoint, especially given her cardiac history. Instructed not to drive unless had 4 hrs of effective therapy for her JOSE MANUEL the night before. Did review the risks of under or untreated JOSE MANUEL including, but not limited to, higher risks of motor vehicle accidents, stroke, heart attacks, and death. She understands and accepts all these risks. If you have questions or concerns, please do not hesitate to call me. I look forward to following Toby Leiva along with you.     Sincerely,    BRICE Eisenberg    CC providers:  Otoniel Leong MD  [address]  [facility] [address]  Via Fax: [phone]

## 2022-11-09 NOTE — ASSESSMENT & PLAN NOTE
Chronic-with progression/exacerbation: Visit was switched to a telephone visit due to technical issues. Reviewed and analyzed results of physiologic download from patient's machine and reviewed with patient. Supplies and parts as needed for her machine. These are medically necessary. Limit caffeine use after 3pm. Based on the analyzed data will change following settings: Humidity and tube temperature turned off. Will turn moisture settings off on her machine so she will not have to use distilled water. Encouraged her to contact her DME to try a different style of mask that may be better suited for her limited mobility. Recommended she could try the [facility] terry mask. Discussed she could have a couple sets of masks going at a time to decrease the amount of washing each day. She wants a referral for Inspire. Discussed given her cardiac history and weight, she may not be a candidate. Discussed gold standard treatment for any severity of Obstructive sleep apnea is PAP therapy. Patient would like to discuss with Dr. Levy Fitzgerald in more detail due to her issues with her machine and her limited mobility. Will place consult for Dr. Levy Fitzgerald for [facility]. Will see her back in 3 months. Encouraged her to contact the office with any questions or concerns. Encouraged consistent use of her machine each night, all night. Discussed the importance of treating Obstructive sleep apnea from a physiological standpoint, especially given her cardiac history. Instructed not to drive unless had 4 hrs of effective therapy for her JOSE MANUEL the night before. Did review the risks of under or untreated JOSE MANUEL including, but not limited to, higher risks of motor vehicle accidents, stroke, heart attacks, and death. She understands and accepts all these risks.

## 2022-11-10 ENCOUNTER — TELEPHONE (OUTPATIENT)
Dept: PULMONOLOGY | Age: 72
End: 2022-11-10

## 2022-11-10 DIAGNOSIS — G47.33 OSA (OBSTRUCTIVE SLEEP APNEA): Primary | ICD-10-CM

## 2022-11-11 NOTE — TELEPHONE ENCOUNTER
I have talked to the patient about the inspire, went over the qualifications and the procedures to approve the inspire for her. She has been having issues with using the CPAP machine because she recently had an injury to her left arm and was not able to use her CPAP machine and she is greatly disturbed that she was not able to sleep with her CPAP machine as she does feel value of using CPAP machine however because of limitations of not having anybody else at the house she is unable to use her CPAP machine and put on at night when she goes to sleep. She is looking for an alternative because of this left arm issues. I explained to her that her sleep study and issues above probably qualify her for the inspire however her BMI is at 40, and that at current this may be over the limit however that there may be newer changes to the BMI inclusion.     I referred her to Dr. Bevin Ahumada for evaluation

## 2022-11-14 ENCOUNTER — OFFICE VISIT (OUTPATIENT)
Dept: ORTHOPEDIC SURGERY | Age: 72
End: 2022-11-14

## 2022-11-14 VITALS — HEIGHT: 66 IN | WEIGHT: 250 LBS | BODY MASS INDEX: 40.18 KG/M2 | RESPIRATION RATE: 16 BRPM

## 2022-11-14 DIAGNOSIS — S42.352D CLOSED DISPLACED COMMINUTED FRACTURE OF SHAFT OF LEFT HUMERUS WITH ROUTINE HEALING, SUBSEQUENT ENCOUNTER: ICD-10-CM

## 2022-11-14 DIAGNOSIS — S42.292D OTHER CLOSED DISPLACED FRACTURE OF PROXIMAL END OF LEFT HUMERUS WITH ROUTINE HEALING, SUBSEQUENT ENCOUNTER: Primary | ICD-10-CM

## 2022-11-14 PROCEDURE — 99024 POSTOP FOLLOW-UP VISIT: CPT | Performed by: ORTHOPAEDIC SURGERY

## 2022-11-14 NOTE — PROGRESS NOTES
ORTHOPAEDIC NEW PATIENT NOTE    Chief Complaint   Patient presents with    Post-Op Check     Post op left shoulder   8-23-22         HPI  11/14/22  Oswald Dang returns to clinic today for follow-up of her left shoulder/brachium  She reports her left arm is doing well  She is at home  Pain is rated 0 out of 10  She is done with home therapy  She has questions about returning to driving and outpatient PT  No incisional issues  No numbness or tingling  Finger range of motion is improving, however still cannot make a full fist      9/30/22   Approximately 5.5 weeks postop left proximal humerus and humeral shaft fracture ORIF  She is at home now  She has therapy coming to her house  They are working on range of motion, however she still has significant stiffness in her hand/fingers  She reports they are working hard at it  Pain is rated 5-6 out of 10, worse at night  Using sling  No incisional issues      9/7/22  Postop check left humerus fracture  She reports she is doing well overall  No incisional issues  No numbness or tingling  She is working with therapy at the facility  She is working on finger range of motion, which was very stiff on initial evaluation in the office back on August 19 8/23/22  OPERATION PERFORMED:  Open treatment of the left proximal humerus and humeral shaft fractures with internal fixation. 8/19/22  74 y.o. female RHD seen for evaluation of left shoulder/arm injury/fracture:   The patient reports she injured her left arm on July 31 when she traveled to [facility]  She reports she fell into a wall and hit her left shoulder  She lives in [place], [place]  After her injury in [facility], she was admitted to [address] [place] [address]  She reports she was evaluated by orthopedics there, who recommended surgical fixation there, however she did not have any friends or family there, therefore she did not want to have surgery there  She reports she was placed initially in a coaptation splint, then a De brace, then back to a coaptation splint prior to transfer back to [place] area  She reports she was admitted in [place] until August 10, 2022 at which point she was transferred back to [place] to [facility]  She was then transferred to the [place] at [facility]  She has not seen any orthopedics since she has been back in town  She presented to the emergency department a couple nights ago at which point she was given follow-up to my office today  She is now in a sling  When she went to the emergency department a couple nights ago, she reported some numbness and tingling in the ring finger and small finger tips  However this has since improved  She is a diabetic, on insulin  She does not smoke      Allergies   Allergen Reactions    Metformin Nausea Only and Other (See Comments)     \"ate the lining of her stomach\"      Adhesive Tape      Skin irritation and blisters  - does okay with steri strips and tegaderm        Current Outpatient Medications   Medication Sig Dispense Refill    Cholecalciferol (VITAMIN D3 PO) Take 1,000 mg by mouth      Multiple Vitamins-Minerals (HAIR SKIN AND NAILS FORMULA) TABS Take by mouth      vitamin C (ASCORBIC ACID) 500 MG tablet Take 500 mg by mouth daily      Multiple Vitamins-Minerals (THERAPEUTIC MULTIVITAMIN-MINERALS) tablet Take 1 tablet by mouth daily Essential 1 Pro caps      metoprolol succinate (TOPROL XL) 50 MG extended release tablet Take 1 tablet by mouth daily 90 tablet 3    metoprolol succinate (TOPROL XL) 25 MG extended release tablet Take 1 tablet by mouth daily 90 tablet 3    furosemide (LASIX) 20 MG tablet Take 2 tablets by mouth daily 180 tablet 3    Semaglutide,0.25 or 0.5MG/DOS, (OZEMPIC, 0.25 OR 0.5 MG/DOSE,) 2 MG/1.5ML SOPN once a week       spironolactone (ALDACTONE) 25 MG tablet Take 1 tablet by mouth daily 90 tablet 3    meloxicam (MOBIC) 15 MG tablet Take 7.5 mg by mouth daily      insulin glargine (LANTUS) 100 UNIT/ML injection vial Inject 30 Units into the skin nightly       pantoprazole (PROTONIX) 40 MG tablet Take 40 mg by mouth daily      famotidine (PEPCID) 40 MG tablet Take 40 mg by mouth daily       aspirin 81 MG chewable tablet Take 1 tablet by mouth daily. 30 tablet 3    acetaminophen (TYLENOL) 500 MG tablet Take 500 mg by mouth every 6 hours as needed for Pain. No current facility-administered medications for this visit. Past Medical History:   Diagnosis Date    Arthritis     Back pain     CHF (congestive heart failure) (HCC)     Chronic diastolic heart failure (HCC)     Diabetes mellitus ([facility]     Diabetes mellitus ([facility]     TYPE 2    GERD (gastroesophageal reflux disease)     High cholesterol     Hypertension     Malignant neoplasm of right female breast ([facility] 05/03/2022    Polio     Polio     AS INFANT    Shingles     OCT 2013    Sleep apnea     Urinary problem     HAS BASHFUL BLADDER        Past Surgical History:   Procedure Laterality Date    BACK SURGERY      BACK SURGERY      1/13/14 x2    BREAST BIOPSY Right 5/3/2022    RIGHT RADIOFREQUENCY IDENTIFICATION TAG LOCALIZED PARTIAL MASTECTOMY RIGHT SENTINEL LYMPH NODE BIOPSY performed by Sarah Huston MD at [address] Right 5/3/2022    .  performed by Sarah Huston MD at [address] Left 8/23/2022    OPEN REDUCTION INTERNAL FIXATION LEFT PROXIMAL HUMERUS AND SHAFT FRACTURES-REGIONAL-SYNTHES performed by Liborio Smallwood MD at [address]      bilateral knee replacements    JOINT REPLACEMENT      [place] [address] [facility]      [facility] LOC BREAST BIOPSY RIGHT Right 4/6/2022    [facility] STEROTACTIC LOC BREAST BIOPSY RIGHT 4/6/2022 [facility] [address]    OTHER SURGICAL HISTORY  01/13/14    L4-5 DECOMPRESSION, POSTERIOR LATERAL FUSION AND PEDICLE       Family History   Problem Relation Age of Onset    Heart Disease Mother     Cancer Mother breast cancer, 1967    Breast Cancer Mother     Early Death Father     Diabetes Sister        Social History     Socioeconomic History    Marital status:      Spouse name: Not on file    Number of children: Not on file    Years of education: Not on file    Highest education level: Not on file   Occupational History    Not on file   Tobacco Use    Smoking status: Never    Smokeless tobacco: Never   Vaping Use    Vaping Use: Never used   Substance and Sexual Activity    Alcohol use: No    Drug use: No    Sexual activity: Yes     Partners: Male   Other Topics Concern    Not on file   Social History Narrative    ** Merged History Encounter **          Social Determinants of Health     Financial Resource Strain: Not on file   Food Insecurity: Not on file   Transportation Needs: Not on file   Physical Activity: Not on file   Stress: Not on file   Social Connections: Not on file   Intimate Partner Violence: Not on file   Housing Stability: Not on file        Vitals:    11/14/22 1400   Resp: 16   Weight: 250 lb (113.4 kg)   Height: 5' 6\" (1.676 m)       Physical Exam  Constitutional - well-groomed, well-nourished, Body mass index is 40.35 kg/m². Psychiatric - pleasant, normal mood & affect  Cardiovascular - RRR, left radial pulse 2+  Skin - no rashes, wounds, or lesions seen on exposed skin  Neurological - ORALE SILT M/U/R/A/LABC nerve distributions; AIN/PIN/IO intact  Left shoulder/brachium - anterior surgical incision nicely healed  No erythema or drainage  No obvious ecchymosis   Active FE/scaption 90    ER 20    IR back pocket   She is able to extend and flex her left elbow, wrist, supinate/pronate her forearm   She has full finger extension, finger flexion is improved compared to prior, but still not full      Imaging:  Images were personally reviewed by myself and discussed with the patient  Narrative   EXAMINATION:   2 XRAY VIEWS OF THE LEFT SHOULDER       8/15/2022 10:48 pm       COMPARISON:   None. HISTORY:   ORDERING SYSTEM PROVIDED HISTORY: Injury   TECHNOLOGIST PROVIDED HISTORY:   Reason for exam:->Injury   Reason for Exam: Injury       FINDINGS:   There is no fracture of the clavicle or displacement of the acromioclavicular   joint. Sclerosis and marginal spurring at the acromioclavicular joint from   arthritis. Positioning of the glenohumeral joint is difficult due to patient trauma. There is no dislocation of the humeral head from the glenoid. The articular   margin of the humeral head is intact. Acute comminuted fracture is present   in the proximal to mid shaft of the humerus. There is large free butter   fragment with moderate to severe displacement. There is angulation and   displacement of the main shaft components. A longitudinal fracture line runs   up to the humeral neck and at least the base of the greater tubercle region. The superior edge of the greater tubercle does not appear fragmented or have   cortical displacement. .           Impression   Acute comminuted and displaced fracture involving the proximal to mid   humerus. The main shaft components are displaced by of least a full shaft   with and angled. There is a large butterfly fragment with longitudinal   fracture line running up the humeral neck to the base of the greater   tubercle. The superior edge of the tubercle appears intact without cortical   disruption. The articular humeral head is spared and no dislocation from the glenoid. Arthritic changes at the acromioclavicular joint. Narrative   EXAMINATION:   TWO XRAY VIEWS OF THE LEFT HUMERUS       8/16/2022 12:20 am       COMPARISON:   None. HISTORY:   ORDERING SYSTEM PROVIDED HISTORY: injury   TECHNOLOGIST PROVIDED HISTORY:   Reason for exam:->injury   Reason for Exam: L/arm pain and swelling       FINDINGS:   Acute fracture of the mid to proximal left humerus is again noted.   There is   a oblique main fracture in the mid to upper shaft with displacement of the   main shaft components by to a cm on the current position. There is a large   free butterfly fragment at the lateral aspect coursing up to the humeral   neck. Longitudinal fracture line runs superiorly up the humeral neck and is   overlapped on the current position. The distal humerus appears intact. The elbow is not positioned for detailed   evaluation. Impression   Acute comminuted and displaced fracture involving the mid to upper left   humerus with large free fragment and involvement up to the edge of the   humeral neck. The distal humerus appears intact. Left shoulder 4 views performed 11/14/22 -   Status post open reduction internal fixation of the comminuted left proximal humerus and humeral shaft fractures. Fracture reduction/alignment is maintained compared to prior imaging. No obvious hardware complications. Interval fracture healing/callus is seen. Glenohumeral joint is reduced. Assessment & Plan:  74 y.o. female who presents with    Diagnosis Orders   1. Other closed displaced fracture of proximal end of left humerus with routine healing, subsequent encounter  XR SHOULDER LEFT (MIN 2 VIEWS)    External Referral To Physical Therapy      2. Closed displaced comminuted fracture of shaft of left humerus with routine healing, subsequent encounter            No orders of the defined types were placed in this encounter. Doing well  Radiographs today show progressive healing  She already stopped using her sling  She is not requiring any narcotic pain medications  Advance left shoulder/arm range of motion and activities as tolerated  Outpatient physical therapy referral  Continue to work on maximizing her range of motion    Driving precautions given, must be off narcotics and able to perform active ROM. Advised to practice in an empty parking lot first, must have full control of the steering wheel with good reaction time.   Patient is ultimately responsible in the 's seat.     Follow-up in 2 to 3 months with repeat radiographs    Sophia Floyd MD

## 2022-11-22 ENCOUNTER — HOSPITAL ENCOUNTER (OUTPATIENT)
Dept: PHYSICAL THERAPY | Age: 72
Setting detail: THERAPIES SERIES
Discharge: HOME | End: 2022-11-22
Payer: MEDICARE

## 2022-11-22 DIAGNOSIS — M25.612 IMPAIRED RANGE OF MOTION OF LEFT SHOULDER: ICD-10-CM

## 2022-11-22 DIAGNOSIS — R29.898 IMPAIRED STRENGTH OF SHOULDER MUSCLES: ICD-10-CM

## 2022-11-22 DIAGNOSIS — S42.292D OTHER CLOSED DISPLACED FRACTURE OF PROXIMAL END OF LEFT HUMERUS WITH ROUTINE HEALING, SUBSEQUENT ENCOUNTER: Primary | ICD-10-CM

## 2022-11-22 PROCEDURE — 97161 PT EVAL LOW COMPLEX 20 MIN: CPT

## 2022-11-22 NOTE — FLOWSHEET NOTE
[facility] [address] [facility]  Phone: [phone] Fax: [phone]    Physical Therapy Treatment Note/ Progress Report:       Date:  2022    Patient Name:  Magy Burch    :  1950  MRN: [id number]  Restrictions/Precautions:    Medical Diagnosis:  Other closed displaced fracture of proximal end of left humerus with routine healing, subsequent encounter [S42.292D]  Treatment Diagnosis:      ICD-10-CM     1. Other closed displaced fracture of proximal end of left humerus with routine healing, subsequent encounter  S42.292D         2. Impaired strength of shoulder muscles  R29.898         3.  Impaired range of motion of left shoulder  M25.612           Insurance/Certification information:  PT Insurance Information: Medicare/[employer]  Physician Information:  Shasha Lomeli MD  Plan of care signed (Y/N):     Date of Patient follow up with Physician:      Progress Report: []  Yes  []  No     Date Range for reporting period:  Beginnin22  Ending:     Progress report due (10 Rx/or 30 days whichever is less): 10/48/78     Recertification due (POC duration/ or 90 days whichever is less): 22     Visit # Insurance Allowable Auth Needed   1 BMN []Yes    [x]No     Pain level:  0/10 currently; 3/10 at worst     SUBJECTIVE:  See eval    OBJECTIVE: See eval  Observation:   Test measurements:      RESTRICTIONS/PRECAUTIONS: latex allergy, recent Hx of breast cancer with R lymph node removal, HBP    Exercises/Interventions:   Therapeutic Ex (98971)  Min: Sets/sec Reps CUES/Notes   Reclined overhead flexion 1 5    Pendulum/Ball rolls      Cane AAROM flex/press      3 way Isomet      T- band Row/pinch      T- band lower pinch      T- band ER activation      Supine SA punch      SL ER/SL punch      Prone Rows/ext      Prone HAB/Prone Flex      Seat Table slides/Wall Slides      Seated HH Depression      No Money      Scap Wall Lat touches/wall walks            Standing flex/scap      Standing Punch      Lawnmower                              Manual Intervention  (64266)  Min:      Shld /GH Mobs      Post Cap mobs      Thoracic/Rib manipualtion      CT MT/Mobs      PROM MT      Elbow mobs            NMR re-education (48580)  Min:      T-spine Ext      GH depress/compress      Scap/GH NMR      Body blade      Wall ball roll      Wall Ball bounce      Ball drops      Porsha Scap Bio      Floor Snow angels-sliders            Therapeutic Activity (69324)  Min:      UE throwing porgression      Dynamic UE stability      Earthquake Bar      Bodyblade                Therapeutic Exercise and NMR EXR  [x] (15392) Provided verbal/tactile cueing for activities related to strengthening, flexibility, endurance, ROM  for improvements in scapular, scapulothoracic and UE control with self care, reaching, carrying, lifting, house/yardwork, driving/computer work. [x] (55052) Provided verbal/tactile cueing for activities related to improving balance, coordination, kinesthetic sense, posture, motor skill, proprioception  to assist with  scapular, scapulothoracic and UE control with self care, reaching, carrying, lifting, house/yardwork, driving/computer work. Therapeutic Activities:    [] (18128 or 60937) Provided verbal/tactile cueing for activities related to improving balance, coordination, kinesthetic sense, posture, motor skill, proprioception and motor activation to allow for proper function of scapular, scapulothoracic and UE control with self care, carrying, lifting, driving/computer work.      Home Exercise Program:    [x] (59646) Reviewed/Progressed HEP activities related to strengthening, flexibility, endurance, ROM of scapular, scapulothoracic and UE control with self care, reaching, carrying, lifting, house/yardwork, driving/computer work  [] (66784) Reviewed/Progressed HEP activities related to improving balance, coordination, kinesthetic sense, posture, motor skill, proprioception of scapular, scapulothoracic and UE control with self care, reaching, carrying, lifting, house/yardwork, driving/computer work      Manual Treatments:  PROM / STM / Oscillations-Mobs:  G-I, II, III, IV (PA's, Inf., Post.)  [] (72419) Provided manual therapy to mobilize soft tissue/joints of cervical/CT, scapular GHJ and UE for the purpose of modulating pain, promoting relaxation,  increasing ROM, reducing/eliminating soft tissue swelling/inflammation/restriction, improving soft tissue extensibility and allowing for proper ROM for normal function with self care, reaching, carrying, lifting, house/yardwork, driving/computer work    Modalities:      Charges:  Timed Code Treatment Minutes: 5   Total Treatment Minutes: 31       [x] EVAL (LOW) 02660 (typically 20 minutes face-to-face)  [] EVAL (MOD) 14841 (typically 30 minutes face-to-face)  [] EVAL (HIGH) 59782 (typically 45 minutes face-to-face)  [] RE-EVAL     [] (43571) x     [] DRY NEEDLE 1 OR 2 MUSCLES  [] NMR (09778) x     [] DRY NEEDLE 3+ MUSCLES  [] Manual (07285) x       [] TA (24540) x     [] Mech Traction (32687)  [] ES(attended) (97972)     [] ES (un) (64596):   [] VASO (56864)  [] Other:    If [facility] Please Indicate Time In/Out  CPT Code Time in Time out                                   GOALS:  Patient stated goal: full arm motion  [] Progressing: [] Met: [] Not Met: [] Adjusted    Therapist goals for Patient:   Short Term Goals: To be achieved in: 2 weeks  1. Independent in HEP and progression per patient tolerance, in order to prevent re-injury. [] Progressing: [] Met: [] Not Met: [] Adjusted  2. Patient will have a decrease in pain to facilitate improvement in movement, function, and ADLs as indicated by Functional Deficits. [] Progressing: [] Met: [] Not Met: [] Adjusted    Long Term Goals: To be achieved in: 10-12 weeks  1.  Patient will reach FOTO predicted score of at least 62 to assist with reaching prior level of function with activities such as dressing. [] Progressing: [] Met: [] Not Met: [] Adjusted  2. Patient will demonstrate increased L shoulder flexion AROM to 120 deg to allow for proper joint functioning as indicated by patients Functional Deficits. [] Progressing: [] Met: [] Not Met: [] Adjusted  3. Patient will demonstrate an increase in global L shoulder strength to 4+/5 to allow for proper functional mobility as indicated by patients Functional Deficits. [] Progressing: [] Met: [] Not Met: [] Adjusted  4. Patient will return to all dressing activities, such has wearing shirts and jackets without increased symptoms or restriction. [] Progressing: [] Met: [] Not Met: [] Adjusted  5. Patient will demonstrate 5 repeated overhead reaches with 5# to demonstrate replacing items from overhead cabinets without symptoms or restrictions. [] Progressing: [] Met: [] Not Met: [] Adjusted    ASSESSMENT:  See eval    Return to Play: (if applicable)   []  Stage 1: Intro to Strength   []  Stage 2: Dynamic Strength and Intro to Plyometrics   []  Stage 3: Advanced Plyometrics and Intro to Throwing   []  Stage 4: Sport specific Training/Return to Sport     []  Ready to Return to Play, [employer]   []  Not Ready for Return to Sports   Comments:      Treatment/Activity Tolerance:  [x] Patient tolerated treatment well [] Patient limited by fatique  [] Patient limited by pain  [] Patient limited by other medical complications  [] Other:     Overall Progression Towards Functional goals/ Treatment Progress Update:  [] Patient is progressing as expected towards functional goals listed. [] Progression is slowed due to complexities/Impairments listed. [] Progression has been slowed due to co-morbidities.   [x] Plan just implemented, too soon to assess goals progression <30days   [] Goals require adjustment due to lack of progress  [] Patient is not progressing as expected and requires additional follow up with physician  [] Other    Prognosis for POC: [x] Good [] Fair  [] Poor    Patient requires continued skilled intervention: [x] Yes  [] No      PLAN: See eval  [] Continue per plan of care [] Alter current plan (see comments)  [x] Plan of care initiated [] Hold pending MD visit [] Discharge    Electronically signed by: Roula Burns PT     Note: If patient does not return for scheduled/recommended follow up visits, this note will serve as a discharge from care along with the most recent update on progress.

## 2022-11-22 NOTE — PLAN OF CARE
-  [facility]  Phone: [phone]   Fax:     [phone]                                                       Physical Therapy Certification    Dear Brittany French MD  ,    We had the pleasure of evaluating the following patient for physical therapy services at [address] [place] [address]. A summary of our findings can be found in the initial assessment below. This includes our plan of care. If you have any questions or concerns regarding these findings, please do not hesitate to contact me at the office phone number checked above. Thank you for the referral.       Physician Signature:_______________________________Date:__________________  By signing above (or electronic signature), therapists plan is approved by physician      Patient: Claudell July   : 1950   MRN: [id number]  Referring Physician: Brittany French MD        Evaluation Date: 2022      Medical Diagnosis:  Other closed displaced fracture of proximal end of left humerus with routine healing, subsequent encounter [S42.292D]  Treatment Diagnosis:    ICD-10-CM    1. Other closed displaced fracture of proximal end of left humerus with routine healing, subsequent encounter  S42.292D       2. Impaired strength of shoulder muscles  R29.898       3.  Impaired range of motion of left shoulder  M25.612                                              Insurance information: PT Insurance Information: Medicare/[employer]    Precautions/ Contra-indications: latex allergy, recent Hx of breast cancer with R lymph node removal, HBP    C-SSRS Triggered by Intake questionnaire (Past 2 wk assessment):   [x] No, Questionnaire did not trigger screening.   [] Yes, Patient intake triggered further evaluation      [] C-SSRS Screening completed  [] PCP notified via Plan of Care  [] Emergency services notified     Latex Allergy:  [x]NO      []YES  Preferred Language for Healthcare:   [x]English       []Other:      SUBJECTIVE: Patient stated complaint: Pt is a 75 yo female presenting today s/p L humerus fracture that was surgically repaired in August. Pt states that in May she had surgery to remove lymph nodes near her right axilla secondary to recent diagnosis of breast cancer. Pt started radiation treatments in June. Then she was in [address] [place] [address] in July and fell resulting in her L humerus fracture. Pt was in a [address] [place] [address] hospital for eleven days and then transferred back to [place] to have surgery in August. After surgery, pt was in a rehabilitation facility for physical and occupational therapy. Once pt returned home, she had home physical and occupational therapy. Pt then requested to have outpatient physical therapy after being finished with home therapy. Pt reports that she continues to have limitations with washing and dressing. Dressing includes difficulty with putting on jackets and she continues to wear shirts that button up on either side. Pt also notes that since surgery she has had L hand weakness and tightness. Notes she had polio and her left side was weaker her entire life since having polio, but this weakness is more than prior. Pt also notes she has [employer] that assists her at home with bathing. Pt notes she utilizes TSB for ambulation as safety net. Pt reports that she feels she has been making progress, but wants to be able to reach overhead again.     Relevant Medical History:latex allergy, recent Hx of breast cancer with R lymph node removal, HBP  Functional Disability Index: FOTO physical FS primary measure score = 42; Risk adjusted = 46    Pain Scale: 0/10 currently; 3/10 at worst  Easing factors: Tylenol, ice  Provocative factors: lifting \"too much\", reaching overhead     Type: []Constant   [x]Intermittent  []Radiating [x]Localized []other:     Numbness/Tingling: pt denies    Occupation/School: retired    Living Status/Prior Level of Function: Pt is modified independent with ambulation, requires assistance with bathing    OBJECTIVE:     CERV ROM     Cervical Flexion WFL    Cervical Extension Neutral    Cervical SB Unable to perform    Cervical rotation          ROM Left Right   Shoulder Flex 91 135   Shoulder Abd 88 132   Shoulder ER     Shoulder IR                    Strength  Left Right   Shoulder Flex 3 4   Shoulder Scap 3 4   Shoulder ER 3 4   Shoulder IR 3+ 4+         strength neutral at side <5# 35#         Reflexes/Sensation:    [x]Dermatomes/Myotomes intact    []Reflexes equal and normal bilaterally   []Other:    Joint mobility:    []Normal    [x]Hypo   []Hyper    Palpation: Noted muscle guarding of L upper trap with mild tenderness to palpation    Posture: bilateral rounded shoulders, forward head posture    Bandages/Dressings/Incisions: incision is healing well with no signs of infection or inflammation    Gait: (include devices/WB status): SPC                         [x] Patient history, allergies, meds reviewed. Medical chart reviewed. See intake form. Review Of Systems (ROS):  [x]Performed Review of systems (Integumentary, CardioPulmonary, Neurological) by intake and observation. Intake form has been scanned into medical record. Patient has been instructed to contact their primary care physician regarding ROS issues if not already being addressed at this time.       Co-morbidities/Complexities (which will affect course of rehabilitation):   []None           Arthritic conditions   []Rheumatoid arthritis (M05.9)  []Osteoarthritis (M19.91)   Cardiovascular conditions   [x]Hypertension (I10)  []Hyperlipidemia (E78.5)  []Angina pectoris (I20)  []Atherosclerosis (I70)   Musculoskeletal conditions   []Disc pathology   []Congenital spine pathologies   []Prior surgical intervention  []Osteoporosis (M81.8)  []Osteopenia (M85.8)   Endocrine conditions   []Hypothyroid (E03.9)  []Hyperthyroid Gastrointestinal conditions   []Constipation (L55.30)   Metabolic conditions   []Morbid obesity (E66.01)  [x]Diabetes type 1(E10.65) or 2 (E11.65)   []Neuropathy (G60.9)     Pulmonary conditions   []Asthma (J45)  []Coughing   []COPD (J44.9)   Psychological Disorders  []Anxiety (F41.9)  []Depression (F32.9)   []Other:   [x]Other: Hx of breast CA, Hx of polio         Barriers to/and or personal factors that will affect rehab potential:              [x]Age  []Sex              []Motivation/Lack of Motivation                        [x]Co-Morbidities              []Cognitive Function, education/learning barriers              []Environmental, home barriers              []profession/work barriers  [x]past PT/medical experience  []other:  Justification:      Falls Risk Assessment (30 days):   [x] Falls Risk assessed and no intervention required.   [] Falls Risk assessed and Patient requires intervention due to being higher risk   TUG score (>12s at risk):     [] Falls education provided, including        ASSESSMENT:   Functional Impairments   [x]Noted spinal or UE joint hypomobility   []Noted spinal or UE joint hypermobility   [x]Decreased UE functional ROM   [x]Decreased UE functional strength   []Abnormal reflexes/sensation/myotomal/dermatomal deficits   [x]Decreased RC/scapular/core strength and neuromuscular control   []other:      Functional Activity Limitations (from functional questionnaire and intake)   [x]Reduced ability to tolerate prolonged functional positions   [x]Reduced ability or difficulty with changes of positions or transfers between positions   []Reduced ability to maintain good posture and demonstrate good body mechanics with sitting, bending, and lifting   [] Reduced ability or tolerance with driving and/or computer work   [x]Reduced ability to sleep   [x]Reduced ability to perform lifting, reaching, carrying tasks   [x]Reduced ability to tolerate impact through UE   [x]Reduced ability to reach behind back   [x]Reduced ability to  or hold objects   [x]Reduced ability to throw or toss an object   []other:    Participation Restrictions   [x]Reduced participation in self care activities   [x]Reduced participation in home management activities   []Reduced participation in work activities   [x]Reduced participation in social activities. []Reduced participation in sport/recreation activities. Classification:   [x]Signs/symptoms consistent with post-surgical status including decreased ROM, strength and function.   []Signs/symptoms consistent with joint sprain/strain   []Signs/symptoms consistent with shoulder impingement   []Signs/symptoms consistent with shoulder/elbow/wrist tendinopathy   []Signs/symptoms consistent with Rotator cuff tear   []Signs/symptoms consistent with labral tear   []Signs/symptoms consistent with postural dysfunction    []Signs/symptoms consistent with Glenohumeral IR Deficit - <45 degrees   []Signs/symptoms consistent with facet dysfunction of cervical/thoracic spine    []Signs/symptoms consistent with pathology which may benefit from Dry needling     []other:     Prognosis/Rehab Potential:      []Excellent   [x]Good    []Fair   []Poor    Tolerance of evaluation/treatment:    []Excellent   [x]Good    []Fair   []Poor    Physical Therapy Evaluation Complexity Justification  [x] A history of present problem with:  [] no personal factors and/or comorbidities that impact the plan of care;  [x]1-2 personal factors and/or comorbidities that impact the plan of care  []3 personal factors and/or comorbidities that impact the plan of care  [x] An examination of body systems using standardized tests and measures addressing any of the following: body structures and functions (impairments), activity limitations, and/or participation restrictions;:  [] a total of 1-2 or more elements   [] a total of 3 or more elements   [x] a total of 4 or more elements   [x] A clinical presentation with:  [x] stable and/or uncomplicated characteristics   [] evolving clinical presentation with changing characteristics  [] unstable and unpredictable characteristics;   [x] Clinical decision making of [x] low, [] moderate, [] high complexity using standardized patient assessment instrument and/or measurable assessment of functional outcome. [x] EVAL (LOW) 29280 (typically 30 minutes face-to-face)  [] EVAL (MOD) 96595 (typically 30 minutes face-to-face)  [] EVAL (HIGH) 77129 (typically 45 minutes face-to-face)  [] RE-EVAL     PLAN:  Frequency/Duration:  2 days per week for 10-12 Weeks:  INTERVENTIONS:  [x] Therapeutic exercise including: strength training, ROM, for Upper extremity and core   [x]  NMR activation and proprioception for UE, scap and Core   [x] Manual therapy as indicated for shoulder, scapula and spine to include: Dry Needling/IASTM, STM, PROM, Gr I-IV mobilizations, manipulation. [x] Modalities as needed that may include: thermal agents, E-stim, Biofeedback, US, iontophoresis as indicated  [x] Patient education on joint protection, postural re-education, activity modification, progression of HEP. HEP instruction: Written HEP instructions provided and reviewed. GOALS:  Patient stated goal: full arm motion  [] Progressing: [] Met: [] Not Met: [] Adjusted    Therapist goals for Patient:   Short Term Goals: To be achieved in: 2 weeks  1. Independent in HEP and progression per patient tolerance, in order to prevent re-injury. [] Progressing: [] Met: [] Not Met: [] Adjusted  2. Patient will have a decrease in pain to facilitate improvement in movement, function, and ADLs as indicated by Functional Deficits. [] Progressing: [] Met: [] Not Met: [] Adjusted    Long Term Goals: To be achieved in: 10-12 weeks  1. Patient will reach FOTO predicted score of at least 62 to assist with reaching prior level of function with activities such as dressing. [] Progressing: [] Met: [] Not Met: [] Adjusted  2.  Patient will demonstrate increased L shoulder flexion AROM to 120 deg to allow for proper joint functioning as indicated by patients Functional Deficits. [] Progressing: [] Met: [] Not Met: [] Adjusted  3. Patient will demonstrate an increase in global L shoulder strength to 4+/5 to allow for proper functional mobility as indicated by patients Functional Deficits. [] Progressing: [] Met: [] Not Met: [] Adjusted  4. Patient will return to all dressing activities, such has wearing shirts and jackets without increased symptoms or restriction. [] Progressing: [] Met: [] Not Met: [] Adjusted  5. Patient will demonstrate 5 repeated overhead reaches with 5# to demonstrate replacing items from overhead cabinets without symptoms or restrictions.     [] Progressing: [] Met: [] Not Met: [] Adjusted     Electronically signed by:  Nisa Park PT

## 2022-11-23 ENCOUNTER — TELEPHONE (OUTPATIENT)
Dept: WOMENS IMAGING | Age: 72
End: 2022-11-23

## 2022-11-23 NOTE — TELEPHONE ENCOUNTER
Left message for patient on VM requesting return call.  Reaching out to schedule f/u diagnostic mammogram.

## 2022-11-29 ENCOUNTER — HOSPITAL ENCOUNTER (OUTPATIENT)
Dept: PHYSICAL THERAPY | Age: 72
Setting detail: THERAPIES SERIES
Discharge: HOME | End: 2022-11-29
Payer: MEDICARE

## 2022-11-29 PROCEDURE — 97140 MANUAL THERAPY 1/> REGIONS: CPT

## 2022-11-29 PROCEDURE — 97110 THERAPEUTIC EXERCISES: CPT

## 2022-11-29 NOTE — FLOWSHEET NOTE
[place] [address] [facility]  Phone: [phone] Fax: [phone]    Physical Therapy Treatment Note/ Progress Report:       Date:  2022    Patient Name:  Jami Mendoza    :  1950  MRN: [id number]  Restrictions/Precautions:    Medical Diagnosis:  Other closed displaced fracture of proximal end of left humerus with routine healing, subsequent encounter [S42.292D]  Treatment Diagnosis:      ICD-10-CM     1. Other closed displaced fracture of proximal end of left humerus with routine healing, subsequent encounter  S42.292D         2. Impaired strength of shoulder muscles  R29.898         3. Impaired range of motion of left shoulder  M25.612           Insurance/Certification information:  PT Insurance Information: Medicare/AARP  Physician Information:  Karena Boucher MD  Plan of care signed (Y/N):     Date of Patient follow up with Physician:      Progress Report: []  Yes  []  No     Date Range for reporting period:  Beginnin22  Ending:     Progress report due (10 Rx/or 30 days whichever is less):      Recertification due (POC duration/ or 90 days whichever is less): 22     Visit # Insurance Allowable Auth Needed   2 BMN []Yes    [x]No     Pain level:  210 currently     SUBJECTIVE:  Pt reports that she was cooking for Thanksgiving and was using her arm much more than she normally has, notes mild discomfort along proximal anterior shoulder. Pt reports she feels that her shoulder is going to continue to heel, but she is mostly worried about her hand function.     OBJECTIVE: See eval  Observation:   Test measurements:      RESTRICTIONS/PRECAUTIONS: latex allergy, recent Hx of breast cancer with R lymph node removal, HBP    Exercises/Interventions:   Therapeutic Ex (77030)  Min: 24 Sets/sec Reps CUES/Notes   Supine cane flexion 10 sec 5    Ball squeezes 2 10    Table slides 10 5    TB no monies 2 10 3 sec hold   Scapular retractions 5 sec 10 2 sets   T- band lower pinch      T- band ER activation      Supine SA punch      SL ER/SL punch      Prone Rows/ext      Prone HAB/Prone Flex      Seat Table slides/Wall Slides      Seated HH Depression      No Money      Scap Wall Lat touches/wall walks            Standing flex/scap      Standing Punch      Lawnmower                              Manual Intervention  (49582)  Min: 16      Shld /GH Mobs 3 min  AP grades 2-3   Post Cap mobs      Thoracic/Rib manipualtion      CT MT/Mobs      PROM MT 10 min  Flex, ER   Upper trap STM 3 min           NMR re-education (37997)  Min:      T-spine Ext      GH depress/compress      Scap/GH NMR      Body blade      Wall ball roll      Wall Ball bounce      Ball drops      Porsha Scap Bio      Floor Snow angels-sliders            Therapeutic Activity (31596)  Min:      UE throwing porgression      Dynamic UE stability      Earthquake Bar      Bodyblade                Therapeutic Exercise and NMR EXR  [x] (09644) Provided verbal/tactile cueing for activities related to strengthening, flexibility, endurance, ROM  for improvements in scapular, scapulothoracic and UE control with self care, reaching, carrying, lifting, house/yardwork, driving/computer work. [x] (63458) Provided verbal/tactile cueing for activities related to improving balance, coordination, kinesthetic sense, posture, motor skill, proprioception  to assist with  scapular, scapulothoracic and UE control with self care, reaching, carrying, lifting, house/yardwork, driving/computer work. Therapeutic Activities:    [] (59701 or 13928) Provided verbal/tactile cueing for activities related to improving balance, coordination, kinesthetic sense, posture, motor skill, proprioception and motor activation to allow for proper function of scapular, scapulothoracic and UE control with self care, carrying, lifting, driving/computer work.      Home Exercise Program:    [x] (31913) Reviewed/Progressed HEP activities related to strengthening, flexibility, endurance, ROM of scapular, scapulothoracic and UE control with self care, reaching, carrying, lifting, house/yardwork, driving/computer work  [] (94439) Reviewed/Progressed HEP activities related to improving balance, coordination, kinesthetic sense, posture, motor skill, proprioception of scapular, scapulothoracic and UE control with self care, reaching, carrying, lifting, house/yardwork, driving/computer work      Manual Treatments:  PROM / STM / Oscillations-Mobs:  G-I, II, III, IV (PA's, Inf., Post.)  [] (49816) Provided manual therapy to mobilize soft tissue/joints of cervical/CT, scapular GHJ and UE for the purpose of modulating pain, promoting relaxation,  increasing ROM, reducing/eliminating soft tissue swelling/inflammation/restriction, improving soft tissue extensibility and allowing for proper ROM for normal function with self care, reaching, carrying, lifting, house/yardwork, driving/computer work    Modalities:      Charges:  Timed Code Treatment Minutes: 40    Total Treatment Minutes: 40       [] EVAL (LOW) 33086 (typically 20 minutes face-to-face)  [] EVAL (MOD) 70633 (typically 30 minutes face-to-face)  [] EVAL (HIGH) 35044 (typically 45 minutes face-to-face)  [] RE-EVAL     [x] VR(88595) x 2    [] DRY NEEDLE 1 OR 2 MUSCLES  [] NMR (98562) x     [] DRY NEEDLE 3+ MUSCLES  [x] Manual (02173) x 1      [] TA (72024) x     [] Mech Traction (67569)  [] ES(attended) (49216)     [] ES (un) (30714):   [] VASO (64686)  [] Other:    If BW Please Indicate Time In/Out  CPT Code Time in Time out                                   GOALS:  Patient stated goal: full arm motion  [] Progressing: [] Met: [] Not Met: [] Adjusted    Therapist goals for Patient:   Short Term Goals: To be achieved in: 2 weeks  1. Independent in HEP and progression per patient tolerance, in order to prevent re-injury.    [] Progressing: [] Met: [] Not Met: [] Adjusted  2. Patient will have a decrease in pain to facilitate improvement in movement, function, and ADLs as indicated by Functional Deficits. [] Progressing: [] Met: [] Not Met: [] Adjusted    Long Term Goals: To be achieved in: 10-12 weeks  1. Patient will reach FOTO predicted score of at least 62 to assist with reaching prior level of function with activities such as dressing. [] Progressing: [] Met: [] Not Met: [] Adjusted  2. Patient will demonstrate increased L shoulder flexion AROM to 120 deg to allow for proper joint functioning as indicated by patients Functional Deficits. [] Progressing: [] Met: [] Not Met: [] Adjusted  3. Patient will demonstrate an increase in global L shoulder strength to 4+/5 to allow for proper functional mobility as indicated by patients Functional Deficits. [] Progressing: [] Met: [] Not Met: [] Adjusted  4. Patient will return to all dressing activities, such has wearing shirts and jackets without increased symptoms or restriction. [] Progressing: [] Met: [] Not Met: [] Adjusted  5. Patient will demonstrate 5 repeated overhead reaches with 5# to demonstrate replacing items from overhead cabinets without symptoms or restrictions. [] Progressing: [] Met: [] Not Met: [] Adjusted    ASSESSMENT:  Pt tolerated treatment session fairly well today. Noted upper trap compensation during L shoulder flexion. Tenderness to palpation along muscle belly of upper trap that mildly improved after soft tissue massage. Tactile cues required for scapular retraction. Plan to continue to progress hand exercises next visit. Noted challenge with foam ball squeezes today. Pt will continue to benefit from skilled physical therapy to improve strength, ROM, neuromuscular control, and endurance to return to PLOF including reaching overhead and performing ADLs without symptoms or restrictions.     Return to Play: (if applicable)   []  Stage 1: Intro to Strength   []  Stage 2: Dynamic Strength and Intro to Plyometrics   []  Stage 3: Advanced Plyometrics and Intro to Throwing   []  Stage 4: Sport specific Training/Return to Sport     []  Ready to Return to Play, [employer]   []  Not Ready for Return to Sports   Comments:      Treatment/Activity Tolerance:  [x] Patient tolerated treatment well [] Patient limited by fatique  [] Patient limited by pain  [] Patient limited by other medical complications  [] Other:     Overall Progression Towards Functional goals/ Treatment Progress Update:  [] Patient is progressing as expected towards functional goals listed. [] Progression is slowed due to complexities/Impairments listed. [] Progression has been slowed due to co-morbidities. [x] Plan just implemented, too soon to assess goals progression <30days   [] Goals require adjustment due to lack of progress  [] Patient is not progressing as expected and requires additional follow up with physician  [] Other    Prognosis for POC: [x] Good [] Fair  [] Poor    Patient requires continued skilled intervention: [x] Yes  [] No      PLAN: See eval  [] Continue per plan of care [] Alter current plan (see comments)  [x] Plan of care initiated [] Hold pending MD visit [] Discharge    Electronically signed by: Estefany Em PT     Note: If patient does not return for scheduled/recommended follow up visits, this note will serve as a discharge from care along with the most recent update on progress.

## 2022-12-01 ENCOUNTER — HOSPITAL ENCOUNTER (OUTPATIENT)
Dept: PHYSICAL THERAPY | Age: 72
Setting detail: THERAPIES SERIES
Discharge: HOME | End: 2022-12-01
Payer: MEDICARE

## 2022-12-01 PROCEDURE — 97140 MANUAL THERAPY 1/> REGIONS: CPT

## 2022-12-01 PROCEDURE — 97110 THERAPEUTIC EXERCISES: CPT

## 2022-12-01 NOTE — FLOWSHEET NOTE
[place] [address] [facility]  Phone: [phone] Fax: [phone]    Physical Therapy Treatment Note/ Progress Report:       Date:  2022    Patient Name:  Noemi Walker    :  1950  MRN: [id number]  Restrictions/Precautions:    Medical Diagnosis:  Other closed displaced fracture of proximal end of left humerus with routine healing, subsequent encounter [S42.292D]  Treatment Diagnosis:      ICD-10-CM     1. Other closed displaced fracture of proximal end of left humerus with routine healing, subsequent encounter  S42.292D         2. Impaired strength of shoulder muscles  R29.898         3. Impaired range of motion of left shoulder  M25.612           Insurance/Certification information:  PT Insurance Information: Medicare/AARP  Physician Information:  Jatinder Marvin MD  Plan of care signed (Y/N): Y    Date of Patient follow up with Physician:      Progress Report: []  Yes  []  No     Date Range for reporting period:  Beginnin22  Ending:     Progress report due (10 Rx/or 30 days whichever is less): /53/46     Recertification due (POC duration/ or 90 days whichever is less): 22     Visit # Insurance Allowable Auth Needed   3 BMN []Yes    [x]No     Pain level:  2/10 currently     SUBJECTIVE:  Pt reports that she is sore through her upper trap and medial arm at start of session today. Reports her biggest concern continues to be her L hand pain and lack of  strength.     OBJECTIVE: See eval  Observation:   Test measurements:      RESTRICTIONS/PRECAUTIONS: latex allergy, recent Hx of breast cancer with R lymph node removal, HBP    Exercises/Interventions:   Therapeutic Ex (18662)  Min: 23 Sets/sec Reps CUES/Notes   Supine cane flexion 10 sec 5    Ball squeezes 2 10ea Prone and supine hand   Table slides 10 5 Performed with inclined table   TB no monies 2 10 3 sec hold   Scapular retractions 5 sec 10 2 sets Abduction isometric 10 sec 10    T- band ER activation      Supine SA punch      SL ER/SL punch      Prone Rows/ext      Prone HAB/Prone Flex      Seat Table slides/Wall Slides      Seated HH Depression      No Money      Scap Wall Lat touches/wall walks            Standing flex/scap      Standing Punch      Lawnmower                              Manual Intervention  (45094)  Min: 18      Shld /GH Mobs 3 min  AP grades 2-3   Post Cap mobs      Finger flexion PROM/stretching 5 min     Wrist/hand PROM 7 min  Flex/ext   Shoulder PROM MT 2 min  Scaption   Upper trap STM 6 min           NMR re-education (95453)  Min:      T-spine Ext      GH depress/compress      Scap/GH NMR      Body blade      Wall ball roll      Wall Ball bounce      Ball drops      Porsha Scap Bio      Floor Snow angels-sliders            Therapeutic Activity (78855)  Min:      UE throwing porgression      Dynamic UE stability      Earthquake Bar      Bodyblade                Therapeutic Exercise and NMR EXR  [x] (51553) Provided verbal/tactile cueing for activities related to strengthening, flexibility, endurance, ROM  for improvements in scapular, scapulothoracic and UE control with self care, reaching, carrying, lifting, house/yardwork, driving/computer work. [x] (31963) Provided verbal/tactile cueing for activities related to improving balance, coordination, kinesthetic sense, posture, motor skill, proprioception  to assist with  scapular, scapulothoracic and UE control with self care, reaching, carrying, lifting, house/yardwork, driving/computer work. Therapeutic Activities:    [] (98320 or 28111) Provided verbal/tactile cueing for activities related to improving balance, coordination, kinesthetic sense, posture, motor skill, proprioception and motor activation to allow for proper function of scapular, scapulothoracic and UE control with self care, carrying, lifting, driving/computer work.      Home Exercise Program:    [x] (09418) Reviewed/Progressed HEP activities related to strengthening, flexibility, endurance, ROM of scapular, scapulothoracic and UE control with self care, reaching, carrying, lifting, house/yardwork, driving/computer work  [] (84681) Reviewed/Progressed HEP activities related to improving balance, coordination, kinesthetic sense, posture, motor skill, proprioception of scapular, scapulothoracic and UE control with self care, reaching, carrying, lifting, house/yardwork, driving/computer work      Manual Treatments:  PROM / STM / Oscillations-Mobs:  G-I, II, III, IV (PA's, Inf., Post.)  [] (49283) Provided manual therapy to mobilize soft tissue/joints of cervical/CT, scapular GHJ and UE for the purpose of modulating pain, promoting relaxation,  increasing ROM, reducing/eliminating soft tissue swelling/inflammation/restriction, improving soft tissue extensibility and allowing for proper ROM for normal function with self care, reaching, carrying, lifting, house/yardwork, driving/computer work    Modalities:      Charges:  Timed Code Treatment Minutes: 41    Total Treatment Minutes: 41       [] EVAL (LOW) 75515 (typically 20 minutes face-to-face)  [] EVAL (MOD) 03942 (typically 30 minutes face-to-face)  [] EVAL (HIGH) 90326 (typically 45 minutes face-to-face)  [] RE-EVAL     [x] UX(66549) x 2    [] DRY NEEDLE 1 OR 2 MUSCLES  [] NMR (40637) x     [] DRY NEEDLE 3+ MUSCLES  [x] Manual (03007) x 1      [] TA (40257) x     [] Mech Traction (94424)  [] ES(attended) (76467)     [] ES (un) (27818):   [] VASO (89603)  [] Other:    If BW Please Indicate Time In/Out  CPT Code Time in Time out                                   GOALS:  Patient stated goal: full arm motion  [] Progressing: [] Met: [] Not Met: [] Adjusted    Therapist goals for Patient:   Short Term Goals: To be achieved in: 2 weeks  1. Independent in HEP and progression per patient tolerance, in order to prevent re-injury.    [] Progressing: [] Met: [] Not Met: [] Adjusted  2. Patient will have a decrease in pain to facilitate improvement in movement, function, and ADLs as indicated by Functional Deficits. [] Progressing: [] Met: [] Not Met: [] Adjusted    Long Term Goals: To be achieved in: 10-12 weeks  1. Patient will reach FOTO predicted score of at least 62 to assist with reaching prior level of function with activities such as dressing. [] Progressing: [] Met: [] Not Met: [] Adjusted  2. Patient will demonstrate increased L shoulder flexion AROM to 120 deg to allow for proper joint functioning as indicated by patients Functional Deficits. [] Progressing: [] Met: [] Not Met: [] Adjusted  3. Patient will demonstrate an increase in global L shoulder strength to 4+/5 to allow for proper functional mobility as indicated by patients Functional Deficits. [] Progressing: [] Met: [] Not Met: [] Adjusted  4. Patient will return to all dressing activities, such has wearing shirts and jackets without increased symptoms or restriction. [] Progressing: [] Met: [] Not Met: [] Adjusted  5. Patient will demonstrate 5 repeated overhead reaches with 5# to demonstrate replacing items from overhead cabinets without symptoms or restrictions. [] Progressing: [] Met: [] Not Met: [] Adjusted    ASSESSMENT:  Pt tolerated treatment session fairly well today. All exercises except shoulder abduction isometric performed in sitting secondary to pt feeling unsteady on her feet today. Noted upper trap compensation during L shoulder mobility. Tactile cues required during scapular retractions to also prevent upper trap compensations. Education provided during session for hand stretches and wrist and hand anatomy. Plan to challenge  strength during L shoulder strengthening moving forward.  Pt will continue to benefit from skilled physical therapy to improve strength, ROM, neuromuscular control, and endurance to return to PLOF including reaching overhead and performing ADLs without symptoms or restrictions. Return to Play: (if applicable)   []  Stage 1: Intro to Strength   []  Stage 2: Dynamic Strength and Intro to Plyometrics   []  Stage 3: Advanced Plyometrics and Intro to Throwing   []  Stage 4: Sport specific Training/Return to Sport     []  Ready to Return to Play, [employer]   []  Not Ready for Return to Sports   Comments:      Treatment/Activity Tolerance:  [x] Patient tolerated treatment well [] Patient limited by fatique  [] Patient limited by pain  [] Patient limited by other medical complications  [] Other:     Overall Progression Towards Functional goals/ Treatment Progress Update:  [] Patient is progressing as expected towards functional goals listed. [] Progression is slowed due to complexities/Impairments listed. [] Progression has been slowed due to co-morbidities. [x] Plan just implemented, too soon to assess goals progression <30days   [] Goals require adjustment due to lack of progress  [] Patient is not progressing as expected and requires additional follow up with physician  [] Other    Prognosis for POC: [x] Good [] Fair  [] Poor    Patient requires continued skilled intervention: [x] Yes  [] No      PLAN: See eval  [] Continue per plan of care [] Alter current plan (see comments)  [x] Plan of care initiated [] Hold pending MD visit [] Discharge    Electronically signed by: Nisa Park PT     Note: If patient does not return for scheduled/recommended follow up visits, this note will serve as a discharge from care along with the most recent update on progress.

## 2022-12-06 ENCOUNTER — HOSPITAL ENCOUNTER (OUTPATIENT)
Dept: PHYSICAL THERAPY | Age: 72
Setting detail: THERAPIES SERIES
Discharge: HOME | End: 2022-12-06
Payer: MEDICARE

## 2022-12-06 PROCEDURE — 97110 THERAPEUTIC EXERCISES: CPT

## 2022-12-06 PROCEDURE — 97140 MANUAL THERAPY 1/> REGIONS: CPT

## 2022-12-06 RX ORDER — SPIRONOLACTONE 25 MG/1
TABLET ORAL
Qty: 90 TABLET | Refills: 0 | Status: SHIPPED | OUTPATIENT
Start: 2022-12-06

## 2022-12-06 NOTE — FLOWSHEET NOTE
[facility] [address] [facility]  Phone: [phone] Fax: [phone]    Physical Therapy Treatment Note/ Progress Report:       Date:  2022    Patient Name:  Javier Woodruff    :  1950  MRN: [id number]  Restrictions/Precautions:    Medical Diagnosis:  Other closed displaced fracture of proximal end of left humerus with routine healing, subsequent encounter [S42.292D]  Treatment Diagnosis:      ICD-10-CM     1. Other closed displaced fracture of proximal end of left humerus with routine healing, subsequent encounter  S42.292D         2. Impaired strength of shoulder muscles  R29.898         3. Impaired range of motion of left shoulder  M25.612           Insurance/Certification information:  PT Insurance Information: Medicare/AARP  Physician Information:  Nahomy Caicedo MD  Plan of care signed (Y/N): Y    Date of Patient follow up with Physician:      Progress Report: []  Yes  []  No     Date Range for reporting period:  Beginnin22  Ending:     Progress report due (10 Rx/or 30 days whichever is less):      Recertification due (POC duration/ or 90 days whichever is less): 22     Visit # Insurance Allowable Auth Needed   4 BMN []Yes    [x]No     Pain level:  2-3/10 currently     SUBJECTIVE: Pt reports that she has been more focused on having her L arm swing during ambulating since last visit. Reports it has been helping with her balance and decreased use of cane. Notes that the days following therapy is when she has increased discomfort, but never immediately following sessions. Pt states she has been taking Tylenol as needed.      OBJECTIVE: See eval  Observation:   Test measurements:      RESTRICTIONS/PRECAUTIONS: latex allergy, recent Hx of breast cancer with R lymph node removal, HBP    Exercises/Interventions:   Therapeutic Ex (51792)  Min: 25 Sets/sec Reps CUES/Notes   Supine cane flexion 10 sec 5 Ball squeezes 2 10ea Prone and supine hand   Table slides 10 5 Performed with inclined table   TB no monies 2 10 3 sec hold   Scapular retractions 5 sec 10    Abduction isometric 10 sec 10    Reclined flexion 1 8    Reclined ABD 1 5    TB row 2 5 Red   Cane ER stretch 10 sec 5    Wall slides 1 3 Terminated secondary to balance   Seat Table slides/Wall Slides      Seated HH Depression      No Money      Scap Wall Lat touches/wall walks            Standing flex/scap      Standing Punch      Lawnmower                              Manual Intervention  (96963)  Min: 17      Shld /GH Mobs 3 min  AP grades 2-3   Post Cap mobs      Finger flexion PROM/stretching 3 min     Wrist/hand PROM 2 min  Flex/ext   Shoulder PROM MT 8 min  Scaption and ER   Upper trap STM 4 min           NMR re-education (82443)  Min:      T-spine Ext      GH depress/compress      Scap/GH NMR      Body blade      Wall ball roll      Wall Ball bounce      Ball drops      Porsha Scap Bio      Floor Snow angels-sliders            Therapeutic Activity (09686)  Min:      UE throwing porgression      Dynamic UE stability      Earthquake Bar      Bodyblade                Therapeutic Exercise and NMR EXR  [x] (16937) Provided verbal/tactile cueing for activities related to strengthening, flexibility, endurance, ROM  for improvements in scapular, scapulothoracic and UE control with self care, reaching, carrying, lifting, house/yardwork, driving/computer work. [x] (76158) Provided verbal/tactile cueing for activities related to improving balance, coordination, kinesthetic sense, posture, motor skill, proprioception  to assist with  scapular, scapulothoracic and UE control with self care, reaching, carrying, lifting, house/yardwork, driving/computer work.     Therapeutic Activities:    [] (10116 or 12527) Provided verbal/tactile cueing for activities related to improving balance, coordination, kinesthetic sense, posture, motor skill, proprioception and motor activation to allow for proper function of scapular, scapulothoracic and UE control with self care, carrying, lifting, driving/computer work.      Home Exercise Program:    [x] (79558) Reviewed/Progressed HEP activities related to strengthening, flexibility, endurance, ROM of scapular, scapulothoracic and UE control with self care, reaching, carrying, lifting, house/yardwork, driving/computer work  [] (56510) Reviewed/Progressed HEP activities related to improving balance, coordination, kinesthetic sense, posture, motor skill, proprioception of scapular, scapulothoracic and UE control with self care, reaching, carrying, lifting, house/yardwork, driving/computer work      Manual Treatments:  PROM / STM / Oscillations-Mobs:  G-I, II, III, IV (PA's, Inf., Post.)  [] (09116) Provided manual therapy to mobilize soft tissue/joints of cervical/CT, scapular GHJ and UE for the purpose of modulating pain, promoting relaxation,  increasing ROM, reducing/eliminating soft tissue swelling/inflammation/restriction, improving soft tissue extensibility and allowing for proper ROM for normal function with self care, reaching, carrying, lifting, house/yardwork, driving/computer work    Modalities:      Charges:  Timed Code Treatment Minutes: 42   Total Treatment Minutes: 42       [] EVAL (LOW) 61053 (typically 20 minutes face-to-face)  [] EVAL (MOD) 96550 (typically 30 minutes face-to-face)  [] EVAL (HIGH) 13018 (typically 45 minutes face-to-face)  [] RE-EVAL     [x] WD(63023) x 2    [] DRY NEEDLE 1 OR 2 MUSCLES  [] NMR (90142) x     [] DRY NEEDLE 3+ MUSCLES  [x] Manual (20902) x 1      [] TA (04373) x     [] Mech Traction (25417)  [] ES(attended) (09616)     [] ES (un) (74382):   [] VASO (60672)  [] Other:    If BWC Please Indicate Time In/Out  CPT Code Time in Time out                                   GOALS:  Patient stated goal: full arm motion  [] Progressing: [] Met: [] Not Met: [] Adjusted    Therapist goals for Patient:   Short Term Goals: To be achieved in: 2 weeks  1. Independent in HEP and progression per patient tolerance, in order to prevent re-injury. [] Progressing: [] Met: [] Not Met: [] Adjusted  2. Patient will have a decrease in pain to facilitate improvement in movement, function, and ADLs as indicated by Functional Deficits. [] Progressing: [] Met: [] Not Met: [] Adjusted    Long Term Goals: To be achieved in: 10-12 weeks  1. Patient will reach FOTO predicted score of at least 62 to assist with reaching prior level of function with activities such as dressing. [] Progressing: [] Met: [] Not Met: [] Adjusted  2. Patient will demonstrate increased L shoulder flexion AROM to 120 deg to allow for proper joint functioning as indicated by patients Functional Deficits. [] Progressing: [] Met: [] Not Met: [] Adjusted  3. Patient will demonstrate an increase in global L shoulder strength to 4+/5 to allow for proper functional mobility as indicated by patients Functional Deficits. [] Progressing: [] Met: [] Not Met: [] Adjusted  4. Patient will return to all dressing activities, such has wearing shirts and jackets without increased symptoms or restriction. [] Progressing: [] Met: [] Not Met: [] Adjusted  5. Patient will demonstrate 5 repeated overhead reaches with 5# to demonstrate replacing items from overhead cabinets without symptoms or restrictions. [] Progressing: [] Met: [] Not Met: [] Adjusted    ASSESSMENT:  Pt tolerated treatment session fairly well today. Quick muscular fatigue will all exercises today with extended rest breaks required. Slowly improving scaption PROM. Noted upper trap compensation during L shoulder activation for any form of elevation. Soft tissue restrictions along L upper trap secondary to overuse. Tactile cues required for scapular retraction and prevention of compensation that improved with repetitions. Addition of gentle cane ER stretch to HEP.  L  is improving, however, continues to be limited. Pt will continue to benefit from skilled physical therapy to improve strength, ROM, neuromuscular control, and endurance to return to PLOF including reaching overhead and performing ADLs without symptoms or restrictions. Return to Play: (if applicable)   []  Stage 1: Intro to Strength   []  Stage 2: Dynamic Strength and Intro to Plyometrics   []  Stage 3: Advanced Plyometrics and Intro to Throwing   []  Stage 4: Sport specific Training/Return to Sport     []  Ready to Return to Play, [employer]   []  Not Ready for Return to Sports   Comments:      Treatment/Activity Tolerance:  [x] Patient tolerated treatment well [] Patient limited by fatique  [] Patient limited by pain  [] Patient limited by other medical complications  [] Other:     Overall Progression Towards Functional goals/ Treatment Progress Update:  [] Patient is progressing as expected towards functional goals listed. [] Progression is slowed due to complexities/Impairments listed. [] Progression has been slowed due to co-morbidities. [x] Plan just implemented, too soon to assess goals progression <30days   [] Goals require adjustment due to lack of progress  [] Patient is not progressing as expected and requires additional follow up with physician  [] Other    Prognosis for POC: [x] Good [] Fair  [] Poor    Patient requires continued skilled intervention: [x] Yes  [] No      PLAN: See eval  [x] Continue per plan of care [] Alter current plan (see comments)  [] Plan of care initiated [] Hold pending MD visit [] Discharge    Electronically signed by: Corona Sanon, PT     Note: If patient does not return for scheduled/recommended follow up visits, this note will serve as a discharge from care along with the most recent update on progress.

## 2022-12-06 NOTE — TELEPHONE ENCOUNTER
Received refill request for Spironolactone from [facility].     Last ov:07/28/2022 [facility]    Last labs:08/19/2022 CMP    Last Refill:09/09/2021 #90 tabs w/ 3 refills    Next appointment:03/02/2023 [facility]

## 2022-12-08 ENCOUNTER — HOSPITAL ENCOUNTER (OUTPATIENT)
Dept: PHYSICAL THERAPY | Age: 72
Setting detail: THERAPIES SERIES
Discharge: HOME | End: 2022-12-08
Payer: MEDICARE

## 2022-12-08 ENCOUNTER — TELEPHONE (OUTPATIENT)
Dept: ORTHOPEDIC SURGERY | Age: 72
End: 2022-12-08

## 2022-12-08 PROCEDURE — 97110 THERAPEUTIC EXERCISES: CPT

## 2022-12-08 PROCEDURE — 97140 MANUAL THERAPY 1/> REGIONS: CPT

## 2022-12-08 NOTE — FLOWSHEET NOTE
[facility] [address] [facility]  Phone: [phone] Fax: [phone]    Physical Therapy Treatment Note/ Progress Report:       Date:  2022    Patient Name:  Elmer Prasad    :  1950  MRN: [id number]  Restrictions/Precautions:    Medical Diagnosis:  Other closed displaced fracture of proximal end of left humerus with routine healing, subsequent encounter [S42.292D]  Treatment Diagnosis:      ICD-10-CM     1. Other closed displaced fracture of proximal end of left humerus with routine healing, subsequent encounter  S42.292D         2. Impaired strength of shoulder muscles  R29.898         3. Impaired range of motion of left shoulder  M25.612           Insurance/Certification information:  PT Insurance Information: Medicare/[employer]ol[employer]  Physician Information:  Beau Massey MD  Plan of care signed (Y/N): Y    Date of Patient follow up with Physician:      Progress Report: []  Yes  []  No     Date Range for reporting period:  Beginnin22  Ending:     Progress report due (10 Rx/or 30 days whichever is less):      Recertification due (POC duration/ or 90 days whichever is less): 22     Visit # Insurance Allowable Auth Needed   5 BMN []Yes    [x]No     Pain level:  1-4/10 L hand currently; 3/10 L shoulder     SUBJECTIVE:  Pt reports leaving last session she was feeling good and accomplished, but notes that Tuesday night she was sore and had difficulty falling asleep, so she took Tylenol to help. Wednesday she woke up feeling better, but still had pain. Pt reports she was performing chores and feels that she is using her arm more than she had been. Primary complaint today is pt's hand/wrist pain. Pt states that she believes her hand/wrist might be broken from her initial fall, but has never had imaging performed. Audible click into extension with pain.     OBJECTIVE: See eval  Observation:   Test measurements: RESTRICTIONS/PRECAUTIONS: latex allergy, recent Hx of breast cancer with R lymph node removal, HBP    Exercises/Interventions:   Therapeutic Ex (33302)  Min: 24 Sets/sec Reps CUES/Notes   Supine cane flexion 10 sec 5    Ball squeezes 2 10ea Prone and supine hand   Table slides 10 5 Performed with inclined table   TB no monies 2 10 3 sec hold   Scapular retractions 5 sec 10    Abduction isometric 10 sec 10    Reclined flexion 1 5    Reclined ABD 1 5    TB row 2 5 Red   Cane ER stretch 10 sec 5    Cane AAROM scaption with stretch 1 10    Wall slides 1 3 Terminated secondary to balance   Wall isometric 5 sec 10 ER and ABD; seated in chair   Seated HH Depression      No Money      Scap Wall Lat touches/wall walks            Standing flex/scap      Standing Punch      Lawnmower                              Manual Intervention  (23490)  Min: 18      Shld /GH Mobs 2 min  AP grades 2-3   Post Cap mobs      Finger flexion PROM/stretching 3 min     Wrist/hand PROM 2 min  Flex/ext   Shoulder PROM MT 11 min  Scaption and ER   Upper trap STM 5 min           NMR re-education (36758)  Min:      T-spine Ext      GH depress/compress      Scap/GH NMR      Body blade      Wall ball roll      Wall Ball bounce      Ball drops      Porsha Scap Bio      Floor Snow angels-sliders            Therapeutic Activity (67601)  Min:      UE throwing porgression      Dynamic UE stability      Earthquake Bar      Bodyblade                Therapeutic Exercise and NMR EXR  [x] (15217) Provided verbal/tactile cueing for activities related to strengthening, flexibility, endurance, ROM  for improvements in scapular, scapulothoracic and UE control with self care, reaching, carrying, lifting, house/yardwork, driving/computer work.     [x] (49548) Provided verbal/tactile cueing for activities related to improving balance, coordination, kinesthetic sense, posture, motor skill, proprioception  to assist with  scapular, scapulothoracic and UE control with self care, reaching, carrying, lifting, house/yardwork, driving/computer work. Therapeutic Activities:    [] (07472 or 78846) Provided verbal/tactile cueing for activities related to improving balance, coordination, kinesthetic sense, posture, motor skill, proprioception and motor activation to allow for proper function of scapular, scapulothoracic and UE control with self care, carrying, lifting, driving/computer work.      Home Exercise Program:    [x] (25991) Reviewed/Progressed HEP activities related to strengthening, flexibility, endurance, ROM of scapular, scapulothoracic and UE control with self care, reaching, carrying, lifting, house/yardwork, driving/computer work  [] (70259) Reviewed/Progressed HEP activities related to improving balance, coordination, kinesthetic sense, posture, motor skill, proprioception of scapular, scapulothoracic and UE control with self care, reaching, carrying, lifting, house/yardwork, driving/computer work      Manual Treatments:  PROM / STM / Oscillations-Mobs:  G-I, II, III, IV (PA's, Inf., Post.)  [] (54008) Provided manual therapy to mobilize soft tissue/joints of cervical/CT, scapular GHJ and UE for the purpose of modulating pain, promoting relaxation,  increasing ROM, reducing/eliminating soft tissue swelling/inflammation/restriction, improving soft tissue extensibility and allowing for proper ROM for normal function with self care, reaching, carrying, lifting, house/yardwork, driving/computer work    Modalities:      Charges:  Timed Code Treatment Minutes: 42   Total Treatment Minutes: 42       [] EVAL (LOW) 66662 (typically 20 minutes face-to-face)  [] EVAL (MOD) 94979 (typically 30 minutes face-to-face)  [] EVAL (HIGH) 24798 (typically 45 minutes face-to-face)  [] RE-EVAL     [x] QU(81738) x 2    [] DRY NEEDLE 1 OR 2 MUSCLES  [] NMR (07805) x     [] DRY NEEDLE 3+ MUSCLES  [x] Manual (89277) x 1      [] TA (97150) x     [] Mech Traction (50526)  [] ES(attended) (27740) [] ES (un) (17020):   [] VASO (81227)  [] Other:    If BWC Please Indicate Time In/Out  CPT Code Time in Time out                                   GOALS:  Patient stated goal: full arm motion  [] Progressing: [] Met: [] Not Met: [] Adjusted    Therapist goals for Patient:   Short Term Goals: To be achieved in: 2 weeks  1. Independent in HEP and progression per patient tolerance, in order to prevent re-injury. [] Progressing: [] Met: [] Not Met: [] Adjusted  2. Patient will have a decrease in pain to facilitate improvement in movement, function, and ADLs as indicated by Functional Deficits. [] Progressing: [] Met: [] Not Met: [] Adjusted    Long Term Goals: To be achieved in: 10-12 weeks  1. Patient will reach FOTO predicted score of at least 62 to assist with reaching prior level of function with activities such as dressing. [] Progressing: [] Met: [] Not Met: [] Adjusted  2. Patient will demonstrate increased L shoulder flexion AROM to 120 deg to allow for proper joint functioning as indicated by patients Functional Deficits. [] Progressing: [] Met: [] Not Met: [] Adjusted  3. Patient will demonstrate an increase in global L shoulder strength to 4+/5 to allow for proper functional mobility as indicated by patients Functional Deficits. [] Progressing: [] Met: [] Not Met: [] Adjusted  4. Patient will return to all dressing activities, such has wearing shirts and jackets without increased symptoms or restriction. [] Progressing: [] Met: [] Not Met: [] Adjusted  5. Patient will demonstrate 5 repeated overhead reaches with 5# to demonstrate replacing items from overhead cabinets without symptoms or restrictions. [] Progressing: [] Met: [] Not Met: [] Adjusted    ASSESSMENT:  Pt tolerated treatment session fairly well today. Modification required secondary to shoulder and hand pain reported by pt. Focus of session on manual therapy, ROM exercises, and isometrics for symptom modification.  L upper trap compensation noted with all attempts of L shoulder elevation. Demonstration and tactile cues required for isometric positioning for L shoulder ER and ABD. No pain reported during isometrics; however, pt reports shoulder fatigue by end of session. Pt was educated on contacting Dr. Rajani Law with concerns regarding hand/wrist pain for continuity of care. Pt notes that she is going to follow-up and contact his office. Hand and wrist exercises held today secondary to pt's pain. Pt will continue to benefit from skilled physical therapy to improve strength, ROM, neuromuscular control, and endurance to return to PLOF including reaching overhead and performing ADLs without symptoms or restrictions. Return to Play: (if applicable)   []  Stage 1: Intro to Strength   []  Stage 2: Dynamic Strength and Intro to Plyometrics   []  Stage 3: Advanced Plyometrics and Intro to Throwing   []  Stage 4: Sport specific Training/Return to Sport     []  Ready to Return to Play, [employer]   []  Not Ready for Return to Sports   Comments:      Treatment/Activity Tolerance:  [x] Patient tolerated treatment well [] Patient limited by fatique  [] Patient limited by pain  [] Patient limited by other medical complications  [] Other:     Overall Progression Towards Functional goals/ Treatment Progress Update:  [] Patient is progressing as expected towards functional goals listed. [] Progression is slowed due to complexities/Impairments listed. [] Progression has been slowed due to co-morbidities.   [x] Plan just implemented, too soon to assess goals progression <30days   [] Goals require adjustment due to lack of progress  [] Patient is not progressing as expected and requires additional follow up with physician  [] Other    Prognosis for POC: [x] Good [] Fair  [] Poor    Patient requires continued skilled intervention: [x] Yes  [] No      PLAN: See eval  [x] Continue per plan of care [] Alter current plan (see comments)  [] Plan of care initiated [] Hold pending MD visit [] Discharge    Electronically signed by: Sofiya Puente PT     Note: If patient does not return for scheduled/recommended follow up visits, this note will serve as a discharge from care along with the most recent update on progress.

## 2022-12-08 NOTE — TELEPHONE ENCOUNTER
General Question     Subject: X-RAYS  Patient and /or Facility Request: Ester Dakins  Contact Number: [phone]    PATIENT CALLED IN TO SEE IF SHE CAN GET AN X-RAY OF HER R HAND IN [place] LOCATION. Kecia Costa PLEASE CALL PATIENT BACK AT THE ABOVE NUMBER. ...

## 2022-12-08 NOTE — TELEPHONE ENCOUNTER
Patient having hand pain --  wants xray -- said she would need to see a physician for an order --  she is going to call PCP

## 2022-12-13 ENCOUNTER — HOSPITAL ENCOUNTER (OUTPATIENT)
Dept: PHYSICAL THERAPY | Age: 72
Setting detail: THERAPIES SERIES
Discharge: HOME | End: 2022-12-13
Payer: MEDICARE

## 2022-12-13 PROCEDURE — 97140 MANUAL THERAPY 1/> REGIONS: CPT

## 2022-12-13 PROCEDURE — 97110 THERAPEUTIC EXERCISES: CPT

## 2022-12-13 NOTE — FLOWSHEET NOTE
[place] [address] [facility]  Phone: [phone] Fax: [phone]    Physical Therapy Treatment Note/ Progress Report:       Date:  2022    Patient Name:  Mckenzie Vásquez    :  1950  MRN: [id number]  Restrictions/Precautions:    Medical Diagnosis:  Other closed displaced fracture of proximal end of left humerus with routine healing, subsequent encounter [S42.292D]  Treatment Diagnosis:      ICD-10-CM     1. Other closed displaced fracture of proximal end of left humerus with routine healing, subsequent encounter  S42.292D         2. Impaired strength of shoulder muscles  R29.898         3. Impaired range of motion of left shoulder  M25.612           Insurance/Certification information:  PT Insurance Information: Medicare/AARP  Physician Information:  Keyonna Iniguez MD  Plan of care signed (Y/N): Y    Date of Patient follow up with Physician:      Progress Report: []  Yes  []  No     Date Range for reporting period:  Beginnin22  Ending:     Progress report due (10 Rx/or 30 days whichever is less):      Recertification due (POC duration/ or 90 days whichever is less): 22     Visit # Insurance Allowable Auth Needed   6 BMN []Yes    [x]No     Pain level:  5/10 L hand currently; 2/10 L shoulder     SUBJECTIVE: Pt reports that she contacted her PCP and had x-rays performed of her L hand. Pt reports that x-rays revealed no fractures but osteoarthritis throughout. Pt notes she has also been having blood pressure fluctuations and has follow-up with MD scheduled for next week, but might be moved up. Notes her shoulder is starting to feel looser and she is having less pain along her upper trap.      OBJECTIVE: See eval  Observation:   Test measurements:      RESTRICTIONS/PRECAUTIONS: latex allergy, recent Hx of breast cancer with R lymph node removal, HBP    Exercises/Interventions:   Therapeutic Ex (08364)  Min: 16 Sets/sec Reps CUES/Notes   Supine cane flexion 10 sec 5    Ball squeezes 2 10ea Prone and supine hand   Table slides 10 5 Performed with inclined table   TB no monies 2 10 3 sec hold   Scapular retractions 5 sec 10    Abduction isometric 10 sec 5    Reclined flexion 1 5    Reclined ABD 1 5    TB row 2 5 Red   Cane ER stretch 10 sec 5    Cane AAROM scaption with stretch 1 10    Wall slides 1 3 Terminated secondary to balance   Wall isometric 5 sec 10 ER and ABD; seated in chair   Seated scaption 2 8    Wrist extension 3 5    Scap Wall Lat touches/wall walks            Standing flex/scap      Standing Punch      Lawnmower                              Manual Intervention  (11447)  Min: 25       Shld /GH Mobs 3 min  AP grades 2-3   Post Cap mobs      Finger flexion PROM/stretching 3 min     Wrist/hand PROM 2 min  Flex/ext   Shoulder PROM MT 9 min  Scaption and ER   Upper trap STM 4 min     Wrist extension mobs 5 min  Grades II-III   Wrist PROM 4 min  Flexion/extension         NMR re-education (73098)  Min:      T-spine Ext      GH depress/compress      Scap/GH NMR      Body blade      Wall ball roll      Wall Ball bounce      Ball drops      Porsha Scap Bio      Floor Snow angels-sliders            Therapeutic Activity (55187)  Min:      UE throwing porgression      Dynamic UE stability      Earthquake Bar      Bodyblade                Therapeutic Exercise and NMR EXR  [x] (63692) Provided verbal/tactile cueing for activities related to strengthening, flexibility, endurance, ROM  for improvements in scapular, scapulothoracic and UE control with self care, reaching, carrying, lifting, house/yardwork, driving/computer work.     [x] (70316) Provided verbal/tactile cueing for activities related to improving balance, coordination, kinesthetic sense, posture, motor skill, proprioception  to assist with  scapular, scapulothoracic and UE control with self care, reaching, carrying, lifting, house/yardwork, driving/computer work. Therapeutic Activities:    [] (43443 or 56509) Provided verbal/tactile cueing for activities related to improving balance, coordination, kinesthetic sense, posture, motor skill, proprioception and motor activation to allow for proper function of scapular, scapulothoracic and UE control with self care, carrying, lifting, driving/computer work.      Home Exercise Program:    [x] (75109) Reviewed/Progressed HEP activities related to strengthening, flexibility, endurance, ROM of scapular, scapulothoracic and UE control with self care, reaching, carrying, lifting, house/yardwork, driving/computer work  [] (64664) Reviewed/Progressed HEP activities related to improving balance, coordination, kinesthetic sense, posture, motor skill, proprioception of scapular, scapulothoracic and UE control with self care, reaching, carrying, lifting, house/yardwork, driving/computer work      Manual Treatments:  PROM / STM / Oscillations-Mobs:  G-I, II, III, IV (PA's, Inf., Post.)  [x] (15322) Provided manual therapy to mobilize soft tissue/joints of cervical/CT, scapular GHJ and UE for the purpose of modulating pain, promoting relaxation,  increasing ROM, reducing/eliminating soft tissue swelling/inflammation/restriction, improving soft tissue extensibility and allowing for proper ROM for normal function with self care, reaching, carrying, lifting, house/yardwork, driving/computer work    Modalities:      Charges:  Timed Code Treatment Minutes: 41   Total Treatment Minutes: 41       [] EVAL (LOW) 48544 (typically 20 minutes face-to-face)  [] EVAL (MOD) 43214 (typically 30 minutes face-to-face)  [] EVAL (HIGH) 77893 (typically 45 minutes face-to-face)  [] RE-EVAL     [x] XE(26226) x 1    [] DRY NEEDLE 1 OR 2 MUSCLES  [] NMR (19517) x     [] DRY NEEDLE 3+ MUSCLES  [x] Manual (47739) x 2      [] TA (64302) x     [] Mech Traction (59348)  [] ES(attended) (22479)     [] ES (un) (57526):   [] VASO (38256)  [] Other:    If BWC Please Indicate Time In/Out  CPT Code Time in Time out                                   GOALS:  Patient stated goal: full arm motion  [] Progressing: [] Met: [] Not Met: [] Adjusted    Therapist goals for Patient:   Short Term Goals: To be achieved in: 2 weeks  1. Independent in HEP and progression per patient tolerance, in order to prevent re-injury. [] Progressing: [] Met: [] Not Met: [] Adjusted  2. Patient will have a decrease in pain to facilitate improvement in movement, function, and ADLs as indicated by Functional Deficits. [] Progressing: [] Met: [] Not Met: [] Adjusted    Long Term Goals: To be achieved in: 10-12 weeks  1. Patient will reach FOTO predicted score of at least 62 to assist with reaching prior level of function with activities such as dressing. [] Progressing: [] Met: [] Not Met: [] Adjusted  2. Patient will demonstrate increased L shoulder flexion AROM to 120 deg to allow for proper joint functioning as indicated by patients Functional Deficits. [] Progressing: [] Met: [] Not Met: [] Adjusted  3. Patient will demonstrate an increase in global L shoulder strength to 4+/5 to allow for proper functional mobility as indicated by patients Functional Deficits. [] Progressing: [] Met: [] Not Met: [] Adjusted  4. Patient will return to all dressing activities, such has wearing shirts and jackets without increased symptoms or restriction. [] Progressing: [] Met: [] Not Met: [] Adjusted  5. Patient will demonstrate 5 repeated overhead reaches with 5# to demonstrate replacing items from overhead cabinets without symptoms or restrictions. [] Progressing: [] Met: [] Not Met: [] Adjusted    ASSESSMENT:  Pt tolerated treatment session fairly well today. Primary complaint of L hand and wrist pain greater than L shoulder pain. Education provided regarding osteoarthritis clinical course and features as well as activities for symptom modification.  Wrist mobilizations added today for symptom modification and improving extension ROM. Mobilization with movement during extension with decreased symptoms. Noted stretch at end range L shoulder flexion. Upper trap compensation during scaption repetitions. Pt will continue to benefit from skilled physical therapy to improve strength, ROM, neuromuscular control, and endurance to return to PLOF including reaching overhead and performing ADLs without symptoms or restrictions. Return to Play: (if applicable)   []  Stage 1: Intro to Strength   []  Stage 2: Dynamic Strength and Intro to Plyometrics   []  Stage 3: Advanced Plyometrics and Intro to Throwing   []  Stage 4: Sport specific Training/Return to Sport     []  Ready to Return to Play, [employer]   []  Not Ready for Return to Sports   Comments:      Treatment/Activity Tolerance:  [x] Patient tolerated treatment well [] Patient limited by fatique  [] Patient limited by pain  [] Patient limited by other medical complications  [] Other:     Overall Progression Towards Functional goals/ Treatment Progress Update:  [] Patient is progressing as expected towards functional goals listed. [] Progression is slowed due to complexities/Impairments listed. [] Progression has been slowed due to co-morbidities.   [x] Plan just implemented, too soon to assess goals progression <30days   [] Goals require adjustment due to lack of progress  [] Patient is not progressing as expected and requires additional follow up with physician  [] Other    Prognosis for POC: [x] Good [] Fair  [] Poor    Patient requires continued skilled intervention: [x] Yes  [] No      PLAN: See eval  [x] Continue per plan of care [] Alter current plan (see comments)  [] Plan of care initiated [] Hold pending MD visit [] Discharge    Electronically signed by: Isaac Cash PT     Note: If patient does not return for scheduled/recommended follow up visits, this note will serve as a discharge from care along with the most recent update on progress.

## 2022-12-15 ENCOUNTER — HOSPITAL ENCOUNTER (OUTPATIENT)
Dept: PHYSICAL THERAPY | Age: 72
Setting detail: THERAPIES SERIES
Discharge: HOME | End: 2022-12-15
Payer: MEDICARE

## 2022-12-15 PROCEDURE — 97140 MANUAL THERAPY 1/> REGIONS: CPT

## 2022-12-15 PROCEDURE — 97110 THERAPEUTIC EXERCISES: CPT

## 2022-12-15 NOTE — FLOWSHEET NOTE
[place] [address] [facility]  Phone: [phone] Fax: [phone]    Physical Therapy Treatment Note/ Progress Report:       Date:  12/15/2022    Patient Name:  Janet Moreno    :  1950  MRN: [id number]  Restrictions/Precautions:    Medical Diagnosis:  Other closed displaced fracture of proximal end of left humerus with routine healing, subsequent encounter [S42.292D]  Treatment Diagnosis:      ICD-10-CM     1. Other closed displaced fracture of proximal end of left humerus with routine healing, subsequent encounter  S42.292D         2. Impaired strength of shoulder muscles  R29.898         3. Impaired range of motion of left shoulder  M25.612           Insurance/Certification information:  PT Insurance Information: Medicare/AARP  Physician Information:  Rachel Santiago MD  Plan of care signed (Y/N): Y    Date of Patient follow up with Physician:      Progress Report: []  Yes  []  No     Date Range for reporting period:  Beginnin22  Ending:     Progress report due (10 Rx/or 30 days whichever is less):      Recertification due (POC duration/ or 90 days whichever is less): 22     Visit # Insurance Allowable Auth Needed   7 BMN []Yes    [x]No     Pain level:  3/10 L hand currently; 0/10 L shoulder     SUBJECTIVE: Pt reports that she does not have any shoulder pain the start of the session today. Notes mild hand/wrist pain. Pt reports she saw an orthopedic doctor for her hand and was referred to a hand specialist for therapy with [facility]. Reports last night the back of her shoulder was painful.     OBJECTIVE: See eval  Observation:   Test measurements:      RESTRICTIONS/PRECAUTIONS: latex allergy, recent Hx of breast cancer with R lymph node removal, HBP    Exercises/Interventions:   Therapeutic Ex (01650)  Min: 19 Sets/sec Reps CUES/Notes   Sitting cane flexion 10 sec 5    Ball squeezes 2 10ea Prone and supine hand   Table slides 10 5 Performed with inclined table   TB no monies 2 10 3 sec hold   Scapular retractions 2 10 Manual resistance   Abduction isometric 10 sec 5 Manual resistance   Reclined flexion 1 5    Seated cane scaption 2 5 10 sec hold at top   Reclined ABD 1 5    TB row 2 5 Red   Cane ER stretch 10 sec 5    Cane AAROM scaption with stretch 1 10    Wall slides 1 3 Terminated secondary to balance   Wall isometric 5 sec 10 ER and ABD; seated in chair   Seated scaption 2 8    Wrist extension 3 5    Scap Wall Lat touches/wall walks      Seated external rotaion 2 10 Manual resistance   Standing flex/scap      Standing Punch      Lawnmower                              Manual Intervention  (58268)  Min: 23       Shld /GH Mobs 5 min  AP grades 2-3   Post Cap mobs      Finger flexion PROM/stretching 3 min     Wrist/hand PROM 2 min  Flex/ext   Shoulder PROM MT 11 min  Scaption and ER   Upper trap STM 7 min     Wrist extension mobs 5 min  Grades II-III   Wrist PROM 4 min  Flexion/extension         NMR re-education (59512)  Min:      T-spine Ext      GH depress/compress      Scap/GH NMR      Body blade      Wall ball roll      Wall Ball bounce      Ball drops      Porsha Scap Bio      Floor Snow angels-sliders            Therapeutic Activity (04032)  Min:      UE throwing porgression      Dynamic UE stability      Earthquake Bar      Bodyblade                Therapeutic Exercise and NMR EXR  [x] (11201) Provided verbal/tactile cueing for activities related to strengthening, flexibility, endurance, ROM  for improvements in scapular, scapulothoracic and UE control with self care, reaching, carrying, lifting, house/yardwork, driving/computer work.     [x] (99228) Provided verbal/tactile cueing for activities related to improving balance, coordination, kinesthetic sense, posture, motor skill, proprioception  to assist with  scapular, scapulothoracic and UE control with self care, reaching, carrying, lifting, house/yardwork, driving/computer work. Therapeutic Activities:    [] (14994 or 92448) Provided verbal/tactile cueing for activities related to improving balance, coordination, kinesthetic sense, posture, motor skill, proprioception and motor activation to allow for proper function of scapular, scapulothoracic and UE control with self care, carrying, lifting, driving/computer work.      Home Exercise Program:    [x] (22727) Reviewed/Progressed HEP activities related to strengthening, flexibility, endurance, ROM of scapular, scapulothoracic and UE control with self care, reaching, carrying, lifting, house/yardwork, driving/computer work  [] (65727) Reviewed/Progressed HEP activities related to improving balance, coordination, kinesthetic sense, posture, motor skill, proprioception of scapular, scapulothoracic and UE control with self care, reaching, carrying, lifting, house/yardwork, driving/computer work      Manual Treatments:  PROM / STM / Oscillations-Mobs:  G-I, II, III, IV (PA's, Inf., Post.)  [x] (92826) Provided manual therapy to mobilize soft tissue/joints of cervical/CT, scapular GHJ and UE for the purpose of modulating pain, promoting relaxation,  increasing ROM, reducing/eliminating soft tissue swelling/inflammation/restriction, improving soft tissue extensibility and allowing for proper ROM for normal function with self care, reaching, carrying, lifting, house/yardwork, driving/computer work    Modalities:      Charges:  Timed Code Treatment Minutes: 42   Total Treatment Minutes: 42       [] EVAL (LOW) 85156 (typically 20 minutes face-to-face)  [] EVAL (MOD) 21396 (typically 30 minutes face-to-face)  [] EVAL (HIGH) 00271 (typically 45 minutes face-to-face)  [] RE-EVAL     [x] SHAFFER(87552) x 1    [] DRY NEEDLE 1 OR 2 MUSCLES  [] NMR (91987) x     [] DRY NEEDLE 3+ MUSCLES  [x] Manual (67637) x 2      [] TA (10587) x     [] Mech Traction (34263)  [] ES(attended) (24933)     [] ES (un) (33513):   [] VASO (56952)  [] Other:    If BW Please Indicate Time In/Out  CPT Code Time in Time out                                   GOALS:  Patient stated goal: full arm motion  [] Progressing: [] Met: [] Not Met: [] Adjusted    Therapist goals for Patient:   Short Term Goals: To be achieved in: 2 weeks  1. Independent in HEP and progression per patient tolerance, in order to prevent re-injury. [] Progressing: [] Met: [] Not Met: [] Adjusted  2. Patient will have a decrease in pain to facilitate improvement in movement, function, and ADLs as indicated by Functional Deficits. [] Progressing: [] Met: [] Not Met: [] Adjusted    Long Term Goals: To be achieved in: 10-12 weeks  1. Patient will reach FOTO predicted score of at least 62 to assist with reaching prior level of function with activities such as dressing. [] Progressing: [] Met: [] Not Met: [] Adjusted  2. Patient will demonstrate increased L shoulder flexion AROM to 120 deg to allow for proper joint functioning as indicated by patients Functional Deficits. [] Progressing: [] Met: [] Not Met: [] Adjusted  3. Patient will demonstrate an increase in global L shoulder strength to 4+/5 to allow for proper functional mobility as indicated by patients Functional Deficits. [] Progressing: [] Met: [] Not Met: [] Adjusted  4. Patient will return to all dressing activities, such has wearing shirts and jackets without increased symptoms or restriction. [] Progressing: [] Met: [] Not Met: [] Adjusted  5. Patient will demonstrate 5 repeated overhead reaches with 5# to demonstrate replacing items from overhead cabinets without symptoms or restrictions. [] Progressing: [] Met: [] Not Met: [] Adjusted    ASSESSMENT:  Pt tolerated treatment session fairly well today. No noted shoulder pain at start of session. Slowly improving L shoulder ROM. Significant upper trap compensation during shoulder elevation.  L hand pain limits resistance strengthening secondary to inability to grasp. Manual resistance applied as needed. Attempted activation of rotator cuff musculature during shoulder scaption with difficulty noted from pt. Pt noted stretch at end range of available flexion. Pt will continue to benefit from skilled physical therapy to improve strength, ROM, neuromuscular control, and endurance to return to PLOF including reaching overhead and performing ADLs without symptoms or restrictions. Return to Play: (if applicable)   []  Stage 1: Intro to Strength   []  Stage 2: Dynamic Strength and Intro to Plyometrics   []  Stage 3: Advanced Plyometrics and Intro to Throwing   []  Stage 4: Sport specific Training/Return to Sport     []  Ready to Return to Play, [place] [employer]   []  Not Ready for Return to Sports   Comments:      Treatment/Activity Tolerance:  [x] Patient tolerated treatment well [] Patient limited by fatique  [] Patient limited by pain  [] Patient limited by other medical complications  [] Other:     Overall Progression Towards Functional goals/ Treatment Progress Update:  [] Patient is progressing as expected towards functional goals listed. [] Progression is slowed due to complexities/Impairments listed. [] Progression has been slowed due to co-morbidities. [x] Plan just implemented, too soon to assess goals progression <30days   [] Goals require adjustment due to lack of progress  [] Patient is not progressing as expected and requires additional follow up with physician  [] Other    Prognosis for POC: [x] Good [] Fair  [] Poor    Patient requires continued skilled intervention: [x] Yes  [] No      PLAN: See eval  [x] Continue per plan of care [] Alter current plan (see comments)  [] Plan of care initiated [] Hold pending MD visit [] Discharge    Electronically signed by: Roula Burns PT     Note: If patient does not return for scheduled/recommended follow up visits, this note will serve as a discharge from care along with the most recent update on progress.

## 2022-12-16 ENCOUNTER — OFFICE VISIT (OUTPATIENT)
Dept: ENT CLINIC | Age: 72
End: 2022-12-16
Payer: MEDICARE

## 2022-12-16 VITALS
RESPIRATION RATE: 16 BRPM | TEMPERATURE: 97.1 F | DIASTOLIC BLOOD PRESSURE: 69 MMHG | SYSTOLIC BLOOD PRESSURE: 167 MMHG | WEIGHT: 250 LBS | BODY MASS INDEX: 40.18 KG/M2 | HEART RATE: 80 BPM | HEIGHT: 66 IN

## 2022-12-16 DIAGNOSIS — G47.33 OBSTRUCTIVE SLEEP APNEA: Primary | ICD-10-CM

## 2022-12-16 PROCEDURE — 1123F ACP DISCUSS/DSCN MKR DOCD: CPT | Performed by: OTOLARYNGOLOGY

## 2022-12-16 PROCEDURE — 99203 OFFICE O/P NEW LOW 30 MIN: CPT | Performed by: OTOLARYNGOLOGY

## 2022-12-16 PROCEDURE — 3017F COLORECTAL CA SCREEN DOC REV: CPT | Performed by: OTOLARYNGOLOGY

## 2022-12-16 PROCEDURE — G8484 FLU IMMUNIZE NO ADMIN: HCPCS | Performed by: OTOLARYNGOLOGY

## 2022-12-16 PROCEDURE — G8427 DOCREV CUR MEDS BY ELIG CLIN: HCPCS | Performed by: OTOLARYNGOLOGY

## 2022-12-16 PROCEDURE — G8417 CALC BMI ABV UP PARAM F/U: HCPCS | Performed by: OTOLARYNGOLOGY

## 2022-12-16 PROCEDURE — G9899 SCRN MAM PERF RSLTS DOC: HCPCS | Performed by: OTOLARYNGOLOGY

## 2022-12-16 PROCEDURE — 3078F DIAST BP <80 MM HG: CPT | Performed by: OTOLARYNGOLOGY

## 2022-12-16 PROCEDURE — 1036F TOBACCO NON-USER: CPT | Performed by: OTOLARYNGOLOGY

## 2022-12-16 PROCEDURE — 1090F PRES/ABSN URINE INCON ASSESS: CPT | Performed by: OTOLARYNGOLOGY

## 2022-12-16 PROCEDURE — 3074F SYST BP LT 130 MM HG: CPT | Performed by: OTOLARYNGOLOGY

## 2022-12-16 PROCEDURE — G8399 PT W/DXA RESULTS DOCUMENT: HCPCS | Performed by: OTOLARYNGOLOGY

## 2022-12-16 RX ORDER — LOSARTAN POTASSIUM 50 MG/1
50 TABLET ORAL DAILY
COMMUNITY

## 2022-12-16 ASSESSMENT — ENCOUNTER SYMPTOMS
APNEA: 0
COUGH: 0
SINUS PRESSURE: 0
TROUBLE SWALLOWING: 0
SHORTNESS OF BREATH: 0
EYE ITCHING: 0
SORE THROAT: 0
FACIAL SWELLING: 0
VOICE CHANGE: 0

## 2022-12-16 NOTE — PROGRESS NOTES
[facility], [address], [address], [address] [place] [address]  P: [phone]       Patient     Huey Krishnamurthy  1950    ChiefComplaint     Chief Complaint   Patient presents with    New Patient     Patient is here to discuss Inspire. She has used a CPAP in the past but stopped when having difficulty putting the mask on. History of Present Illness     Caitlyn Shelby is a 74year old female here today for evaluation of obstructive sleep apnea and discussion of inspire. Diagnosed with moderate JOSE MANUEL AHI 19.6 on PSG 5/2021. Had been using CPAP and doing well, unfortunately had severe left arm fracture requiring surgery with subsequent weakness/non-functional left hand. Has been unable to use CPAP as she can no longer manipulate the mask onto her face/head. Looking for alternative treatment. Past Medical History     Past Medical History:   Diagnosis Date    Arthritis     Back pain     CHF (congestive heart failure) (HCC)     Chronic diastolic heart failure (HCC)     Diabetes mellitus ([address]     Diabetes mellitus ([address]     TYPE 2    GERD (gastroesophageal reflux disease)     High cholesterol     Hypertension     Malignant neoplasm of right female breast ([address] 05/03/2022    Polio     Polio     AS INFANT    Shingles     OCT 2013    Sleep apnea     Urinary problem     HAS BASHFUL BLADDER       Past Surgical History     Past Surgical History:   Procedure Laterality Date    BACK SURGERY      BACK SURGERY      1/13/14 x2    BREAST BIOPSY Right 05/03/2022    RIGHT RADIOFREQUENCY IDENTIFICATION TAG LOCALIZED PARTIAL MASTECTOMY RIGHT SENTINEL LYMPH NODE BIOPSY performed by Augusta Galloway MD at [address] Right 05/03/2022    .  performed by Augusta Galloway MD at [address] [place] [address] Left 08/23/2022    OPEN REDUCTION INTERNAL FIXATION LEFT PROXIMAL HUMERUS AND SHAFT FRACTURES-REGIONAL-SYNTHES performed by Jessica Cabrera MD at [address] [facility] JOINT REPLACEMENT      bilateral knee replacements    JOINT REPLACEMENT      [place] KNEES    KNEE SURGERY      [facility] BREAST BIOPSY RIGHT Right 04/06/2022    [facility] LOC BREAST BIOPSY RIGHT 4/6/2022 [facility] [address]    OTHER SURGICAL HISTORY  01/13/2014    L4-5 DECOMPRESSION, POSTERIOR LATERAL FUSION AND PEDICLE       Family History     Family History   Problem Relation Age of Onset    Heart Disease Mother     Cancer Mother         breast cancer, 5    Breast Cancer Mother     Early Death Father     Diabetes Sister        Social History     Social History     Tobacco Use    Smoking status: Never    Smokeless tobacco: Never   Vaping Use    Vaping Use: Never used   Substance Use Topics    Alcohol use: No    Drug use: No        Allergies     Allergies   Allergen Reactions    Metformin Nausea Only and Other (See Comments)     \"ate the lining of her stomach\"      Adhesive Tape      Skin irritation and blisters  - does okay with steri strips and tegaderm       Medications     Current Outpatient Medications   Medication Sig Dispense Refill    losartan (COZAAR) 50 MG tablet Take 50 mg by mouth daily      spironolactone (ALDACTONE) 25 MG tablet Take 1 tablet by mouth once daily 90 tablet 0    Cholecalciferol (VITAMIN D3 PO) Take 1,000 mg by mouth      Multiple Vitamins-Minerals (HAIR SKIN AND NAILS FORMULA) TABS Take by mouth      vitamin C (ASCORBIC ACID) 500 MG tablet Take 500 mg by mouth daily      Multiple Vitamins-Minerals (THERAPEUTIC MULTIVITAMIN-MINERALS) tablet Take 1 tablet by mouth daily Essential 1 Pro caps      metoprolol succinate (TOPROL XL) 50 MG extended release tablet Take 1 tablet by mouth daily 90 tablet 3    metoprolol succinate (TOPROL XL) 25 MG extended release tablet Take 1 tablet by mouth daily 90 tablet 3    furosemide (LASIX) 20 MG tablet Take 2 tablets by mouth daily 180 tablet 3    meloxicam (MOBIC) 15 MG tablet Take 7.5 mg by mouth daily      insulin glargine (LANTUS) 100 UNIT/ML injection vial Inject 30 Units into the skin nightly       pantoprazole (PROTONIX) 40 MG tablet Take 40 mg by mouth daily      famotidine (PEPCID) 40 MG tablet Take 40 mg by mouth daily       aspirin 81 MG chewable tablet Take 1 tablet by mouth daily. 30 tablet 3    acetaminophen (TYLENOL) 500 MG tablet Take 500 mg by mouth every 6 hours as needed for Pain. Semaglutide,0.25 or 0.5MG/DOS, (OZEMPIC, 0.25 OR 0.5 MG/DOSE,) 2 MG/1.5ML SOPN once a week  (Patient not taking: Reported on 12/16/2022)       No current facility-administered medications for this visit. Review of Systems     Review of Systems   Constitutional:  Negative for appetite change, chills, fatigue, fever and unexpected weight change. HENT:  Negative for congestion, ear discharge, ear pain, facial swelling, hearing loss, nosebleeds, postnasal drip, sinus pressure, sneezing, sore throat, tinnitus, trouble swallowing and voice change. Eyes:  Negative for itching. Respiratory:  Negative for apnea, cough and shortness of breath. Endocrine: Negative for cold intolerance and heat intolerance. Musculoskeletal:  Negative for myalgias and neck pain. +left arm/hand injury   Skin:  Negative for rash. Allergic/Immunologic: Negative for environmental allergies. Neurological:  Negative for dizziness and headaches. Psychiatric/Behavioral:  Negative for confusion, decreased concentration and sleep disturbance. PhysicalExam     Vitals:    12/16/22 1142   BP: (!) 167/69   Site: Left Lower Arm   Position: Sitting   Cuff Size: Medium Adult   Pulse: 80   Resp: 16   Temp: 97.1 °F (36.2 °C)   TempSrc: Infrared   Weight: 250 lb (113.4 kg)   Height: 5' 6\" (1.676 m)       Physical Exam  Constitutional:       General: She is not in acute distress. Appearance: She is well-developed. HENT:      Head: Normocephalic and atraumatic. Right Ear: Tympanic membrane, ear canal and external ear normal. No drainage.  No middle criteria expanding but timing of this is unknown  -wishes to wait until BMI lower and Inspire program has started in [place] as transportation to [facility] is extremely difficult    Will wait for lower BMI and [place] [facility] program      Marcelo Bachelor, DO  12/16/22      Portions of this note were dictated using Dragon.  There may be linguistic errors secondary to the use of this program.

## 2022-12-20 ENCOUNTER — HOSPITAL ENCOUNTER (OUTPATIENT)
Dept: PHYSICAL THERAPY | Age: 72
Setting detail: THERAPIES SERIES
Discharge: HOME | End: 2022-12-20
Payer: MEDICARE

## 2022-12-20 PROCEDURE — 97110 THERAPEUTIC EXERCISES: CPT

## 2022-12-20 PROCEDURE — 97140 MANUAL THERAPY 1/> REGIONS: CPT

## 2022-12-20 NOTE — FLOWSHEET NOTE
[place] [address] [facility]  Phone: [phone] Fax: [phone]    Physical Therapy Treatment Note/ Progress Report:       Date:  2022    Patient Name:  Holden Aguilar    :  1950  MRN: [id number]  Restrictions/Precautions:    Medical Diagnosis:  Other closed displaced fracture of proximal end of left humerus with routine healing, subsequent encounter [S42.292D]  Treatment Diagnosis:      ICD-10-CM     1. Other closed displaced fracture of proximal end of left humerus with routine healing, subsequent encounter  S42.292D         2. Impaired strength of shoulder muscles  R29.898         3. Impaired range of motion of left shoulder  M25.612           Insurance/Certification information:  PT Insurance Information: Medicare/AARP  Physician Information:  Jakob Barr MD  Plan of care signed (Y/N): Y    Date of Patient follow up with Physician:      Progress Report: []  Yes  []  No     Date Range for reporting period:  Beginnin22  Ending:     Progress report due (10 Rx/or 30 days whichever is less):      Recertification due (POC duration/ or 90 days whichever is less): 22     Visit # Insurance Allowable Auth Needed   8 BMN []Yes    [x]No     Pain level:  0/10 L shoulder     SUBJECTIVE: Pt reports she is coming from occupational therapy today for her hand. Pt notes that she has had discomfort in her shoulder, notes the pain is in her hand. States she feels like she is using her L arm more than she has.     OBJECTIVE: See eval  Observation:   Test measurements:      RESTRICTIONS/PRECAUTIONS: latex allergy, recent Hx of breast cancer with R lymph node removal, HBP    Exercises/Interventions:   Therapeutic Ex (02160)  Min: 23 Sets/sec Reps CUES/Notes   Sitting cane flexion 10 sec 5    Ball squeezes 2 10ea Prone and supine hand   Table slides 10 5 Performed with inclined table   TB no monies 2 10 3 sec hold   Scapular retractions 2 10 Manual resistance   Abduction isometric 10 sec 5 Manual resistance   Reclined flexion 1 5    Seated cane scaption 2 5 10 sec hold at top   Reclined ABD 1 5    TB row 2 5 Red   Cane ER stretch 10 sec 5    Cane AAROM scaption with stretch 1 10    Wall slides 1 3 Terminated secondary to balance   Wall isometric 5 sec 10 ER and ABD; seated in chair   Seated scaption 2 8    Supine shoulder flexion 2 8    Supine AAROM cane flexion 5 sec 8    Supine punch 2 5    Seated external rotaion 2 10 Manual resistance   Standing flex/scap      Standing Punch      Lawnmower                              Manual Intervention  (87333)  Min: 20       Shld /GH Mobs 5 min  AP grades 2-3   Post Cap mobs      Finger flexion PROM/stretching 3 min     Wrist/hand PROM 2 min  Flex/ext   Shoulder PROM MT 8 min  Scaption and ER   Upper trap STM 7 min     Wrist extension mobs 5 min  Grades II-III   Wrist PROM 4 min  Flexion/extension         NMR re-education (80898)  Min:      T-spine Ext      GH depress/compress      Scap/GH NMR      Body blade      Wall ball roll      Wall Ball bounce      Ball drops      Porsha Scap Bio      Floor Snow angels-sliders            Therapeutic Activity (13916)  Min:      UE throwing porgression      Dynamic UE stability      Earthquake Bar      Bodyblade                Therapeutic Exercise and NMR EXR  [x] (25419) Provided verbal/tactile cueing for activities related to strengthening, flexibility, endurance, ROM  for improvements in scapular, scapulothoracic and UE control with self care, reaching, carrying, lifting, house/yardwork, driving/computer work. [x] (71222) Provided verbal/tactile cueing for activities related to improving balance, coordination, kinesthetic sense, posture, motor skill, proprioception  to assist with  scapular, scapulothoracic and UE control with self care, reaching, carrying, lifting, house/yardwork, driving/computer work.     Therapeutic Activities:    [] (48609 or ) Provided verbal/tactile cueing for activities related to improving balance, coordination, kinesthetic sense, posture, motor skill, proprioception and motor activation to allow for proper function of scapular, scapulothoracic and UE control with self care, carrying, lifting, driving/computer work.      Home Exercise Program:    [x] (09875) Reviewed/Progressed HEP activities related to strengthening, flexibility, endurance, ROM of scapular, scapulothoracic and UE control with self care, reaching, carrying, lifting, house/yardwork, driving/computer work  [] (51870) Reviewed/Progressed HEP activities related to improving balance, coordination, kinesthetic sense, posture, motor skill, proprioception of scapular, scapulothoracic and UE control with self care, reaching, carrying, lifting, house/yardwork, driving/computer work      Manual Treatments:  PROM / STM / Oscillations-Mobs:  G-I, II, III, IV (PA's, Inf., Post.)  [x] (23171) Provided manual therapy to mobilize soft tissue/joints of cervical/CT, scapular GHJ and UE for the purpose of modulating pain, promoting relaxation,  increasing ROM, reducing/eliminating soft tissue swelling/inflammation/restriction, improving soft tissue extensibility and allowing for proper ROM for normal function with self care, reaching, carrying, lifting, house/yardwork, driving/computer work    Modalities:      Charges:  Timed Code Treatment Minutes: 43   Total Treatment Minutes: 43       [] EVAL (LOW) 33144 (typically 20 minutes face-to-face)  [] EVAL (MOD) 97849 (typically 30 minutes face-to-face)  [] EVAL (HIGH) 88691 (typically 45 minutes face-to-face)  [] RE-EVAL     [x] MZ(95865) x 2    [] DRY NEEDLE 1 OR 2 MUSCLES  [] NMR (08303) x     [] DRY NEEDLE 3+ MUSCLES  [x] Manual (44121) x 1      [] TA (29342) x     [] Mech Traction (16243)  [] ES(attended) (18539)     [] ES (un) (85488):   [] VASO (69646)  [] Other:    If [facility] Please Indicate Time In/Out  CPT Code Time in Time out                                   GOALS:  Patient stated goal: full arm motion  [] Progressing: [] Met: [] Not Met: [] Adjusted    Therapist goals for Patient:   Short Term Goals: To be achieved in: 2 weeks  1. Independent in HEP and progression per patient tolerance, in order to prevent re-injury. [] Progressing: [] Met: [] Not Met: [] Adjusted  2. Patient will have a decrease in pain to facilitate improvement in movement, function, and ADLs as indicated by Functional Deficits. [] Progressing: [] Met: [] Not Met: [] Adjusted    Long Term Goals: To be achieved in: 10-12 weeks  1. Patient will reach FOTO predicted score of at least 62 to assist with reaching prior level of function with activities such as dressing. [] Progressing: [] Met: [] Not Met: [] Adjusted  2. Patient will demonstrate increased L shoulder flexion AROM to 120 deg to allow for proper joint functioning as indicated by patients Functional Deficits. [] Progressing: [] Met: [] Not Met: [] Adjusted  3. Patient will demonstrate an increase in global L shoulder strength to 4+/5 to allow for proper functional mobility as indicated by patients Functional Deficits. [] Progressing: [] Met: [] Not Met: [] Adjusted  4. Patient will return to all dressing activities, such has wearing shirts and jackets without increased symptoms or restriction. [] Progressing: [] Met: [] Not Met: [] Adjusted  5. Patient will demonstrate 5 repeated overhead reaches with 5# to demonstrate replacing items from overhead cabinets without symptoms or restrictions. [] Progressing: [] Met: [] Not Met: [] Adjusted    ASSESSMENT:  Pt tolerated treatment session fairly well today. Slowly improving PROM with stretch felt at end range reported from pt. Addition of supine shoulder active flexion today with pt able to perform from 0 deg to ~30 deg for 4 reps. Assistance with shoulder flexion and eccentric control performed with difficulty noted from pt.  Global L shoulder strength impairments remain. Noted trigger point along L upper trap that improved with manual therapy. Adjustments made during L shoulder ER for L hand/wrist pain. Pt will continue to benefit from skilled physical therapy to improve strength, ROM, neuromuscular control, and endurance to return to PLOF including reaching overhead and performing ADLs without symptoms or restrictions. Return to Play: (if applicable)   []  Stage 1: Intro to Strength   []  Stage 2: Dynamic Strength and Intro to Plyometrics   []  Stage 3: Advanced Plyometrics and Intro to Throwing   []  Stage 4: Sport specific Training/Return to Sport     []  Ready to Return to Play, [place] [employer]   []  Not Ready for Return to Sports   Comments:      Treatment/Activity Tolerance:  [x] Patient tolerated treatment well [] Patient limited by fatique  [] Patient limited by pain  [] Patient limited by other medical complications  [] Other:     Overall Progression Towards Functional goals/ Treatment Progress Update:  [] Patient is progressing as expected towards functional goals listed. [] Progression is slowed due to complexities/Impairments listed. [] Progression has been slowed due to co-morbidities. [x] Plan just implemented, too soon to assess goals progression <30days   [] Goals require adjustment due to lack of progress  [] Patient is not progressing as expected and requires additional follow up with physician  [] Other    Prognosis for POC: [x] Good [] Fair  [] Poor    Patient requires continued skilled intervention: [x] Yes  [] No      PLAN: See eval  [x] Continue per plan of care [] Alter current plan (see comments)  [] Plan of care initiated [] Hold pending MD visit [] Discharge    Electronically signed by: Genie Bernard PT     Note: If patient does not return for scheduled/recommended follow up visits, this note will serve as a discharge from care along with the most recent update on progress.

## 2022-12-22 ENCOUNTER — HOSPITAL ENCOUNTER (OUTPATIENT)
Dept: PHYSICAL THERAPY | Age: 72
Setting detail: THERAPIES SERIES
Discharge: HOME | End: 2022-12-22
Payer: MEDICARE

## 2022-12-22 PROCEDURE — 97110 THERAPEUTIC EXERCISES: CPT

## 2022-12-22 PROCEDURE — 97140 MANUAL THERAPY 1/> REGIONS: CPT

## 2022-12-22 NOTE — PROGRESS NOTES
[facility] [address] [address] [place]  Phone: [phone] Fax: [phone]    Physical Therapy Treatment Note/ Progress Report:       Date:  2022    Patient Name:  Luan Govea    :  1950  MRN: [id number]  Restrictions/Precautions:    Medical Diagnosis:  Other closed displaced fracture of proximal end of left humerus with routine healing, subsequent encounter [S42.292D]  Treatment Diagnosis:      ICD-10-CM     1. Other closed displaced fracture of proximal end of left humerus with routine healing, subsequent encounter  S42.292D         2. Impaired strength of shoulder muscles  R29.898         3. Impaired range of motion of left shoulder  M25.612           Insurance/Certification information:  PT Insurance Information: Medicare/AARP  Physician Information:  Justin Fowler MD  Plan of care signed (Y/N): Y    Date of Patient follow up with Physician:      Progress Report: []  Yes  []  No     Date Range for reporting period:  Beginnin22  Ending:     Progress report due (10 Rx/or 30 days whichever is less): 51     Recertification due (POC duration/ or 90 days whichever is less): 22     Visit # Insurance Allowable Auth Needed   9 BMN []Yes    [x]No     Pain level:  6/10 L shoulder     SUBJECTIVE: Pt notes that she feels like she is 55-60% improved since beginning therapy. Reports improvements of being able to wash under her L arm and wash her RUE with her LUE since beginning therapy; she was unable to do so prior. Pt notes that she had a difficult time sleeping last night and her shoulder has been painful the last day and a half to two days.     Functional Disability Index: FOTO physical FS primary measure score = 42; Risk adjusted = 46 Date: 22  Functional Disability Index: FOTO physical FS primary measure score =  41    Date: 22    OBJECTIVE: See eval  Observation:   Test measurements: 12/22  ROM Left Right   Shoulder Flex 93    Shoulder Abd     Shoulder ER       Shoulder IR                               Strength  Left Right   Shoulder Flex 3+    Shoulder Scap 3+    Shoulder ER 3+    Shoulder IR 3+        RESTRICTIONS/PRECAUTIONS: latex allergy, recent Hx of breast cancer with R lymph node removal, HBP    Exercises/Interventions:   Therapeutic Ex (46698)  Min: 19 Sets/sec Reps CUES/Notes   Sitting cane flexion 10 sec 5    Ball squeezes 2 10ea Prone and supine hand   Table slides 10 5 Performed with inclined table   TB no monies 2 10 3 sec hold   Scapular retractions 2 10 Manual resistance   Abduction isometric 10 sec 5 Manual resistance   Reclined flexion 1 5    Seated cane scaption 2 5 10 sec hold at top   Reclined ABD 1 5    TB row 2 5 Red   Cane ER stretch 10 sec 5    Cane AAROM scaption with stretch 1 10    Wall slides 1 3 Terminated secondary to balance   Wall isometric 5 sec 10 ER and ABD; seated in chair   Seated scaption 2 8    Supine shoulder flexion 2 8    Supine AAROM cane flexion 5 sec 8    Supine punch 2 5    Seated external rotation isometric 2 10 Manual resistance   Seated scaption 2 8    Seated thoracic rotation over swiss ball 3 sec 10    Lawnmower                              Manual Intervention  (58869)  Min: 24       Shld /GH Mobs 4 min  AP grades 2-3   Post Cap mobs      Finger flexion PROM/stretching 3 min     Wrist/hand PROM 2 min  Flex/ext   Shoulder PROM MT 7 min  Scaption and ER   Upper trap/levator/suboccipital STM 13 min  From distal to proximal attachment   Wrist extension mobs 5 min  Grades II-III   Wrist PROM 4 min  Flexion/extension         NMR re-education (63771)  Min:      T-spine Ext      GH depress/compress      Scap/GH NMR      Body blade      Wall ball roll      Wall Ball bounce      Ball drops      Porsha Scap Bio      Floor Snow angels-sliders            Therapeutic Activity (96353)  Min:      UE throwing porgression      Dynamic UE stability Earthquake Bar      Bodyblade                Therapeutic Exercise and NMR EXR  [x] (35993) Provided verbal/tactile cueing for activities related to strengthening, flexibility, endurance, ROM  for improvements in scapular, scapulothoracic and UE control with self care, reaching, carrying, lifting, house/yardwork, driving/computer work. [x] (75772) Provided verbal/tactile cueing for activities related to improving balance, coordination, kinesthetic sense, posture, motor skill, proprioception  to assist with  scapular, scapulothoracic and UE control with self care, reaching, carrying, lifting, house/yardwork, driving/computer work. Therapeutic Activities:    [] (00626 or 25698) Provided verbal/tactile cueing for activities related to improving balance, coordination, kinesthetic sense, posture, motor skill, proprioception and motor activation to allow for proper function of scapular, scapulothoracic and UE control with self care, carrying, lifting, driving/computer work.      Home Exercise Program:    [x] (20519) Reviewed/Progressed HEP activities related to strengthening, flexibility, endurance, ROM of scapular, scapulothoracic and UE control with self care, reaching, carrying, lifting, house/yardwork, driving/computer work  [] (63597) Reviewed/Progressed HEP activities related to improving balance, coordination, kinesthetic sense, posture, motor skill, proprioception of scapular, scapulothoracic and UE control with self care, reaching, carrying, lifting, house/yardwork, driving/computer work      Manual Treatments:  PROM / STM / Oscillations-Mobs:  G-I, II, III, IV (PA's, Inf., Post.)  [x] (25707) Provided manual therapy to mobilize soft tissue/joints of cervical/CT, scapular GHJ and UE for the purpose of modulating pain, promoting relaxation,  increasing ROM, reducing/eliminating soft tissue swelling/inflammation/restriction, improving soft tissue extensibility and allowing for proper ROM for normal function with self care, reaching, carrying, lifting, house/yardwork, driving/computer work    Modalities:      Charges:  Timed Code Treatment Minutes: 43   Total Treatment Minutes: 43       [] EVAL (LOW) 41104 (typically 20 minutes face-to-face)  [] EVAL (MOD) 75357 (typically 30 minutes face-to-face)  [] EVAL (HIGH) 75333 (typically 45 minutes face-to-face)  [] RE-EVAL     [x] JG(03225) x 1    [] DRY NEEDLE 1 OR 2 MUSCLES  [] NMR (71191) x     [] DRY NEEDLE 3+ MUSCLES  [x] Manual (78784) x 2      [] TA (13764) x     [] Mech Traction (27064)  [] ES(attended) (76119)     [] ES (un) (94427):   [] VASO (68596)  [] Other:    If BWC Please Indicate Time In/Out  CPT Code Time in Time out                                   GOALS:  Patient stated goal: full arm motion  [x] Progressing: [] Met: [] Not Met: [] Adjusted    Therapist goals for Patient:   Short Term Goals: To be achieved in: 2 weeks  1. Independent in HEP and progression per patient tolerance, in order to prevent re-injury. [] Progressing: [x] Met: [] Not Met: [] Adjusted  2. Patient will have a decrease in pain to facilitate improvement in movement, function, and ADLs as indicated by Functional Deficits. [] Progressing: [x] Met: [] Not Met: [] Adjusted    Long Term Goals: To be achieved in: 10-12 weeks  1. Patient will reach FOTO predicted score of at least 62 to assist with reaching prior level of function with activities such as dressing. [x] Progressing: [] Met: [] Not Met: [] Adjusted  2. Patient will demonstrate increased L shoulder flexion AROM to 120 deg to allow for proper joint functioning as indicated by patients Functional Deficits. [x] Progressing: [] Met: [] Not Met: [] Adjusted  3. Patient will demonstrate an increase in global L shoulder strength to 4+/5 to allow for proper functional mobility as indicated by patients Functional Deficits. [x] Progressing: [] Met: [] Not Met: [] Adjusted  4.  Patient will return to all dressing activities, such has wearing shirts and jackets without increased symptoms or restriction. [] Progressing: [] Met: [] Not Met: [] Adjusted  5. Patient will demonstrate 5 repeated overhead reaches with 5# to demonstrate replacing items from overhead cabinets without symptoms or restrictions. [x] Progressing: [] Met: [] Not Met: [] Adjusted    ASSESSMENT:  Pt has demonstrated progress during her time in physical therapy including improving L shoulder ROM, strength, pain, and neuromuscular control however noted deficits remain in all the above. Pt is also demonstrating progress towards her goals. Modifications required during session today secondary to increased L shoulder pain. Pt noted tenderness to palpation along anterior humerus that was more tender than normal. Noted soft tissue restrictions throughout L upper trapezius into proximal attachment. Improvement of symptoms noted after manual therapy. Education provided during session regarding pins from surgery, nerve adaptations, and rotator cuff strength. Noted upper trap hike during all shoulder elevation today. Pt will continue to benefit from skilled physical therapy to improve strength, ROM, neuromuscular control, and endurance to return to PLOF including reaching overhead and performing ADLs without symptoms or restrictions.     Return to Play: (if applicable)   []  Stage 1: Intro to Strength   []  Stage 2: Dynamic Strength and Intro to Plyometrics   []  Stage 3: Advanced Plyometrics and Intro to Throwing   []  Stage 4: Sport specific Training/Return to Sport     []  Ready to Return to Play, [employer]   []  Not Ready for Return to Sports   Comments:      Treatment/Activity Tolerance:  [x] Patient tolerated treatment well [] Patient limited by fatique  [] Patient limited by pain  [] Patient limited by other medical complications  [] Other:     Overall Progression Towards Functional goals/ Treatment Progress Update:  [] Patient is progressing as expected towards functional goals listed. [] Progression is slowed due to complexities/Impairments listed. [] Progression has been slowed due to co-morbidities. [x] Plan just implemented, too soon to assess goals progression <30days   [] Goals require adjustment due to lack of progress  [] Patient is not progressing as expected and requires additional follow up with physician  [] Other    Prognosis for POC: [x] Good [] Fair  [] Poor    Patient requires continued skilled intervention: [x] Yes  [] No      PLAN: See eval  [x] Continue per plan of care [] Alter current plan (see comments)  [] Plan of care initiated [] Hold pending MD visit [] Discharge    Electronically signed by: Michael Doan PT     Note: If patient does not return for scheduled/recommended follow up visits, this note will serve as a discharge from care along with the most recent update on progress.

## 2022-12-29 ENCOUNTER — HOSPITAL ENCOUNTER (OUTPATIENT)
Dept: PHYSICAL THERAPY | Age: 72
Setting detail: THERAPIES SERIES
Discharge: HOME | End: 2022-12-29
Payer: MEDICARE

## 2022-12-29 PROCEDURE — 97140 MANUAL THERAPY 1/> REGIONS: CPT

## 2022-12-29 PROCEDURE — 97110 THERAPEUTIC EXERCISES: CPT

## 2022-12-29 NOTE — PROGRESS NOTES
[facility] [address] [place]  Phone: [phone] Fax: [phone]    Physical Therapy Treatment Note/ Progress Report:       Date:  2022    Patient Name:  Mckenzie Vásquez    :  1950  MRN: [id number]  Restrictions/Precautions:    Medical Diagnosis:  Other closed displaced fracture of proximal end of left humerus with routine healing, subsequent encounter [S42.292D]  Treatment Diagnosis:      ICD-10-CM     1. Other closed displaced fracture of proximal end of left humerus with routine healing, subsequent encounter  S42.292D         2. Impaired strength of shoulder muscles  R29.898         3. Impaired range of motion of left shoulder  M25.612           Insurance/Certification information:  PT Insurance Information: Medicare/AARP  Physician Information:  Keyonna Iniguez MD  Plan of care signed (Y/N): Y    Date of Patient follow up with Physician:      Progress Report: []  Yes  []  No     Date Range for reporting period:  Beginnin22  Ending:     Progress report due (10 Rx/or 30 days whichever is less): 51     Recertification due (POC duration/ or 90 days whichever is less): 22     Visit # Insurance Allowable Auth Needed   10 BMN []Yes    [x]No     Pain level:  6/10 L shoulder     SUBJECTIVE: Pt notes that she feels like she is 55-60% improved since beginning therapy. Reports improvements of being able to wash under her L arm and wash her RUE with her LUE since beginning therapy; she was unable to do so prior. Pt notes that she had a difficult time sleeping last night and her shoulder has been painful the last day and a half to two days.     Functional Disability Index: FOTO physical FS primary measure score = 42; Risk adjusted = 46 Date: 22  Functional Disability Index: FOTO physical FS primary measure score =  41    Date: 22    OBJECTIVE: See eval  Observation:   Test measurements: 12/22  ROM Left Right   Shoulder Flex 93    Shoulder Abd     Shoulder ER       Shoulder IR                               Strength  Left Right   Shoulder Flex 3+    Shoulder Scap 3+    Shoulder ER 3+    Shoulder IR 3+        RESTRICTIONS/PRECAUTIONS: latex allergy, recent Hx of breast cancer with R lymph node removal, HBP    Exercises/Interventions:   Therapeutic Ex (07735) Sets/sec Reps CUES/Notes   Sitting cane flexion 10 sec 5    Ball squeezes 2 10ea Prone and supine hand   Table slides 10 5 Performed with inclined table   TB no monies 2 10 3 sec hold   Scapular retractions 2 10 Manual resistance   Abduction isometric 10 sec 5 Manual resistance   Reclined flexion 1 5    Seated cane scaption 2 5 10 sec hold at top   Reclined ABD 1 5    TB row 2 5 Red   Cane ER stretch 10 sec 5    Cane AAROM flex/scap    Attempted   Wall slides 1 3 Terminated secondary to balance   Wall isometric 5 sec 10 ER and ABD; seated in chair   Seated scaption 2 8    Supine shoulder flexion    Attempted    Supine AAROM cane flexion 5 sec 8    Supine punch 2 5    Seated external rotation isometric 2 10 Manual resistance   Seated scaption 2 8    Seated thoracic rotation over swiss ball 3 sec 10    Standing or seated - TB pulldown - Green TB 5s 15 Cues for scap mechanics; Ecc control into stretch in FF         Patient education.   8 min The importance of obtaining L GH joint mobility, PROM in getting back functional strength and functional use of L UE; discussed posture and it's effect on OH reaching               Manual Intervention  (52800)      Shld /GH Mobs - posterior, inferior  6 min Gr. II   Post Cap mobs      Finger flexion PROM/stretching      Wrist/hand PROM   Flex/ext   Shoulder PROM  12 min Flex, scap, ER in supine   Scapular mobs - seated  Supporting L elbow  6 min Gr. II   STM to L UT/LS, rhomboids  6 min Seated   Wrist extension mobs   Grades II-III   Wrist PROM   Flexion/extension         [facility] re-education (56418)      T-spine Ext      GH depress/compress      Scap/GH NMR      Body blade      Wall ball roll      Wall Ball bounce      Ball drops      Porsha Scap Bio      Floor Snow angels-sliders            Therapeutic Activity (63785)      UE throwing porgression      Dynamic UE stability      Earthquake Bar      Bodyblade                Therapeutic Exercise and NMR EXR  [x] (73880) Provided verbal/tactile cueing for activities related to strengthening, flexibility, endurance, ROM  for improvements in scapular, scapulothoracic and UE control with self care, reaching, carrying, lifting, house/yardwork, driving/computer work. [x] (97091) Provided verbal/tactile cueing for activities related to improving balance, coordination, kinesthetic sense, posture, motor skill, proprioception  to assist with  scapular, scapulothoracic and UE control with self care, reaching, carrying, lifting, house/yardwork, driving/computer work. Therapeutic Activities:    [] (30596 or 36649) Provided verbal/tactile cueing for activities related to improving balance, coordination, kinesthetic sense, posture, motor skill, proprioception and motor activation to allow for proper function of scapular, scapulothoracic and UE control with self care, carrying, lifting, driving/computer work.      Home Exercise Program:    [x] (76276) Reviewed/Progressed HEP activities related to strengthening, flexibility, endurance, ROM of scapular, scapulothoracic and UE control with self care, reaching, carrying, lifting, house/yardwork, driving/computer work  [] (82150) Reviewed/Progressed HEP activities related to improving balance, coordination, kinesthetic sense, posture, motor skill, proprioception of scapular, scapulothoracic and UE control with self care, reaching, carrying, lifting, house/yardwork, driving/computer work      Manual Treatments:  PROM / STM / Oscillations-Mobs:  G-I, II, III, IV (PA's, Inf., Post.)  [x] (37450) Provided manual therapy to mobilize soft tissue/joints of cervical/CT, scapular GHJ and UE for the purpose of modulating pain, promoting relaxation,  increasing ROM, reducing/eliminating soft tissue swelling/inflammation/restriction, improving soft tissue extensibility and allowing for proper ROM for normal function with self care, reaching, carrying, lifting, house/yardwork, driving/computer work    Modalities:      Charges:  Timed Code Treatment Minutes: 52   Total Treatment Minutes: 52       [] EVAL (LOW) 69893 (typically 20 minutes face-to-face)  [] EVAL (MOD) 90040 (typically 30 minutes face-to-face)  [] EVAL (HIGH) 36420 (typically 45 minutes face-to-face)  [] RE-EVAL     [x] CJ(80820) x 1    [] DRY NEEDLE 1 OR 2 MUSCLES  [] NMR (31096) x     [] DRY NEEDLE 3+ MUSCLES  [x] Manual (36534) x 2      [] TA (83369) x     [] Mech Traction (65117)  [] ES(attended) (84817)     [] ES (un) (67782):   [] VASO (28839)  [] Other:    GOALS:  Patient stated goal: full arm motion  [x] Progressing: [] Met: [] Not Met: [] Adjusted  Therapist goals for Patient:   Short Term Goals: To be achieved in: 2 weeks  1. Independent in HEP and progression per patient tolerance, in order to prevent re-injury. [] Progressing: [x] Met: [] Not Met: [] Adjusted  2. Patient will have a decrease in pain to facilitate improvement in movement, function, and ADLs as indicated by Functional Deficits. [] Progressing: [x] Met: [] Not Met: [] Adjusted    Long Term Goals: To be achieved in: 10-12 weeks  1. Patient will reach FOTO predicted score of at least 62 to assist with reaching prior level of function with activities such as dressing. [x] Progressing: [] Met: [] Not Met: [] Adjusted  2. Patient will demonstrate increased L shoulder flexion AROM to 120 deg to allow for proper joint functioning as indicated by patients Functional Deficits. [x] Progressing: [] Met: [] Not Met: [] Adjusted  3.  Patient will demonstrate an increase in global L shoulder strength to 4+/5 to allow for proper functional mobility as indicated by patients Functional Deficits. [x] Progressing: [] Met: [] Not Met: [] Adjusted  4. Patient will return to all dressing activities, such has wearing shirts and jackets without increased symptoms or restriction. [] Progressing: [] Met: [] Not Met: [] Adjusted  5. Patient will demonstrate 5 repeated overhead reaches with 5# to demonstrate replacing items from overhead cabinets without symptoms or restrictions. [x] Progressing: [] Met: [] Not Met: [] Adjusted    ASSESSMENT:  Encouraged tolerance to supine positioning during manual techniques to help improve patient's ability to relax and potentially improve tolerance to more aggressive stretching into OH positions. Would benefit from sidelying position for stretching and strengthening, but patient unwilling to attempt. May continue to push this gravity-eliminated position for further stretching and strengthening considering patient's level of stiffness and strength deficits with OH reaching against gravity. Patient's forward head, rounded shoulder, rounded T/S position do not aid in achieving improved OH reach capabilities either. Discussed in length the importance of posture on achieving improved tissue and joint mobility in that L shoulder and on her ability to reach over her head. Patient required consistent verbal reminders for improved posture throughout the session. Definitely needs cont work on L shoulder mobility, strength, and postural control.      Return to Play: (if applicable)   []  Stage 1: Intro to Strength   []  Stage 2: Dynamic Strength and Intro to Plyometrics   []  Stage 3: Advanced Plyometrics and Intro to Throwing   []  Stage 4: Sport specific Training/Return to Sport     []  Ready to Return to Play, [employer]   []  Not Ready for Return to Sports   Comments:      Treatment/Activity Tolerance:  [x] Patient tolerated treatment well [] Patient limited by fatique  [] Patient limited by pain  [] Patient limited by other medical complications  [] Other:     Overall Progression Towards Functional goals/ Treatment Progress Update:  [] Patient is progressing as expected towards functional goals listed. [] Progression is slowed due to complexities/Impairments listed. [] Progression has been slowed due to co-morbidities. [x] Plan just implemented, too soon to assess goals progression <30days   [] Goals require adjustment due to lack of progress  [] Patient is not progressing as expected and requires additional follow up with physician  [] Other    Prognosis for POC: [x] Good [] Fair  [] Poor    Patient requires continued skilled intervention: [x] Yes  [] No      PLAN: See eval  [x] Continue per plan of care [] Alter current plan (see comments)  [] Plan of care initiated [] Hold pending MD visit [] Discharge    Electronically signed by: James Casas PT, DPT, MS, SCS     Note: If patient does not return for scheduled/recommended follow up visits, this note will serve as a discharge from care along with the most recent update on progress.

## 2023-01-10 ENCOUNTER — APPOINTMENT (OUTPATIENT)
Dept: PHYSICAL THERAPY | Age: 73
End: 2023-01-10
Payer: MEDICARE

## 2023-01-12 ENCOUNTER — HOSPITAL ENCOUNTER (OUTPATIENT)
Dept: PHYSICAL THERAPY | Age: 73
Setting detail: THERAPIES SERIES
Discharge: HOME | End: 2023-01-12
Payer: MEDICARE

## 2023-01-12 PROCEDURE — 97110 THERAPEUTIC EXERCISES: CPT

## 2023-01-12 PROCEDURE — 97140 MANUAL THERAPY 1/> REGIONS: CPT

## 2023-01-12 NOTE — FLOWSHEET NOTE
[facility] [address] [address]  Phone: [phone] Fax: [phone]    Physical Therapy Treatment Note/ Progress Report:       Date:  2023    Patient Name:  Mckenzie Vásquez    :  1950  MRN: [id number]  Restrictions/Precautions:    Medical Diagnosis:  Other closed displaced fracture of proximal end of left humerus with routine healing, subsequent encounter [S42.292D]  Treatment Diagnosis:      ICD-10-CM     1. Other closed displaced fracture of proximal end of left humerus with routine healing, subsequent encounter  S42.292D         2. Impaired strength of shoulder muscles  R29.898         3. Impaired range of motion of left shoulder  M25.612           Insurance/Certification information:  PT Insurance Information: Medicare/[employer]  Physician Information:  Keyonna Iniguez MD  Plan of care signed (Y/N): Y    Date of Patient follow up with Physician:      Progress Report: []  Yes  [x]  No     Date Range for reporting period:  Beginnin22  Ending:     Progress report due (10 Rx/or 30 days whichever is less):      Recertification due (POC duration/ or 90 days whichever is less): 22     Visit # Insurance Allowable Auth Needed   11 BMN []Yes    [x]No     Pain level:  6/10 L shoulder     SUBJECTIVE: Sees Dr. Cher Solis for her L wrist next Friday. Scheduled to see her oncologist the week after that.      Functional Disability Index: FOTO physical FS primary measure score = 42; Risk adjusted = 46 Date: 22  Functional Disability Index: FOTO physical FS primary measure score =  41    Date: 22    OBJECTIVE: See eval  Observation:   Test measurements:      ROM Left Right   Shoulder Flex 93    Shoulder Abd     Shoulder ER       Shoulder IR                               Strength  Left Right   Shoulder Flex 3+    Shoulder Scap 3+    Shoulder ER 3+    Shoulder IR 3+        RESTRICTIONS/PRECAUTIONS: latex allergy, recent Hx of breast cancer with R lymph node removal, HBP    Exercises/Interventions:   Therapeutic Ex (60427) Sets/sec Reps CUES/Notes   Sitting cane flexion 10 sec 5    Ball squeezes 2 10ea Prone and supine hand   Table slides  10 5 Performed with inclined table   Supine OH flexion w/ R UE assist 1 10 For OH mobility/ROM   TB no monies 2 10 3 sec hold   Scapular retractions 2 10 Manual resistance   Abduction isometric 10 sec 5 Manual resistance   Reclined flexion 1 5    Seated cane scaption 2 5 10 sec hold at top   Reclined ABD 1 5    TB row 2 5 Red   Cane ER stretch 10 sec 5    Cane AAROM flex/scap    Attempted   Wall slides 1 3 Terminated secondary to balance   Wall isometric 5 sec 10 ER and ABD; seated in chair   Seated scaption 2 8    Supine shoulder flexion    Attempted    Supine AAROM cane flexion 5 sec 8    Supine punch 2 5    Seated external rotation isometric 2 10 Manual resistance   Seated scaption 2 8    Seated thoracic rotation over swiss ball 3 sec 10    Standing - TB pulldown - Green TB 5s 15 Cues for scap mechanics;  Ecc control into stretch in FF                           Manual Intervention  (19475)      Shld /GH Mobs - posterior, inferior  6 min Gr. II   Post Cap mobs      Finger flexion PROM/stretching      Wrist/hand PROM   Flex/ext   Shoulder PROM  12 min Flex, scap, ER in supine   Scapular mobs - seated  Supporting L elbow  6 min Gr. II   STM to L UT/LS, rhomboids  6 min Seated   Wrist extension mobs   Grades II-III   Wrist PROM   Flexion/extension         NMR re-education (01072)      T-spine Ext      GH depress/compress      Scap/GH NMR      Body blade      Wall ball roll      Wall Ball bounce      Ball drops      Porsha Scap Bio      Floor Snow angels-sliders            Therapeutic Activity (71601)      UE throwing porgression      Dynamic UE stability      Earthquake Bar      Bodyblade                Therapeutic Exercise and NMR EXR  [x] (56830) Provided verbal/tactile cueing for activities related to strengthening, flexibility, endurance, ROM  for improvements in scapular, scapulothoracic and UE control with self care, reaching, carrying, lifting, house/yardwork, driving/computer work. [x] (64383) Provided verbal/tactile cueing for activities related to improving balance, coordination, kinesthetic sense, posture, motor skill, proprioception  to assist with  scapular, scapulothoracic and UE control with self care, reaching, carrying, lifting, house/yardwork, driving/computer work. Therapeutic Activities:    [] (79470 or 78010) Provided verbal/tactile cueing for activities related to improving balance, coordination, kinesthetic sense, posture, motor skill, proprioception and motor activation to allow for proper function of scapular, scapulothoracic and UE control with self care, carrying, lifting, driving/computer work.      Home Exercise Program:    [x] (61528) Reviewed/Progressed HEP activities related to strengthening, flexibility, endurance, ROM of scapular, scapulothoracic and UE control with self care, reaching, carrying, lifting, house/yardwork, driving/computer work  [] (87720) Reviewed/Progressed HEP activities related to improving balance, coordination, kinesthetic sense, posture, motor skill, proprioception of scapular, scapulothoracic and UE control with self care, reaching, carrying, lifting, house/yardwork, driving/computer work      Manual Treatments:  PROM / STM / Oscillations-Mobs:  G-I, II, III, IV (PA's, Inf., Post.)  [x] (73380) Provided manual therapy to mobilize soft tissue/joints of cervical/CT, scapular GHJ and UE for the purpose of modulating pain, promoting relaxation,  increasing ROM, reducing/eliminating soft tissue swelling/inflammation/restriction, improving soft tissue extensibility and allowing for proper ROM for normal function with self care, reaching, carrying, lifting, house/yardwork, driving/computer work    Modalities:      Charges:  Timed Code Treatment Minutes: 48   Total Treatment Minutes: 48       [] EVAL (LOW) 64779 (typically 20 minutes face-to-face)  [] EVAL (MOD) 56136 (typically 30 minutes face-to-face)  [] EVAL (HIGH) 30400 (typically 45 minutes face-to-face)  [] RE-EVAL     [x] IX(10015) x 1    [] DRY NEEDLE 1 OR 2 MUSCLES  [] NMR (86247) x     [] DRY NEEDLE 3+ MUSCLES  [x] Manual (09420) x 2      [] TA (18903) x     [] Mech Traction (86755)  [] ES(attended) (71911)     [] ES (un) (50257):   [] VASO (41162)  [] Other:    GOALS:  Patient stated goal: full arm motion  [x] Progressing: [] Met: [] Not Met: [] Adjusted  Therapist goals for Patient:   Short Term Goals: To be achieved in: 2 weeks  1. Independent in HEP and progression per patient tolerance, in order to prevent re-injury. [] Progressing: [x] Met: [] Not Met: [] Adjusted  2. Patient will have a decrease in pain to facilitate improvement in movement, function, and ADLs as indicated by Functional Deficits. [] Progressing: [x] Met: [] Not Met: [] Adjusted    Long Term Goals: To be achieved in: 10-12 weeks  1. Patient will reach FOTO predicted score of at least 62 to assist with reaching prior level of function with activities such as dressing. [x] Progressing: [] Met: [] Not Met: [] Adjusted  2. Patient will demonstrate increased L shoulder flexion AROM to 120 deg to allow for proper joint functioning as indicated by patients Functional Deficits. [x] Progressing: [] Met: [] Not Met: [] Adjusted  3. Patient will demonstrate an increase in global L shoulder strength to 4+/5 to allow for proper functional mobility as indicated by patients Functional Deficits. [x] Progressing: [] Met: [] Not Met: [] Adjusted  4. Patient will return to all dressing activities, such has wearing shirts and jackets without increased symptoms or restriction. [] Progressing: [] Met: [] Not Met: [] Adjusted  5.  Patient will demonstrate 5 repeated overhead reaches with 5# to demonstrate replacing items from overhead cabinets without symptoms or restrictions. [x] Progressing: [] Met: [] Not Met: [] Adjusted    ASSESSMENT:  Cont all manual techniques in supine positioning to help improve patient's ability to relax and potentially improve tolerance to more aggressive stretching into OH positions. Patient not a fan of that positioning, but compensates a lot when working on passive range/stretching in other positions where gravity is not minimized. Would benefit from sidelying position for stretching and strengthening. Cont to push this gravity-eliminated position for further stretching and strengthening considering patient's level of stiffness and strength deficits with OH reaching against gravity. Patient did say she would be willing to attempt the position, but would feel better about it if she can double check with her oncologist about safety of sidelying position when she sees them in 1-2 weeks. Patient's forward head, rounded shoulder, rounded T/S position do not aid in achieving improved OH reach capabilities. Consistently cueing throughout session to keep her eyes looking straight ahead, keep more upright posture to aid in her L shoulder mobility and to decrease UT/LS, posterior shoulder pain, tightness secondary to poor posture. Definitely needs cont work on L shoulder mobility, strength, and postural control.      Return to Play: (if applicable)   []  Stage 1: Intro to Strength   []  Stage 2: Dynamic Strength and Intro to Plyometrics   []  Stage 3: Advanced Plyometrics and Intro to Throwing   []  Stage 4: Sport specific Training/Return to Sport     []  Ready to Return to Play, [employer]   []  Not Ready for Return to Sports   Comments:      Treatment/Activity Tolerance:  [x] Patient tolerated treatment well [] Patient limited by fatique  [] Patient limited by pain  [] Patient limited by other medical complications  [] Other:     Overall Progression Towards Functional goals/ Treatment Progress Update:  [x] Patient is progressing as expected towards functional goals listed. [] Progression is slowed due to complexities/Impairments listed. [] Progression has been slowed due to co-morbidities. [] Plan just implemented, too soon to assess goals progression <30days   [] Goals require adjustment due to lack of progress  [] Patient is not progressing as expected and requires additional follow up with physician  [] Other    Prognosis for POC: [x] Good [] Fair  [] Poor    Patient requires continued skilled intervention: [x] Yes  [] No      PLAN: See eval  [x] Continue per plan of care [] Alter current plan (see comments)  [] Plan of care initiated [] Hold pending MD visit [] Discharge    Electronically signed by: Alivia Mora, PT, DPT, MS, SCS     Note: If patient does not return for scheduled/recommended follow up visits, this note will serve as a discharge from care along with the most recent update on progress.

## 2023-01-13 ENCOUNTER — HOSPITAL ENCOUNTER (OUTPATIENT)
Dept: PHYSICAL THERAPY | Age: 73
Setting detail: THERAPIES SERIES
Discharge: HOME | End: 2023-01-13
Payer: MEDICARE

## 2023-01-13 PROCEDURE — 97110 THERAPEUTIC EXERCISES: CPT

## 2023-01-13 PROCEDURE — 97140 MANUAL THERAPY 1/> REGIONS: CPT

## 2023-01-13 NOTE — FLOWSHEET NOTE
[facility] [address]  Phone: [phone] Fax: [phone]    Physical Therapy Treatment Note/ Progress Report:       Date:  2023    Patient Name:  Noemi Walker    :  1950  MRN: [id number]  Restrictions/Precautions:    Medical Diagnosis:  Other closed displaced fracture of proximal end of left humerus with routine healing, subsequent encounter [S42.292D]  Treatment Diagnosis:      ICD-10-CM     1. Other closed displaced fracture of proximal end of left humerus with routine healing, subsequent encounter  S42.292D         2. Impaired strength of shoulder muscles  R29.898         3. Impaired range of motion of left shoulder  M25.612           Insurance/Certification information:  PT Insurance Information: Medicare/[employer]  Physician Information:  Jatinder Marvin MD  Plan of care signed (Y/N): Y    Date of Patient follow up with Physician:      Progress Report: []  Yes  [x]  No     Date Range for reporting period:  Beginnin22  Ending:     Progress report due (10 Rx/or 30 days whichever is less):      Recertification due (POC duration/ or 90 days whichever is less): 22     Visit # Insurance Allowable Auth Needed   12 BMN []Yes    [x]No     Pain level:  4/10 L shoulder     SUBJECTIVE: Pt reports that her shoulder was sore last night after she got home and into the evening. Notes she was able to take Tylenol to assist with going to bed.      Functional Disability Index: FOTO physical FS primary measure score = 42; Risk adjusted = 46 Date: 22  Functional Disability Index: FOTO physical FS primary measure score =  41    Date: 22    OBJECTIVE: See eval  Observation:   Test measurements:      ROM Left Right   Shoulder Flex 93    Shoulder Abd     Shoulder ER       Shoulder IR                               Strength  Left Right   Shoulder Flex 3+    Shoulder Scap 3+    Shoulder ER 3+ Shoulder IR 3+        RESTRICTIONS/PRECAUTIONS: latex allergy, recent Hx of breast cancer with R lymph node removal, HBP    Exercises/Interventions:   Therapeutic Ex (64040) Sets/sec Reps CUES/Notes   Sitting cane flexion 10 sec 5    Ball squeezes 2 10ea Prone and supine hand   Table slides  2 10 Performed with inclined table   Supine OH flexion w/ R UE assist 2 10 For OH mobility/ROM   TB no monies 2 10 3 sec hold   Scapular retractions 2 10 Manual resistance   Abduction isometric 10 sec 5 Manual resistance   Reclined flexion 1 5    Seated cane scaption 2 5 10 sec hold at top   Reclined ABD 1 5    TB row 2 5 Red   Cane ER stretch 10 sec 5    Cane AAROM flex/scap    Attempted   Wall slides 1 3 Terminated secondary to balance   Wall isometric 5 sec 10 ER and ABD; seated in chair   Seated scaption 2 8    Supine shoulder flexion    Attempted    Supine AAROM cane flexion 5 sec 8    Supine punch 2 5    Standing external rotation isometric 5 sec 10 Manual resistance   Seated scaption 2 8    Seated thoracic rotation over swiss ball 3 sec 10    Supine - TB pulldown - red TB 5s 15 Cues for scap mechanics;  Ecc control into stretch in FF                           Manual Intervention  (35372)      Shld /GH Mobs - posterior, inferior  8 min Gr. II   Post Cap mobs      Finger flexion PROM/stretching      Wrist/hand PROM   Flex/ext   Shoulder PROM  11 min Flex, scap, ER in supine   Scapular mobs - seated  Supporting L elbow  5 min Gr. II   STM to L UT/LS, rhomboids  6 min Seated   Wrist extension mobs   Grades II-III   Wrist PROM   Flexion/extension         NMR re-education (48942)      T-spine Ext      GH depress/compress      Scap/GH NMR      Body blade      Wall ball roll      Wall Ball bounce      Ball drops      Porsha Scap Bio      Floor Snow angels-sliders            Therapeutic Activity (27137)      UE throwing porgression      Dynamic UE stability      Earthquake Bar      Bodyblade                Therapeutic Exercise and NMR EXR  [x] (72891) Provided verbal/tactile cueing for activities related to strengthening, flexibility, endurance, ROM  for improvements in scapular, scapulothoracic and UE control with self care, reaching, carrying, lifting, house/yardwork, driving/computer work. [x] (37901) Provided verbal/tactile cueing for activities related to improving balance, coordination, kinesthetic sense, posture, motor skill, proprioception  to assist with  scapular, scapulothoracic and UE control with self care, reaching, carrying, lifting, house/yardwork, driving/computer work. Therapeutic Activities:    [] (42251 or 05394) Provided verbal/tactile cueing for activities related to improving balance, coordination, kinesthetic sense, posture, motor skill, proprioception and motor activation to allow for proper function of scapular, scapulothoracic and UE control with self care, carrying, lifting, driving/computer work.      Home Exercise Program:    [x] (42677) Reviewed/Progressed HEP activities related to strengthening, flexibility, endurance, ROM of scapular, scapulothoracic and UE control with self care, reaching, carrying, lifting, house/yardwork, driving/computer work  [] (33182) Reviewed/Progressed HEP activities related to improving balance, coordination, kinesthetic sense, posture, motor skill, proprioception of scapular, scapulothoracic and UE control with self care, reaching, carrying, lifting, house/yardwork, driving/computer work      Manual Treatments:  PROM / STM / Oscillations-Mobs:  G-I, II, III, IV (PA's, Inf., Post.)  [x] (70284) Provided manual therapy to mobilize soft tissue/joints of cervical/CT, scapular GHJ and UE for the purpose of modulating pain, promoting relaxation,  increasing ROM, reducing/eliminating soft tissue swelling/inflammation/restriction, improving soft tissue extensibility and allowing for proper ROM for normal function with self care, reaching, carrying, lifting, house/yardwork, driving/computer work    Modalities:      Charges:  Timed Code Treatment Minutes: 45   Total Treatment Minutes: 45       [] EVAL (LOW) 52399 (typically 20 minutes face-to-face)  [] EVAL (MOD) 70457 (typically 30 minutes face-to-face)  [] EVAL (HIGH) 98256 (typically 45 minutes face-to-face)  [] RE-EVAL     [x] TC(11144) x 1    [] DRY NEEDLE 1 OR 2 MUSCLES  [] NMR (25110) x     [] DRY NEEDLE 3+ MUSCLES  [x] Manual (87288) x 2      [] TA (12678) x     [] Mech Traction (87027)  [] ES(attended) (55680)     [] ES (un) (07182):   [] VASO (39301)  [] Other:    GOALS:  Patient stated goal: full arm motion  [x] Progressing: [] Met: [] Not Met: [] Adjusted  Therapist goals for Patient:   Short Term Goals: To be achieved in: 2 weeks  1. Independent in HEP and progression per patient tolerance, in order to prevent re-injury. [] Progressing: [x] Met: [] Not Met: [] Adjusted  2. Patient will have a decrease in pain to facilitate improvement in movement, function, and ADLs as indicated by Functional Deficits. [] Progressing: [x] Met: [] Not Met: [] Adjusted    Long Term Goals: To be achieved in: 10-12 weeks  1. Patient will reach FOTO predicted score of at least 62 to assist with reaching prior level of function with activities such as dressing. [x] Progressing: [] Met: [] Not Met: [] Adjusted  2. Patient will demonstrate increased L shoulder flexion AROM to 120 deg to allow for proper joint functioning as indicated by patients Functional Deficits. [x] Progressing: [] Met: [] Not Met: [] Adjusted  3. Patient will demonstrate an increase in global L shoulder strength to 4+/5 to allow for proper functional mobility as indicated by patients Functional Deficits. [x] Progressing: [] Met: [] Not Met: [] Adjusted  4. Patient will return to all dressing activities, such has wearing shirts and jackets without increased symptoms or restriction. [] Progressing: [] Met: [] Not Met: [] Adjusted  5.  Patient will demonstrate 5 repeated overhead reaches with 5# to demonstrate replacing items from overhead cabinets without symptoms or restrictions. [x] Progressing: [] Met: [] Not Met: [] Adjusted    ASSESSMENT:  Pt tolerated treatment session fairly well today and was able to tolerated increased manual therapy with stretching into overhead positions. When performing shoulder elevation, pt demonstrates marked upper trap compensation. Better tolerance today to supine positioning. External rotation stretch was able to be performed with ~45 deg shoulder abduction. Noted difficulty with activation of rotator cuff musculature. Kyphotic posture and forward head make overhead mobility difficult. Pt will continue to benefit from skilled physical therapy to improve L shoulder mobility, strength, and postural control to reach overhead. Return to Play: (if applicable)   []  Stage 1: Intro to Strength   []  Stage 2: Dynamic Strength and Intro to Plyometrics   []  Stage 3: Advanced Plyometrics and Intro to Throwing   []  Stage 4: Sport specific Training/Return to Sport     []  Ready to Return to Play, [employer]   []  Not Ready for Return to Sports   Comments:      Treatment/Activity Tolerance:  [x] Patient tolerated treatment well [] Patient limited by fatique  [] Patient limited by pain  [] Patient limited by other medical complications  [] Other:     Overall Progression Towards Functional goals/ Treatment Progress Update:  [x] Patient is progressing as expected towards functional goals listed. [] Progression is slowed due to complexities/Impairments listed. [] Progression has been slowed due to co-morbidities.   [] Plan just implemented, too soon to assess goals progression <30days   [] Goals require adjustment due to lack of progress  [] Patient is not progressing as expected and requires additional follow up with physician  [] Other    Prognosis for POC: [x] Good [] Fair  [] Poor    Patient requires continued skilled intervention: [x] Yes  [] No      PLAN: Improve L shoulder elevation, rotator cuff strengthening  [x] Continue per plan of care [] Alter current plan (see comments)  [] Plan of care initiated [] Hold pending MD visit [] Discharge    Electronically signed by: Candelaria Richards PT, DPT    Note: If patient does not return for scheduled/recommended follow up visits, this note will serve as a discharge from care along with the most recent update on progress.

## 2023-01-17 ENCOUNTER — HOSPITAL ENCOUNTER (OUTPATIENT)
Dept: PHYSICAL THERAPY | Age: 73
Setting detail: THERAPIES SERIES
Discharge: HOME | End: 2023-01-17
Payer: MEDICARE

## 2023-01-17 PROCEDURE — 97140 MANUAL THERAPY 1/> REGIONS: CPT

## 2023-01-17 PROCEDURE — 97110 THERAPEUTIC EXERCISES: CPT

## 2023-01-17 NOTE — FLOWSHEET NOTE
[place] [address] [facility]  Phone: [phone] Fax: [phone]    Physical Therapy Treatment Note/ Progress Report:       Date:  2023    Patient Name:  Domenico Hayes    :  1950  MRN: [id number]  Restrictions/Precautions:    Medical Diagnosis:  Other closed displaced fracture of proximal end of left humerus with routine healing, subsequent encounter [S42.292D]  Treatment Diagnosis:      ICD-10-CM     1. Other closed displaced fracture of proximal end of left humerus with routine healing, subsequent encounter  S42.292D         2. Impaired strength of shoulder muscles  R29.898         3. Impaired range of motion of left shoulder  M25.612           Insurance/Certification information:  PT Insurance Information: Medicare/[employer]  Physician Information:  Naya Singh MD  Plan of care signed (Y/N): Y    Date of Patient follow up with Physician:      Progress Report: []  Yes  [x]  No     Date Range for reporting period:  Beginnin22  Ending:     Progress report due (10 Rx/or 30 days whichever is less): 62     Recertification due (POC duration/ or 90 days whichever is less): 22     Visit # Insurance Allowable Auth Needed   13 BMN []Yes    [x]No     Pain level:  0/10 L shoulder     SUBJECTIVE: Pt reports that she feels like her shoulder is improving. Notes that she has been reaching behind, out in front, and attempting overhead. States she has not taken any Tylenol since last Wednesday for pain control. Pt notes tomorrow she goes to cardiologist tomorrow for blood work and blood pressure management.  Notes she goes to MD on Thursday for L hand/wrist and MD appointment on Friday for follow-up about R axillary lymph node removal.     Functional Disability Index: FOTO physical FS primary measure score = 42; Risk adjusted = 46 Date: 22  Functional Disability Index: FOTO physical FS primary measure score = 41    Date: 12/22/22    OBJECTIVE:   Observation: continued L shoulder hike during L shoulder elevation  Test measurements:    12/22  ROM Left Right   Shoulder Flex 93    Shoulder Abd     Shoulder ER       Shoulder IR                               Strength  Left Right   Shoulder Flex 3+    Shoulder Scap 3+    Shoulder ER 3+    Shoulder IR 3+        RESTRICTIONS/PRECAUTIONS: latex allergy, recent Hx of breast cancer with R lymph node removal, HBP    Exercises/Interventions:   Therapeutic Ex (64552) Sets/sec Reps CUES/Notes   Sitting cane flexion 10 sec 5    Ball squeezes 2 10ea Prone and supine hand   Table slides  2 10 Performed with inclined table   Supine OH flexion w/ R UE assist 2 10 For OH mobility/ROM   TB no monies 2 10 3 sec hold   Scapular retractions 2 10 Manual resistance   Abduction isometric 10 sec 5 Manual resistance   Reclined flexion 1 5    Seated cane scaption 2 5 10 sec hold at top   Reclined ABD 1 5    TB row 2 5 Red   Cane ER stretch 10 sec 5    Cane AAROM flex/scap    Attempted   Wall slides 1 3 Terminated secondary to balance   Wall isometric 5 sec 10 ER and ABD; seated in chair   Seated scaption 2 8    Supine shoulder flexion    Attempted    Supine AAROM cane flexion 5 sec 10    Supine punch 2 5    Supine external and internal rotation 2 10 Manual resistance   Seated scaption 2 8    Seated thoracic rotation over swiss ball 3 sec 10    Standing - TB pulldown - red TB 5s 15 Cues for scap mechanics;  Ecc control into stretch in FF   Cross body stretch 10 sec 5                      Manual Intervention  (42392)      Shld /GH Mobs - posterior, inferior  6 min Gr. II-III   Post Cap mobs      Finger flexion PROM/stretching      Wrist/hand PROM   Flex/ext   Shoulder PROM  14 min Flex, scap, ER in supine   Scapular mobs - seated  Supporting L elbow  2 min    STM to L UT/LS, rhomboids, posterior deltoid  4min Seated   Wrist extension mobs   Grades II-III   Wrist PROM   Flexion/extension         NMR re-education (13412)      T-spine Ext      GH depress/compress      Scap/GH NMR      Body blade      Wall ball roll      Wall Ball bounce      Ball drops      Porsha Scap Bio      Floor Snow angels-sliders            Therapeutic Activity (62626)      UE throwing porgression      Dynamic UE stability      Earthquake Bar      Bodyblade                Therapeutic Exercise and NMR EXR  [x] (46796) Provided verbal/tactile cueing for activities related to strengthening, flexibility, endurance, ROM  for improvements in scapular, scapulothoracic and UE control with self care, reaching, carrying, lifting, house/yardwork, driving/computer work. [x] (34677) Provided verbal/tactile cueing for activities related to improving balance, coordination, kinesthetic sense, posture, motor skill, proprioception  to assist with  scapular, scapulothoracic and UE control with self care, reaching, carrying, lifting, house/yardwork, driving/computer work. Therapeutic Activities:    [] (64524 or 37805) Provided verbal/tactile cueing for activities related to improving balance, coordination, kinesthetic sense, posture, motor skill, proprioception and motor activation to allow for proper function of scapular, scapulothoracic and UE control with self care, carrying, lifting, driving/computer work.      Home Exercise Program:    [x] (54528) Reviewed/Progressed HEP activities related to strengthening, flexibility, endurance, ROM of scapular, scapulothoracic and UE control with self care, reaching, carrying, lifting, house/yardwork, driving/computer work  [] (95386) Reviewed/Progressed HEP activities related to improving balance, coordination, kinesthetic sense, posture, motor skill, proprioception of scapular, scapulothoracic and UE control with self care, reaching, carrying, lifting, house/yardwork, driving/computer work      Manual Treatments:  PROM / STM / Oscillations-Mobs:  G-I, II, III, IV (PA's, Inf., Post.)  [x] (22679) Provided manual therapy to mobilize soft tissue/joints of cervical/CT, scapular GHJ and UE for the purpose of modulating pain, promoting relaxation,  increasing ROM, reducing/eliminating soft tissue swelling/inflammation/restriction, improving soft tissue extensibility and allowing for proper ROM for normal function with self care, reaching, carrying, lifting, house/yardwork, driving/computer work    Modalities:      Charges:  Timed Code Treatment Minutes: 44   Total Treatment Minutes: 44       [] EVAL (LOW) 53343 (typically 20 minutes face-to-face)  [] EVAL (MOD) 34120 (typically 30 minutes face-to-face)  [] EVAL (HIGH) 51876 (typically 45 minutes face-to-face)  [] RE-EVAL     [x] ET(17597) x 1    [] DRY NEEDLE 1 OR 2 MUSCLES  [] NMR (24807) x     [] DRY NEEDLE 3+ MUSCLES  [x] Manual (79202) x 2      [] TA (79260) x     [] Mech Traction (51536)  [] ES(attended) (77532)     [] ES (un) (57831):   [] VASO (63777)  [] Other:    GOALS:  Patient stated goal: full arm motion  [x] Progressing: [] Met: [] Not Met: [] Adjusted  Therapist goals for Patient:   Short Term Goals: To be achieved in: 2 weeks  1. Independent in HEP and progression per patient tolerance, in order to prevent re-injury. [] Progressing: [x] Met: [] Not Met: [] Adjusted  2. Patient will have a decrease in pain to facilitate improvement in movement, function, and ADLs as indicated by Functional Deficits. [] Progressing: [x] Met: [] Not Met: [] Adjusted    Long Term Goals: To be achieved in: 10-12 weeks  1. Patient will reach FOTO predicted score of at least 62 to assist with reaching prior level of function with activities such as dressing. [x] Progressing: [] Met: [] Not Met: [] Adjusted  2. Patient will demonstrate increased L shoulder flexion AROM to 120 deg to allow for proper joint functioning as indicated by patients Functional Deficits. [x] Progressing: [] Met: [] Not Met: [] Adjusted  3.  Patient will demonstrate an increase in global L shoulder strength to 4+/5 to allow for proper functional mobility as indicated by patients Functional Deficits. [x] Progressing: [] Met: [] Not Met: [] Adjusted  4. Patient will return to all dressing activities, such has wearing shirts and jackets without increased symptoms or restriction. [] Progressing: [] Met: [] Not Met: [] Adjusted  5. Patient will demonstrate 5 repeated overhead reaches with 5# to demonstrate replacing items from overhead cabinets without symptoms or restrictions. [x] Progressing: [] Met: [] Not Met: [] Adjusted    ASSESSMENT:  Pt tolerated treatment session fairly well today. Notes improving function and mobility of L shoulder. Stretch felt prior to pain with PROM with muscle guarding closer to end ranges and during eccentric portion. Noted increased pain with muscle activation during eccentric lowering from PROM. Quick rotator cuff fatigue noted with external and internal rotations. Pt plans to check with MD for right sidelying for performance of exercises in coming visits. Verbal and tactile cues required throughout session for proper periscapular positioning and muscle activation. Pt will continue to benefit from skilled physical therapy to improve L shoulder mobility, strength, and postural control to reach overhead. Return to Play: (if applicable)   []  Stage 1: Intro to Strength   []  Stage 2: Dynamic Strength and Intro to Plyometrics   []  Stage 3: Advanced Plyometrics and Intro to Throwing   []  Stage 4: Sport specific Training/Return to Sport     []  Ready to Return to Play, [employer]   []  Not Ready for Return to Sports   Comments:      Treatment/Activity Tolerance:  [x] Patient tolerated treatment well [] Patient limited by fatique  [] Patient limited by pain  [] Patient limited by other medical complications  [] Other:     Overall Progression Towards Functional goals/ Treatment Progress Update:  [x] Patient is progressing as expected towards functional goals listed.     [] Progression is slowed due to complexities/Impairments listed. [] Progression has been slowed due to co-morbidities. [] Plan just implemented, too soon to assess goals progression <30days   [] Goals require adjustment due to lack of progress  [] Patient is not progressing as expected and requires additional follow up with physician  [] Other    Prognosis for POC: [x] Good [] Fair  [] Poor    Patient requires continued skilled intervention: [x] Yes  [] No      PLAN: Improve L shoulder elevation, global L shoulder strengthening  [x] Continue per plan of care [] Alter current plan (see comments)  [] Plan of care initiated [] Hold pending MD visit [] Discharge    Electronically signed by: Tristin Redman, PT, DPT    Note: If patient does not return for scheduled/recommended follow up visits, this note will serve as a discharge from care along with the most recent update on progress.

## 2023-01-19 ENCOUNTER — TELEPHONE (OUTPATIENT)
Dept: CARDIOLOGY CLINIC | Age: 73
End: 2023-01-19

## 2023-01-19 RX ORDER — LOSARTAN POTASSIUM 50 MG/1
100 TABLET ORAL DAILY
Qty: 30 TABLET | Refills: 3
Start: 2023-01-19

## 2023-01-19 RX ORDER — NIFEDIPINE 30 MG/1
TABLET, EXTENDED RELEASE ORAL
COMMUNITY
Start: 2023-01-16 | End: 2023-01-20

## 2023-01-19 NOTE — TELEPHONE ENCOUNTER
Called pt about the message below and she stated that her PCP up her Losartan to 100 mg and she been taking that for 2 weeks. Me and the patient as went over and updated her medication list.     Please note that the losartan has been change to 100 Mg daily and I'm not able to make the change to her medication list, please adjust.

## 2023-01-19 NOTE — TELEPHONE ENCOUNTER
Pt called to inform LES that her left foot and leg is swelling. Pt is wanting someone from the office to call her. 129/81       73      01/19        8:00am  140/87       82      01/18        7:30am  122/73       83       01/18       3:40pm  145/88       77       01/17       8:05am  146/87       76      01/16        7:30am  194/126     82      01/16        6:46pm      Pt has a scheduled appt on 03/02, she is requesting something sooner if possible. Please see where the Pt can be scheduled in at. Please advise.   Thank you

## 2023-01-20 ENCOUNTER — HOSPITAL ENCOUNTER (OUTPATIENT)
Dept: PHYSICAL THERAPY | Age: 73
Setting detail: THERAPIES SERIES
Discharge: HOME | End: 2023-01-20
Payer: MEDICARE

## 2023-01-20 PROCEDURE — 97110 THERAPEUTIC EXERCISES: CPT

## 2023-01-20 PROCEDURE — 97140 MANUAL THERAPY 1/> REGIONS: CPT

## 2023-01-20 RX ORDER — NIFEDIPINE 30 MG/1
30 TABLET, EXTENDED RELEASE ORAL 2 TIMES DAILY
Qty: 180 TABLET | Refills: 3 | Status: SHIPPED | OUTPATIENT
Start: 2023-01-20 | End: 2023-03-02

## 2023-01-20 NOTE — TELEPHONE ENCOUNTER
Pt called back stating they are at the oncology office right now, and they took her /130 even after taking the Losartan 100 MG for past 2 weeks. pt stated her foot has gone down a little but she is concerned about her BP she feels like something is  going on.      Tari Bolton   [phone]

## 2023-01-20 NOTE — FLOWSHEET NOTE
[place] [address] [facility]  Phone: [phone] Fax: [phone]    Physical Therapy Treatment Note/ Progress Report:       Date:  2023    Patient Name:  Rena Beltran    :  1950  MRN: [id number]  Restrictions/Precautions:    Medical Diagnosis:  Other closed displaced fracture of proximal end of left humerus with routine healing, subsequent encounter [S42.292D]  Treatment Diagnosis:      ICD-10-CM     1. Other closed displaced fracture of proximal end of left humerus with routine healing, subsequent encounter  S42.292D         2. Impaired strength of shoulder muscles  R29.898         3. Impaired range of motion of left shoulder  M25.612           Insurance/Certification information:  PT Insurance Information: Medicare/[employer]ARP  Physician Information:  Carri Antonio MD  Plan of care signed (Y/N): Y    Date of Patient follow up with Physician:      Progress Report: []  Yes  [x]  No     Date Range for reporting period:  Beginnin22  Ending:     Progress report due (10 Rx/or 30 days whichever is less): 3/50/74     Recertification due (POC duration/ or 90 days whichever is less): 22     Visit # Insurance Allowable Auth Needed   14 BMN []Yes    [x]No     Pain level:  0/10 L shoulder     SUBJECTIVE: Pt notes that she has appointment with nurse practitioner this afternoon for follow-up regarding lymphedema post-breast cancer. States she saw Dr. Miguel A Combs on Wednesday regarding hand/wrist; notes follow-up next Wednesday for MRI. States her shoulder pain is almost gone now, except when trying to reach behind her.       Functional Disability Index: FOTO physical FS primary measure score = 42; Risk adjusted = 46 Date: 22  Functional Disability Index: FOTO physical FS primary measure score =  41    Date: 22    OBJECTIVE:   Observation: continued L shoulder hike during L shoulder elevation  Test measurements:    12/22  ROM Left Right   Shoulder Flex 93    Shoulder Abd     Shoulder ER       Shoulder IR                               Strength  Left Right   Shoulder Flex 3+    Shoulder Scap 3+    Shoulder ER 3+    Shoulder IR 3+        RESTRICTIONS/PRECAUTIONS: latex allergy, recent Hx of breast cancer with R lymph node removal, HBP    Exercises/Interventions:   Therapeutic Ex (84523) Sets/sec Reps CUES/Notes   Sitting cane flexion 10 sec 5    Ball squeezes 2 10ea Prone and supine hand   Table slides  2 10 Performed with flat table   Supine OH flexion w/ R UE assist 2 10 For OH mobility/ROM   TB no monies 2 10 3 sec hold   Scapular retractions 2 10 Manual resistance   Abduction isometric 10 sec 5 Manual resistance   Reclined flexion 1 5    Seated cane scaption 2 5 10 sec hold at top   Reclined ABD 1 5    TB row 2 5 Red   Cane ER stretch 10 sec 5    Cane AAROM flex/scap    Attempted   Wall slides 1 3 Terminated secondary to balance   Wall isometric 5 sec 10 ER and ABD; seated in chair   Seated scaption 2 8    Supine shoulder flexion    Attempted    Supine AAROM cane flexion 5 sec 10 2 sets   Supine punch 2 5    Sitting external and internal rotation 2 10 Manual resistance   Seated scaption 2 8    Seated thoracic rotation over swiss ball 3 sec 10    Standing - TB pulldown - red TB 5s 15 Cues for scap mechanics;  Ecc control into stretch in FF   Cross body stretch 10 sec 5                      Manual Intervention  (39420)      Shld /GH Mobs - posterior, inferior  4 min Gr. II-III   Thoracic extension with shoulder opening   4 min    Finger flexion PROM/stretching      Wrist/hand PROM   Flex/ext   Shoulder PROM  14 min Flex, scap, ER in supine   Scapular mobs - seated  Supporting L elbow  2 min    STM to L UT/LS, rhomboids, scalenes, posterior deltoid,   6 min Seated   Wrist extension mobs   Grades II-III   Wrist PROM   Flexion/extension         NMR re-education (18795)      T-spine Ext      GH depress/compress   Scap/GH NMR      Body blade      Wall ball roll      Wall Ball bounce      Ball drops      Porsha Scap Bio      Floor Snow angels-sliders            Therapeutic Activity (36748)      UE throwing porgression      Dynamic UE stability      Earthquake Bar      Bodyblade                Therapeutic Exercise and NMR EXR  [x] (69193) Provided verbal/tactile cueing for activities related to strengthening, flexibility, endurance, ROM  for improvements in scapular, scapulothoracic and UE control with self care, reaching, carrying, lifting, house/yardwork, driving/computer work.    [x] (47611) Provided verbal/tactile cueing for activities related to improving balance, coordination, kinesthetic sense, posture, motor skill, proprioception  to assist with  scapular, scapulothoracic and UE control with self care, reaching, carrying, lifting, house/yardwork, driving/computer work.    Therapeutic Activities:    [] (88762 or 34528) Provided verbal/tactile cueing for activities related to improving balance, coordination, kinesthetic sense, posture, motor skill, proprioception and motor activation to allow for proper function of scapular, scapulothoracic and UE control with self care, carrying, lifting, driving/computer work.     Home Exercise Program:    [x] (20772) Reviewed/Progressed HEP activities related to strengthening, flexibility, endurance, ROM of scapular, scapulothoracic and UE control with self care, reaching, carrying, lifting, house/yardwork, driving/computer work  [] (10740) Reviewed/Progressed HEP activities related to improving balance, coordination, kinesthetic sense, posture, motor skill, proprioception of scapular, scapulothoracic and UE control with self care, reaching, carrying, lifting, house/yardwork, driving/computer work      Manual Treatments:  PROM / STM / Oscillations-Mobs:  G-I, II, III, IV (PA's, Inf., Post.)  [x] (17683) Provided manual therapy to mobilize soft tissue/joints of cervical/CT, scapular GHJ  and UE for the purpose of modulating pain, promoting relaxation,  increasing ROM, reducing/eliminating soft tissue swelling/inflammation/restriction, improving soft tissue extensibility and allowing for proper ROM for normal function with self care, reaching, carrying, lifting, house/yardwork, driving/computer work    Modalities:      Charges:  Timed Code Treatment Minutes: 42   Total Treatment Minutes: 42       [] EVAL (LOW) 70051 (typically 20 minutes face-to-face)  [] EVAL (MOD) 00387 (typically 30 minutes face-to-face)  [] EVAL (HIGH) 32735 (typically 45 minutes face-to-face)  [] RE-EVAL     [x] YS(54586) x 1    [] DRY NEEDLE 1 OR 2 MUSCLES  [] NMR (63346) x     [] DRY NEEDLE 3+ MUSCLES  [x] Manual (51210) x 2      [] TA (10969) x     [] Mech Traction (49238)  [] ES(attended) (95963)     [] ES (un) (89282):   [] VASO (85636)  [] Other:    GOALS:  Patient stated goal: full arm motion  [x] Progressing: [] Met: [] Not Met: [] Adjusted  Therapist goals for Patient:   Short Term Goals: To be achieved in: 2 weeks  1. Independent in HEP and progression per patient tolerance, in order to prevent re-injury. [] Progressing: [x] Met: [] Not Met: [] Adjusted  2. Patient will have a decrease in pain to facilitate improvement in movement, function, and ADLs as indicated by Functional Deficits. [] Progressing: [x] Met: [] Not Met: [] Adjusted    Long Term Goals: To be achieved in: 10-12 weeks  1. Patient will reach FOTO predicted score of at least 62 to assist with reaching prior level of function with activities such as dressing. [x] Progressing: [] Met: [] Not Met: [] Adjusted  2. Patient will demonstrate increased L shoulder flexion AROM to 120 deg to allow for proper joint functioning as indicated by patients Functional Deficits. [x] Progressing: [] Met: [] Not Met: [] Adjusted  3.  Patient will demonstrate an increase in global L shoulder strength to 4+/5 to allow for proper functional mobility as indicated by patients Functional Deficits. [x] Progressing: [] Met: [] Not Met: [] Adjusted  4. Patient will return to all dressing activities, such has wearing shirts and jackets without increased symptoms or restriction. [] Progressing: [] Met: [] Not Met: [] Adjusted  5. Patient will demonstrate 5 repeated overhead reaches with 5# to demonstrate replacing items from overhead cabinets without symptoms or restrictions. [x] Progressing: [] Met: [] Not Met: [] Adjusted    ASSESSMENT:  Pt tolerated treatment session fairly well today. Pt continues to report improving shoulder function. Noted tenderness to palpation along L upper and middle trapezius, scalenes, and levator scapulae. Pt reported bilateral upper trap pulling with shoulder elevation. Attempted lat stretch with elbows on table today with increased pain reported from pt. Verbal and tactile cues required throughout session for proper periscapular positioning and muscle activation. Pt will continue to benefit from skilled physical therapy to improve L shoulder mobility, strength, and postural control to reach overhead. Return to Play: (if applicable)   []  Stage 1: Intro to Strength   []  Stage 2: Dynamic Strength and Intro to Plyometrics   []  Stage 3: Advanced Plyometrics and Intro to Throwing   []  Stage 4: Sport specific Training/Return to Sport     []  Ready to Return to Play, [employer]   []  Not Ready for Return to Sports   Comments:      Treatment/Activity Tolerance:  [x] Patient tolerated treatment well [] Patient limited by fatique  [] Patient limited by pain  [] Patient limited by other medical complications  [] Other:     Overall Progression Towards Functional goals/ Treatment Progress Update:  [x] Patient is progressing as expected towards functional goals listed. [] Progression is slowed due to complexities/Impairments listed. [] Progression has been slowed due to co-morbidities.   [] Plan just implemented, too soon to assess goals progression <30days   [] Goals require adjustment due to lack of progress  [] Patient is not progressing as expected and requires additional follow up with physician  [] Other    Prognosis for POC: [x] Good [] Fair  [] Poor    Patient requires continued skilled intervention: [x] Yes  [] No      PLAN: Improve L shoulder elevation, global L shoulder strengthening  [x] Continue per plan of care [] Alter current plan (see comments)  [] Plan of care initiated [] Hold pending MD visit [] Discharge    Electronically signed by: Adrienne Short, PT, DPT    Note: If patient does not return for scheduled/recommended follow up visits, this note will serve as a discharge from care along with the most recent update on progress.

## 2023-01-24 ENCOUNTER — HOSPITAL ENCOUNTER (OUTPATIENT)
Dept: PHYSICAL THERAPY | Age: 73
Setting detail: THERAPIES SERIES
Discharge: HOME | End: 2023-01-24
Payer: MEDICARE

## 2023-01-24 PROCEDURE — 97110 THERAPEUTIC EXERCISES: CPT

## 2023-01-24 PROCEDURE — 97140 MANUAL THERAPY 1/> REGIONS: CPT

## 2023-01-24 NOTE — FLOWSHEET NOTE
[place] [address] [facility]  Phone: [phone] Fax: [phone]    Physical Therapy Treatment Note/ Progress Report:       Date:  2023    Patient Name:  Caitlin Shah    :  1950  MRN: [id number]  Restrictions/Precautions:    Medical Diagnosis:  Other closed displaced fracture of proximal end of left humerus with routine healing, subsequent encounter [S42.292D]  Treatment Diagnosis:      ICD-10-CM     1. Other closed displaced fracture of proximal end of left humerus with routine healing, subsequent encounter  S42.292D         2. Impaired strength of shoulder muscles  R29.898         3. Impaired range of motion of left shoulder  M25.612           Insurance/Certification information:  PT Insurance Information: Medicare/[employer]  Physician Information:  Chad Martinez MD  Plan of care signed (Y/N): Y    Date of Patient follow up with Physician:      Progress Report: []  Yes  [x]  No     Date Range for reporting period:  Beginnin22  Ending:     Progress report due (10 Rx/or 30 days whichever is less): NPV     Recertification due (POC duration/ or 90 days whichever is less): 22     Visit # Insurance Allowable Auth Needed   15 BMN []Yes    [x]No     Pain level:  4/10 L shoulder     SUBJECTIVE: Pt reports that her L shoulder is sore today and has been since previous session. Reports she feels she hasn't been as complaint with HEP secondary to all healthcare visits recently. Pt reports that she saw a nurse practitioner for follow-up after her previous cancer diagnosis. Pt reports the nurse practitioner has ordered physical therapy for her R arm weakness and the nurse practitioner said she is able to lay on her R side. Pt also had follow-up with her radiation doctor said that she does not believe her weakness is related to her radiation.  According to pt, she has been seen for follow-ups regarding her breast cancer and treatments, L hand/wrist pain, a foot doctor, primary care physician for blood pressure management. Pt reports she is overwhelmed with all of her doctors visits currently. Functional Disability Index: FOTO physical FS primary measure score = 42; Risk adjusted = 46 Date: 11/22/22  Functional Disability Index: FOTO physical FS primary measure score =  41    Date: 12/22/22    OBJECTIVE:   Observation: continued L shoulder hike during L shoulder elevation  Test measurements:    12/22  ROM Left Right   Shoulder Flex 93    Shoulder Abd     Shoulder ER       Shoulder IR                               Strength  Left Right   Shoulder Flex 3+    Shoulder Scap 3+    Shoulder ER 3+    Shoulder IR 3+        RESTRICTIONS/PRECAUTIONS: latex allergy, recent Hx of breast cancer with R lymph node removal, HBP    Exercises/Interventions:   Therapeutic Ex (17077) Sets/sec Reps CUES/Notes   Sitting cane flexion 10 sec 5    Ball squeezes 2 10ea Prone and supine hand   Table slides  2 10 Performed with inclined table   Supine OH flexion w/ R UE assist 2 10 For OH mobility/ROM   TB no monies 2 10 3 sec hold   Scapular retractions 2 10 5 sec holds; Manual resistance   Abduction isometric 10 sec 5 Manual resistance   Reclined flexion 1 5    Seated cane scaption 2 5 10 sec hold at top   Reclined ABD 1 5    TB row 2 5 Red   Cane ER stretch 10 sec 5    Cane AAROM flex/scap    Attempted   Wall slides 1 10    Wall isometric 5 sec 10 ER and ABD; seated in chair   Seated scaption 2 8    Supine shoulder flexion    Attempted    Supine flexion 5 sec 10 2 sets   Supine punch 2 5    Sitting external and internal rotation 2 10 Manual resistance   Seated scaption 2 8    Seated thoracic rotation over swiss ball 3 sec 10    Standing - TB pulldown - red TB 5s 15 Cues for scap mechanics;  Ecc control into stretch in FF   Cross body stretch 10 sec 5                      Manual Intervention  (11019)      Shld /GH Mobs - posterior, inferior  1 min Gr. II-III   Thoracic extension with shoulder opening   4 min    Finger flexion PROM/stretching      Wrist/hand PROM   Flex/ext   Shoulder PROM  8 min Flex, scap, ER in supine   Scapular mobs - seated  Supporting L elbow  2 min    STM to L UT/LS, rhomboids, scalenes, posterior deltoid,   6 min Seated   Wrist extension mobs   Grades II-III   Wrist PROM   Flexion/extension         NMR re-education (07502)      T-spine Ext      GH depress/compress      Scap/GH NMR      Body blade      Wall ball roll      Wall Ball bounce      Ball drops      Porsha Scap Bio      Floor Snow angels-sliders            Therapeutic Activity (71643)      UE throwing porgression      Dynamic UE stability      Earthquake Bar      Bodyblade                Therapeutic Exercise and NMR EXR  [x] (28304) Provided verbal/tactile cueing for activities related to strengthening, flexibility, endurance, ROM  for improvements in scapular, scapulothoracic and UE control with self care, reaching, carrying, lifting, house/yardwork, driving/computer work. [x] (51339) Provided verbal/tactile cueing for activities related to improving balance, coordination, kinesthetic sense, posture, motor skill, proprioception  to assist with  scapular, scapulothoracic and UE control with self care, reaching, carrying, lifting, house/yardwork, driving/computer work. Therapeutic Activities:    [] (14197 or 08601) Provided verbal/tactile cueing for activities related to improving balance, coordination, kinesthetic sense, posture, motor skill, proprioception and motor activation to allow for proper function of scapular, scapulothoracic and UE control with self care, carrying, lifting, driving/computer work.      Home Exercise Program:    [x] (77524) Reviewed/Progressed HEP activities related to strengthening, flexibility, endurance, ROM of scapular, scapulothoracic and UE control with self care, reaching, carrying, lifting, house/yardwork, driving/computer work  [] (84102) Reviewed/Progressed HEP activities related to improving balance, coordination, kinesthetic sense, posture, motor skill, proprioception of scapular, scapulothoracic and UE control with self care, reaching, carrying, lifting, house/yardwork, driving/computer work      Manual Treatments:  PROM / STM / Oscillations-Mobs:  G-I, II, III, IV (PA's, Inf., Post.)  [x] (89315) Provided manual therapy to mobilize soft tissue/joints of cervical/CT, scapular GHJ and UE for the purpose of modulating pain, promoting relaxation,  increasing ROM, reducing/eliminating soft tissue swelling/inflammation/restriction, improving soft tissue extensibility and allowing for proper ROM for normal function with self care, reaching, carrying, lifting, house/yardwork, driving/computer work    Modalities:      Charges:  Timed Code Treatment Minutes: 44   Total Treatment Minutes: 44       [] EVAL (LOW) 05539 (typically 20 minutes face-to-face)  [] EVAL (MOD) 40539 (typically 30 minutes face-to-face)  [] EVAL (HIGH) 14800 (typically 45 minutes face-to-face)  [] RE-EVAL     [x] TJ(00710) x 2    [] DRY NEEDLE 1 OR 2 MUSCLES  [] NMR (36353) x     [] DRY NEEDLE 3+ MUSCLES  [x] Manual (82743) x 1      [] TA (96480) x     [] Mech Traction (09941)  [] ES(attended) (07578)     [] ES (un) (55330):   [] VASO (81237)  [] Other:    GOALS:  Patient stated goal: full arm motion  [x] Progressing: [] Met: [] Not Met: [] Adjusted  Therapist goals for Patient:   Short Term Goals: To be achieved in: 2 weeks  1. Independent in HEP and progression per patient tolerance, in order to prevent re-injury. [] Progressing: [x] Met: [] Not Met: [] Adjusted  2. Patient will have a decrease in pain to facilitate improvement in movement, function, and ADLs as indicated by Functional Deficits. [] Progressing: [x] Met: [] Not Met: [] Adjusted    Long Term Goals: To be achieved in: 10-12 weeks  1.  Patient will reach FOTO predicted score of at least 62 to assist with reaching prior level of function with activities such as dressing. [x] Progressing: [] Met: [] Not Met: [] Adjusted  2. Patient will demonstrate increased L shoulder flexion AROM to 120 deg to allow for proper joint functioning as indicated by patients Functional Deficits. [x] Progressing: [] Met: [] Not Met: [] Adjusted  3. Patient will demonstrate an increase in global L shoulder strength to 4+/5 to allow for proper functional mobility as indicated by patients Functional Deficits. [x] Progressing: [] Met: [] Not Met: [] Adjusted  4. Patient will return to all dressing activities, such has wearing shirts and jackets without increased symptoms or restriction. [] Progressing: [x] Met: [] Not Met: [] Adjusted  5. Patient will demonstrate 5 repeated overhead reaches with 5# to demonstrate replacing items from overhead cabinets without symptoms or restrictions. [x] Progressing: [] Met: [] Not Met: [] Adjusted    ASSESSMENT:  Pt tolerated treatment session fairly well today. Noted external stressors discussed today and potential impact on current plan of care and increased L shoulder pain. Pt noted relief of pain by end of session compared to start of session. Pt educated on gentle shoulder motions and isometrics for pain relief. Cues required external rotation to prevent wrist pain from active wrist extension. Noted tenderness to palpation throughout upper trapezius. Plan to address RUE next visit as pt is able. Importance of sleep hygiene also discussed with assistance for healing. Modifications made during session today secondary to initial pain levels. Pt will continue to benefit from skilled physical therapy to improve L shoulder mobility, strength, and postural control to reach overhead.      Return to Play: (if applicable)   []  Stage 1: Intro to Strength   []  Stage 2: Dynamic Strength and Intro to Plyometrics   []  Stage 3: Advanced Plyometrics and Intro to Throwing   []  Stage 4: Sport specific Training/Return to Sport     []  Ready to Return to Play, Meets All Above Stages   []  Not Ready for Return to Sports   Comments:      Treatment/Activity Tolerance:  [x] Patient tolerated treatment well [] Patient limited by fatique  [] Patient limited by pain  [] Patient limited by other medical complications  [] Other:     Overall Progression Towards Functional goals/ Treatment Progress Update:  [x] Patient is progressing as expected towards functional goals listed. [] Progression is slowed due to complexities/Impairments listed. [] Progression has been slowed due to co-morbidities. [] Plan just implemented, too soon to assess goals progression <30days   [] Goals require adjustment due to lack of progress  [] Patient is not progressing as expected and requires additional follow up with physician  [] Other    Prognosis for POC: [x] Good [] Fair  [] Poor    Patient requires continued skilled intervention: [x] Yes  [] No      PLAN: Improve L shoulder elevation, global L shoulder strengthening  [x] Continue per plan of care [] Alter current plan (see comments)  [] Plan of care initiated [] Hold pending MD visit [] Discharge    Electronically signed by: Maye Lebron, PT, DPT    Note: If patient does not return for scheduled/recommended follow up visits, this note will serve as a discharge from care along with the most recent update on progress.

## 2023-01-26 ENCOUNTER — HOSPITAL ENCOUNTER (OUTPATIENT)
Dept: PHYSICAL THERAPY | Age: 73
Setting detail: THERAPIES SERIES
Discharge: HOME | End: 2023-01-26
Payer: MEDICARE

## 2023-01-26 PROCEDURE — 97140 MANUAL THERAPY 1/> REGIONS: CPT

## 2023-01-26 PROCEDURE — 97110 THERAPEUTIC EXERCISES: CPT

## 2023-01-26 NOTE — PLAN OF CARE
[place] [address] [address]  Phone: [phone] Fax: [phone]  Physical Therapy Re-Certification Plan of Care    Dear Caroline Michel MD  ,    We had the pleasure of treating the following patient for physical therapy services at [facility]. A summary of our findings can be found in the updated assessment below. This includes our plan of care. If you have any questions or concerns regarding these findings, please do not hesitate to contact me at the office phone number checked above. Thank you for the referral.     Physician Signature:________________________________Date:__________________  By signing above (or electronic signature), therapist's plan is approved by physician      Functional Outcome:     Functional Disability Index: FOTO physical FS primary measure score = 42; Risk adjusted = 46 Date: 11/22/22  Functional Disability Index: FOTO physical FS primary measure score =  41    Date: 12/22/22  Functional Disability Index: FOTO physical FS primary measure score =  44    Date: 1/26/23    Overall Response to Treatment:   []Patient is responding well to treatment and improvement is noted with regards  to goals   []Patient should continue to improve in reasonable time if they continue HEP   []Patient has plateaued and is no longer responding to skilled PT intervention    []Patient is getting worse and would benefit from return to referring MD   []Patient unable to adhere to initial POC   [x]Other: Pt has been tolerating her time in physical therapy fairly well. She has demonstrated improvements of ROM, strength, pain, and neuromuscular control however all continue to be limited. Pt demonstrates all the above deficits throughout her bilateral upper extremities.  Pt's progress has been slowed and complicated secondary to her co-morbidities of hypertension, diabetes, and concomitant L hand/wrist injuries. Pt's posture of increased thoracic kyphosis, increased lower cervical lordosis, and upper cervical hyperextension also play a significant role in her bilateral shoulder range of motion and strength deficits. To this point, pt has 3/8 goals, and has not returned to all of her ADLs such as cleaning, cooking, bathing, and lifting without increased symptoms or restrictions. Pt reports she is ~60% improved since beginning therapy with remaining percentage deficit coming from not performing above ADLs yet without symptoms. Pt will continue to benefit from further skilled physical therapy to improve all the above impairments to bilateral upper extremities and return to PLOF including bathing and reaching overhead without symptoms or restrictions. Date range of Visits: 22 - 23  Total Visits: 16    Recommendation:    [x]Continue PT 2x / wk for 8 weeks. []Hold PT, pending MD visit    Physical Therapy Treatment Note/ Progress Report:       Date:  2023    Patient Name:  Huma Bernstein    :  1950  MRN: [id number]  Restrictions/Precautions:    Medical Diagnosis:  Other closed displaced fracture of proximal end of left humerus with routine healing, subsequent encounter [S42.292D]  Treatment Diagnosis:      ICD-10-CM     1. Other closed displaced fracture of proximal end of left humerus with routine healing, subsequent encounter  S42.292D         2. Impaired strength of shoulder muscles  R29.898         3.  Impaired range of motion of left shoulder  M25.612           Insurance/Certification information:  PT Insurance Information: Medicare/AARP  Physician Information:  Ulysses Maxwell MD  Plan of care signed (Y/N): Y    Date of Patient follow up with Physician:      Progress Report: [x]  Yes  []  No     Date Range for reporting period:  Beginnin22  Ending:     Progress report due (10 Rx/or 30 days whichever is less):      Recertification due (POC duration/ or 90 days whichever is less): 2/26/23     Visit # Insurance Allowable Auth Needed   16 BMN []Yes    [x]No     Pain level:  5/10 L shoulder     SUBJECTIVE: Pt reports that she had MRI performed of her L hand/wrist. Pt reports that she saw Dr. Candi French this morning and had MRI read; reports that MRI revealed tendinopathies and arthritis with no fractures evident. Pt reports that she is re-starting occupational therapy for both hands next week. Notes that leaving the previous therapy session her shoulder was feeling good and not painful, but had pain that was present into the night and Wednesday that required pain medication to assist with sleeping. Pt reports that she is overly stressed. Pt reports that she is approximately 60% improved since beginning therapy. States that the remaining 40% is returning to bathing herself, reaching behind her without pain, and reaching out in front of her.       Functional Disability Index: FOTO physical FS primary measure score = 42; Risk adjusted = 46 Date: 11/22/22  Functional Disability Index: FOTO physical FS primary measure score =  41    Date: 12/22/22  Functional Disability Index: FOTO physical FS primary measure score =  44    Date: 1/26/23    OBJECTIVE:   Observation: continued L shoulder hike during L shoulder elevation  Test measurements:    1/26  ROM Left Right   Shoulder Flex 90 110   Shoulder Abd 90 120   Shoulder ER       Shoulder IR                               Strength  Left Right   Shoulder Flex 4- 4-   Shoulder Scap 4- 4-   Shoulder ER 3+ 4-   Shoulder IR 4- 4-       RESTRICTIONS/PRECAUTIONS: latex allergy, recent Hx of breast cancer with R lymph node removal, HBP    Exercises/Interventions:   Therapeutic Ex (51143) Sets/sec Reps CUES/Notes   Sitting cane flexion 10 sec 5    Ball squeezes 2 10ea Prone and supine hand   Table slides  2 10 Performed with inclined table   Supine OH flexion w/ R UE assist 2 10 For OH mobility/ROM   TB no monies 2 10 3 sec hold   Scapular retractions 2 10 5 sec holds; Manual resistance   Bilateral shoulder ER with foam roller back hug 2 8    Sidelying ER AAROM 2 8    Cervical retractions 2 8    Sidelying flexion 1 6 AAROM   Abduction isometric 10 sec 5 Manual resistance   Reclined flexion 1 5    Seated cane scaption 2 5 10 sec hold at top   Reclined ABD 1 5    TB row 2 5 Red   Cane ER stretch 10 sec 5    Cane AAROM flex/scap    Attempted   Wall slides 1 10    Wall isometric 5 sec 10 ER and ABD; seated in chair   Seated scaption 2 8    Supine shoulder flexion    Attempted    Supine cane flexion 5 sec 10 2 sets   Supine punch 2 5    Sitting external and internal rotation 2 10 Manual resistance   Seated scaption 2 8    Seated thoracic rotation over swiss ball 3 sec 10    Standing - TB pulldown - red TB 5s 15 Cues for scap mechanics;  Ecc control into stretch in FF   Cross body stretch 10 sec 5                      Manual Intervention  (02303)      Shld/GH Mobs - posterior, inferior  2 min Gr. II-III   Thoracic extension with shoulder opening   3 min    Finger flexion PROM/stretching      Wrist/hand PROM   Flex/ext   Shoulder PROM  5 min Flex, scap, ER in supine; performed supine and sidelying   Scapular mobs - seated  Supporting L elbow  2 min    STM to L UT/LS, rhomboids, scalenes, posterior deltoid,   3 min Seated   Wrist extension mobs   Grades II-III   Wrist PROM   Flexion/extension         NMR re-education (61917)      T-spine Ext      GH depress/compress      Scap/GH NMR      Body blade      Wall ball roll      Wall Ball bounce      Ball drops      Porsha Scap Bio      Floor Snow angels-sliders            Therapeutic Activity (34219)      UE throwing porgression      Dynamic UE stability      Earthquake Bar      Bodyblade                Therapeutic Exercise and NMR EXR  [x] (76309) Provided verbal/tactile cueing for activities related to strengthening, flexibility, endurance, ROM  for improvements in scapular, scapulothoracic and UE control with self care, reaching, carrying, lifting, house/yardwork, driving/computer work. [x] (70563) Provided verbal/tactile cueing for activities related to improving balance, coordination, kinesthetic sense, posture, motor skill, proprioception  to assist with  scapular, scapulothoracic and UE control with self care, reaching, carrying, lifting, house/yardwork, driving/computer work. Therapeutic Activities:    [] (76262 or 57577) Provided verbal/tactile cueing for activities related to improving balance, coordination, kinesthetic sense, posture, motor skill, proprioception and motor activation to allow for proper function of scapular, scapulothoracic and UE control with self care, carrying, lifting, driving/computer work.      Home Exercise Program:    [x] (55118) Reviewed/Progressed HEP activities related to strengthening, flexibility, endurance, ROM of scapular, scapulothoracic and UE control with self care, reaching, carrying, lifting, house/yardwork, driving/computer work  [] (86177) Reviewed/Progressed HEP activities related to improving balance, coordination, kinesthetic sense, posture, motor skill, proprioception of scapular, scapulothoracic and UE control with self care, reaching, carrying, lifting, house/yardwork, driving/computer work      Manual Treatments:  PROM / STM / Oscillations-Mobs:  G-I, II, III, IV (PA's, Inf., Post.)  [x] (72758) Provided manual therapy to mobilize soft tissue/joints of cervical/CT, scapular GHJ and UE for the purpose of modulating pain, promoting relaxation,  increasing ROM, reducing/eliminating soft tissue swelling/inflammation/restriction, improving soft tissue extensibility and allowing for proper ROM for normal function with self care, reaching, carrying, lifting, house/yardwork, driving/computer work    Modalities:      Charges:  Timed Code Treatment Minutes: 48   Total Treatment Minutes: 48       [] CAITLIN (LOW) 70980 (typically 20 minutes face-to-face)  [] EVAL (MOD) 58968 (typically 30 minutes face-to-face)  [] EVAL (HIGH) 84360 (typically 45 minutes face-to-face)  [] RE-EVAL     [x] BU(56972) x 2    [] DRY NEEDLE 1 OR 2 MUSCLES  [] NMR (56587) x     [] DRY NEEDLE 3+ MUSCLES  [x] Manual (60626) x 1      [] TA (78329) x     [] Mech Traction (84108)  [] ES(attended) (58711)     [] ES (un) (98345):   [] VASO (58987)  [] Other:    GOALS:  Patient stated goal: full arm motion  [x] Progressing: [] Met: [] Not Met: [] Adjusted  Therapist goals for Patient:   Short Term Goals: To be achieved in: 2 weeks  1. Independent in HEP and progression per patient tolerance, in order to prevent re-injury. [] Progressing: [x] Met: [] Not Met: [] Adjusted  2. Patient will have a decrease in pain to facilitate improvement in movement, function, and ADLs as indicated by Functional Deficits. [] Progressing: [x] Met: [] Not Met: [] Adjusted    Long Term Goals: To be achieved in: 10-12 weeks  1. Patient will reach FOTO predicted score of at least 62 to assist with reaching prior level of function with activities such as dressing. [x] Progressing: [] Met: [] Not Met: [] Adjusted  2. Patient will demonstrate increased L shoulder flexion AROM to 120 deg to allow for proper joint functioning as indicated by patients Functional Deficits. [x] Progressing: [] Met: [] Not Met: [] Adjusted  3. Patient will demonstrate an increase in global L shoulder strength to 4+/5 to allow for proper functional mobility as indicated by patients Functional Deficits. [x] Progressing: [] Met: [] Not Met: [] Adjusted  4. Patient will return to all dressing activities, such has wearing shirts and jackets without increased symptoms or restriction. [] Progressing: [x] Met: [] Not Met: [] Adjusted  5. Patient will demonstrate 5 repeated overhead reaches with 5# to demonstrate replacing items from overhead cabinets without symptoms or restrictions.     [x] Progressing: [] Met: [] Not Met: [] Adjusted    ASSESSMENT: Pt has been tolerating her time in physical therapy fairly well. She has demonstrated improvements of ROM, strength, pain, and neuromuscular control however all continue to be limited. Pt demonstrates all the above deficits throughout her bilateral upper extremities. Pt's progress has been slowed and complicated secondary to her co-morbidities of hypertension, diabetes, and concomitant L hand/wrist injuries. Pt's posture of increased thoracic kyphosis, increased lower cervical lordosis, and upper cervical hyperextension also play a significant role in her bilateral shoulder range of motion and strength deficits. To this point, pt has 3/8 goals, and has not returned to all of her ADLs such as cleaning, cooking, bathing, and lifting without increased symptoms or restrictions. Pt reports she is ~60% improved since beginning therapy with remaining percentage deficit coming from not performing above ADLs yet without symptoms. Pt will continue to benefit from further skilled physical therapy to improve all the above impairments to bilateral upper extremities and return to PLOF including bathing and reaching overhead without symptoms or restrictions. Return to Play: (if applicable)   []  Stage 1: Intro to Strength   []  Stage 2: Dynamic Strength and Intro to Plyometrics   []  Stage 3: Advanced Plyometrics and Intro to Throwing   []  Stage 4: Sport specific Training/Return to Sport     []  Ready to Return to Play, [employer]   []  Not Ready for Return to Sports   Comments:      Treatment/Activity Tolerance:  [x] Patient tolerated treatment well [] Patient limited by fatique  [] Patient limited by pain  [] Patient limited by other medical complications  [] Other:     Overall Progression Towards Functional goals/ Treatment Progress Update:  [x] Patient is progressing as expected towards functional goals listed. [] Progression is slowed due to complexities/Impairments listed.   [] Progression has been slowed due to co-morbidities. [] Plan just implemented, too soon to assess goals progression <30days   [] Goals require adjustment due to lack of progress  [] Patient is not progressing as expected and requires additional follow up with physician  [] Other    Prognosis for POC: [x] Good [] Fair  [] Poor    Patient requires continued skilled intervention: [x] Yes  [] No      PLAN: Improve L shoulder elevation, global L shoulder strengthening  [x] Continue per plan of care [] Alter current plan (see comments)  [] Plan of care initiated [] Hold pending MD visit [] Discharge    Electronically signed by: Tate Falk PT, DPT    Note: If patient does not return for scheduled/recommended follow up visits, this note will serve as a discharge from care along with the most recent update on progress.

## 2023-01-31 ENCOUNTER — HOSPITAL ENCOUNTER (OUTPATIENT)
Dept: PHYSICAL THERAPY | Age: 73
Setting detail: THERAPIES SERIES
Discharge: HOME | End: 2023-01-31
Payer: MEDICARE

## 2023-01-31 PROCEDURE — 97140 MANUAL THERAPY 1/> REGIONS: CPT

## 2023-01-31 PROCEDURE — 97110 THERAPEUTIC EXERCISES: CPT

## 2023-01-31 NOTE — FLOWSHEET NOTE
[place] [address] [facility]  Phone: [phone] Fax: [phone]    Physical Therapy Treatment Note/ Progress Report:       Date:  2023    Patient Name:  Patricia Worthy    :  1950  MRN: [id number]  Restrictions/Precautions:    Medical Diagnosis:  Other closed displaced fracture of proximal end of left humerus with routine healing, subsequent encounter [S42.292D]  Treatment Diagnosis:      ICD-10-CM     1. Other closed displaced fracture of proximal end of left humerus with routine healing, subsequent encounter  S42.292D         2. Impaired strength of shoulder muscles  R29.898         3. Impaired range of motion of left shoulder  M25.612           Insurance/Certification information:  PT Insurance Information: Medicare/[employer]  Physician Information:  Donna Forde MD  Plan of care signed (Y/N): Y    Date of Patient follow up with Physician:      Progress Report: [x]  Yes  []  No     Date Range for reporting period:  Beginnin22  Ending:     Progress report due (10 Rx/or 30 days whichever is less):      Recertification due (POC duration/ or 90 days whichever is less): 23     Visit # Insurance Allowable Auth Needed   17 BMN []Yes    [x]No     Pain level:  0/10 in L shoulder and R shoulder     SUBJECTIVE: Pt reports that she is feeling really good today at start of session. Reports that she is on new diabetes medicine and meal regimen. States on Friday she was walking and stumbled over her foot; she was able to catch herself with her RUE, and she said she had immediate pain in her wrist and shoulder; reports she feels unsteady sometimes on her feet because she doesn't have as much arm swing. Pt reports that she feels she has some tingling in her R hand mostly first three digits.     Functional Disability Index: FOTO physical FS primary measure score = 42; Risk adjusted = 46 Date: 22  Functional Disability Index: FOTO physical FS primary measure score =  41    Date: 12/22/22  Functional Disability Index: FOTO physical FS primary measure score =  44    Date: 1/26/23    OBJECTIVE:   Observation: continued L shoulder hike during L shoulder elevation  Test measurements:    1/26  ROM Left Right   Shoulder Flex 90 110   Shoulder Abd 90 120   Shoulder ER       Shoulder IR                               Strength  Left Right   Shoulder Flex 4- 4-   Shoulder Scap 4- 4-   Shoulder ER 3+ 4-   Shoulder IR 4- 4-       RESTRICTIONS/PRECAUTIONS: latex allergy, recent Hx of breast cancer with R lymph node removal, HBP    Exercises/Interventions:   Therapeutic Ex (93558) Sets/sec Reps CUES/Notes   Sitting cane flexion 10 sec 5    Ball squeezes 2 10ea Prone and supine hand   Table slides  2 10 Performed with inclined table   Supine OH flexion w/ R UE assist 2 10 For OH mobility/ROM   TB no monies 2 10 Yellow TB   Scapular retractions 2 10 5 sec holds; Manual resistance   Seated rows 2 10 Yellow TB   Bilateral shoulder ER with foam roller back hug 2 8    Sidelying ER AAROM 2 8    Upper cervical flexion 2 10 5 sec hold   Pec doorway stretch 10 sec 5 Performed with hands at sides   Sidelying flexion 1 6 AAROM   Abduction isometric 10 sec 5 Manual resistance   Reclined flexion 1 5    Seated cane scaption 2 5 10 sec hold at top   Reclined ABD 1 5    TB row 2 5 Red   Cane ER stretch 10 sec 5    Cane AAROM flex/scap    Attempted   Wall slides 1 10    Wall isometric 5 sec 10 ER and ABD; seated in chair   Seated scaption 2 8    Supine shoulder flexion    Attempted    Supine cane flexion 5 sec 10 2 sets   Supine punch 2 5    Sitting external and internal rotation 2 10 Manual resistance   Seated scaption 2 8    Seated thoracic rotation over swiss ball 3 sec 10    Standing - TB pulldown - yellow TB 5s 15 Cues for scap mechanics;  Ecc control into stretch in FF   Cross body stretch 10 sec 5                      Manual Intervention  (38771) Shld/GH Mobs - posterior, inferior  2 min Gr. II-III   Thoracic extension with shoulder opening   2 min    Finger flexion PROM/stretching      Wrist/hand PROM   Flex/ext   Shoulder PROM  9 min Flex, scap, ER in supine; performed supine   Scapular mobs - seated  Supporting L elbow  2 min    STM to L UT/LS, rhomboids, scalenes, posterior deltoid,   3 min Seated   Wrist extension mobs   Grades II-III   Wrist PROM   Flexion/extension         NMR re-education (25631)      T-spine Ext      GH depress/compress      Scap/GH NMR      Body blade      Wall ball roll      Wall Ball bounce      Ball drops      Porsha Scap Bio      Floor Snow angels-sliders            Therapeutic Activity (22730)      UE throwing porgression      Dynamic UE stability      Earthquake Bar      Bodyblade                Therapeutic Exercise and NMR EXR  [x] (68380) Provided verbal/tactile cueing for activities related to strengthening, flexibility, endurance, ROM  for improvements in scapular, scapulothoracic and UE control with self care, reaching, carrying, lifting, house/yardwork, driving/computer work. [x] (96458) Provided verbal/tactile cueing for activities related to improving balance, coordination, kinesthetic sense, posture, motor skill, proprioception  to assist with  scapular, scapulothoracic and UE control with self care, reaching, carrying, lifting, house/yardwork, driving/computer work. Therapeutic Activities:    [] (72638 or 16001) Provided verbal/tactile cueing for activities related to improving balance, coordination, kinesthetic sense, posture, motor skill, proprioception and motor activation to allow for proper function of scapular, scapulothoracic and UE control with self care, carrying, lifting, driving/computer work.      Home Exercise Program:    [x] (30494) Reviewed/Progressed HEP activities related to strengthening, flexibility, endurance, ROM of scapular, scapulothoracic and UE control with self care, reaching, carrying, lifting, house/yardwork, driving/computer work  [] (46105) Reviewed/Progressed HEP activities related to improving balance, coordination, kinesthetic sense, posture, motor skill, proprioception of scapular, scapulothoracic and UE control with self care, reaching, carrying, lifting, house/yardwork, driving/computer work      Manual Treatments:  PROM / STM / Oscillations-Mobs:  G-I, II, III, IV (PA's, Inf., Post.)  [x] (30749) Provided manual therapy to mobilize soft tissue/joints of cervical/CT, scapular GHJ and UE for the purpose of modulating pain, promoting relaxation,  increasing ROM, reducing/eliminating soft tissue swelling/inflammation/restriction, improving soft tissue extensibility and allowing for proper ROM for normal function with self care, reaching, carrying, lifting, house/yardwork, driving/computer work    Modalities:      Charges:  Timed Code Treatment Minutes: 46   Total Treatment Minutes: 46       [] EVAL (LOW) 41851 (typically 20 minutes face-to-face)  [] EVAL (MOD) 92156 (typically 30 minutes face-to-face)  [] EVAL (HIGH) 423 8935 (typically 45 minutes face-to-face)  [] RE-EVAL     [x] CE(38217) x 2    [] DRY NEEDLE 1 OR 2 MUSCLES  [] NMR (65705) x     [] DRY NEEDLE 3+ MUSCLES  [x] Manual (76806) x 1      [] TA (20986) x     [] Mech Traction (32890)  [] ES(attended) (44130)     [] ES (un) (34255):   [] VASO (91318)  [] Other:    GOALS:  Patient stated goal: full arm motion  [x] Progressing: [] Met: [] Not Met: [] Adjusted  Therapist goals for Patient:   Short Term Goals: To be achieved in: 2 weeks  1. Independent in HEP and progression per patient tolerance, in order to prevent re-injury. [] Progressing: [x] Met: [] Not Met: [] Adjusted  2. Patient will have a decrease in pain to facilitate improvement in movement, function, and ADLs as indicated by Functional Deficits. [] Progressing: [x] Met: [] Not Met: [] Adjusted    Long Term Goals: To be achieved in: 10-12 weeks  1.  Patient will reach FOTO predicted score of at least 62 to assist with reaching prior level of function with activities such as dressing. [x] Progressing: [] Met: [] Not Met: [] Adjusted  2. Patient will demonstrate increased L shoulder flexion AROM to 120 deg to allow for proper joint functioning as indicated by patients Functional Deficits. [x] Progressing: [] Met: [] Not Met: [] Adjusted  3. Patient will demonstrate an increase in global L shoulder strength to 4+/5 to allow for proper functional mobility as indicated by patients Functional Deficits. [x] Progressing: [] Met: [] Not Met: [] Adjusted  4. Patient will return to all dressing activities, such has wearing shirts and jackets without increased symptoms or restriction. [] Progressing: [x] Met: [] Not Met: [] Adjusted  5. Patient will demonstrate 5 repeated overhead reaches with 5# to demonstrate replacing items from overhead cabinets without symptoms or restrictions. [x] Progressing: [] Met: [] Not Met: [] Adjusted    ASSESSMENT:  Pt tolerated treatment session fairly well today. Exercises performed bilaterally to address bilateral upper extremity strength impairments. Progress demonstrated today with pt tolerating increased exercises in standing. Quick muscular fatigue demonstrated bilaterally. Pt has been focusing on improving posture during seated and standing positions. Plan to continue to progress strengthening and ROM as tolerated. Pt will continue to benefit from further skilled physical therapy to improve all the above impairments to bilateral upper extremities and return to PLOF including bathing and reaching overhead without symptoms or restrictions.     Return to Play: (if applicable)   []  Stage 1: Intro to Strength   []  Stage 2: Dynamic Strength and Intro to Plyometrics   []  Stage 3: Advanced Plyometrics and Intro to Throwing   []  Stage 4: Sport specific Training/Return to Sport     []  Ready to Return to Play, Meets All Above Stages   []  Not Ready for Return to Sports   Comments:      Treatment/Activity Tolerance:  [x] Patient tolerated treatment well [] Patient limited by fatique  [] Patient limited by pain  [] Patient limited by other medical complications  [] Other:     Overall Progression Towards Functional goals/ Treatment Progress Update:  [x] Patient is progressing as expected towards functional goals listed. [] Progression is slowed due to complexities/Impairments listed. [] Progression has been slowed due to co-morbidities. [] Plan just implemented, too soon to assess goals progression <30days   [] Goals require adjustment due to lack of progress  [] Patient is not progressing as expected and requires additional follow up with physician  [] Other    Prognosis for POC: [x] Good [] Fair  [] Poor    Patient requires continued skilled intervention: [x] Yes  [] No      PLAN: Improve L shoulder elevation, global L shoulder strengthening  [x] Continue per plan of care [] Alter current plan (see comments)  [] Plan of care initiated [] Hold pending MD visit [] Discharge    Electronically signed by: Freddy Cabrera, PT, DPT    Note: If patient does not return for scheduled/recommended follow up visits, this note will serve as a discharge from care along with the most recent update on progress.

## 2023-02-02 ENCOUNTER — HOSPITAL ENCOUNTER (OUTPATIENT)
Dept: PHYSICAL THERAPY | Age: 73
Setting detail: THERAPIES SERIES
Discharge: HOME | End: 2023-02-02

## 2023-02-02 NOTE — FLOWSHEET NOTE
[facility] [address]     Physical Therapy  Cancellation/No-show Note  Patient Name:  Venita Reilly  :  1950   Date:  2023  Cancelled visits to date: 1  No-shows to date: 0    Patient status for today's appointment patient:  [x]  Cancelled  []  Rescheduled appointment  []  No-show     Reason given by patient:  [x]  Patient ill  []  Conflicting appointment  []  No transportation    []  Conflict with work  []  No reason given  []  Other:     Comments:      Phone call information:   []  Phone call made today to patient at _ time at number provided:      []  Patient answered, conversation as follows:    []  Patient did not answer, message left as follows:  []  Phone call not made today  [x]  Phone call not needed - pt contacted us to cancel and provided reason for cancellation.      Electronically signed by:  Coreen Worthy PT

## 2023-02-07 ENCOUNTER — HOSPITAL ENCOUNTER (OUTPATIENT)
Dept: PHYSICAL THERAPY | Age: 73
Setting detail: THERAPIES SERIES
Discharge: HOME | End: 2023-02-07
Payer: MEDICARE

## 2023-02-07 PROCEDURE — 97140 MANUAL THERAPY 1/> REGIONS: CPT

## 2023-02-07 PROCEDURE — 97110 THERAPEUTIC EXERCISES: CPT

## 2023-02-07 NOTE — FLOWSHEET NOTE
[place] [address] [place]  Phone: [phone] Fax: [phone]    Physical Therapy Treatment Note/ Progress Report:       Date:  2023    Patient Name:  Jesús Barroso    :  1950  MRN: [id number]  Restrictions/Precautions:    Medical Diagnosis:  Other closed displaced fracture of proximal end of left humerus with routine healing, subsequent encounter [S42.292D]  Treatment Diagnosis:      ICD-10-CM     1. Other closed displaced fracture of proximal end of left humerus with routine healing, subsequent encounter  S42.292D         2. Impaired strength of shoulder muscles  R29.898         3. Impaired range of motion of left shoulder  M25.612           Insurance/Certification information:  PT Insurance Information: Medicare/AARP  Physician Information:  Damian Esparza MD  Plan of care signed (Y/N): Y    Date of Patient follow up with Physician:      Progress Report: [x]  Yes  []  No     Date Range for reporting period:  Beginnin22  Ending:     Progress report due (10 Rx/or 30 days whichever is less):      Recertification due (POC duration/ or 90 days whichever is less): 23     Visit # Insurance Allowable Auth Needed   18 BMN []Yes    [x]No     Pain level:  0/10 in L shoulder; only soreness; 3/10 pain R shoulder     SUBJECTIVE: Pt reports after last session she has been having low back pain. Pt attributes her pain to previous back surgeries and laying down during therapy sessions. Pt notes that heat has been helping it feel better. Reports she has returned to OT last week for her hands.     Functional Disability Index: FOTO physical FS primary measure score = 42; Risk adjusted = 46 Date: 22  Functional Disability Index: FOTO physical FS primary measure score =  41    Date: 22  Functional Disability Index: FOTO physical FS primary measure score =  44    Date: 23    OBJECTIVE: Observation: continued L shoulder hike during L shoulder elevation  Test measurements:    1/26  ROM Left Right   Shoulder Flex 90 110   Shoulder Abd 90 120   Shoulder ER       Shoulder IR                               Strength  Left Right   Shoulder Flex 4- 4-   Shoulder Scap 4- 4-   Shoulder ER 3+ 4-   Shoulder IR 4- 4-       RESTRICTIONS/PRECAUTIONS: latex allergy, recent Hx of breast cancer with R lymph node removal, HBP    Exercises/Interventions:   Exercises performed bilaterally  Therapeutic Ex (71908) Sets/sec Reps CUES/Notes   Sitting cane flexion 10 sec 5    Ball squeezes 2 10ea Prone and supine hand   Table slides  2 10 Performed with inclined table   Supine OH flexion w/ R UE assist 2 10 For OH mobility/ROM   TB no monies 2 10 Yellow TB   Scapular retractions 2 10 5 sec holds; Manual resistance   Seated rows 2 10 Red TB   Bilateral shoulder ER with foam roller back hug 2 8    Sidelying ER AAROM 2 8    Upper cervical flexion 2 10 5 sec hold   Pec doorway stretch 10 sec 5 Performed with hands at sides   Sidelying flexion 1 6 AAROM   Abduction isometric 10 sec 5 Manual resistance   Reclined flexion 1 5    Seated cane scaption 2 5 10 sec hold at top   Reclined ABD 1 5    TB row 2 5 Red   Cane ER stretch 10 sec 5    Cane AAROM flex/scap    Attempted   Wall slides 1 10    Wall isometric 5 sec 10 ER and ABD; seated in chair   Seated scaption 2 8    Supine shoulder flexion    Attempted    Supine cane flexion 5 sec 10 2 sets   Supine punch 2 5    Sitting external and internal rotation 2ea 10 Red TB   Seated scaption 2 8    Seated thoracic rotation over swiss ball 3 sec 10    Seated - TB pulldown - red TB 5s 15 Cues for scap mechanics;  Ecc control into stretch in FF   Cross body stretch 10 sec 5                      Manual Intervention  (88095)      Shld/GH Mobs - posterior, inferior  2 min Gr. II-III   Thoracic extension with shoulder opening   2 min    Finger flexion PROM/stretching      Wrist/hand PROM Flex/ext   Shoulder PROM  9 min Flex, scap, ER in supine; performed supine   Scapular mobs - seated  Supporting L elbow  3 min    STM to L UT/LS, rhomboids, scalenes, posterior deltoid,   4 min Seated   Wrist extension mobs   Grades II-III   Wrist PROM   Flexion/extension         NMR re-education (09952)      T-spine Ext      GH depress/compress      Scap/GH NMR      Body blade      Wall ball roll      Wall Ball bounce      Ball drops      Porsha Scap Bio      Floor Snow angels-sliders            Therapeutic Activity (23577)      UE throwing porgression      Dynamic UE stability      Earthquake Bar      Bodyblade                Therapeutic Exercise and NMR EXR  [x] (18750) Provided verbal/tactile cueing for activities related to strengthening, flexibility, endurance, ROM  for improvements in scapular, scapulothoracic and UE control with self care, reaching, carrying, lifting, house/yardwork, driving/computer work. [x] (36017) Provided verbal/tactile cueing for activities related to improving balance, coordination, kinesthetic sense, posture, motor skill, proprioception  to assist with  scapular, scapulothoracic and UE control with self care, reaching, carrying, lifting, house/yardwork, driving/computer work. Therapeutic Activities:    [] (84511 or 15353) Provided verbal/tactile cueing for activities related to improving balance, coordination, kinesthetic sense, posture, motor skill, proprioception and motor activation to allow for proper function of scapular, scapulothoracic and UE control with self care, carrying, lifting, driving/computer work.      Home Exercise Program:    [x] (60626) Reviewed/Progressed HEP activities related to strengthening, flexibility, endurance, ROM of scapular, scapulothoracic and UE control with self care, reaching, carrying, lifting, house/yardwork, driving/computer work  [] (54431) Reviewed/Progressed HEP activities related to improving balance, coordination, kinesthetic sense, posture, motor skill, proprioception of scapular, scapulothoracic and UE control with self care, reaching, carrying, lifting, house/yardwork, driving/computer work      Manual Treatments:  PROM / STM / Oscillations-Mobs:  G-I, II, III, IV (PA's, Inf., Post.)  [x] (14025) Provided manual therapy to mobilize soft tissue/joints of cervical/CT, scapular GHJ and UE for the purpose of modulating pain, promoting relaxation,  increasing ROM, reducing/eliminating soft tissue swelling/inflammation/restriction, improving soft tissue extensibility and allowing for proper ROM for normal function with self care, reaching, carrying, lifting, house/yardwork, driving/computer work    Modalities:      Charges:  Timed Code Treatment Minutes: 44   Total Treatment Minutes: 44       [] EVAL (LOW) 81401 (typically 20 minutes face-to-face)  [] EVAL (MOD) 31058 (typically 30 minutes face-to-face)  [] EVAL (HIGH) 23412 (typically 45 minutes face-to-face)  [] RE-EVAL     [x] OA(70446) x 2    [] DRY NEEDLE 1 OR 2 MUSCLES  [] NMR (60600) x     [] DRY NEEDLE 3+ MUSCLES  [x] Manual (40764) x 1      [] TA (27773) x     [] Mech Traction (03347)  [] ES(attended) (22199)     [] ES (un) (90657):   [] VASO (33371)  [] Other:    GOALS:  Patient stated goal: full arm motion  [x] Progressing: [] Met: [] Not Met: [] Adjusted  Therapist goals for Patient:   Short Term Goals: To be achieved in: 2 weeks  1. Independent in HEP and progression per patient tolerance, in order to prevent re-injury. [] Progressing: [x] Met: [] Not Met: [] Adjusted  2. Patient will have a decrease in pain to facilitate improvement in movement, function, and ADLs as indicated by Functional Deficits. [] Progressing: [x] Met: [] Not Met: [] Adjusted    Long Term Goals: To be achieved in: 10-12 weeks  1. Patient will reach FOTO predicted score of at least 62 to assist with reaching prior level of function with activities such as dressing.   [x] Progressing: [] Met: [] Not Met: [] Adjusted  2. Patient will demonstrate increased L shoulder flexion AROM to 120 deg to allow for proper joint functioning as indicated by patients Functional Deficits. [x] Progressing: [] Met: [] Not Met: [] Adjusted  3. Patient will demonstrate an increase in global L shoulder strength to 4+/5 to allow for proper functional mobility as indicated by patients Functional Deficits. [x] Progressing: [] Met: [] Not Met: [] Adjusted  4. Patient will return to all dressing activities, such has wearing shirts and jackets without increased symptoms or restriction. [] Progressing: [x] Met: [] Not Met: [] Adjusted  5. Patient will demonstrate 5 repeated overhead reaches with 5# to demonstrate replacing items from overhead cabinets without symptoms or restrictions. [x] Progressing: [] Met: [] Not Met: [] Adjusted    ASSESSMENT:  Pt tolerated treatment session fairly well today. Continued work on [employer] and improving ranges of motion. Modification performed during session secondary to pt's low back pain and request to not lay supine or sidelying secondary to her back pain. Increased resistances performed today with theraband. Quick muscular fatigue noted throughout. Pt noted pain along lateral elbow pointing along brachioradialis during scaption exercise on L side. Pain subsided with no resistance. Improvements of pain noted in L shoulder as pt describes as soreness now versus pain. Pt will continue to benefit from further skilled physical therapy to improve all the above impairments to bilateral upper extremities and return to PLOF including bathing and reaching overhead without symptoms or restrictions.     Return to Play: (if applicable)   []  Stage 1: Intro to Strength   []  Stage 2: Dynamic Strength and Intro to Plyometrics   []  Stage 3: Advanced Plyometrics and Intro to Throwing   []  Stage 4: Sport specific Training/Return to Sport     []  Ready to Return to Play, Meets All Above Stages   []  Not Ready for Return to Sports   Comments:      Treatment/Activity Tolerance:  [] Patient tolerated treatment well [x] Patient limited by fatique  [] Patient limited by pain  [] Patient limited by other medical complications  [] Other:     Overall Progression Towards Functional goals/ Treatment Progress Update:  [x] Patient is progressing as expected towards functional goals listed. [] Progression is slowed due to complexities/Impairments listed. [] Progression has been slowed due to co-morbidities. [] Plan just implemented, too soon to assess goals progression <30days   [] Goals require adjustment due to lack of progress  [] Patient is not progressing as expected and requires additional follow up with physician  [] Other    Prognosis for POC: [x] Good [] Fair  [] Poor    Patient requires continued skilled intervention: [x] Yes  [] No      PLAN: Improve L shoulder elevation, global L shoulder strengthening  [x] Continue per plan of care [] Alter current plan (see comments)  [] Plan of care initiated [] Hold pending MD visit [] Discharge    Electronically signed by: Nick Lafleur, PT, DPT    Note: If patient does not return for scheduled/recommended follow up visits, this note will serve as a discharge from care along with the most recent update on progress.

## 2023-02-09 ENCOUNTER — TELEPHONE (OUTPATIENT)
Dept: ENT CLINIC | Age: 73
End: 2023-02-09

## 2023-02-09 ENCOUNTER — HOSPITAL ENCOUNTER (OUTPATIENT)
Dept: PHYSICAL THERAPY | Age: 73
Setting detail: THERAPIES SERIES
Discharge: HOME | End: 2023-02-09
Payer: MEDICARE

## 2023-02-09 NOTE — TELEPHONE ENCOUNTER
Patient calling to let Janalee Jeans know she has lost 23 pounds since her last appointment on 12/16/22. Patient would like to know if she is okay to schedule a follow up appointment for [facility] with Saroj?     Please advise     Patient call back [phone]

## 2023-02-09 NOTE — FLOWSHEET NOTE
[facility] [address]     Physical Therapy  Cancellation/No-show Note  Patient Name:  Victorino Gibbs  :  1950   Date:  2023  Cancelled visits to date: 2  No-shows to date: 0    Patient status for today's appointment patient:  [x]  Cancelled  []  Rescheduled appointment  []  No-show     Reason given by patient:  [x]  Patient ill  []  Conflicting appointment  []  No transportation    []  Conflict with work  []  No reason given  []  Other:     Comments:      Phone call information:   []  Phone call made today to patient at _ time at number provided:      []  Patient answered, conversation as follows:    []  Patient did not answer, message left as follows:  []  Phone call not made today  [x]  Phone call not needed - pt contacted us to cancel and provided reason for cancellation.      Electronically signed by:  Margareth Sawyer PT

## 2023-02-09 NOTE — TELEPHONE ENCOUNTER
Call placed to patient informed her of the message from Dr. Hollie Bernard, patient expressed understanding. Provided patient with the phone number to the [place] office and she stated she would give them a call to get an appointment set up.

## 2023-02-13 ENCOUNTER — OFFICE VISIT (OUTPATIENT)
Dept: ORTHOPEDIC SURGERY | Age: 73
End: 2023-02-13
Payer: MEDICARE

## 2023-02-13 VITALS — WEIGHT: 250 LBS | RESPIRATION RATE: 16 BRPM | BODY MASS INDEX: 40.18 KG/M2 | HEIGHT: 66 IN

## 2023-02-13 DIAGNOSIS — S42.352D CLOSED DISPLACED COMMINUTED FRACTURE OF SHAFT OF LEFT HUMERUS WITH ROUTINE HEALING, SUBSEQUENT ENCOUNTER: ICD-10-CM

## 2023-02-13 DIAGNOSIS — S42.292D OTHER CLOSED DISPLACED FRACTURE OF PROXIMAL END OF LEFT HUMERUS WITH ROUTINE HEALING, SUBSEQUENT ENCOUNTER: Primary | ICD-10-CM

## 2023-02-13 DIAGNOSIS — Z98.890 S/P ORIF (OPEN REDUCTION INTERNAL FIXATION) FRACTURE: ICD-10-CM

## 2023-02-13 DIAGNOSIS — Z87.81 S/P ORIF (OPEN REDUCTION INTERNAL FIXATION) FRACTURE: ICD-10-CM

## 2023-02-13 PROCEDURE — G8399 PT W/DXA RESULTS DOCUMENT: HCPCS | Performed by: PHYSICIAN ASSISTANT

## 2023-02-13 PROCEDURE — 3017F COLORECTAL CA SCREEN DOC REV: CPT | Performed by: PHYSICIAN ASSISTANT

## 2023-02-13 PROCEDURE — G8417 CALC BMI ABV UP PARAM F/U: HCPCS | Performed by: PHYSICIAN ASSISTANT

## 2023-02-13 PROCEDURE — 99212 OFFICE O/P EST SF 10 MIN: CPT | Performed by: PHYSICIAN ASSISTANT

## 2023-02-13 PROCEDURE — 1036F TOBACCO NON-USER: CPT | Performed by: PHYSICIAN ASSISTANT

## 2023-02-13 PROCEDURE — 1123F ACP DISCUSS/DSCN MKR DOCD: CPT | Performed by: PHYSICIAN ASSISTANT

## 2023-02-13 PROCEDURE — G9899 SCRN MAM PERF RSLTS DOC: HCPCS | Performed by: PHYSICIAN ASSISTANT

## 2023-02-13 PROCEDURE — G8484 FLU IMMUNIZE NO ADMIN: HCPCS | Performed by: PHYSICIAN ASSISTANT

## 2023-02-13 PROCEDURE — G8427 DOCREV CUR MEDS BY ELIG CLIN: HCPCS | Performed by: PHYSICIAN ASSISTANT

## 2023-02-13 PROCEDURE — 1090F PRES/ABSN URINE INCON ASSESS: CPT | Performed by: PHYSICIAN ASSISTANT

## 2023-02-13 NOTE — PROGRESS NOTES
ORTHOPAEDIC NEW PATIENT NOTE    Chief Complaint   Patient presents with    Follow-up     Left shoulder 8-23-22         HPI  2/13/23  Fu for left shoulder, 6 months s/p left prox humerus and humeral shaft fracture ORIF  Doing well  No pain in left shoulder, just stiffness  No N/T  Still working with outpt PT, states she wants to get more motion and knows she won't push herself without the therapist  Has seen [facility] hand surgeon since last visit, diagnosed with tendonitis and arthritis of bilateral hands and wrist, currently in left wrist brace and undergoing OT  No new complaints or concerns      11/14/22  Gian Phillips returns to clinic today for follow-up of her left shoulder/brachium  She reports her left arm is doing well  She is at home  Pain is rated 0 out of 10  She is done with home therapy  She has questions about returning to driving and outpatient PT  No incisional issues  No numbness or tingling  Finger range of motion is improving, however still cannot make a full fist      9/30/22   Approximately 5.5 weeks postop left proximal humerus and humeral shaft fracture ORIF  She is at home now  She has therapy coming to her house  They are working on range of motion, however she still has significant stiffness in her hand/fingers  She reports they are working hard at it  Pain is rated 5-6 out of 10, worse at night  Using sling  No incisional issues      9/7/22  Postop check left humerus fracture  She reports she is doing well overall  No incisional issues  No numbness or tingling  She is working with therapy at the facility  She is working on finger range of motion, which was very stiff on initial evaluation in the office back on August 19 8/23/22  OPERATION PERFORMED:  Open treatment of the left proximal humerus and humeral shaft fractures with internal fixation. 8/19/22  74 y.o. female RHD seen for evaluation of left shoulder/arm injury/fracture:   The patient reports she injured her left arm on July 31 when she traveled to [address] [place] [address] for [place]  She reports she fell into a wall and hit her left shoulder  She lives in [place], [place]  After her injury in [address] [place] [address], she was admitted to [facility]   She reports she was evaluated by orthopedics there, who recommended surgical fixation there, however she did not have any friends or family there, therefore she did not want to have surgery there  She reports she was placed initially in a coaptation splint, then a De brace, then back to a coaptation splint prior to transfer back to [facility]  She reports she was admitted in [address] [place] [address] until August 10, 2022 at which point she was transferred back to [place] to [facility]  She was then transferred to the [place] at [facility]  She has not seen any orthopedics since she has been back in town  She presented to the emergency department a couple nights ago at which point she was given follow-up to my office today  She is now in a sling  When she went to the emergency department a couple nights ago, she reported some numbness and tingling in the ring finger and small finger tips  However this has since improved  She is a diabetic, on insulin  She does not smoke      Allergies   Allergen Reactions    Metformin Nausea Only and Other (See Comments)     \"ate the lining of her stomach\"      Adhesive Tape      Skin irritation and blisters  - does okay with steri strips and tegaderm        Current Outpatient Medications   Medication Sig Dispense Refill    NIFEdipine (PROCARDIA XL) 30 MG extended release tablet Take 1 tablet by mouth in the morning and at bedtime 180 tablet 3    losartan (COZAAR) 50 MG tablet Take 2 tablets by mouth daily 30 tablet 3    spironolactone (ALDACTONE) 25 MG tablet Take 1 tablet by mouth once daily 90 tablet 0    Cholecalciferol (VITAMIN D3 PO) Take 1,000 mg by mouth      Multiple Vitamins-Minerals (HAIR SKIN AND NAILS FORMULA) TABS Take by mouth      vitamin C (ASCORBIC ACID) 500 MG tablet Take 500 mg by mouth daily      Multiple Vitamins-Minerals (THERAPEUTIC MULTIVITAMIN-MINERALS) tablet Take 1 tablet by mouth daily Essential 1 Pro caps      metoprolol succinate (TOPROL XL) 50 MG extended release tablet Take 1 tablet by mouth daily 90 tablet 3    metoprolol succinate (TOPROL XL) 25 MG extended release tablet Take 1 tablet by mouth daily 90 tablet 3    furosemide (LASIX) 20 MG tablet Take 2 tablets by mouth daily 180 tablet 3    Semaglutide,0.25 or 0.5MG/DOS, (OZEMPIC, 0.25 OR 0.5 MG/DOSE,) 2 MG/1.5ML SOPN once a week  (Patient not taking: Reported on 12/16/2022)      meloxicam (MOBIC) 15 MG tablet Take 7.5 mg by mouth daily      insulin glargine (LANTUS) 100 UNIT/ML injection vial Inject 30 Units into the skin nightly       pantoprazole (PROTONIX) 40 MG tablet Take 40 mg by mouth daily      famotidine (PEPCID) 40 MG tablet Take 40 mg by mouth daily       aspirin 81 MG chewable tablet Take 1 tablet by mouth daily. 30 tablet 3    acetaminophen (TYLENOL) 500 MG tablet Take 500 mg by mouth every 6 hours as needed for Pain. No current facility-administered medications for this visit.        Past Medical History:   Diagnosis Date    Arthritis     Back pain     CHF (congestive heart failure) (HCC)     Chronic diastolic heart failure (HCC)     Diabetes mellitus ([facility]     Diabetes mellitus ([facility]     TYPE 2    GERD (gastroesophageal reflux disease)     High cholesterol     Hypertension     Malignant neoplasm of right female breast ([facility] 05/03/2022    Polio     Polio     AS INFANT    Shingles     OCT 2013    Sleep apnea     Urinary problem     HAS BASHFUL BLADDER        Past Surgical History:   Procedure Laterality Date    BACK SURGERY      BACK SURGERY      1/13/14 x2    BREAST BIOPSY Right 05/03/2022    RIGHT RADIOFREQUENCY IDENTIFICATION TAG LOCALIZED PARTIAL MASTECTOMY RIGHT SENTINEL LYMPH NODE BIOPSY performed by Logan Ngo MD at [address] Right 05/03/2022    . performed by Maren Payton MD at [address] [facility] [address] Left 08/23/2022    OPEN REDUCTION INTERNAL FIXATION LEFT PROXIMAL HUMERUS AND SHAFT FRACTURES-REGIONAL-SYNTHES performed by Leyla Humphries MD at [facility]      bilateral knee replacements    JOINT REPLACEMENT      [place] KNEES    KNEE SURGERY      [facility] BREAST BIOPSY RIGHT Right 04/06/2022    [facility] BREAST BIOPSY RIGHT [facility]    OTHER SURGICAL HISTORY  01/13/2014    L4-5 DECOMPRESSION, POSTERIOR LATERAL FUSION AND PEDICLE       Family History   Problem Relation Age of Onset    Heart Disease Mother     Cancer Mother         breast cancer, 5    Breast Cancer Mother     Early Death Father     Diabetes Sister        Social History     Socioeconomic History    Marital status:      Spouse name: Not on file    Number of children: Not on file    Years of education: Not on file    Highest education level: Not on file   Occupational History    Not on file   Tobacco Use    Smoking status: Never    Smokeless tobacco: Never   Vaping Use    Vaping Use: Never used   Substance and Sexual Activity    Alcohol use: No    Drug use: No    Sexual activity: Yes     Partners: Male   Other Topics Concern    Not on file   Social History Narrative    ** Merged History Encounter **          Social Determinants of Health     Financial Resource Strain: Not on file   Food Insecurity: Not on file   Transportation Needs: Not on file   Physical Activity: Not on file   Stress: Not on file   Social Connections: Not on file   Intimate Partner Violence: Not on file   Housing Stability: Not on file        Vitals:    02/13/23 1050   Resp: 16   Weight: 250 lb (113.4 kg)   Height: 5' 6\" (1.676 m)       Physical Exam  Constitutional - well-groomed, well-nourished, Body mass index is 40.35 kg/m².   Psychiatric - pleasant, normal mood & affect  Cardiovascular - RRR, left radial pulse 2+  Skin - no rashes, wounds, or lesions seen on exposed skin  Neurological - ORALE SILT M/U/R/A/LABC nerve distributions; AIN/PIN/IO intact  Left shoulder/brachium - anterior surgical incision nicely healed  No erythema or drainage  No obvious ecchymosis   Active FE/scaption 90    ER 20    IR back pocket   She is able to extend and flex her left elbow, wrist, supinate/pronate her forearm   She has full finger extension, finger flexion is improved compared to prior, but still not full      Imaging:  Images were personally reviewed by myself and discussed with the patient  Narrative   EXAMINATION:   2 XRAY VIEWS OF THE LEFT SHOULDER       8/15/2022 10:48 pm       COMPARISON:   None. HISTORY:   ORDERING SYSTEM PROVIDED HISTORY: Injury   TECHNOLOGIST PROVIDED HISTORY:   Reason for exam:->Injury   Reason for Exam: Injury       FINDINGS:   There is no fracture of the clavicle or displacement of the acromioclavicular   joint. Sclerosis and marginal spurring at the acromioclavicular joint from   arthritis. Positioning of the glenohumeral joint is difficult due to patient trauma. There is no dislocation of the humeral head from the glenoid. The articular   margin of the humeral head is intact. Acute comminuted fracture is present   in the proximal to mid shaft of the humerus. There is large free butter   fragment with moderate to severe displacement. There is angulation and   displacement of the main shaft components. A longitudinal fracture line runs   up to the humeral neck and at least the base of the greater tubercle region. The superior edge of the greater tubercle does not appear fragmented or have   cortical displacement. .           Impression   Acute comminuted and displaced fracture involving the proximal to mid   humerus. The main shaft components are displaced by of least a full shaft   with and angled.   There is a large butterfly fragment with longitudinal   fracture line running up the humeral neck to the base of the greater   tubercle. The superior edge of the tubercle appears intact without cortical   disruption. The articular humeral head is spared and no dislocation from the glenoid. Arthritic changes at the acromioclavicular joint. Narrative   EXAMINATION:   TWO XRAY VIEWS OF THE LEFT HUMERUS       8/16/2022 12:20 am       COMPARISON:   None. HISTORY:   ORDERING SYSTEM PROVIDED HISTORY: injury   TECHNOLOGIST PROVIDED HISTORY:   Reason for exam:->injury   Reason for Exam: L/arm pain and swelling       FINDINGS:   Acute fracture of the mid to proximal left humerus is again noted. There is   a oblique main fracture in the mid to upper shaft with displacement of the   main shaft components by to a cm on the current position. There is a large   free butterfly fragment at the lateral aspect coursing up to the humeral   neck. Longitudinal fracture line runs superiorly up the humeral neck and is   overlapped on the current position. The distal humerus appears intact. The elbow is not positioned for detailed   evaluation. Impression   Acute comminuted and displaced fracture involving the mid to upper left   humerus with large free fragment and involvement up to the edge of the   humeral neck. The distal humerus appears intact. Left shoulder 4 views performed 2/13/2023 -  Status post open reduction internal fixation of the comminuted left proximal humerus and humeral shaft fractures. Fracture reduction/alignment is maintained compared to prior imaging. No obvious hardware complications. Fractures are essentially healed. Glenohumeral joint is reduced. Assessment & Plan:  74 y.o. female who presents with    Diagnosis Orders   1.  Other closed displaced fracture of proximal end of left humerus with routine healing, subsequent encounter  XR SHOULDER LEFT (MIN 2 VIEWS)      2. Closed displaced comminuted fracture of shaft of left humerus with routine healing, subsequent encounter        3. S/P ORIF (open reduction internal fixation) fracture            No orders of the defined types were placed in this encounter.       Doing well  X-rays today show essentially healed fractures in acceptable alignment  She is happy with the progress she has made with PT and would like to continue, I think this is reasonable  Able to perform most ADLs, but still has difficulty with behind the back activities  Activity as tolerated    Follow-up in 6 months for repeat radiographs      Rachel López PA-C

## 2023-02-14 ENCOUNTER — HOSPITAL ENCOUNTER (OUTPATIENT)
Dept: PHYSICAL THERAPY | Age: 73
Setting detail: THERAPIES SERIES
Discharge: HOME | End: 2023-02-14
Payer: MEDICARE

## 2023-02-14 PROCEDURE — 97110 THERAPEUTIC EXERCISES: CPT

## 2023-02-14 PROCEDURE — 97140 MANUAL THERAPY 1/> REGIONS: CPT

## 2023-02-14 NOTE — FLOWSHEET NOTE
[facility] [address] [address]  Phone: [phone] Fax: [phone]    Physical Therapy Treatment Note/ Progress Report:       Date:  2023    Patient Name:  Odalys Garcia    :  1950  MRN: [id number]  Restrictions/Precautions:    Medical Diagnosis:  Other closed displaced fracture of proximal end of left humerus with routine healing, subsequent encounter [S42.292D]  Treatment Diagnosis:      ICD-10-CM     1. Other closed displaced fracture of proximal end of left humerus with routine healing, subsequent encounter  S42.292D         2. Impaired strength of shoulder muscles  R29.898         3. Impaired range of motion of left shoulder  M25.612           Insurance/Certification information:  PT Insurance Information: [email or web address]  Physician Information:  Caroline Michel MD  Plan of care signed (Y/N): Y    Date of Patient follow up with Physician:      Progress Report: [x]  Yes  []  No     Date Range for reporting period:  Beginnin22  Ending:     Progress report due (10 Rx/or 30 days whichever is less):      Recertification due (POC duration/ or 90 days whichever is less): 23     Visit # Insurance Allowable Auth Needed   19 BMN []Yes    [x]No     Pain level:  0/10 in L shoulder; only soreness; 2-3/10 pain R shoulder     SUBJECTIVE: Pt reports that she has has been sick since previous visit, through the weekend secondary to a new diabetes medications. Pt reports she had visit with Dr. Jose Burris office yesterday, and was instructed to continue with therapy. Also had follow-up with cancer doctor last week and has pending MRI. Notes her lower back has been improving and heat has been very helpful.     Functional Disability Index: FOTO physical FS primary measure score = 42; Risk adjusted = 46 Date: 22  Functional Disability Index: FOTO physical FS primary measure score =  41    Date: 22  Functional Disability Index: FOTO physical FS primary measure score =  44    Date: 1/26/23    OBJECTIVE:   Observation: continued L shoulder hike during L shoulder elevation  Test measurements:    1/26  ROM Left Right   Shoulder Flex 90 110   Shoulder Abd 90 120   Shoulder ER       Shoulder IR                               Strength  Left Right   Shoulder Flex 4- 4-   Shoulder Scap 4- 4-   Shoulder ER 3+ 4-   Shoulder IR 4- 4-       RESTRICTIONS/PRECAUTIONS: latex allergy, recent Hx of breast cancer with R lymph node removal, HBP    Exercises/Interventions:   Exercises performed bilaterally  Therapeutic Ex (85222) Sets/sec Reps CUES/Notes   Sitting cane flexion 10 sec 5    Ball squeezes 2 10ea Prone and supine hand   Inclined table slides  2 8    Supine OH flexion w/ R UE assist 2 10 For OH mobility/ROM   TB no monies 2 10 Yellow TB   Scapular retractions 2 10 5 sec holds; Manual resistance   Seated rows 2 15 Red TB   Bilateral shoulder ER with foam roller back hug 2 8    Sidelying ER AAROM 2 8    Upper cervical flexion 1 10 5 sec hold   Pec doorway stretch 10 sec 5 Performed with hands at sides   Sidelying flexion 1 6 AAROM   Abduction isometric 10 sec 5 Manual resistance   Reclined flexion 1 5    Seated cane scaption 2 5 10 sec hold at top   Reclined ABD 1 5    TB row 2 5 Red   Cane ER stretch 10 sec 5    Cane AAROM flex/scap    Attempted   Wall slides 1 10    Wall isometric 5 sec 10 ER and ABD; seated in chair   Seated scaption 2 8    Standing wall slide 1 5 Terminated secondary to increased LBP   Standing shoulder retraction against wall 1 8    Seated cane flexion 5 sec 10 2 sets   Supine punch 2 5    Sitting external and internal rotation 2ea 10 Red TB   Seated scaption 2 8    Seated thoracic extension over chair 3 sec 10    Seated - TB pulldown - red TB 5s 15 Cues for scap mechanics;  Ecc control into stretch in FF   Cross body stretch 10 sec 5                      Manual Intervention  (13570)      Shld/GH Mobs - posterior, inferior  2 min Gr. II-III   Thoracic extension with shoulder opening   2 min    Finger flexion PROM/stretching      Wrist/hand PROM   Flex/ext   Shoulder PROM  4 min Flex, scap, ER in supine; performed seated   Scapular mobs - seated  Supporting L elbow   min    STM to L UT/LS, rhomboids, scalenes, posterior deltoid,   3 min Seated   Wrist extension mobs   Grades II-III   Wrist PROM   Flexion/extension         NMR re-education (75937)      T-spine Ext      GH depress/compress      Scap/GH NMR      Body blade      Wall ball roll      Wall Ball bounce      Ball drops      Porsha Scap Bio      Floor Snow angels-sliders            Therapeutic Activity (38589)      UE throwing porgression      Dynamic UE stability      Earthquake Bar      Bodyblade                Therapeutic Exercise and NMR EXR  [x] (39537) Provided verbal/tactile cueing for activities related to strengthening, flexibility, endurance, ROM  for improvements in scapular, scapulothoracic and UE control with self care, reaching, carrying, lifting, house/yardwork, driving/computer work. [x] (69251) Provided verbal/tactile cueing for activities related to improving balance, coordination, kinesthetic sense, posture, motor skill, proprioception  to assist with  scapular, scapulothoracic and UE control with self care, reaching, carrying, lifting, house/yardwork, driving/computer work. Therapeutic Activities:    [] (04144 or 68369) Provided verbal/tactile cueing for activities related to improving balance, coordination, kinesthetic sense, posture, motor skill, proprioception and motor activation to allow for proper function of scapular, scapulothoracic and UE control with self care, carrying, lifting, driving/computer work.      Home Exercise Program:    [x] (04568) Reviewed/Progressed HEP activities related to strengthening, flexibility, endurance, ROM of scapular, scapulothoracic and UE control with self care, reaching, carrying, lifting, house/yardwork, driving/computer work  [] (96320) Reviewed/Progressed HEP activities related to improving balance, coordination, kinesthetic sense, posture, motor skill, proprioception of scapular, scapulothoracic and UE control with self care, reaching, carrying, lifting, house/yardwork, driving/computer work      Manual Treatments:  PROM / STM / Oscillations-Mobs:  G-I, II, III, IV (PA's, Inf., Post.)  [x] (82591) Provided manual therapy to mobilize soft tissue/joints of cervical/CT, scapular GHJ and UE for the purpose of modulating pain, promoting relaxation,  increasing ROM, reducing/eliminating soft tissue swelling/inflammation/restriction, improving soft tissue extensibility and allowing for proper ROM for normal function with self care, reaching, carrying, lifting, house/yardwork, driving/computer work    Modalities:      Charges:  Timed Code Treatment Minutes: 42   Total Treatment Minutes: 42       [] EVAL (LOW) 78440 (typically 20 minutes face-to-face)  [] EVAL (MOD) 57668 (typically 30 minutes face-to-face)  [] EVAL (HIGH) 423 8935 (typically 45 minutes face-to-face)  [] RE-EVAL     [x] HG(09700) x 2    [] DRY NEEDLE 1 OR 2 MUSCLES  [] NMR (82687) x     [] DRY NEEDLE 3+ MUSCLES  [x] Manual (30743) x 1      [] TA (72223) x     [] Mech Traction (60534)  [] ES(attended) (67983)     [] ES (un) (77486):   [] VASO (23415)  [] Other:    GOALS:  Patient stated goal: full arm motion  [x] Progressing: [] Met: [] Not Met: [] Adjusted  Therapist goals for Patient:   Short Term Goals: To be achieved in: 2 weeks  1. Independent in HEP and progression per patient tolerance, in order to prevent re-injury. [] Progressing: [x] Met: [] Not Met: [] Adjusted  2. Patient will have a decrease in pain to facilitate improvement in movement, function, and ADLs as indicated by Functional Deficits. [] Progressing: [x] Met: [] Not Met: [] Adjusted    Long Term Goals: To be achieved in: 10-12 weeks  1.  Patient will reach FOTO predicted score of at least 62 to assist with reaching prior level of function with activities such as dressing. [x] Progressing: [] Met: [] Not Met: [] Adjusted  2. Patient will demonstrate increased L shoulder flexion AROM to 120 deg to allow for proper joint functioning as indicated by patients Functional Deficits. [x] Progressing: [] Met: [] Not Met: [] Adjusted  3. Patient will demonstrate an increase in global L shoulder strength to 4+/5 to allow for proper functional mobility as indicated by patients Functional Deficits. [x] Progressing: [] Met: [] Not Met: [] Adjusted  4. Patient will return to all dressing activities, such has wearing shirts and jackets without increased symptoms or restriction. [] Progressing: [x] Met: [] Not Met: [] Adjusted  5. Patient will demonstrate 5 repeated overhead reaches with 5# to demonstrate replacing items from overhead cabinets without symptoms or restrictions. [x] Progressing: [] Met: [] Not Met: [] Adjusted    ASSESSMENT:  Pt tolerated treatment session well today. Continued focus of BUE strengthening today. Session adapted today to be performed in sitting secondary to pt having complaints of low back pain and recovery from illness. Improving L shoulder flexion ROM and muscle activation noted. Tactile and verbal cues required throughout session for proper form and adjustments to exercises to promote stretch versus pain for pt. Attempted standing exercises at wall that caused increased pt low back pt. Noted lumbar extension to facilitate BUE elevation secondary to increased thoracic kyphotic posture. Pt will continue to benefit from further skilled physical therapy to improve all the above impairments to bilateral upper extremities and return to PLOF including bathing and reaching overhead without symptoms or restrictions.     Return to Play: (if applicable)   []  Stage 1: Intro to Strength   []  Stage 2: Dynamic Strength and Intro to Plyometrics   []  Stage 3: Advanced Plyometrics and Intro to Throwing   []  Stage 4: Sport specific Training/Return to Sport     []  Ready to Return to Play, [employer]   []  Not Ready for Return to Sports   Comments:      Treatment/Activity Tolerance:  [] Patient tolerated treatment well [x] Patient limited by fatique  [] Patient limited by pain  [] Patient limited by other medical complications  [] Other:     Overall Progression Towards Functional goals/ Treatment Progress Update:  [x] Patient is progressing as expected towards functional goals listed. [] Progression is slowed due to complexities/Impairments listed. [] Progression has been slowed due to co-morbidities. [] Plan just implemented, too soon to assess goals progression <30days   [] Goals require adjustment due to lack of progress  [] Patient is not progressing as expected and requires additional follow up with physician  [] Other    Prognosis for POC: [x] Good [] Fair  [] Poor    Patient requires continued skilled intervention: [x] Yes  [] No      PLAN: Improve bilateral shoulder elevation, global bilateral shoulder strengthening  [x] Continue per plan of care [] Alter current plan (see comments)  [] Plan of care initiated [] Hold pending MD visit [] Discharge    Electronically signed by: Kathy Vanegas, PT, DPT    Note: If patient does not return for scheduled/recommended follow up visits, this note will serve as a discharge from care along with the most recent update on progress.

## 2023-02-16 ENCOUNTER — HOSPITAL ENCOUNTER (OUTPATIENT)
Dept: PHYSICAL THERAPY | Age: 73
Setting detail: THERAPIES SERIES
Discharge: HOME | End: 2023-02-16
Payer: MEDICARE

## 2023-02-16 PROCEDURE — 97140 MANUAL THERAPY 1/> REGIONS: CPT

## 2023-02-16 PROCEDURE — 97110 THERAPEUTIC EXERCISES: CPT

## 2023-02-16 NOTE — FLOWSHEET NOTE
[facility] [address] [address]  Phone: [phone] Fax: [phone]    Physical Therapy Treatment Note/ Progress Report:       Date:  2023    Patient Name:  Srinath Calix    :  1950  MRN: [id number]  Restrictions/Precautions:    Medical Diagnosis:  Other closed displaced fracture of proximal end of left humerus with routine healing, subsequent encounter [S42.292D]  Treatment Diagnosis:      ICD-10-CM     1. Other closed displaced fracture of proximal end of left humerus with routine healing, subsequent encounter  S42.292D         2. Impaired strength of shoulder muscles  R29.898         3. Impaired range of motion of left shoulder  M25.612           Insurance/Certification information:  PT Insurance Information: Medicare/AARP  Physician Information:  Israel Tadeo MD  Plan of care signed (Y/N): Y    Date of Patient follow up with Physician:      Progress Report: [x]  Yes  []  No     Date Range for reporting period:  Beginnin22  Ending:     Progress report due (10 Rx/or 30 days whichever is less):      Recertification due (POC duration/ or 90 days whichever is less): 23     Visit # Insurance Allowable Auth Needed   20 BMN []Yes    [x]No     Pain level:  0/10 in L shoulder;  1/10 pain R shoulder     SUBJECTIVE: Pt reports she had OT yesterday and felt like her L shoulder was being worked during that as well. Notes her L upper trap was sore last night. Pt notes that she is not having any bilateral shoulder pain, but her shoulders are sore. Feels like she is improving overall, but not yet where she wants to be.     Functional Disability Index: FOTO physical FS primary measure score = 42; Risk adjusted = 46 Date: 22  Functional Disability Index: FOTO physical FS primary measure score =  41    Date: 22  Functional Disability Index: FOTO physical FS primary measure score =  44    Date: 1/26/23    OBJECTIVE:   Observation: continued L shoulder hike during L shoulder elevation  Test measurements:    1/26  ROM Left Right   Shoulder Flex 90 110   Shoulder Abd 90 120   Shoulder ER       Shoulder IR                               Strength  Left Right   Shoulder Flex 4- 4-   Shoulder Scap 4- 4-   Shoulder ER 3+ 4-   Shoulder IR 4- 4-       RESTRICTIONS/PRECAUTIONS: latex allergy, recent Hx of breast cancer with R lymph node removal, HBP    Exercises/Interventions:   Exercises performed bilaterally  Therapeutic Ex (53688) Sets/sec Reps CUES/Notes   Sitting cane flexion 10 sec 5    Ball squeezes 2 10ea Prone and supine hand   Inclined table slides  2 8    Supine OH flexion w/ R UE assist 2 10 For OH mobility/ROM   TB no monies 2 10 Yellow TB   Scapular retractions 2 10 5 sec holds; Manual resistance   Seated rows 2 15 Red TB   Bilateral shoulder ER with foam roller back hug 2 8    Sidelying ER AAROM 2 8    Upper cervical flexion 1 10 5 sec hold   Pec doorway stretch 10 sec 5 Performed with hands at sides   Sidelying flexion 1 6 AAROM   Abduction isometric 10 sec 5 Manual resistance   Reclined flexion 1 5    Seated cane scaption 2 5 10 sec hold at top   Reclined ABD 1 5    TB row 2 5 Red   Cane ER stretch 10 sec 5    Cane AAROM flex/scap    Attempted   Wall slides 1 10    Wall isometric 5 sec 10 ER and ABD; seated in chair   Seated scaption to 90 3 5 Bilateral   Standing wall slide 1 5 Terminated secondary to increased LBP   Standing shoulder retraction against wall 1 8    Seated cane flexion 5 sec 10 2 sets   Supine punch 2 5    Sitting external and internal rotation 2ea 10 Red TB   Seated thoracic extension over chair 3 sec 10    Seated - TB pulldown - red TB 5s 15 Cues for scap mechanics;  Ecc control into stretch in FF   Sidelying punch 2 10    Cross body stretch 10 sec 5                      Manual Intervention  (84060)      Shld/GH Mobs - posterior, inferior  2 min Gr. II-III   Thoracic extension with shoulder opening over SB  4 min    Sidelying resisted ER   2 min 2 sets; graded resistance through range and tactile cuing for proper form   Sidelying AAROM flexion  3 min 2 sets of 10 reps   Wrist/hand PROM   Flex/ext   Shoulder PROM  4 min Flex, scap, ER in supine; performed seated   Scapular mobs - seated  Supporting L elbow   min    STM to L UT/LS, rhomboids, scalenes, posterior deltoid,   2 min Seated   Wrist extension mobs   Grades II-III   Wrist PROM   Flexion/extension         NMR re-education (01226)      T-spine Ext      GH depress/compress      Scap/GH NMR      Body blade      Wall ball roll      Wall Ball bounce      Ball drops      Porsha Scap Bio      Floor Snow angels-sliders            Therapeutic Activity (57775)      UE throwing porgression      Dynamic UE stability      Earthquake Bar      Bodyblade                Therapeutic Exercise and NMR EXR  [x] (78996) Provided verbal/tactile cueing for activities related to strengthening, flexibility, endurance, ROM  for improvements in scapular, scapulothoracic and UE control with self care, reaching, carrying, lifting, house/yardwork, driving/computer work. [x] (46617) Provided verbal/tactile cueing for activities related to improving balance, coordination, kinesthetic sense, posture, motor skill, proprioception  to assist with  scapular, scapulothoracic and UE control with self care, reaching, carrying, lifting, house/yardwork, driving/computer work. Therapeutic Activities:    [] (47708 or 94488) Provided verbal/tactile cueing for activities related to improving balance, coordination, kinesthetic sense, posture, motor skill, proprioception and motor activation to allow for proper function of scapular, scapulothoracic and UE control with self care, carrying, lifting, driving/computer work.      Home Exercise Program:    [x] (57863) Reviewed/Progressed HEP activities related to strengthening, flexibility, endurance, ROM of scapular, scapulothoracic and UE control with self care, reaching, carrying, lifting, house/yardwork, driving/computer work  [] (76692) Reviewed/Progressed HEP activities related to improving balance, coordination, kinesthetic sense, posture, motor skill, proprioception of scapular, scapulothoracic and UE control with self care, reaching, carrying, lifting, house/yardwork, driving/computer work      Manual Treatments:  PROM / STM / Oscillations-Mobs:  G-I, II, III, IV (PA's, Inf., Post.)  [x] (85433) Provided manual therapy to mobilize soft tissue/joints of cervical/CT, scapular GHJ and UE for the purpose of modulating pain, promoting relaxation,  increasing ROM, reducing/eliminating soft tissue swelling/inflammation/restriction, improving soft tissue extensibility and allowing for proper ROM for normal function with self care, reaching, carrying, lifting, house/yardwork, driving/computer work    Modalities:      Charges:  Timed Code Treatment Minutes: 46   Total Treatment Minutes: 46       [] EVAL (LOW) 65490 (typically 20 minutes face-to-face)  [] EVAL (MOD) 22278 (typically 30 minutes face-to-face)  [] EVAL (HIGH) [id number] (typically 45 minutes face-to-face)  [] RE-EVAL     [x] HILL(20288) x 2    [] DRY NEEDLE 1 OR 2 MUSCLES  [] NMR (23076) x     [] DRY NEEDLE 3+ MUSCLES  [x] Manual (25840) x 1      [] TA (65772) x     [] Mech Traction (43609)  [] ES(attended) (21422)     [] ES (un) (14228):   [] VASO (45465)  [] Other:    GOALS:  Patient stated goal: full arm motion  [x] Progressing: [] Met: [] Not Met: [] Adjusted  Therapist goals for Patient:   Short Term Goals: To be achieved in: 2 weeks  1. Independent in HEP and progression per patient tolerance, in order to prevent re-injury. [] Progressing: [x] Met: [] Not Met: [] Adjusted  2. Patient will have a decrease in pain to facilitate improvement in movement, function, and ADLs as indicated by Functional Deficits. [] Progressing: [x] Met: [] Not Met: [] Adjusted    Long Term Goals:  To be achieved in: 10-12 weeks  1. Patient will reach FOTO predicted score of at least 62 to assist with reaching prior level of function with activities such as dressing. [x] Progressing: [] Met: [] Not Met: [] Adjusted  2. Patient will demonstrate increased L shoulder flexion AROM to 120 deg to allow for proper joint functioning as indicated by patients Functional Deficits. [x] Progressing: [] Met: [] Not Met: [] Adjusted  3. Patient will demonstrate an increase in global L shoulder strength to 4+/5 to allow for proper functional mobility as indicated by patients Functional Deficits. [x] Progressing: [] Met: [] Not Met: [] Adjusted  4. Patient will return to all dressing activities, such has wearing shirts and jackets without increased symptoms or restriction. [] Progressing: [x] Met: [] Not Met: [] Adjusted  5. Patient will demonstrate 5 repeated overhead reaches with 5# to demonstrate replacing items from overhead cabinets without symptoms or restrictions. [x] Progressing: [] Met: [] Not Met: [] Adjusted    ASSESSMENT:  Pt tolerated treatment session well today. Improved tolerance today with shoulder exercises performed in sidelying. Decreased L  upper trap compensations and neck pain in sidelying compared to seated positions. Noted L shoulder muscular fatigue at end of session versus upper trap. Noted bilateral shoulder weakness and quick fatigue. Trialed shoulder scaption with RUE fatiguing quicker compared to L. Manual assistance performed with AAROM to assist pt through appropriate ranges, grade resistance, and tactile cuing. Pt will continue to benefit from further skilled physical therapy to improve all the above impairments to bilateral upper extremities and return to PLOF including bathing and reaching overhead without symptoms or restrictions.     Return to Play: (if applicable)   []  Stage 1: Intro to Strength   []  Stage 2: Dynamic Strength and Intro to Plyometrics   []  Stage 3: Advanced Plyometrics and Intro to Throwing   []  Stage 4: Sport specific Training/Return to Sport     []  Ready to Return to Play, [employer]   []  Not Ready for Return to Sports   Comments:      Treatment/Activity Tolerance:  [x] Patient tolerated treatment well [] Patient limited by fatique  [] Patient limited by pain  [] Patient limited by other medical complications  [] Other:     Overall Progression Towards Functional goals/ Treatment Progress Update:  [x] Patient is progressing as expected towards functional goals listed. [] Progression is slowed due to complexities/Impairments listed. [] Progression has been slowed due to co-morbidities. [] Plan just implemented, too soon to assess goals progression <30days   [] Goals require adjustment due to lack of progress  [] Patient is not progressing as expected and requires additional follow up with physician  [] Other    Prognosis for POC: [x] Good [] Fair  [] Poor    Patient requires continued skilled intervention: [x] Yes  [] No      PLAN: Improve bilateral shoulder elevation, global bilateral shoulder strengthening  [x] Continue per plan of care [] Alter current plan (see comments)  [] Plan of care initiated [] Hold pending MD visit [] Discharge    Electronically signed by: Kai Ann, PT, DPT    Note: If patient does not return for scheduled/recommended follow up visits, this note will serve as a discharge from care along with the most recent update on progress.

## 2023-02-20 ENCOUNTER — HOSPITAL ENCOUNTER (OUTPATIENT)
Dept: MRI IMAGING | Age: 73
Discharge: HOME | End: 2023-02-20
Payer: MEDICARE

## 2023-02-20 DIAGNOSIS — M25.511 RIGHT SHOULDER PAIN, UNSPECIFIED CHRONICITY: ICD-10-CM

## 2023-02-20 DIAGNOSIS — Z85.3 PERSONAL HISTORY OF MALIGNANT NEOPLASM OF BREAST: ICD-10-CM

## 2023-02-20 DIAGNOSIS — R29.898 RIGHT ARM WEAKNESS: ICD-10-CM

## 2023-02-20 PROCEDURE — 73223 MRI JOINT UPR EXTR W/O&W/DYE: CPT

## 2023-02-20 PROCEDURE — 6360000004 HC RX CONTRAST MEDICATION: Performed by: SURGERY

## 2023-02-20 PROCEDURE — A9577 INJ MULTIHANCE: HCPCS | Performed by: SURGERY

## 2023-02-20 RX ADMIN — GADOBENATE DIMEGLUMINE 15 ML: 529 INJECTION, SOLUTION INTRAVENOUS at 11:47

## 2023-02-20 NOTE — TELEPHONE ENCOUNTER
Received refill request for Furosemide from [facility].     Last ov: 07/28/2022 LES    Last Refill: 02/15/2022 #180 w/ 3 refills    Next appointment: 03/02/2023 LES

## 2023-02-21 ENCOUNTER — HOSPITAL ENCOUNTER (OUTPATIENT)
Dept: PHYSICAL THERAPY | Age: 73
Setting detail: THERAPIES SERIES
Discharge: HOME | End: 2023-02-21
Payer: MEDICARE

## 2023-02-21 PROCEDURE — 97110 THERAPEUTIC EXERCISES: CPT

## 2023-02-21 PROCEDURE — 97140 MANUAL THERAPY 1/> REGIONS: CPT

## 2023-02-21 RX ORDER — FUROSEMIDE 20 MG/1
TABLET ORAL
Qty: 180 TABLET | Refills: 0 | Status: SHIPPED | OUTPATIENT
Start: 2023-02-21

## 2023-02-21 NOTE — FLOWSHEET NOTE
[facility] [address] [address]  Phone: [phone] Fax: [phone]    Physical Therapy Treatment Note/ Progress Report:       Date:  2023    Patient Name:  Elmer Prasad    :  1950  MRN: [id number]  Restrictions/Precautions:    Medical Diagnosis:  Other closed displaced fracture of proximal end of left humerus with routine healing, subsequent encounter [S42.292D]  Treatment Diagnosis:      ICD-10-CM     1. Other closed displaced fracture of proximal end of left humerus with routine healing, subsequent encounter  S42.292D         2. Impaired strength of shoulder muscles  R29.898         3. Impaired range of motion of left shoulder  M25.612           Insurance/Certification information:  PT Insurance Information: Medicare/AARP  Physician Information:  Beau Massey MD  Plan of care signed (Y/N): Y    Date of Patient follow up with Physician:      Progress Report: [x]  Yes  []  No     Date Range for reporting period:  Beginnin22  Ending:     Progress report due (10 Rx/or 30 days whichever is less):      Recertification due (POC duration/ or 90 days whichever is less): 23     Visit # Insurance Allowable Auth Needed   21 BMN []Yes    [x]No     Pain level:  0/10 in L shoulder;  0/10 pain R shoulder     SUBJECTIVE: Pt reports that her L shoulder is feeling tight today. Feels like she hasn't had time to work on her HEP since previous session secondary to doctor visits. States R shoulder is feeling tired today. However, no pain reported from pt.     Functional Disability Index: FOTO physical FS primary measure score = 42; Risk adjusted = 46 Date: 22  Functional Disability Index: FOTO physical FS primary measure score =  41    Date: 22  Functional Disability Index: FOTO physical FS primary measure score =  44    Date: 23    OBJECTIVE:   Observation: continued L shoulder hike/upper trap compensation during L shoulder elevation  Test measurements:    1/26  ROM Left Right   Shoulder Flex 90 110   Shoulder Abd 90 120   Shoulder ER       Shoulder IR                               Strength  Left Right   Shoulder Flex 4- 4-   Shoulder Scap 4- 4-   Shoulder ER 3+ 4-   Shoulder IR 4- 4-       RESTRICTIONS/PRECAUTIONS: latex allergy, recent Hx of breast cancer with R lymph node removal, HBP    Exercises/Interventions:   Exercises performed bilaterally  Therapeutic Ex (27765) Sets/sec Reps CUES/Notes   Sitting cane flexion 10 sec 5    Ball squeezes 2 10ea Prone and supine hand   Inclined table slides  2 8    Supine OH flexion w/ R UE assist 2 10 For OH mobility/ROM   TB no monies 2 10 Yellow TB   Scapular retractions 2 10 5 sec holds; Manual resistance   Seated rows 2 15 Red TB   Bilateral shoulder ER with foam roller back hug 2 8    Sidelying ER AAROM 2 8    Upper cervical flexion 1 10 5 sec hold   Pec doorway stretch 10 sec 5 Performed with hands at sides   Sidelying flexion 1 6 AAROM   Abduction isometric 10 sec 5 Manual resistance   Reclined flexion 1 5    Seated cane scaption 2 5 10 sec hold at top   Reclined ABD 1 5    TB row 2 5 Red   Cane ER stretch 10 sec 5    Cane AAROM flex/scap    Attempted   Wall slides 1 10    Wall isometric 5 sec 10 ER and ABD; seated in chair   Sidelying shoulder abduction 2 10    Sidelying shoulder ER 2 10 Cues for form   Supine UK deltoid 1 min 2 RUE   Seated scaption to 90 3 8 Bilateral   Standing wall slide 1 5 Terminated secondary to increased LBP   Standing shoulder retraction against wall 1 8    Seated cane flexion 5 sec 10 2 sets   Supine punch 2 5    Sitting external and internal rotation 2ea 10 Red TB   Seated thoracic extension over chair 3 sec 10    Seated - TB pulldown - red TB 5s 15 Cues for scap mechanics;  Ecc control into stretch in FF   Sidelying punch 2 10    Cross body stretch 10 sec 5                      Manual Intervention  (07199)      Shld/GH Mobs - posterior, inferior  2 min Gr. II-III   Thoracic extension with shoulder opening over SB  3 min    Sidelying resisted ER   2 min 2 sets; graded resistance through range and tactile cuing for proper form   Sidelying AAROM flexion  3 min 2 sets of 10 reps   Wrist/hand PROM   Flex/ext   Shoulder PROM  7 min Flex, scap, ER in supine in hooklying;   Scapular mobs - seated  Supporting L elbow   min    STM to bilateral UT/LS, rhomboids, scalenes, posterior deltoid,   3 min Seated   Seated shoulder elevation with scapular assistance 1 10    Wrist PROM   Flexion/extension         NMR re-education (15912)      T-spine Ext      GH depress/compress      Scap/GH NMR      Body blade      Wall ball roll      Wall Ball bounce      Ball drops      Porsha Scap Bio      Floor Snow angels-sliders            Therapeutic Activity (05286)      UE throwing porgression      Dynamic UE stability      Earthquake Bar      Bodyblade                Therapeutic Exercise and NMR EXR  [x] (42099) Provided verbal/tactile cueing for activities related to strengthening, flexibility, endurance, ROM  for improvements in scapular, scapulothoracic and UE control with self care, reaching, carrying, lifting, house/yardwork, driving/computer work.    [x] (98358) Provided verbal/tactile cueing for activities related to improving balance, coordination, kinesthetic sense, posture, motor skill, proprioception  to assist with  scapular, scapulothoracic and UE control with self care, reaching, carrying, lifting, house/yardwork, driving/computer work.    Therapeutic Activities:    [] (67362 or 44241) Provided verbal/tactile cueing for activities related to improving balance, coordination, kinesthetic sense, posture, motor skill, proprioception and motor activation to allow for proper function of scapular, scapulothoracic and UE control with self care, carrying, lifting, driving/computer work.     Home Exercise Program:    [x] (84212) Reviewed/Progressed HEP  activities related to strengthening, flexibility, endurance, ROM of scapular, scapulothoracic and UE control with self care, reaching, carrying, lifting, house/yardwork, driving/computer work  [] (82115) Reviewed/Progressed HEP activities related to improving balance, coordination, kinesthetic sense, posture, motor skill, proprioception of scapular, scapulothoracic and UE control with self care, reaching, carrying, lifting, house/yardwork, driving/computer work      Manual Treatments:  PROM / STM / Oscillations-Mobs:  G-I, II, III, IV (PA's, Inf., Post.)  [x] (83974) Provided manual therapy to mobilize soft tissue/joints of cervical/CT, scapular GHJ and UE for the purpose of modulating pain, promoting relaxation,  increasing ROM, reducing/eliminating soft tissue swelling/inflammation/restriction, improving soft tissue extensibility and allowing for proper ROM for normal function with self care, reaching, carrying, lifting, house/yardwork, driving/computer work    Modalities:      Charges:  Timed Code Treatment Minutes: 47   Total Treatment Minutes: 47       [] EVAL (LOW) 42117 (typically 20 minutes face-to-face)  [] EVAL (MOD) 68255 (typically 30 minutes face-to-face)  [] EVAL (HIGH) 20253 (typically 45 minutes face-to-face)  [] RE-EVAL     [x] SG(39340) x 2    [] DRY NEEDLE 1 OR 2 MUSCLES  [] NMR (32154) x     [] DRY NEEDLE 3+ MUSCLES  [x] Manual (67166) x 1      [] TA (33856) x     [] [facility] (26006[facility]  [] ES(attended) (09428)     [] ES (un) (64383):   [] VASO (91814)  [] Other:    GOALS:  Patient stated goal: full arm motion  [x] Progressing: [] Met: [] Not Met: [] Adjusted  Therapist goals for Patient:   Short Term Goals: To be achieved in: 2 weeks  1. Independent in HEP and progression per patient tolerance, in order to prevent re-injury. [] Progressing: [x] Met: [] Not Met: [] Adjusted  2.  Patient will have a decrease in pain to facilitate improvement in movement, function, and ADLs as indicated by Functional Deficits. [] Progressing: [x] Met: [] Not Met: [] Adjusted    Long Term Goals: To be achieved in: 10-12 weeks  1. Patient will reach FOTO predicted score of at least 62 to assist with reaching prior level of function with activities such as dressing. [x] Progressing: [] Met: [] Not Met: [] Adjusted  2. Patient will demonstrate increased L shoulder flexion AROM to 120 deg to allow for proper joint functioning as indicated by patients Functional Deficits. [x] Progressing: [] Met: [] Not Met: [] Adjusted  3. Patient will demonstrate an increase in global L shoulder strength to 4+/5 to allow for proper functional mobility as indicated by patients Functional Deficits. [x] Progressing: [] Met: [] Not Met: [] Adjusted  4. Patient will return to all dressing activities, such has wearing shirts and jackets without increased symptoms or restriction. [] Progressing: [x] Met: [] Not Met: [] Adjusted  5. Patient will demonstrate 5 repeated overhead reaches with 5# to demonstrate replacing items from overhead cabinets without symptoms or restrictions. [x] Progressing: [] Met: [] Not Met: [] Adjusted    ASSESSMENT:  Pt tolerated treatment session well today. Pt continues to demonstrate improving tolerance the exercises through bilateral upper extremities. Constant verbal and tactile cues required throughout session for proper form and decreased compensations. Improving deltoid and rotator cuff activation bilaterally versus upper trapezius. Manual assistance performed today to decrease upper trap compensation and facilitate scapular upward rotation. Pt reported increased shoulder muscle activation versus pulling into her neck with manual assistance. Shoulder fatigue noted by end of session. Continued education provided during session for importance of HEP and working on posture throughout her days. Supine exercises performed with bolsters under bilateral knees with no reported back pain from pt.  Plan to continue to progress ROM and strength as tolerated. Pt will continue to benefit from further skilled physical therapy to improve all the above impairments to bilateral upper extremities and return to PLOF including bathing and reaching overhead without symptoms or restrictions. Return to Play: (if applicable)   []  Stage 1: Intro to Strength   []  Stage 2: Dynamic Strength and Intro to Plyometrics   []  Stage 3: Advanced Plyometrics and Intro to Throwing   []  Stage 4: Sport specific Training/Return to Sport     []  Ready to Return to Play, [employer]   []  Not Ready for Return to Sports   Comments:      Treatment/Activity Tolerance:  [x] Patient tolerated treatment well [] Patient limited by fatique  [] Patient limited by pain  [] Patient limited by other medical complications  [] Other:     Overall Progression Towards Functional goals/ Treatment Progress Update:  [x] Patient is progressing as expected towards functional goals listed. [] Progression is slowed due to complexities/Impairments listed. [] Progression has been slowed due to co-morbidities. [] Plan just implemented, too soon to assess goals progression <30days   [] Goals require adjustment due to lack of progress  [] Patient is not progressing as expected and requires additional follow up with physician  [] Other    Prognosis for POC: [x] Good [] Fair  [] Poor    Patient requires continued skilled intervention: [x] Yes  [] No      PLAN: Improve bilateral shoulder elevation, global bilateral shoulder strengthening  [x] Continue per plan of care [] Alter current plan (see comments)  [] Plan of care initiated [] Hold pending MD visit [] Discharge    Electronically signed by: Rhiannon Stafford PT, DPT    Note: If patient does not return for scheduled/recommended follow up visits, this note will serve as a discharge from care along with the most recent update on progress.

## 2023-02-22 PROBLEM — I35.1 AORTIC REGURGITATION: Status: ACTIVE | Noted: 2023-02-22

## 2023-02-22 NOTE — PROGRESS NOTES
[facility]   Cardiac follow up    Referring Provider:  Heide Driver MD     No chief complaint on file. History of Present Illness:  Avel Phalen is a 74 y.o. female with a history of diabetes, dHF, hyperlipidemia, and hypertension. She was previously following with Dr. Cameron Francois with [facility].     At [address] in July 2022 she reported that she was diagnosed with breast cancer. Today she is here for follow up. Past Medical History:   has a past medical history of Arthritis, Back pain, CHF (congestive heart failure) ([address] Chronic diastolic heart failure ([address] Diabetes mellitus ([address] Diabetes mellitus ([address] GERD (gastroesophageal reflux disease), High cholesterol, Hypertension, Malignant neoplasm of right female breast ([address] Polio, Polio, Shingles, Sleep apnea, and Urinary problem. Surgical History:   has a past surgical history that includes joint replacement; knee surgery; Colonoscopy; other surgical history (01/13/2014); hernia repair; joint replacement; SABRINA STEREO BREAST BX W LOC DEVICE 1ST LESION RIGHT (Right, 04/06/2022); back surgery; back surgery; Breast biopsy (Right, 05/03/2022); Breast surgery (Right, 05/03/2022); and Humerus fracture surgery (Left, 08/23/2022). Social History:   reports that she has never smoked. She has never used smokeless tobacco. She reports that she does not drink alcohol and does not use drugs. Family History:  family history includes Breast Cancer in her mother; Cancer in her mother; Diabetes in her sister; Early Death in her father; Heart Disease in her mother. Home Medications:  Prior to Admission medications    Medication Sig Start Date End Date Taking?  Authorizing Provider   furosemide (LASIX) 20 MG tablet Take 2 tablets by mouth once daily 2/21/23   Theo Mcmillan MD   NIFEdipine (PROCARDIA XL) 30 MG extended release tablet Take 1 tablet by mouth in the morning and at bedtime 1/20/23   Theo Mcmillan MD   losartan (COZAAR) 50 MG tablet Take 2 tablets by mouth daily 1/19/23   Anahy John MD   spironolactone (ALDACTONE) 25 MG tablet Take 1 tablet by mouth once daily 12/6/22   Anahy John MD   Cholecalciferol (VITAMIN D3 PO) Take 1,000 mg by mouth    Historical Provider, MD   Multiple Vitamins-Minerals (HAIR SKIN AND NAILS FORMULA) TABS Take by mouth    Historical Provider, MD   vitamin C (ASCORBIC ACID) 500 MG tablet Take 500 mg by mouth daily    Historical Provider, MD   Multiple Vitamins-Minerals (THERAPEUTIC MULTIVITAMIN-MINERALS) tablet Take 1 tablet by mouth daily Essential 1 Pro caps    Historical Provider, MD   metoprolol succinate (TOPROL XL) 50 MG extended release tablet Take 1 tablet by mouth daily 2/15/22   Anahy John MD   metoprolol succinate (TOPROL XL) 25 MG extended release tablet Take 1 tablet by mouth daily 2/15/22   Anahy John MD   Semaglutide,0.25 or 0.5MG/DOS, (OZEMPIC, 0.25 OR 0.5 MG/DOSE,) 2 MG/1.5ML SOPN once a week   Patient not taking: Reported on 12/16/2022 7/1/21   Historical Provider, MD   meloxicam (MOBIC) 15 MG tablet Take 7.5 mg by mouth daily    Historical Provider, MD   insulin glargine (LANTUS) 100 UNIT/ML injection vial Inject 30 Units into the skin nightly     Historical Provider, MD   pantoprazole (PROTONIX) 40 MG tablet Take 40 mg by mouth daily 12/29/20   Historical Provider, MD   famotidine (PEPCID) 40 MG tablet Take 40 mg by mouth daily     Historical Provider, MD   aspirin 81 MG chewable tablet Take 1 tablet by mouth daily. 2/26/15   Alexandrea Aguilar MD   acetaminophen (TYLENOL) 500 MG tablet Take 500 mg by mouth every 6 hours as needed for Pain. Historical Provider, MD        Allergies:  Metformin and Adhesive tape     Review of Systems:   Constitutional: there has been no unanticipated weight loss. There's been no change in energy level, sleep pattern, or activity level. Eyes: No visual changes or diplopia. No scleral icterus. ENT: No Headaches, hearing loss or vertigo.  No mouth sores or sore throat. Cardiovascular: Reviewed in HPI  Respiratory: No cough or wheezing, no sputum production. No hematemesis. Gastrointestinal: No abdominal pain, appetite loss, blood in stools. No change in bowel or bladder habits. Genitourinary: No dysuria, trouble voiding, or hematuria. Musculoskeletal:  No gait disturbance, weakness or joint complaints. Integumentary: No rash or pruritis. Neurological: No headache, diplopia, change in muscle strength, numbness or tingling. No change in gait, balance, coordination, mood, affect, memory, mentation, behavior. Psychiatric: No anxiety, no depression. Endocrine: No malaise, fatigue or temperature intolerance. No excessive thirst, fluid intake, or urination. No tremor. Hematologic/Lymphatic: No abnormal bruising or bleeding, blood clots or swollen lymph nodes. Allergic/Immunologic: No nasal congestion or hives. Physical Examination:    There were no vitals filed for this visit. Wt Readings from Last 1 Encounters:   02/13/23 250 lb (113.4 kg)       Constitutional and General Appearance: NAD      Skin:good turgor,intact without lesions  HEENT: EOMI ,normal  Neck:no JVD    Respiratory:  Normal excursion and expansion without use of accessory muscles  Resp Auscultation: Normal breath sounds without dullness  Cardiovascular: The apical impulses not displaced  Heart tones are crisp and normal  Cervical veins are not engorged  The carotid upstroke is normal in amplitude and contour without delay or bruit  Peripheral pulses are symmetrical and full  There is no clubbing, cyanosis of the extremities.   Mild BLE   Femoral Arteries: 2+ and equal  Pedal Pulses: 2+ and equal   Abdomen:  No masses or tenderness  Liver/Spleen: No Abnormalities Noted  Neurological/Psychiatric:  Alert and oriented in all spheres  Moves all extremities well  Exhibits normal gait balance and coordination  No abnormalities of mood, affect, memory, mentation, or behavior are noted    Stress 4/23/21  Summary The overall quality of the study is good. There is subdiaphragmatic  attenuation. Left ventricular cavity is noted to be moderately dilated. The  right ventricle is normal.   SPECT images demonstrate a small area of decreased perfusion of mild  intensity in the apical inferior and mid inferior segments at rest and  stress. There is no corresponding wall motion abnormality. The defect is  most consistent with artifact. Sum stress score of 3. No visual TID. Calculated TID of 1.08   Left ventricular ejection fraction is 40% with mild global hypokinesis. Abnormal study due to increased LV volumes and decreased ejection fraction. No reversible ischemia. There is an inferior defect most consistent with  subdiaphragmatic artifact. Moderate risk scan. Echo 3/22/22:   Left ventricular cavity size is normal. Normal left ventricular wall thickness. Left ventricular function is low normal with ejection fraction estimated at 50%. No regional wall motion abnormalities are noted. Diastolic filling parameters suggest grade II diastolic dysfunction. Thickening of leaflets of mitral valve. Mitral annular calcification is present. Mild mitral regurgitation is present. Bi-atrial enlargement. Mild aortic stenosis. Moderate aortic regurgitation. Mild tricuspid regurgitation. Estimated pulmonary artery systolic pressure is at 44 mmHg assuming a right atrial pressure of 3 mmHg. Assessment:  1. Diastolic dysfunction:   Stable, compensated  -continue on spironolactone 25mg qd and Lasix 40mg daily  -echo 3/2022 EF 50%  -continue daily weights     2. Essential hypertension:   Stable  There were no vitals taken for this visit. -previously increased metoprolol to 75mg qd  -at [facility] 7/2022 stopped losartan and planned to recheck with Dr Sarah Holt in august 2022  -back on losartan at 100 mg daily and procardia 30 mg bid       3.  Hyperlipidemia:   - stable   2/18/22: ; TRIG 79; HDL 49;   8/2021  TG 89 HDL 49        4.      5.  JOSE MANUEL (obstructive sleep apnea): continue using C-pap       AR  - Echo 3/2022  Soft murmur on assessment ***  Stable          Plan:  Cardiac test and lab results personally reviewed by me during this office visit and discussed. Continue risk factor modifications. Call for any change in symptoms, call to report any changes in shortness of breath or development of chest pain with activity. Follow up in ***      Cardiac test and lab results personally reviewed by me during this office visit and discussed. I appreciate the opportunity of cooperating in the care of this individual.    Cornel Edmond.  Lurdes Benitez M.D., [facility] [place]    ***

## 2023-02-23 ENCOUNTER — HOSPITAL ENCOUNTER (OUTPATIENT)
Dept: PHYSICAL THERAPY | Age: 73
Setting detail: THERAPIES SERIES
Discharge: HOME | End: 2023-02-23
Payer: MEDICARE

## 2023-02-23 PROCEDURE — 97140 MANUAL THERAPY 1/> REGIONS: CPT

## 2023-02-23 PROCEDURE — 97110 THERAPEUTIC EXERCISES: CPT

## 2023-02-23 NOTE — FLOWSHEET NOTE
[facility] [address] [address]  Phone: [phone] Fax: [phone]    Physical Therapy Treatment Note/ Progress Report:       Date:  2023    Patient Name:  Francisco Mcdonald    :  1950  MRN: [id number]  Restrictions/Precautions:    Medical Diagnosis:  Other closed displaced fracture of proximal end of left humerus with routine healing, subsequent encounter [S42.292D]  Treatment Diagnosis:      ICD-10-CM     1. Other closed displaced fracture of proximal end of left humerus with routine healing, subsequent encounter  S42.292D         2. Impaired strength of shoulder muscles  R29.898         3. Impaired range of motion of left shoulder  M25.612           Insurance/Certification information:  PT Insurance Information: Medicare/[employer]  Physician Information:  Michelle Wells MD  Plan of care signed (Y/N): Y    Date of Patient follow up with Physician:      Progress Report: [x]  Yes  []  No     Date Range for reporting period:  Beginnin22  Ending:     Progress report due (10 Rx/or 30 days whichever is less):      Recertification due (POC duration/ or 90 days whichever is less): 23     Visit # Insurance Allowable Auth Needed   22 BMN []Yes    [x]No     Pain level:  0/10 in L shoulder;  0/10 pain R shoulder     SUBJECTIVE: Pt reports that the evening following last session her L shoulder was sore and was sore into the following morning, but feeling subsided. Notes the feeling was not her normal pain and localized it to the lateral and into posterior shoulder.     Functional Disability Index: FOTO physical FS primary measure score = 42; Risk adjusted = 46 Date: 22  Functional Disability Index: FOTO physical FS primary measure score =  41    Date: 22  Functional Disability Index: FOTO physical FS primary measure score =  44    Date: 23    OBJECTIVE:   Observation: continued L shoulder hike/upper trap compensation during L shoulder elevation  Test measurements:    1/26  ROM Left Right   Shoulder Flex 90 110   Shoulder Abd 90 120   Shoulder ER       Shoulder IR                               Strength  Left Right   Shoulder Flex 4- 4-   Shoulder Scap 4- 4-   Shoulder ER 3+ 4-   Shoulder IR 4- 4-       RESTRICTIONS/PRECAUTIONS: latex allergy, recent Hx of breast cancer with R lymph node removal, HBP    Exercises/Interventions:   Exercises performed bilaterally  Therapeutic Ex (63059) Sets/sec Reps CUES/Notes   Sitting cane flexion 10 sec 5    Ball squeezes 2 10ea Prone and supine hand   Inclined table slides  2 8    Supine OH flexion w/ R UE assist 2 10 For OH mobility/ROM   TB no monies 2 10 Yellow TB   Scapular retractions 2 10 5 sec holds; Manual resistance   Seated rows 2 15 Red TB   Bilateral shoulder ER with foam roller back hug 2 8    Sidelying ER AAROM 2 8    Upper cervical flexion 1 10 5 sec hold   Pec doorway stretch 10 sec 5 Performed with hands at sides   Sidelying flexion 1 6 AAROM   Abduction isometric 10 sec 5 Manual resistance   Reclined flexion 1 5    Seated cane scaption 2 5 10 sec hold at top   Reclined ABD 1 5    TB row 2 5 Red   Cane ER stretch 10 sec 5    Supine cane AAROM flex/scap  2 8    Wall slides 1 10    Wall isometric 5 sec 10 ER and ABD; seated in chair   Sidelying shoulder abduction 2 10    Sidelying shoulder ER 2 10 Cues for form   Supine UK deltoid 1 min 2 Bilateral; through available range   Seated scaption to 90 3 8 Bilateral; waterfalls   Standing wall slide 1 5 Terminated secondary to increased LBP   Standing shoulder retraction against wall 1 8    Seated cane flexion 5 sec 10 2 sets   Supine punch 2 5    Sitting external and internal rotation 2ea 10 Red TB   Seated thoracic extension over chair 3 sec 10    Standing - TB pulldown - red TB 5s 15 2 sets   Sidelying punch 2 10    Cross body stretch 10 sec 5                      Manual Intervention  (29659)      Shld/GH Mobs - posterior, inferior  2 min Gr. II-III   Thoracic extension with shoulder opening over SB  4 min    Sidelying resisted ER   2 min 2 sets; graded resistance through range and tactile cuing for proper form   Sidelying AAROM flexion  3 min 2 sets of 10 reps   Wrist/hand PROM   Flex/ext   Shoulder PROM  8 min Flex, scap, ER in supine in hooklying;   Scapular mobs - seated  Supporting L elbow   min    STM to bilateral UT/LS, rhomboids, scalenes, posterior deltoid,   2 min Seated   Seated shoulder elevation with scapular assistance 1 8 LUE   Wrist PROM   Flexion/extension         NMR re-education (55947)      T-spine Ext      GH depress/compress      Scap/GH NMR      Body blade      Wall ball roll      Wall Ball bounce      Ball drops      Porsha Scap Bio      Floor Snow angels-sliders            Therapeutic Activity (98689)      UE throwing porgression      Dynamic UE stability      Earthquake Bar      Bodyblade                Therapeutic Exercise and NMR EXR  [x] (84053) Provided verbal/tactile cueing for activities related to strengthening, flexibility, endurance, ROM  for improvements in scapular, scapulothoracic and UE control with self care, reaching, carrying, lifting, house/yardwork, driving/computer work. [x] (14995) Provided verbal/tactile cueing for activities related to improving balance, coordination, kinesthetic sense, posture, motor skill, proprioception  to assist with  scapular, scapulothoracic and UE control with self care, reaching, carrying, lifting, house/yardwork, driving/computer work. Therapeutic Activities:    [] (77262 or 69663) Provided verbal/tactile cueing for activities related to improving balance, coordination, kinesthetic sense, posture, motor skill, proprioception and motor activation to allow for proper function of scapular, scapulothoracic and UE control with self care, carrying, lifting, driving/computer work.      Home Exercise Program:    [x] (92253) Reviewed/Progressed HEP activities related to strengthening, flexibility, endurance, ROM of scapular, scapulothoracic and UE control with self care, reaching, carrying, lifting, house/yardwork, driving/computer work  [] (14050) Reviewed/Progressed HEP activities related to improving balance, coordination, kinesthetic sense, posture, motor skill, proprioception of scapular, scapulothoracic and UE control with self care, reaching, carrying, lifting, house/yardwork, driving/computer work      Manual Treatments:  PROM / STM / Oscillations-Mobs:  G-I, II, III, IV (PA's, Inf., Post.)  [x] (19683) Provided manual therapy to mobilize soft tissue/joints of cervical/CT, scapular GHJ and UE for the purpose of modulating pain, promoting relaxation,  increasing ROM, reducing/eliminating soft tissue swelling/inflammation/restriction, improving soft tissue extensibility and allowing for proper ROM for normal function with self care, reaching, carrying, lifting, house/yardwork, driving/computer work    Modalities:      Charges:  Timed Code Treatment Minutes: 48   Total Treatment Minutes: 48       [] EVAL (LOW) 74113 (typically 20 minutes face-to-face)  [] EVAL (MOD) 09465 (typically 30 minutes face-to-face)  [] EVAL (HIGH) 44359 (typically 45 minutes face-to-face)  [] RE-EVAL     [x] VW(76506) x 2    [] DRY NEEDLE 1 OR 2 MUSCLES  [] NMR (70504) x     [] DRY NEEDLE 3+ MUSCLES  [x] Manual (45942) x 1      [] TA (04515) x     [] [facility] (82404[facility]  [] ES(attended) (94826)     [] ES (un) (57616):   [] VASO (88692)  [] Other:    GOALS:  Patient stated goal: full arm motion  [x] Progressing: [] Met: [] Not Met: [] Adjusted  Therapist goals for Patient:   Short Term Goals: To be achieved in: 2 weeks  1. Independent in HEP and progression per patient tolerance, in order to prevent re-injury. [] Progressing: [x] Met: [] Not Met: [] Adjusted  2.  Patient will have a decrease in pain to facilitate improvement in movement, function, and ADLs as indicated by Functional Deficits. [] Progressing: [x] Met: [] Not Met: [] Adjusted    Long Term Goals: To be achieved in: 10-12 weeks  1. Patient will reach FOTO predicted score of at least 62 to assist with reaching prior level of function with activities such as dressing. [x] Progressing: [] Met: [] Not Met: [] Adjusted  2. Patient will demonstrate increased L shoulder flexion AROM to 120 deg to allow for proper joint functioning as indicated by patients Functional Deficits. [x] Progressing: [] Met: [] Not Met: [] Adjusted  3. Patient will demonstrate an increase in global L shoulder strength to 4+/5 to allow for proper functional mobility as indicated by patients Functional Deficits. [x] Progressing: [] Met: [] Not Met: [] Adjusted  4. Patient will return to all dressing activities, such has wearing shirts and jackets without increased symptoms or restriction. [] Progressing: [x] Met: [] Not Met: [] Adjusted  5. Patient will demonstrate 5 repeated overhead reaches with 5# to demonstrate replacing items from overhead cabinets without symptoms or restrictions. [x] Progressing: [] Met: [] Not Met: [] Adjusted    ASSESSMENT:  Pt tolerated treatment session well today. Education provided during session for muscular work versus pain and expected muscle soreness after exercises. Tactile cues required throughout session for proper form with exercises. Continued assistance with L scapular upward rotation which decreased L shoulder pain. Noted fatigue by end of session. Pt is functionally progressing well and use of LUE is improving. Plan to continue to progress ROM and strength as tolerated. Pt will continue to benefit from further skilled physical therapy to improve strength, neuromuscular control, ROM, and endurance to bilateral upper extremities and return to PLOF including bathing and reaching overhead without symptoms or restrictions.     Return to Play: (if applicable)   []  Stage 1: Intro to Strength   []  Stage 2: Dynamic Strength and Intro to Plyometrics   []  Stage 3: Advanced Plyometrics and Intro to Throwing   []  Stage 4: Sport specific Training/Return to Sport     []  Ready to Return to Play, [employer]   []  Not Ready for Return to Sports   Comments:      Treatment/Activity Tolerance:  [x] Patient tolerated treatment well [] Patient limited by fatique  [] Patient limited by pain  [] Patient limited by other medical complications  [] Other:     Overall Progression Towards Functional goals/ Treatment Progress Update:  [x] Patient is progressing as expected towards functional goals listed. [] Progression is slowed due to complexities/Impairments listed. [] Progression has been slowed due to co-morbidities. [] Plan just implemented, too soon to assess goals progression <30days   [] Goals require adjustment due to lack of progress  [] Patient is not progressing as expected and requires additional follow up with physician  [] Other    Prognosis for POC: [x] Good [] Fair  [] Poor    Patient requires continued skilled intervention: [x] Yes  [] No      PLAN: Improve bilateral shoulder elevation, global bilateral shoulder strengthening  [x] Continue per plan of care [] Alter current plan (see comments)  [] Plan of care initiated [] Hold pending MD visit [] Discharge    Electronically signed by: Sofiya Puente PT, DPT    Note: If patient does not return for scheduled/recommended follow up visits, this note will serve as a discharge from care along with the most recent update on progress.

## 2023-02-28 ENCOUNTER — HOSPITAL ENCOUNTER (OUTPATIENT)
Dept: PHYSICAL THERAPY | Age: 73
Setting detail: THERAPIES SERIES
Discharge: HOME | End: 2023-02-28
Payer: MEDICARE

## 2023-02-28 PROCEDURE — 97140 MANUAL THERAPY 1/> REGIONS: CPT

## 2023-02-28 PROCEDURE — 97110 THERAPEUTIC EXERCISES: CPT

## 2023-02-28 NOTE — PLAN OF CARE
[place] [address] [address]  Phone: [phone] Fax: [phone]  Physical Therapy Re-Certification Plan of Care    Dear Mayo Paez MD  ,    We had the pleasure of treating the following patient for physical therapy services at [facility]. A summary of our findings can be found in the updated assessment below. This includes our plan of care. If you have any questions or concerns regarding these findings, please do not hesitate to contact me at the office phone number checked above. Thank you for the referral.     Physician Signature:________________________________Date:__________________  By signing above (or electronic signature), therapist's plan is approved by physician      Functional Outcome:     Functional Disability Index: FOTO physical FS primary measure score =  44 DASH = 54   Date: 1/26/23  QuickDASH: raw scare - 32; dysfunction - 48%    Overall Response to Treatment:   []Patient is responding well to treatment and improvement is noted with regards  to goals   []Patient should continue to improve in reasonable time if they continue HEP   []Patient has plateaued and is no longer responding to skilled PT intervention    []Patient is getting worse and would benefit from return to referring MD   []Patient unable to adhere to initial POC   [x]Other: Pt is tolerating her time in physical therapy well and is demonstrating improvements of include pain, strength, ROM, neuromuscular control, and muscular endurance with her bilateral shoulders. Pt continues to be motivated by her current progress and is working toward her current goals. Pt has met 3/9 goals with goals adjusted and added today. Limitations for meeting goals remain strength and ROM impairments.  Overall progress has been slow secondary to pt's comorbidities, severity of her initial injury, learned compensations from initial injury to surgical intervention, and concomitant L hand/wrist dysfunction that she is currently being seen by an occupational therapist for. Through manual resistance, the pt's ability to activate her L rotator cuff has increased greatly compared to her start in physical therapy. She has also improved her pain to only have soreness after therapy session, with no pain levels that she reports similar to start of physical therapy. However, given all the improvements the pt has demonstrated, all continue to be impaired. These impaired components most greatly affect the pt's ability to reach overhead, reach behind her back to assist with washing and self-bathing, and performing dishes. Pt will continue to benefit from skilled physical therapy to address all the above listed impairments and help the pt to return to reaching overhead and performing ADLs without symptoms or restrictions. Date range of Visits: 22 - 23  Total Visits: 23    Recommendation:    [x]Continue PT 2x / wk for 8 weeks. []Hold PT, pending MD visit      Physical Therapy Treatment Note/ Progress Report:       Date:  2023    Patient Name:  Cornel Rucker    :  1950  MRN: [id number]  Restrictions/Precautions:    Medical Diagnosis:  Other closed displaced fracture of proximal end of left humerus with routine healing, subsequent encounter [S42.292D]  Treatment Diagnosis:      ICD-10-CM     1. Other closed displaced fracture of proximal end of left humerus with routine healing, subsequent encounter  S42.292D         2. Impaired strength of shoulder muscles  R29.898         3.  Impaired range of motion of left shoulder  M25.612           Insurance/Certification information:  PT Insurance Information: Medicare/AARP  Physician Information:  Hao Michel MD  Plan of care signed (Y/N): Y    Date of Patient follow up with Physician:      Progress Report: [x]  Yes  []  No     Date Range for reporting period:  Beginnin22  Ending:     Progress report due (10 Rx/or 30 days whichever is less): 13     Recertification due (POC duration/ or 90 days whichever is less): 23     Visit # Insurance Allowable Auth Needed   23 BMN []Yes    [x]No     Pain level:  0/10 in L shoulder;  0/10 pain R shoulder     SUBJECTIVE:     Functional Disability Index: FOTO physical FS primary measure score = 42; Risk adjusted = 46 Date: 22  Functional Disability Index: FOTO physical FS primary measure score =  41    Date: 22  Functional Disability Index: FOTO physical FS primary measure score =  44 DASH = 54   Date: 23  QuickDASH: raw scare - 32; dysfunction - 48%    OBJECTIVE:   Observation: continued L shoulder hike/upper trap compensation during L shoulder elevation  Test measurements:      ROM Left Right   Shoulder Flex 90 supine; 98 sitting 122 supine; 125   Shoulder Abd 90 120   Shoulder ER       Shoulder IR                               Strength  Left Right   Shoulder Flex 4-/4 4   Shoulder Scap 4- 4-   Shoulder ER 4- 4   Shoulder IR 4-/4 4       RESTRICTIONS/PRECAUTIONS: latex allergy, recent Hx of breast cancer with R lymph node removal, HBP    Exercises/Interventions:   Exercises performed bilaterally  Therapeutic Ex (81439) Sets/sec Reps CUES/Notes   Sitting cane flexion 10 sec 5    Ball squeezes 2 10ea Prone and supine hand   Inclined table slides  2 8    Supine OH flexion w/ R UE assist 2 10 For OH mobility/ROM   TB no monies 2 10 Yellow TB   Scapular retractions 2 10 5 sec holds; Manual resistance   Seated rows 2 15 Red TB   Bilateral shoulder ER with foam roller back hug 2 8    Sidelying ER AAROM 2 8    Upper cervical flexion 1 10 5 sec hold   Pec doorway stretch 10 sec 5 Performed with hands at sides   Sidelying flexion 1 6 AAROM   Abduction isometric 10 sec 5 Manual resistance   Reclined flexion 1 5    Seated cane scaption 2 5 10 sec hold at top   Reclined ABD 1 5    TB row 2 5 Red Cane ER stretch 10 sec 5    Supine cane AAROM flex/scap  2 8    Wall slides 1 10    Wall isometric 5 sec 10 ER and ABD; seated in chair   Sidelying shoulder abduction 2 10    Sidelying shoulder ER 2 10 Cues for form   Supine UK deltoid 1 min 2 Bilateral; through available range   Seated scaption to 90 3 8 Bilateral; waterfalls   Standing wall slide 1 5 Terminated secondary to increased LBP   Standing shoulder retraction against wall 1 8    Seated cane flexion 5 sec 10 2 sets   Supine punch 2 5    Standing external and internal rotation 2ea 6 Red TB   Seated thoracic extension over chair 3 sec 10    Seated - TB pulldown - red TB 5s 15 2 sets   Towel IR stretch behind back 10 sec 5 Performed at half wall   Sidelying punch 2 10    Cross body stretch 10 sec 5                      Manual Intervention  (85468)      Shld/GH Mobs - posterior, inferior  2 min Gr. II-III   Thoracic extension with shoulder opening over SB   min    Sidelying resisted ER    min 2 sets; graded resistance through range and tactile cuing for proper form   Sidelying AAROM flexion   min 2 sets of 10 reps   Wrist/hand PROM   Flex/ext   Shoulder PROM  9 min Flex, scap, ER in supine in hooklying;   Scapular mobs - seated  Supporting L elbow   min    STM to bilateral UT/LS, rhomboids, scalenes, posterior deltoid,    min Seated   Seated shoulder elevation with scapular assistance 1 8 LUE         NMR re-education (84754)      T-spine Ext      GH depress/compress      Scap/GH NMR      Body blade      Wall ball roll      Wall Ball bounce      Ball drops      Porsha Scap Bio      Floor Snow angels-sliders            Therapeutic Activity (95122)      UE throwing porgression      Dynamic UE stability      Earthquake Bar      Bodyblade                Therapeutic Exercise and NMR EXR  [x] (39647) Provided verbal/tactile cueing for activities related to strengthening, flexibility, endurance, ROM  for improvements in scapular, scapulothoracic and UE control with self care, reaching, carrying, lifting, house/yardwork, driving/computer work. [x] (56893) Provided verbal/tactile cueing for activities related to improving balance, coordination, kinesthetic sense, posture, motor skill, proprioception  to assist with  scapular, scapulothoracic and UE control with self care, reaching, carrying, lifting, house/yardwork, driving/computer work. Therapeutic Activities:    [] (70532 or 63655) Provided verbal/tactile cueing for activities related to improving balance, coordination, kinesthetic sense, posture, motor skill, proprioception and motor activation to allow for proper function of scapular, scapulothoracic and UE control with self care, carrying, lifting, driving/computer work.      Home Exercise Program:    [x] (66438) Reviewed/Progressed HEP activities related to strengthening, flexibility, endurance, ROM of scapular, scapulothoracic and UE control with self care, reaching, carrying, lifting, house/yardwork, driving/computer work  [] (41340) Reviewed/Progressed HEP activities related to improving balance, coordination, kinesthetic sense, posture, motor skill, proprioception of scapular, scapulothoracic and UE control with self care, reaching, carrying, lifting, house/yardwork, driving/computer work      Manual Treatments:  PROM / STM / Oscillations-Mobs:  G-I, II, III, IV (PA's, Inf., Post.)  [x] (44336) Provided manual therapy to mobilize soft tissue/joints of cervical/CT, scapular GHJ and UE for the purpose of modulating pain, promoting relaxation,  increasing ROM, reducing/eliminating soft tissue swelling/inflammation/restriction, improving soft tissue extensibility and allowing for proper ROM for normal function with self care, reaching, carrying, lifting, house/yardwork, driving/computer work    Modalities:      Charges:  Timed Code Treatment Minutes: 43   Total Treatment Minutes: 43       [] EVAL (LOW) 12237 (typically 20 minutes face-to-face)  [] EVAL (MOD) 87961 (typically 30 minutes face-to-face)  [] EVAL (HIGH) 17500 (typically 45 minutes face-to-face)  [] RE-EVAL     [x] XK(67893) x 2    [] DRY NEEDLE 1 OR 2 MUSCLES  [] NMR (21476) x     [] DRY NEEDLE 3+ MUSCLES  [x] Manual (85110) x 1      [] TA (22853) x     [] Mech Traction (40657)  [] ES(attended) (51293)     [] ES (un) (44376):   [] VASO (03930)  [] Other:    GOALS:  Patient stated goal: full arm motion  [x] Progressing: [] Met: [] Not Met: [] Adjusted  Therapist goals for Patient:   Short Term Goals: To be achieved in: 2 weeks  1. Independent in HEP and progression per patient tolerance, in order to prevent re-injury. [] Progressing: [x] Met: [] Not Met: [] Adjusted  2. Patient will have a decrease in pain to facilitate improvement in movement, function, and ADLs as indicated by Functional Deficits. [] Progressing: [x] Met: [] Not Met: [] Adjusted    Long Term Goals: To be achieved in: 10-12 weeks  1. Patient will improve her QuickDASH score by at least 10 points to assist with reaching prior level of function with activities such as bathing and reaching overhead. [] Progressing: [] Met: [] Not Met: [x] Adjusted 2/28  2. Patient will demonstrate increased L shoulder flexion AROM to 120 deg to allow for proper joint functioning as indicated by patients Functional Deficits. [x] Progressing: [] Met: [] Not Met: [] Adjusted  3. Patient will demonstrate an increase in global L shoulder strength to 4+/5 to allow for proper functional mobility as indicated by patients Functional Deficits. [x] Progressing: [] Met: [] Not Met: [] Adjusted  4. Patient will return to all dressing activities, such has wearing shirts and jackets without increased symptoms or restriction. [] Progressing: [x] Met: [] Not Met: [] Adjusted  5. Patient will demonstrate 5 repeated overhead reaches with 5# to demonstrate replacing items from overhead cabinets without symptoms or restrictions.     [x] Progressing: [] Met: [] Not Met: [] Adjusted  6. Patient will demonstrate reaching behind her back to at least L1 levels to demonstrate needs for self-bathing and back washing without symptoms or restrictions. [x] Progressing: [] Met: [] Not Met: [] Added 2/28    ASSESSMENT: Pt is tolerating her time in physical therapy well and is demonstrating improvements of include pain, strength, ROM, neuromuscular control, and muscular endurance with her bilateral shoulders. Pt continues to be motivated by her current progress and is working toward her current goals. Pt has met 3/9 goals with goals adjusted and added today. Limitations for meeting goals remain strength and ROM impairments. Overall progress has been slow secondary to pt's comorbidities, severity of her initial injury, learned compensations from initial injury to surgical intervention, and concomitant L hand/wrist dysfunction that she is currently being seen by an occupational therapist for. Through manual resistance, the pt's ability to activate her L rotator cuff has increased greatly compared to her start in physical therapy. She has also improved her pain to only have soreness after therapy session, with no pain levels that she reports similar to start of physical therapy. However, given all the improvements the pt has demonstrated, all continue to be impaired. These impaired components most greatly affect the pt's ability to reach overhead, reach behind her back to assist with washing and self-bathing, and performing dishes. Pt will continue to benefit from skilled physical therapy to address all the above listed impairments and help the pt to return to reaching overhead and performing ADLs without symptoms or restrictions.       Return to Play: (if applicable)   []  Stage 1: Intro to Strength   []  Stage 2: Dynamic Strength and Intro to Plyometrics   []  Stage 3: Advanced Plyometrics and Intro to Throwing   []  Stage 4: Sport specific Training/Return to Sport     []  Ready to Return to Play, Meets All Above Stages   []  Not Ready for Return to Sports   Comments:      Treatment/Activity Tolerance:  [x] Patient tolerated treatment well [] Patient limited by fatique  [] Patient limited by pain  [] Patient limited by other medical complications  [] Other:     Overall Progression Towards Functional goals/ Treatment Progress Update:  [x] Patient is progressing as expected towards functional goals listed. [] Progression is slowed due to complexities/Impairments listed. [] Progression has been slowed due to co-morbidities. [] Plan just implemented, too soon to assess goals progression <30days   [] Goals require adjustment due to lack of progress  [] Patient is not progressing as expected and requires additional follow up with physician  [] Other    Prognosis for POC: [x] Good [] Fair  [] Poor    Patient requires continued skilled intervention: [x] Yes  [] No      PLAN: Improve bilateral shoulder elevation, global bilateral shoulder strengthening  [x] Continue per plan of care [] Alter current plan (see comments)  [] Plan of care initiated [] Hold pending MD visit [] Discharge    Electronically signed by: Jessica Finch PT, DPT    Note: If patient does not return for scheduled/recommended follow up visits, this note will serve as a discharge from care along with the most recent update on progress.

## 2023-03-02 ENCOUNTER — OFFICE VISIT (OUTPATIENT)
Dept: CARDIOLOGY CLINIC | Age: 73
End: 2023-03-02
Payer: MEDICARE

## 2023-03-02 ENCOUNTER — HOSPITAL ENCOUNTER (OUTPATIENT)
Dept: PHYSICAL THERAPY | Age: 73
Setting detail: THERAPIES SERIES
Discharge: HOME | End: 2023-03-02
Payer: MEDICARE

## 2023-03-02 VITALS
SYSTOLIC BLOOD PRESSURE: 132 MMHG | HEIGHT: 66 IN | DIASTOLIC BLOOD PRESSURE: 70 MMHG | HEART RATE: 74 BPM | BODY MASS INDEX: 38.28 KG/M2 | OXYGEN SATURATION: 98 % | WEIGHT: 238.2 LBS

## 2023-03-02 DIAGNOSIS — I50.32 CHRONIC DIASTOLIC HEART FAILURE (HCC): Primary | ICD-10-CM

## 2023-03-02 DIAGNOSIS — I10 PRIMARY HYPERTENSION: ICD-10-CM

## 2023-03-02 DIAGNOSIS — E78.5 HYPERLIPIDEMIA, UNSPECIFIED HYPERLIPIDEMIA TYPE: ICD-10-CM

## 2023-03-02 DIAGNOSIS — I35.1 AORTIC VALVE INSUFFICIENCY, ETIOLOGY OF CARDIAC VALVE DISEASE UNSPECIFIED: ICD-10-CM

## 2023-03-02 DIAGNOSIS — G47.33 OSA (OBSTRUCTIVE SLEEP APNEA): ICD-10-CM

## 2023-03-02 PROCEDURE — 1090F PRES/ABSN URINE INCON ASSESS: CPT | Performed by: INTERNAL MEDICINE

## 2023-03-02 PROCEDURE — 3078F DIAST BP <80 MM HG: CPT | Performed by: INTERNAL MEDICINE

## 2023-03-02 PROCEDURE — G9899 SCRN MAM PERF RSLTS DOC: HCPCS | Performed by: INTERNAL MEDICINE

## 2023-03-02 PROCEDURE — G8417 CALC BMI ABV UP PARAM F/U: HCPCS | Performed by: INTERNAL MEDICINE

## 2023-03-02 PROCEDURE — G8484 FLU IMMUNIZE NO ADMIN: HCPCS | Performed by: INTERNAL MEDICINE

## 2023-03-02 PROCEDURE — 97110 THERAPEUTIC EXERCISES: CPT

## 2023-03-02 PROCEDURE — 3017F COLORECTAL CA SCREEN DOC REV: CPT | Performed by: INTERNAL MEDICINE

## 2023-03-02 PROCEDURE — 1036F TOBACCO NON-USER: CPT | Performed by: INTERNAL MEDICINE

## 2023-03-02 PROCEDURE — 99214 OFFICE O/P EST MOD 30 MIN: CPT | Performed by: INTERNAL MEDICINE

## 2023-03-02 PROCEDURE — G8427 DOCREV CUR MEDS BY ELIG CLIN: HCPCS | Performed by: INTERNAL MEDICINE

## 2023-03-02 PROCEDURE — 3074F SYST BP LT 130 MM HG: CPT | Performed by: INTERNAL MEDICINE

## 2023-03-02 PROCEDURE — G8399 PT W/DXA RESULTS DOCUMENT: HCPCS | Performed by: INTERNAL MEDICINE

## 2023-03-02 PROCEDURE — 1123F ACP DISCUSS/DSCN MKR DOCD: CPT | Performed by: INTERNAL MEDICINE

## 2023-03-02 PROCEDURE — 97140 MANUAL THERAPY 1/> REGIONS: CPT

## 2023-03-02 RX ORDER — NIFEDIPINE 30 MG/1
30 TABLET, EXTENDED RELEASE ORAL DAILY
Qty: 90 TABLET | Refills: 3
Start: 2023-03-02

## 2023-03-02 RX ORDER — DULAGLUTIDE 1.5 MG/.5ML
INJECTION, SOLUTION SUBCUTANEOUS
COMMUNITY
Start: 2023-02-09

## 2023-03-02 NOTE — PATIENT INSTRUCTIONS
You can take Nifedipine once daily now, Keep bp < 140/80, if it is above, you can take twice. Continue with your weight loss, doing great!

## 2023-03-02 NOTE — PROGRESS NOTES
[address]   Cardiac follow up    Referring Provider:  Vika Nelson MD     Chief Complaint   Patient presents with    Hypertension    6 Month Follow-Up        History of Present Illness:  La Nena Hamlin is a 74 y.o. female with a history of diabetes, dHF, hyperlipidemia, and hypertension. She was previously following with Dr. Manny Diez with [facility].     At [facility] in July 2022 she reported that she was diagnosed with breast cancer. Today she is here for follow up. About 3 days after her last ov, she had a fall and broke her arm. She is still in therapy for it and recovering well. She denies any CP or SOB. She is on weight watchers and down 24 lbs. She is also planning on the inspire implant for sleep apnea. Past Medical History:   has a past medical history of Arthritis, Back pain, CHF (congestive heart failure) ([address] Chronic diastolic heart failure ([address] Diabetes mellitus ([address] Diabetes mellitus ([address] GERD (gastroesophageal reflux disease), High cholesterol, Hypertension, Malignant neoplasm of right female breast ([address] Polio, Polio, Shingles, Sleep apnea, and Urinary problem. Surgical History:   has a past surgical history that includes joint replacement; knee surgery; Colonoscopy; other surgical history (01/13/2014); hernia repair; joint replacement; SABRINA STEREO BREAST BX W LOC DEVICE 1ST LESION RIGHT (Right, 04/06/2022); back surgery; back surgery; Breast biopsy (Right, 05/03/2022); Breast surgery (Right, 05/03/2022); Humerus fracture surgery (Left, 08/23/2022); and Arm Surgery (Left). Social History:   reports that she has never smoked. She has never used smokeless tobacco. She reports that she does not drink alcohol and does not use drugs. Family History:  family history includes Breast Cancer in her mother; Cancer in her mother; Diabetes in her sister; Early Death in her father; Heart Disease in her mother.      Home Medications:  Prior to Admission medications Medication Sig Start Date End Date Taking? Authorizing Provider   TRULICITY 1.5 PN/2.0RU SC injection  2/9/23  Yes Historical Provider, MD   furosemide (LASIX) 20 MG tablet Take 2 tablets by mouth once daily 2/21/23  Yes Gabbie De La Vega MD   NIFEdipine (PROCARDIA XL) 30 MG extended release tablet Take 1 tablet by mouth in the morning and at bedtime 1/20/23  Yes Gabbie De La Vega MD   losartan (COZAAR) 50 MG tablet Take 2 tablets by mouth daily 1/19/23  Yes Gabbie De La Vega MD   spironolactone (ALDACTONE) 25 MG tablet Take 1 tablet by mouth once daily 12/6/22  Yes Gabbie De La Vega MD   Cholecalciferol (VITAMIN D3 PO) Take 1,000 mg by mouth   Yes Historical Provider, MD   Multiple Vitamins-Minerals (HAIR SKIN AND NAILS FORMULA) TABS Take by mouth   Yes Historical Provider, MD   vitamin C (ASCORBIC ACID) 500 MG tablet Take 500 mg by mouth daily   Yes Historical Provider, MD   Multiple Vitamins-Minerals (THERAPEUTIC MULTIVITAMIN-MINERALS) tablet Take 1 tablet by mouth daily Essential 1 Pro caps   Yes Historical Provider, MD   metoprolol succinate (TOPROL XL) 50 MG extended release tablet Take 1 tablet by mouth daily 2/15/22  Yes Gabbie De La Vega MD   metoprolol succinate (TOPROL XL) 25 MG extended release tablet Take 1 tablet by mouth daily 2/15/22  Yes Gabbie De La Vega MD   meloxicam (MOBIC) 15 MG tablet Take 7.5 mg by mouth daily   Yes Historical Provider, MD   insulin glargine (LANTUS) 100 UNIT/ML injection vial Inject 26 Units into the skin nightly   Yes Historical Provider, MD   pantoprazole (PROTONIX) 40 MG tablet Take 40 mg by mouth daily 12/29/20  Yes Historical Provider, MD   famotidine (PEPCID) 40 MG tablet Take 40 mg by mouth daily    Yes Historical Provider, MD   aspirin 81 MG chewable tablet Take 1 tablet by mouth daily. 2/26/15  Yes Myrtis Bloch, MD   acetaminophen (TYLENOL) 500 MG tablet Take 500 mg by mouth every 6 hours as needed for Pain.    Yes Historical Provider, MD        Allergies:  Metformin and Adhesive tape     Review of Systems:   Constitutional: there has been no unanticipated weight loss. There's been no change in energy level, sleep pattern, or activity level. Eyes: No visual changes or diplopia. No scleral icterus. ENT: No Headaches, hearing loss or vertigo. No mouth sores or sore throat. Cardiovascular: Reviewed in HPI  Respiratory: No cough or wheezing, no sputum production. No hematemesis. Gastrointestinal: No abdominal pain, appetite loss, blood in stools. No change in bowel or bladder habits. Genitourinary: No dysuria, trouble voiding, or hematuria. Musculoskeletal:  No gait disturbance, weakness or joint complaints. Integumentary: No rash or pruritis. Neurological: No headache, diplopia, change in muscle strength, numbness or tingling. No change in gait, balance, coordination, mood, affect, memory, mentation, behavior. Psychiatric: No anxiety, no depression. Endocrine: No malaise, fatigue or temperature intolerance. No excessive thirst, fluid intake, or urination. No tremor. Hematologic/Lymphatic: No abnormal bruising or bleeding, blood clots or swollen lymph nodes. Allergic/Immunologic: No nasal congestion or hives. Physical Examination:    Vitals:    03/02/23 1003   BP: 132/70   Pulse: 74   SpO2: 98%            Wt Readings from Last 1 Encounters:   03/02/23 238 lb 3.2 oz (108 kg)       Constitutional and General Appearance: NAD      Skin:good turgor,intact without lesions  HEENT: EOMI ,normal  Neck:no JVD    Respiratory:  Normal excursion and expansion without use of accessory muscles  Resp Auscultation: Normal breath sounds without dullness  Cardiovascular: The apical impulses not displaced  Heart tones are crisp and normal  Cervical veins are not engorged  The carotid upstroke is normal in amplitude and contour without delay or bruit  Peripheral pulses are symmetrical and full  There is no clubbing, cyanosis of the extremities.   Mild BLE   Femoral Arteries: 2+ and equal  Pedal Pulses: 2+ and equal   Abdomen:  No masses or tenderness  Liver/Spleen: No Abnormalities Noted  Neurological/Psychiatric:  Alert and oriented in all spheres  Moves all extremities well  Exhibits normal gait balance and coordination  No abnormalities of mood, affect, memory, mentation, or behavior are noted    Stress 4/23/21  Summary The overall quality of the study is good. There is subdiaphragmatic  attenuation. Left ventricular cavity is noted to be moderately dilated. The  right ventricle is normal.   SPECT images demonstrate a small area of decreased perfusion of mild  intensity in the apical inferior and mid inferior segments at rest and  stress. There is no corresponding wall motion abnormality. The defect is  most consistent with artifact. Sum stress score of 3. No visual TID. Calculated TID of 1.08   Left ventricular ejection fraction is 40% with mild global hypokinesis. Abnormal study due to increased LV volumes and decreased ejection fraction. No reversible ischemia. There is an inferior defect most consistent with  subdiaphragmatic artifact. Moderate risk scan. Echo 3/22/22:   Left ventricular cavity size is normal. Normal left ventricular wall thickness. Left ventricular function is low normal with ejection fraction estimated at 50%. No regional wall motion abnormalities are noted. Diastolic filling parameters suggest grade II diastolic dysfunction. Thickening of leaflets of mitral valve. Mitral annular calcification is present. Mild mitral regurgitation is present. Bi-atrial enlargement. Mild aortic stenosis. Moderate aortic regurgitation. Mild tricuspid regurgitation. Estimated pulmonary artery systolic pressure is at 44 mmHg assuming a right atrial pressure of 3 mmHg. Assessment:  1. Diastolic dysfunction:   Stable, compensated  -continue on spironolactone 25mg qd and Lasix 40mg daily  -echo 3/2022 EF 50%  -continue daily weights     2.  Essential hypertension:   Stable  BP 132/70 (Site: Left Lower Arm, Position: Sitting, Cuff Size: Large Adult)   Pulse 74   Ht 5' 6\" (1.676 m)   Wt 238 lb 3.2 oz (108 kg)   SpO2 98%   BMI 38.45 kg/m²   -previously increased metoprolol to 75mg qd  -at OV 7/2022 stopped losartan and planned to recheck with Dr Leyla Walls in august 2022  -back on losartan at 100 mg daily and procardia 30 mg bid  - home BPs have been much better      3. Hyperlipidemia:   - stable   2/18/22: ; TRIG 79; HDL 49;   8/2021  TG 89 HDL 49   1/18/23  TG 86 HDL 63          4.      5.  JOSE MANUEL (obstructive sleep apnea): continue using C-pap  Is inquiring on Inspire with her sleep doctor     Aortic stenosis  - Echo 3/2022           Plan:  Cardiac test and lab results personally reviewed by me during this office visit and discussed. Continue risk factor modifications. Call for any change in symptoms, call to report any changes in shortness of breath or development of chest pain with activity. OK to take nifedipine once daily (down from bid) (keep bp <140/80)  Continue healthy lifestyle, weight loss so far 24 lbs per pt    Follow up in 3 months       Cardiac test and lab results personally reviewed by me during this office visit and discussed. I appreciate the opportunity of cooperating in the care of this individual.    Jordan Serrano. Anne Wheeler M.D., [facility] [place]      The scribe's documentation has been prepared under my direction and personally reviewed by me in its entirety. I confirm that the note above accurately reflects all work, treatment, procedures, and medical decision making performed by me.

## 2023-03-02 NOTE — FLOWSHEET NOTE
[place] [address] [address]  Phone: [phone] Fax: [phone]  Physical Therapy Treatment Note/ Progress Report:       Date:  2023    Patient Name:  Colleen Cantu    :  1950  MRN: [id number]  Restrictions/Precautions:    Medical Diagnosis:  Other closed displaced fracture of proximal end of left humerus with routine healing, subsequent encounter [S42.292D]  Treatment Diagnosis:      ICD-10-CM     1. Other closed displaced fracture of proximal end of left humerus with routine healing, subsequent encounter  S42.292D         2. Impaired strength of shoulder muscles  R29.898         3. Impaired range of motion of left shoulder  M25.612           Insurance/Certification information:  PT Insurance Information: Medicare/[employer]  Physician Information:  Cherry Anand MD  Plan of care signed (Y/N): Y    Date of Patient follow up with Physician:      Progress Report: []  Yes  [x]  No     Date Range for reporting period:  Beginnin22  Ending:     Progress report due (10 Rx/or 30 days whichever is less):      Recertification due (POC duration/ or 90 days whichever is less): 3/28/23     Visit # Insurance Allowable Auth Needed   24 BMN []Yes    [x]No     Pain level:  0/10 in L shoulder;  0/10 pain R shoulder     SUBJECTIVE: Pt reports that she was able to get out and walk yesterday on her street. States the shoulder feels a little stiff today secondary to this being her first activity today.     Functional Disability Index: FOTO physical FS primary measure score = 42; Risk adjusted = 46 Date: 22  Functional Disability Index: FOTO physical FS primary measure score =  41    Date: 22  Functional Disability Index: FOTO physical FS primary measure score =  44 DASH = 54   Date: 23  QuickDASH: raw score - 32; dysfunction - 48%    OBJECTIVE:   Observation: continued L shoulder hike/upper trap compensation during L shoulder elevation  Test measurements:    1/26  ROM Left Right   Shoulder Flex 90 supine; 98 sitting 122 supine; 125   Shoulder Abd 90 120   Shoulder ER       Shoulder IR                               Strength  Left Right   Shoulder Flex 4-/4 4   Shoulder Scap 4- 4-   Shoulder ER 4- 4   Shoulder IR 4-/4 4       RESTRICTIONS/PRECAUTIONS: latex allergy, recent Hx of breast cancer with R lymph node removal, HBP    Exercises/Interventions:   Exercises performed bilaterally  Therapeutic Ex (29521) Sets/sec Reps CUES/Notes   Sitting cane flexion 10 sec 5    Ball squeezes 2 10ea Prone and supine hand   Inclined table slides  2 8    Supine OH flexion w/ R UE assist 2 10 For OH mobility/ROM   No monies 2 10 Against wall   Scapular retractions 2 10 5 sec holds; Manual resistance   Seated rows 2 15 Red TB   Bilateral shoulder ER with foam roller back hug 2 8    Sidelying ER AAROM 2 8    Upper cervical flexion 1 10 5 sec hold   Pec doorway stretch 10 sec 5 Performed with hands at sides   Sidelying flexion 1 6 AAROM   Abduction isometric 10 sec 5 Manual resistance   TB row 2 5 Red   Cane ER stretch 10 sec 5    Supine cane AAROM flex/scap  2 8    Wall slides 1 10 Bilateral   Sidelying shoulder abduction 2 10    Sidelying shoulder ER 2 10 Cues for form   Supine UK deltoid 1 min 2 Bilateral; through available range   Seated scaption to 90 2 8 Bilateral; 1#   Standing wall slide 1 5 Terminated secondary to increased LBP   Standing shoulder retraction against wall 1 8    Seated cane flexion 5 sec 10 2 sets   Supine punch 2 5    Standing external and internal rotation 2ea 6 Red TB   Seated thoracic extension over chair 3 sec 10    Seated - TB pulldown - red TB 5s 15 2 sets   Towel IR stretch behind back 10 sec 5 Performed at half wall   Sidelying punch 2 15    Cross body stretch 10 sec 5                      Manual Intervention  (75775)      Shld/GH Mobs - inferior  2 min Gr. II-III   Thoracic extension with shoulder opening over SB   min    Sidelying resisted ER    min 2 sets; graded resistance through range and tactile cuing for proper form   Sidelying AAROM flexion   min 2 sets of 10 reps   Wrist/hand PROM   Flex/ext   Shoulder seated PROM  6 min Flex, scaption   Scapular mobs - seated  Supporting L elbow  2 min    STM to bilateral UT/LS, rhomboids, scalenes, posterior deltoid,    min Seated   Seated shoulder elevation with scapular assistance 1 8 LUE         NMR re-education (05234)      T-spine Ext      GH depress/compress      Scap/GH NMR      Body blade      Wall ball roll      Wall Ball bounce      Ball drops      Porsha Scap Bio      Floor Snow angels-sliders            Therapeutic Activity (90443)      UE throwing porgression      Dynamic UE stability      Earthquake Bar      Bodyblade                Therapeutic Exercise and NMR EXR  [x] (16616) Provided verbal/tactile cueing for activities related to strengthening, flexibility, endurance, ROM  for improvements in scapular, scapulothoracic and UE control with self care, reaching, carrying, lifting, house/yardwork, driving/computer work. [x] (79601) Provided verbal/tactile cueing for activities related to improving balance, coordination, kinesthetic sense, posture, motor skill, proprioception  to assist with  scapular, scapulothoracic and UE control with self care, reaching, carrying, lifting, house/yardwork, driving/computer work. Therapeutic Activities:    [] (90659 or 04771) Provided verbal/tactile cueing for activities related to improving balance, coordination, kinesthetic sense, posture, motor skill, proprioception and motor activation to allow for proper function of scapular, scapulothoracic and UE control with self care, carrying, lifting, driving/computer work.      Home Exercise Program:    [x] (62487) Reviewed/Progressed HEP activities related to strengthening, flexibility, endurance, ROM of scapular, scapulothoracic and UE control with self care, reaching, carrying, lifting, house/yardwork, driving/computer work  [] (68771) Reviewed/Progressed HEP activities related to improving balance, coordination, kinesthetic sense, posture, motor skill, proprioception of scapular, scapulothoracic and UE control with self care, reaching, carrying, lifting, house/yardwork, driving/computer work      Manual Treatments:  PROM / STM / Oscillations-Mobs:  G-I, II, III, IV (PA's, Inf., Post.)  [x] (67159) Provided manual therapy to mobilize soft tissue/joints of cervical/CT, scapular GHJ and UE for the purpose of modulating pain, promoting relaxation,  increasing ROM, reducing/eliminating soft tissue swelling/inflammation/restriction, improving soft tissue extensibility and allowing for proper ROM for normal function with self care, reaching, carrying, lifting, house/yardwork, driving/computer work    Modalities:      Charges:  Timed Code Treatment Minutes: 39   Total Treatment Minutes: 39       [] EVAL (LOW) 40330 (typically 20 minutes face-to-face)  [] EVAL (MOD) 68580 (typically 30 minutes face-to-face)  [] EVAL (HIGH) 423 8935 (typically 45 minutes face-to-face)  [] RE-EVAL     [x] FJ(91482) x 2    [] DRY NEEDLE 1 OR 2 MUSCLES  [] NMR (84816) x     [] DRY NEEDLE 3+ MUSCLES  [x] Manual (36771) x 1      [] TA (87101) x     [] Mech Traction (86900)  [] ES(attended) (95088)     [] ES (un) (34148):   [] VASO (80975)  [] Other:    GOALS:  Patient stated goal: full arm motion  [x] Progressing: [] Met: [] Not Met: [] Adjusted  Therapist goals for Patient:   Short Term Goals: To be achieved in: 2 weeks  1. Independent in HEP and progression per patient tolerance, in order to prevent re-injury. [] Progressing: [x] Met: [] Not Met: [] Adjusted  2. Patient will have a decrease in pain to facilitate improvement in movement, function, and ADLs as indicated by Functional Deficits. [] Progressing: [x] Met: [] Not Met: [] Adjusted    Long Term Goals: To be achieved in: 10-12 weeks  1.  Patient will improve her QuickDASH score by at least 10 points to assist with reaching prior level of function with activities such as bathing and reaching overhead. [] Progressing: [] Met: [] Not Met: [x] Adjusted 2/28  2. Patient will demonstrate increased L shoulder flexion AROM to 120 deg to allow for proper joint functioning as indicated by patients Functional Deficits. [x] Progressing: [] Met: [] Not Met: [] Adjusted  3. Patient will demonstrate an increase in global L shoulder strength to 4+/5 to allow for proper functional mobility as indicated by patients Functional Deficits. [x] Progressing: [] Met: [] Not Met: [] Adjusted  4. Patient will return to all dressing activities, such has wearing shirts and jackets without increased symptoms or restriction. [] Progressing: [x] Met: [] Not Met: [] Adjusted  5. Patient will demonstrate 5 repeated overhead reaches with 5# to demonstrate replacing items from overhead cabinets without symptoms or restrictions. [x] Progressing: [] Met: [] Not Met: [] Adjusted  6. Patient will demonstrate reaching behind her back to at least L1 levels to demonstrate needs for self-bathing and back washing without symptoms or restrictions. [x] Progressing: [] Met: [] Not Met: [] Added 2/28    ASSESSMENT: Pt's tolerance to treatment continues to improve through her time in physical therapy. Sidelying exercises performed today with noted L shoulder fatigue and muscular work. Tactile cues required throughout duration of session to attempt preventing excessive upper trap compensations bilaterally. Noted R shoulder fatigue by end of session. Education provided to continue with current walking program plan of 3 laps at the rec center as able. Discussion had about aquatics today per pt at her [facility] with current plan to hold secondary to pt believing she has to go up and down stairs to get into the pool. Session cut slightly short today secondary to pt having cardiologist appointment. Plan to continue to progress bilateral shoulder strengthening as tolerated. Pt will continue to benefit from skilled physical therapy to improve strength, ROM, neuromuscular control, endurance, and posture to assist with returning to PLOF including reaching overhead and behind her back for self-bathing. Return to Play: (if applicable)   []  Stage 1: Intro to Strength   []  Stage 2: Dynamic Strength and Intro to Plyometrics   []  Stage 3: Advanced Plyometrics and Intro to Throwing   []  Stage 4: Sport specific Training/Return to Sport     []  Ready to Return to Play, [employer]   []  Not Ready for Return to Sports   Comments:      Treatment/Activity Tolerance:  [x] Patient tolerated treatment well [] Patient limited by fatique  [] Patient limited by pain  [] Patient limited by other medical complications  [] Other:     Overall Progression Towards Functional goals/ Treatment Progress Update:  [] Patient is progressing as expected towards functional goals listed. [x] Progression is slowed due to complexities/Impairments listed. [x] Progression has been slowed due to co-morbidities. [] Plan just implemented, too soon to assess goals progression <30days   [] Goals require adjustment due to lack of progress  [] Patient is not progressing as expected and requires additional follow up with physician  [] Other    Prognosis for POC: [] Good [x] Fair  [] Poor    Patient requires continued skilled intervention: [x] Yes  [] No      PLAN: Improve bilateral shoulder elevation, global bilateral shoulder strengthening  [x] Continue per plan of care [] Alter current plan (see comments)  [] Plan of care initiated [] Hold pending MD visit [] Discharge    Electronically signed by: Jennifer Vizcaino, PT, DPT    Note: If patient does not return for scheduled/recommended follow up visits, this note will serve as a discharge from care along with the most recent update on progress.

## 2023-03-07 ENCOUNTER — HOSPITAL ENCOUNTER (OUTPATIENT)
Dept: PHYSICAL THERAPY | Age: 73
Setting detail: THERAPIES SERIES
Discharge: HOME | End: 2023-03-07
Payer: MEDICARE

## 2023-03-07 PROCEDURE — 97140 MANUAL THERAPY 1/> REGIONS: CPT

## 2023-03-07 NOTE — FLOWSHEET NOTE
[address] [address] [address]  Phone: [phone] Fax: [phone]  Physical Therapy Treatment Note/ Progress Report:       Date:  2023    Patient Name:  Rudy Davis    :  1950  MRN: [id number]  Restrictions/Precautions:    Medical Diagnosis:  Other closed displaced fracture of proximal end of left humerus with routine healing, subsequent encounter [S42.292D]  Treatment Diagnosis:      ICD-10-CM     1. Other closed displaced fracture of proximal end of left humerus with routine healing, subsequent encounter  S42.292D         2. Impaired strength of shoulder muscles  R29.898         3. Impaired range of motion of left shoulder  M25.612           Insurance/Certification information:  PT Insurance Information: Medicare/[employer]  Physician Information:  Ozzy Fragoso MD  Plan of care signed (Y/N): Y    Date of Patient follow up with Physician:      Progress Report: []  Yes  [x]  No     Date Range for reporting period:  Beginnin22  Ending:     Progress report due (10 Rx/or 30 days whichever is less): 07     Recertification due (POC duration/ or 90 days whichever is less): 3/28/23     Visit # Insurance Allowable Auth Needed   25 BMN []Yes    [x]No     Pain level:  0/10 in L shoulder;  0/10 pain R shoulder     SUBJECTIVE: Patient had her eyes dilated today prior to session. States that everything is pretty blurry right now.     Functional Disability Index: FOTO physical FS primary measure score = 42; Risk adjusted = 46 Date: 22  Functional Disability Index: FOTO physical FS primary measure score =  41    Date: 22  Functional Disability Index: FOTO physical FS primary measure score =  44 DASH = 54   Date: 23  QuickDASH: raw score - 32; dysfunction - 48%    OBJECTIVE:   Observation: continued L shoulder hike/upper trap compensation during L shoulder elevation  Test measurements:      ROM Left Right   Shoulder Flex 90 supine; 98 sitting 122 supine; 125   Shoulder Abd 90 120   Shoulder ER       Shoulder IR                               Strength  Left Right   Shoulder Flex 4-/4 4   Shoulder Scap 4- 4-   Shoulder ER 4- 4   Shoulder IR 4-/4 4       RESTRICTIONS/PRECAUTIONS: latex allergy, recent Hx of breast cancer with R lymph node removal, HBP    Exercises/Interventions:   Exercises performed bilaterally  Therapeutic Ex (63540) Sets/sec Reps CUES/Notes   Sitting cane flexion 10 sec 5    Ball squeezes 2 10ea Prone and supine hand   Inclined table slides  2 8    Supine OH flexion w/ R UE assist 2 10 For OH mobility/ROM   No monies 2 10 Against wall   Scapular retractions 2 10 5 sec holds; Manual resistance   Seated rows 2 15 Red TB   Bilateral shoulder ER with foam roller back hug 2 8    Sidelying ER AAROM 2 8    Upper cervical flexion 1 10 5 sec hold   Pec doorway stretch 10 sec 5 Performed with hands at sides   Sidelying flexion 1 6 AAROM   Abduction isometric 10 sec 5 Manual resistance   TB row 2 5 Red   Cane ER stretch 10 sec 5    Supine cane AAROM flex/scap  2 8    Wall slides 1 10 Bilateral   Sidelying shoulder abduction 2 10    Sidelying shoulder ER 2 10 Cues for form   Supine UK deltoid 1 min 2 Bilateral; through available range   Seated scaption to 90 2 8 Bilateral; 1#   Standing wall slide 1 5 Terminated secondary to increased LBP   Standing shoulder retraction against wall 1 8    Seated cane flexion 5 sec 10 2 sets   Supine punch 2 5    Standing external and internal rotation 2ea 6 Red TB   Seated thoracic extension over chair 3 sec 10    Seated - TB pulldown - red TB 5s 15 2 sets   Towel IR stretch behind back 10 sec 5 Performed at half wall   Sidelying punch 2 15    Cross body stretch 10 sec 5                      Manual Intervention  (41357)      Shld/GH Mobs - inferior, posterior - sidelying, supine  8 min Gr. II-III   L shoulder PROM/stretching - sidelying, supine  22 min    Thoracic extension with shoulder opening over SB   min    Sidelying resisted ER    min 2 sets; graded resistance through range and tactile cuing for proper form   Sidelying AAROM flexion   min 2 sets of 10 reps   Wrist/hand PROM   Flex/ext   Shoulder seated PROM  6 min Flex, scaption   Scapular mobs - seated  Supporting L elbow  2 min    STM to bilateral UT/LS, rhomboids, scalenes, posterior deltoid,    min Seated   Seated shoulder elevation with scapular assistance 1 8 LUE         NMR re-education (09819)      T-spine Ext      GH depress/compress      Scap/GH NMR      Body blade      Wall ball roll      Wall Ball bounce      Ball drops      Porsha Scap Bio      Floor Snow angels-sliders            Therapeutic Activity (98916)      UE throwing porgression      Dynamic UE stability      Earthquake Bar      Bodyblade                Therapeutic Exercise and NMR EXR  [x] (31922) Provided verbal/tactile cueing for activities related to strengthening, flexibility, endurance, ROM  for improvements in scapular, scapulothoracic and UE control with self care, reaching, carrying, lifting, house/yardwork, driving/computer work. [x] (06384) Provided verbal/tactile cueing for activities related to improving balance, coordination, kinesthetic sense, posture, motor skill, proprioception  to assist with  scapular, scapulothoracic and UE control with self care, reaching, carrying, lifting, house/yardwork, driving/computer work. Therapeutic Activities:    [] (09968 or 59707) Provided verbal/tactile cueing for activities related to improving balance, coordination, kinesthetic sense, posture, motor skill, proprioception and motor activation to allow for proper function of scapular, scapulothoracic and UE control with self care, carrying, lifting, driving/computer work.      Home Exercise Program:    [x] (87990) Reviewed/Progressed HEP activities related to strengthening, flexibility, endurance, ROM of scapular, scapulothoracic and UE control with self care, reaching, carrying, lifting, house/yardwork, driving/computer work  [] (03908) Reviewed/Progressed HEP activities related to improving balance, coordination, kinesthetic sense, posture, motor skill, proprioception of scapular, scapulothoracic and UE control with self care, reaching, carrying, lifting, house/yardwork, driving/computer work      Manual Treatments:  PROM / STM / Oscillations-Mobs:  G-I, II, III, IV (PA's, Inf., Post.)  [x] (05541) Provided manual therapy to mobilize soft tissue/joints of cervical/CT, scapular GHJ and UE for the purpose of modulating pain, promoting relaxation,  increasing ROM, reducing/eliminating soft tissue swelling/inflammation/restriction, improving soft tissue extensibility and allowing for proper ROM for normal function with self care, reaching, carrying, lifting, house/yardwork, driving/computer work    Modalities:      Charges:  Timed Code Treatment Minutes: 30   Total Treatment Minutes: 30       [] EVAL (LOW) 46565 (typically 20 minutes face-to-face)  [] EVAL (MOD) 72920 (typically 30 minutes face-to-face)  [] EVAL (HIGH) 423 8935 (typically 45 minutes face-to-face)  [] RE-EVAL     [] HO(97657) x     [] DRY NEEDLE 1 OR 2 MUSCLES  [] NMR (01701) x     [] DRY NEEDLE 3+ MUSCLES  [x] Manual (51230) x 2      [] TA (21788) x     [] Mech Traction (07152)  [] ES(attended) (79901)     [] ES (un) (94766):   [] VASO (83813)  [] Other:    GOALS:  Patient stated goal: full arm motion  [x] Progressing: [] Met: [] Not Met: [] Adjusted  Therapist goals for Patient:   Short Term Goals: To be achieved in: 2 weeks  1. Independent in HEP and progression per patient tolerance, in order to prevent re-injury. [] Progressing: [x] Met: [] Not Met: [] Adjusted  2. Patient will have a decrease in pain to facilitate improvement in movement, function, and ADLs as indicated by Functional Deficits. [] Progressing: [x] Met: [] Not Met: [] Adjusted    Long Term Goals: To be achieved in: 10-12 weeks  1. Patient will improve her QuickDASH score by at least 10 points to assist with reaching prior level of function with activities such as bathing and reaching overhead. [] Progressing: [] Met: [] Not Met: [x] Adjusted 2/28  2. Patient will demonstrate increased L shoulder flexion AROM to 120 deg to allow for proper joint functioning as indicated by patients Functional Deficits. [x] Progressing: [] Met: [] Not Met: [] Adjusted  3. Patient will demonstrate an increase in global L shoulder strength to 4+/5 to allow for proper functional mobility as indicated by patients Functional Deficits. [x] Progressing: [] Met: [] Not Met: [] Adjusted  4. Patient will return to all dressing activities, such has wearing shirts and jackets without increased symptoms or restriction. [] Progressing: [x] Met: [] Not Met: [] Adjusted  5. Patient will demonstrate 5 repeated overhead reaches with 5# to demonstrate replacing items from overhead cabinets without symptoms or restrictions. [x] Progressing: [] Met: [] Not Met: [] Adjusted  6. Patient will demonstrate reaching behind her back to at least L1 levels to demonstrate needs for self-bathing and back washing without symptoms or restrictions. [x] Progressing: [] Met: [] Not Met: [] Added 2/28    ASSESSMENT: Patient had her eyes dilated prior to session, so patient not seeing well, everything very blurry at start of session. Wanted to make sure patient could get home safely in the daylight, so performed manual techniques only during today's session.       Return to Play: (if applicable)   []  Stage 1: Intro to Strength   []  Stage 2: Dynamic Strength and Intro to Plyometrics   []  Stage 3: Advanced Plyometrics and Intro to Throwing   []  Stage 4: Sport specific Training/Return to Sport     []  Ready to Return to Play, [employer]   []  Not Ready for Return to Sports   Comments:      Treatment/Activity Tolerance:  [x] Patient tolerated treatment well [] Patient limited by fatique  [] Patient limited by pain  [] Patient limited by other medical complications  [] Other:     Overall Progression Towards Functional goals/ Treatment Progress Update:  [] Patient is progressing as expected towards functional goals listed. [x] Progression is slowed due to complexities/Impairments listed. [x] Progression has been slowed due to co-morbidities. [] Plan just implemented, too soon to assess goals progression <30days   [] Goals require adjustment due to lack of progress  [] Patient is not progressing as expected and requires additional follow up with physician  [] Other    Prognosis for POC: [] Good [x] Fair  [] Poor    Patient requires continued skilled intervention: [x] Yes  [] No      PLAN: Improve bilateral shoulder elevation, global bilateral shoulder strengthening  [x] Continue per plan of care [] Alter current plan (see comments)  [] Plan of care initiated [] Hold pending MD visit [] Discharge    Electronically signed by: Shawna Goncalves, PT, DPT, MS, SCS    Note: If patient does not return for scheduled/recommended follow up visits, this note will serve as a discharge from care along with the most recent update on progress.

## 2023-03-09 ENCOUNTER — OFFICE VISIT (OUTPATIENT)
Dept: ENT CLINIC | Age: 73
End: 2023-03-09
Payer: MEDICARE

## 2023-03-09 VITALS
SYSTOLIC BLOOD PRESSURE: 139 MMHG | BODY MASS INDEX: 37.77 KG/M2 | HEART RATE: 78 BPM | WEIGHT: 234 LBS | OXYGEN SATURATION: 98 % | TEMPERATURE: 97.3 F | DIASTOLIC BLOOD PRESSURE: 67 MMHG

## 2023-03-09 DIAGNOSIS — G47.33 OBSTRUCTIVE SLEEP APNEA: Primary | ICD-10-CM

## 2023-03-09 PROCEDURE — G8427 DOCREV CUR MEDS BY ELIG CLIN: HCPCS | Performed by: STUDENT IN AN ORGANIZED HEALTH CARE EDUCATION/TRAINING PROGRAM

## 2023-03-09 PROCEDURE — G8399 PT W/DXA RESULTS DOCUMENT: HCPCS | Performed by: STUDENT IN AN ORGANIZED HEALTH CARE EDUCATION/TRAINING PROGRAM

## 2023-03-09 PROCEDURE — 3017F COLORECTAL CA SCREEN DOC REV: CPT | Performed by: STUDENT IN AN ORGANIZED HEALTH CARE EDUCATION/TRAINING PROGRAM

## 2023-03-09 PROCEDURE — G8484 FLU IMMUNIZE NO ADMIN: HCPCS | Performed by: STUDENT IN AN ORGANIZED HEALTH CARE EDUCATION/TRAINING PROGRAM

## 2023-03-09 PROCEDURE — 3078F DIAST BP <80 MM HG: CPT | Performed by: STUDENT IN AN ORGANIZED HEALTH CARE EDUCATION/TRAINING PROGRAM

## 2023-03-09 PROCEDURE — G9899 SCRN MAM PERF RSLTS DOC: HCPCS | Performed by: STUDENT IN AN ORGANIZED HEALTH CARE EDUCATION/TRAINING PROGRAM

## 2023-03-09 PROCEDURE — 3075F SYST BP GE 130 - 139MM HG: CPT | Performed by: STUDENT IN AN ORGANIZED HEALTH CARE EDUCATION/TRAINING PROGRAM

## 2023-03-09 PROCEDURE — 1036F TOBACCO NON-USER: CPT | Performed by: STUDENT IN AN ORGANIZED HEALTH CARE EDUCATION/TRAINING PROGRAM

## 2023-03-09 PROCEDURE — G8417 CALC BMI ABV UP PARAM F/U: HCPCS | Performed by: STUDENT IN AN ORGANIZED HEALTH CARE EDUCATION/TRAINING PROGRAM

## 2023-03-09 PROCEDURE — 1123F ACP DISCUSS/DSCN MKR DOCD: CPT | Performed by: STUDENT IN AN ORGANIZED HEALTH CARE EDUCATION/TRAINING PROGRAM

## 2023-03-09 PROCEDURE — 1090F PRES/ABSN URINE INCON ASSESS: CPT | Performed by: STUDENT IN AN ORGANIZED HEALTH CARE EDUCATION/TRAINING PROGRAM

## 2023-03-09 PROCEDURE — 99212 OFFICE O/P EST SF 10 MIN: CPT | Performed by: STUDENT IN AN ORGANIZED HEALTH CARE EDUCATION/TRAINING PROGRAM

## 2023-03-09 NOTE — PROGRESS NOTES
[address] VISIT      Patient Name: Jourdan [facility] Record Number:  [id number]  Primary Care Physician:  Danielle Aguilar MD    ChiefComplaint     Chief Complaint   Patient presents with    Other     Sleep apnea, would like the inspire        History of Present Illness     Mallika Uriarte is an 74 y.o. female previously seen for by Dr. Rola Hernandez for obstructive sleep apnea. Interval History:   She was seen by Dr. Shahbaz Simms in the past to discuss inspire in regards to obstructive sleep apnea. She lives in [facility] and would like to see a physician closer to home for her. Diagnosed with moderate JOSE MANUEL AHI 19.6 on PSG 5/2021. Had been using CPAP and doing well, unfortunately had severe left arm fracture requiring surgery with subsequent weakness/non-functional left hand. Has been unable to use CPAP as she can no longer manipulate the mask onto her face/head. Looking for alternative treatment. She has a mask at home but has not been wearing it. She states that she has been been diligently trying to lose weight and went from 260 pounds to 234 pounds today.   She is currently on weight watchers and exercising regularly    Past Medical History     Past Medical History:   Diagnosis Date    Arthritis     Back pain     CHF (congestive heart failure) (HCC)     Chronic diastolic heart failure (HCC)     Diabetes mellitus ([address]     Diabetes mellitus ([address]     TYPE 2    GERD (gastroesophageal reflux disease)     High cholesterol     Hypertension     Malignant neoplasm of right female breast ([address] 05/03/2022    Polio     Polio     AS INFANT    Shingles     OCT 2013    Sleep apnea     Urinary problem     HAS BASHFUL BLADDER       Past Surgical History     Past Surgical History:   Procedure Laterality Date    ARM SURGERY Left     BACK SURGERY      BACK SURGERY      1/13/14 x2    BREAST BIOPSY Right 05/03/2022    RIGHT RADIOFREQUENCY IDENTIFICATION TAG LOCALIZED PARTIAL MASTECTOMY RIGHT SENTINEL LYMPH NODE BIOPSY performed by Warren Meza MD at [address] Right 05/03/2022    .  performed by Warren Meza MD at [address] Left 08/23/2022    OPEN REDUCTION INTERNAL FIXATION LEFT PROXIMAL HUMERUS AND SHAFT FRACTURES-REGIONAL-SYNTHES performed by Karissa Salazar MD at [address] [facility]      bilateral knee replacements    JOINT REPLACEMENT      [place] KNEES    KNEE SURGERY      [facility] BREAST BIOPSY RIGHT Right 04/06/2022    [facility] BREAST BIOPSY RIGHT 4/6/2022 [address]    OTHER SURGICAL HISTORY  01/13/2014    L4-5 DECOMPRESSION, POSTERIOR LATERAL FUSION AND PEDICLE       Family History     Family History   Problem Relation Age of Onset    Heart Disease Mother     Cancer Mother         breast cancer, 5    Breast Cancer Mother     Early Death Father     Diabetes Sister        Social History     Social History     Tobacco Use    Smoking status: Never    Smokeless tobacco: Never   Vaping Use    Vaping Use: Never used   Substance Use Topics    Alcohol use: No    Drug use: No        Allergies     Allergies   Allergen Reactions    Metformin Nausea Only and Other (See Comments)     \"ate the lining of her stomach\"      Adhesive Tape      Skin irritation and blisters  - does okay with steri strips and tegaderm       Medications     Current Outpatient Medications   Medication Sig Dispense Refill    TRULICITY 1.5 SN/5.4WT SC injection       NIFEdipine (PROCARDIA XL) 30 MG extended release tablet Take 1 tablet by mouth daily 90 tablet 3    furosemide (LASIX) 20 MG tablet Take 2 tablets by mouth once daily 180 tablet 0    losartan (COZAAR) 50 MG tablet Take 2 tablets by mouth daily 30 tablet 3    spironolactone (ALDACTONE) 25 MG tablet Take 1 tablet by mouth once daily 90 tablet 0    Cholecalciferol (VITAMIN D3 PO) Take 1,000 mg by mouth Multiple Vitamins-Minerals (HAIR SKIN AND NAILS FORMULA) TABS Take by mouth      vitamin C (ASCORBIC ACID) 500 MG tablet Take 500 mg by mouth daily      Multiple Vitamins-Minerals (THERAPEUTIC MULTIVITAMIN-MINERALS) tablet Take 1 tablet by mouth daily Essential 1 Pro caps      metoprolol succinate (TOPROL XL) 50 MG extended release tablet Take 1 tablet by mouth daily 90 tablet 3    metoprolol succinate (TOPROL XL) 25 MG extended release tablet Take 1 tablet by mouth daily 90 tablet 3    meloxicam (MOBIC) 15 MG tablet Take 7.5 mg by mouth daily      insulin glargine (LANTUS) 100 UNIT/ML injection vial Inject 26 Units into the skin nightly      pantoprazole (PROTONIX) 40 MG tablet Take 40 mg by mouth daily      famotidine (PEPCID) 40 MG tablet Take 40 mg by mouth daily       aspirin 81 MG chewable tablet Take 1 tablet by mouth daily. 30 tablet 3    acetaminophen (TYLENOL) 500 MG tablet Take 500 mg by mouth every 6 hours as needed for Pain. No current facility-administered medications for this visit. Review of Systems     REVIEW OF SYSTEM: See HPI above    PhysicalExam     Vitals:    03/09/23 1358   BP: 139/67   Site: Left Upper Arm   Position: Sitting   Cuff Size: Medium Adult   Pulse: 78   Temp: 97.3 °F (36.3 °C)   TempSrc: Temporal   SpO2: 98%   Weight: 234 lb (106.1 kg)       PHYSICAL EXAM  /67 (Site: Left Upper Arm, Position: Sitting, Cuff Size: Medium Adult)   Pulse 78   Temp 97.3 °F (36.3 °C) (Temporal)   Wt 234 lb (106.1 kg)   SpO2 98%   BMI 37.77 kg/m²     GENERAL: No acute distress, alert and oriented. EYES: EOMI, Anti-icteric. NOSE: On anterior rhinoscopy there is no epistaxis, nasal mucosa moist and normal appearing, no purulent drainage.    EARS: Normal external appearance; on portable otomicroscopy:     -Ad: External auditory canal without stenosis, tympanic membrane clear, no middle ear effusions or retractions     -As: External auditory canal without stenosis, tympanic membrane clear, no middle ear effusions or retractions  FACE: HB 1/6 bilaterally, symmetric appearing, sensation equal bilaterally  ORAL CAVITY: No masses or lesions visualized or palpated, uvula is midline, moist mucous membranes, no oropharyngeal masses or oropharyngeal obstruction. Tonsils 2+. NECK: Normal range of motion, no thyromegaly, trachea is midline, no palpable lymphadenopathy or neck masses, no crepitus  CHEST: Normal respiratory effort, breathing comfortably, no retractions  SKIN: No rashes, normal appearing skin, no evidence of skin lesions/tumors  NEURO: Cranial Nerves 2, 3, 4, 5, 6, 7, 11, 12 grossly intact bilaterally     I have performed a head and neck physical exam personally or was physically present during the key or critical portions of the service. Assessment and Plan     1. Obstructive sleep apnea  Currently BMI is 37.8. Discussed with the patient that she is still outside the inspire requirements of a BMI 35 or less. She is actively trying to lose weight and has been lost approximately 25 pounds over the last few months. Given her height she would need to lose approximately 20 more pounds to meet the BMI criteria for inspire. She will call to schedule appointment when her weight is under 216 pounds. Her last PSG was May 2021 and may need repeat PSG in the future to fulfill Inspire candidacy, depending on when she returns. Follow-Up     Return if symptoms worsen or fail to improve. Dr. Lisa [place] [address]  Department of Otolaryngology/Head and Neck Surgery  3/9/23    Medical Decision Making: The following items were considered in medical decision making:  Independent review of images  Review / order clinical lab tests  Review / order radiology tests  Decision to obtain old records      This note was generated completely or in part utilizing Dragon dictation speech recognition software.   Occasionally, words are mistranscribed and despite editing, the text may contain inaccuracies due to incorrect word recognition. If further clarification is needed please contact the office at [phone].

## 2023-03-10 ENCOUNTER — APPOINTMENT (OUTPATIENT)
Dept: PHYSICAL THERAPY | Age: 73
End: 2023-03-10
Payer: MEDICARE

## 2023-03-14 ENCOUNTER — HOSPITAL ENCOUNTER (OUTPATIENT)
Dept: PHYSICAL THERAPY | Age: 73
Setting detail: THERAPIES SERIES
Discharge: HOME | End: 2023-03-14
Payer: MEDICARE

## 2023-03-14 NOTE — FLOWSHEET NOTE
[address]     Physical Therapy  Cancellation/No-show Note  Patient Name:  Lissette Archuleta  :  1950   Date:  3/14/2023  Cancelled visits to date: 3  No-shows to date: 0    Patient status for today's appointment patient:  [x]  Cancelled  []  Rescheduled appointment  []  No-show     Reason given by patient:  []  Patient ill  []  Conflicting appointment  []  No transportation    []  Conflict with work  [x]  No reason given  []  Other:     Comments:      Phone call information:   [x]  Phone call made today to patient at 2:00pm at number provided:      [x]  Patient answered, conversation as follows: Pt informed of missed visit today and of upcoming visit on 3/16 at 12:30pm. Pt reported she marked today's visit on the wrong day on her calendar.   []  Patient did not answer, message left as follows:  []  Phone call not made today  []  Phone call not needed - pt contacted us to cancel and provided reason for cancellation.      Electronically signed by:  Estefany Em PT

## 2023-03-15 ENCOUNTER — HOSPITAL ENCOUNTER (OUTPATIENT)
Dept: WOMENS IMAGING | Age: 73
Discharge: HOME | End: 2023-03-15
Payer: MEDICARE

## 2023-03-15 ENCOUNTER — HOSPITAL ENCOUNTER (OUTPATIENT)
Dept: ULTRASOUND IMAGING | Age: 73
End: 2023-03-15
Payer: MEDICARE

## 2023-03-15 DIAGNOSIS — R92.8 ABNORMAL FINDINGS ON DIAGNOSTIC IMAGING OF BREAST: ICD-10-CM

## 2023-03-15 PROCEDURE — 77066 DX MAMMO INCL CAD BI: CPT

## 2023-03-16 ENCOUNTER — HOSPITAL ENCOUNTER (OUTPATIENT)
Dept: PHYSICAL THERAPY | Age: 73
Setting detail: THERAPIES SERIES
Discharge: HOME | End: 2023-03-16
Payer: MEDICARE

## 2023-03-16 PROCEDURE — 97110 THERAPEUTIC EXERCISES: CPT

## 2023-03-16 PROCEDURE — 97140 MANUAL THERAPY 1/> REGIONS: CPT

## 2023-03-16 NOTE — FLOWSHEET NOTE
[facility] [address] [facility]  Phone: [phone] Fax: [phone]  Physical Therapy Treatment Note/ Progress Report:       Date:  2023    Patient Name:  Magy Burch    :  1950  MRN: [id number]  Restrictions/Precautions:    Medical Diagnosis:  Other closed displaced fracture of proximal end of left humerus with routine healing, subsequent encounter [S42.292D]  Treatment Diagnosis:      ICD-10-CM     1. Other closed displaced fracture of proximal end of left humerus with routine healing, subsequent encounter  S42.292D         2. Impaired strength of shoulder muscles  R29.898         3. Impaired range of motion of left shoulder  M25.612           Insurance/Certification information:  PT Insurance Information: Medicare/[employer]  Physician Information:  Shasha Lomeli MD  Plan of care signed (Y/N): Y    Date of Patient follow up with Physician:      Progress Report: []  Yes  [x]  No     Date Range for reporting period:  Beginnin22  Ending:     Progress report due (10 Rx/or 30 days whichever is less): 3/02/96     Recertification due (POC duration/ or 90 days whichever is less): 3/28/23     Visit # Insurance Allowable Auth Needed   26 BMN []Yes    [x]No     Pain level:  0/10 in L shoulder;  0/10 pain R shoulder     SUBJECTIVE: Pt reports that she is having a good day today. Notes her shoulders have been good. Pt reports she is now cancer free. Pt notes her walking at the [facility] has also been going well.     Functional Disability Index: FOTO physical FS primary measure score = 42; Risk adjusted = 46 Date: 22  Functional Disability Index: FOTO physical FS primary measure score =  41    Date: 22  Functional Disability Index: FOTO physical FS primary measure score =  44 DASH = 54   Date: 23  QuickDASH: raw score - 32; dysfunction - 48%    OBJECTIVE:   Observation: continued L shoulder hike/upper trap compensation during L shoulder elevation  Test measurements:    1/26  ROM Left Right   Shoulder Flex 90 supine; 98 sitting 122 supine; 125   Shoulder Abd 90 120   Shoulder ER       Shoulder IR                               Strength  Left Right   Shoulder Flex 4-/4 4   Shoulder Scap 4- 4-   Shoulder ER 4- 4   Shoulder IR 4-/4 4       RESTRICTIONS/PRECAUTIONS: latex allergy, recent Hx of breast cancer with R lymph node removal, HBP    Exercises/Interventions:   Exercises performed bilaterally  Therapeutic Ex (75139) Sets/sec Reps CUES/Notes   UBE 2 min/2 min  Forward/backward   Sitting cane flexion 10 sec 5    Ball squeezes 2 10ea Prone and supine hand   Inclined table slides  2 8    Supine OH flexion w/ R UE assist 2 10 For OH mobility/ROM   No monies 2 10 Seated red TB   Scapular clocks 2 15    Seated rows 2 12 Red TB   Bilateral shoulder ER with foam roller back hug 2 8    Sidelying ER AAROM 2 8    Upper cervical flexion 1 10 5 sec hold   Pec doorway stretch 10 sec 5 Performed with hands at sides   Sidelying flexion 1 6 AAROM   Abduction isometric 10 sec 5 Manual resistance   TB row 2 5 Red   Cane ER stretch 10 sec 5    Supine cane AAROM flex/scap  2 8    Wall slides 1 10 Bilateral   Sidelying shoulder abduction 2 10    Sidelying shoulder ER 2 10 Cues for form   Supine UK deltoid 1 min 2 Bilateral; through available range   Seated scaption to 90 2 8 Bilateral; 1#   Standing scaption 2 10 Performed with back against wall; bilateral   Standing shoulder retraction against wall 1 8    Seated bilateral scaption with TB ER 2 8 Red TB   Supine punch 2 5    Standing external and internal rotation 2ea 6 Red TB   Seated thoracic extension over chair 3 sec 10    Seated - TB pulldown - red TB 5s 15 2 sets   Towel IR stretch behind back 10 sec 5 Performed at half wall   Sidelying punch 2 15    Cross body stretch 10 sec 5                      Manual Intervention  (88957) 13 min      Shld/GH Mobs - inferior, posterior - sidelying, supine  4 min Gr. II-III   L shoulder PROM/stretching - sidelying, supine  9 min Flexion, ER, scaption, ABD   Thoracic extension with shoulder opening over SB   min    Sidelying resisted ER    min 2 sets; graded resistance through range and tactile cuing for proper form   Sidelying AAROM flexion   min 2 sets of 10 reps   Wrist/hand PROM   Flex/ext   Shoulder seated PROM  6 min Flex, scaption   Scapular mobs - seated  Supporting L elbow  2 min    STM to bilateral UT/LS, rhomboids, scalenes, posterior deltoid,    min Seated   Seated shoulder elevation with scapular assistance 1 8 LUE         NMR re-education (93636)      T-spine Ext      GH depress/compress      Scap/GH NMR      Body blade      Wall ball roll      Wall Ball bounce      Ball drops      Porsha Scap Bio      Floor Snow angels-sliders            Therapeutic Activity (67138)      UE throwing porgression      Dynamic UE stability      Earthquake Bar      Bodyblade                Therapeutic Exercise and NMR EXR  [x] (20419) Provided verbal/tactile cueing for activities related to strengthening, flexibility, endurance, ROM  for improvements in scapular, scapulothoracic and UE control with self care, reaching, carrying, lifting, house/yardwork, driving/computer work. [x] (38705) Provided verbal/tactile cueing for activities related to improving balance, coordination, kinesthetic sense, posture, motor skill, proprioception  to assist with  scapular, scapulothoracic and UE control with self care, reaching, carrying, lifting, house/yardwork, driving/computer work. Therapeutic Activities:    [] (41051 or 63597) Provided verbal/tactile cueing for activities related to improving balance, coordination, kinesthetic sense, posture, motor skill, proprioception and motor activation to allow for proper function of scapular, scapulothoracic and UE control with self care, carrying, lifting, driving/computer work.      Home Exercise Program:    [x] (46477) Reviewed/Progressed HEP activities related to strengthening, flexibility, endurance, ROM of scapular, scapulothoracic and UE control with self care, reaching, carrying, lifting, house/yardwork, driving/computer work  [] (18392) Reviewed/Progressed HEP activities related to improving balance, coordination, kinesthetic sense, posture, motor skill, proprioception of scapular, scapulothoracic and UE control with self care, reaching, carrying, lifting, house/yardwork, driving/computer work      Manual Treatments:  PROM / STM / Oscillations-Mobs:  G-I, II, III, IV (PA's, Inf., Post.)  [x] (60191) Provided manual therapy to mobilize soft tissue/joints of cervical/CT, scapular GHJ and UE for the purpose of modulating pain, promoting relaxation,  increasing ROM, reducing/eliminating soft tissue swelling/inflammation/restriction, improving soft tissue extensibility and allowing for proper ROM for normal function with self care, reaching, carrying, lifting, house/yardwork, driving/computer work    Modalities:      Charges:  Timed Code Treatment Minutes: 46   Total Treatment Minutes: 46       [] EVAL (LOW) 48523 (typically 20 minutes face-to-face)  [] EVAL (MOD) 25576 (typically 30 minutes face-to-face)  [] EVAL (HIGH) 38658 (typically 45 minutes face-to-face)  [] RE-EVAL     [x] TJ(01802) x 2    [] DRY NEEDLE 1 OR 2 MUSCLES  [] NMR (00421) x     [] DRY NEEDLE 3+ MUSCLES  [x] Manual (84562) x 1      [] TA (54173) x     [] Mech Traction (68042)  [] ES(attended) (28453)     [] ES (un) (01715):   [] VASO (48559)  [] Other:    GOALS:  Patient stated goal: full arm motion  [x] Progressing: [] Met: [] Not Met: [] Adjusted  Therapist goals for Patient:   Short Term Goals: To be achieved in: 2 weeks  1. Independent in HEP and progression per patient tolerance, in order to prevent re-injury. [] Progressing: [x] Met: [] Not Met: [] Adjusted  2.  Patient will have a decrease in pain to facilitate improvement in movement, function, and ADLs as indicated by Functional Deficits.  [] Progressing: [x] Met: [] Not Met: [] Adjusted    Long Term Goals: To be achieved in: 10-12 weeks  1. Patient will improve her QuickDASH score by at least 10 points to assist with reaching prior level of function with activities such as bathing and reaching overhead.   [] Progressing: [] Met: [] Not Met: [x] Adjusted 2/28  2. Patient will demonstrate increased L shoulder flexion AROM to 120 deg to allow for proper joint functioning as indicated by patients Functional Deficits.   [x] Progressing: [] Met: [] Not Met: [] Adjusted  3. Patient will demonstrate an increase in global L shoulder strength to 4+/5 to allow for proper functional mobility as indicated by patients Functional Deficits.   [x] Progressing: [] Met: [] Not Met: [] Adjusted  4. Patient will return to all dressing activities, such has wearing shirts and jackets without increased symptoms or restriction.   [] Progressing: [x] Met: [] Not Met: [] Adjusted  5. Patient will demonstrate 5 repeated overhead reaches with 5# to demonstrate replacing items from overhead cabinets without symptoms or restrictions.    [x] Progressing: [] Met: [] Not Met: [] Adjusted  6. Patient will demonstrate reaching behind her back to at least L1 levels to demonstrate needs for self-bathing and back washing without symptoms or restrictions.    [x] Progressing: [] Met: [] Not Met: [] Added 2/28    ASSESSMENT: Pt tolerated treatment session well today. Persisting limitations of bilateral upper extremity range of motion, but mild improvements. Larger  modifications performed today to assist with L hand discomfort and  strength. Addition of upper body ergometer today with good tolerance demonstrated from pt. Quick muscular fatigue. Tactile cues of wall utilized today to assist with postural cues and decreased lumbar extension to assist with shoulder elevation. Improving postural awareness to decrease forward trunk position. Pt  continues to be limited with reaching overhead and performing ADLs secondary to her bilateral shoulder impairments. Pt will continue to benefit from skilled physical therapy to improve ROM, strength, and neuromuscular control to reach her goals and reach overhead. Return to Play: (if applicable)   []  Stage 1: Intro to Strength   []  Stage 2: Dynamic Strength and Intro to Plyometrics   []  Stage 3: Advanced Plyometrics and Intro to Throwing   []  Stage 4: Sport specific Training/Return to Sport     []  Ready to Return to Play, [employer]   []  Not Ready for Return to Sports   Comments:      Treatment/Activity Tolerance:  [x] Patient tolerated treatment well [] Patient limited by fatique  [] Patient limited by pain  [] Patient limited by other medical complications  [] Other:     Overall Progression Towards Functional goals/ Treatment Progress Update:  [] Patient is progressing as expected towards functional goals listed. [x] Progression is slowed due to complexities/Impairments listed. [x] Progression has been slowed due to co-morbidities. [] Plan just implemented, too soon to assess goals progression <30days   [] Goals require adjustment due to lack of progress  [] Patient is not progressing as expected and requires additional follow up with physician  [] Other    Prognosis for POC: [] Good [x] Fair  [] Poor    Patient requires continued skilled intervention: [x] Yes  [] No      PLAN: Improve bilateral shoulder elevation, global bilateral shoulder strengthening  [x] Continue per plan of care [] Alter current plan (see comments)  [] Plan of care initiated [] Hold pending MD visit [] Discharge    Electronically signed by: Trice Rust PT, DPT    Note: If patient does not return for scheduled/recommended follow up visits, this note will serve as a discharge from care along with the most recent update on progress.

## 2023-03-16 NOTE — TELEPHONE ENCOUNTER
Last OV: 03/02/2023  Nadege  Last Labs:  Next OV: 05/11/2023  Nadege  Last Refill: 02/15/2022  Ragini Mckeon

## 2023-03-17 RX ORDER — METOPROLOL SUCCINATE 25 MG/1
TABLET, EXTENDED RELEASE ORAL
Qty: 90 TABLET | Refills: 0 | Status: SHIPPED | OUTPATIENT
Start: 2023-03-17

## 2023-03-21 ENCOUNTER — HOSPITAL ENCOUNTER (OUTPATIENT)
Dept: PHYSICAL THERAPY | Age: 73
Setting detail: THERAPIES SERIES
Discharge: HOME | End: 2023-03-21
Payer: MEDICARE

## 2023-03-21 PROCEDURE — 97140 MANUAL THERAPY 1/> REGIONS: CPT

## 2023-03-21 PROCEDURE — 97110 THERAPEUTIC EXERCISES: CPT

## 2023-03-21 NOTE — FLOWSHEET NOTE
Observation: bilateral upper trap compensation with shoulder elevation  Test measurements:    1/26  ROM Left Right   Shoulder Flex 90 supine; 98 sitting 122 supine; 125   Shoulder Abd 90 120   Shoulder ER       Shoulder IR                               Strength  Left Right   Shoulder Flex 4-/4 4   Shoulder Scap 4- 4-   Shoulder ER 4- 4   Shoulder IR 4-/4 4       RESTRICTIONS/PRECAUTIONS: latex allergy, recent Hx of breast cancer with R lymph node removal, HBP    Exercises/Interventions:   Exercises performed bilaterally  Therapeutic Ex (16495) 33 min Sets/sec Reps CUES/Notes   UBE 2 min/2 min  Forward/backward   Sitting cane flexion 10 sec 5    Ball squeezes 2 10ea Prone and supine hand   Inclined table slides  2 8    Supine OH flexion w/ R UE assist 2 10 For OH mobility/ROM   No monies 2 10 Seated red TB   Scapular clocks 2 15    Seated rows 2 12 Red TB   Bilateral shoulder ER with foam roller back hug 2 8    Sidelying ER AAROM 2 8    Upper cervical flexion 1 10 5 sec hold   Pec doorway stretch 10 sec 5 Performed with hands at sides; terminated secondary to pt's reported balance   Sidelying flexion 1 15 Right sidelying   Abduction isometric 10 sec 5 Manual resistance   TB row 2 5 Red   Cane ER stretch 10 sec 5    Supine cane AAROM flex/scap  2 8    Wall slides 1 10 Bilateral   Sidelying punch 2 8-10    Sidelying shoulder ER 2 8 2#   Supine UK deltoid 1 min 2 Bilateral; through available range   Seated scaption to 90 2 8 Bilateral; 1#   Seated scaption 2 10 Performed with back against wall; bilateral; lightest weighted ball   Standing shoulder retraction against wall 5 sec 15    Seated bilateral scaption with TB ER 2 8 Red TB   Supine punch 2 5    Standing external and internal rotation 2ea 6 Red TB   Seated thoracic extension over chair 3 sec 10    Seated - TB pulldown - red TB 5s 15 2 sets   Towel IR stretch behind back 10 sec 5 Performed at half wall   Sidelying punch 2 15    Cross body stretch 10 sec 5

## 2023-03-23 ENCOUNTER — HOSPITAL ENCOUNTER (OUTPATIENT)
Dept: PHYSICAL THERAPY | Age: 73
Setting detail: THERAPIES SERIES
Discharge: HOME | End: 2023-03-23
Payer: MEDICARE

## 2023-03-23 PROCEDURE — 97110 THERAPEUTIC EXERCISES: CPT

## 2023-03-23 PROCEDURE — 97140 MANUAL THERAPY 1/> REGIONS: CPT

## 2023-03-23 NOTE — FLOWSHEET NOTE
[place] [address] [address] [place]  Phone: [phone] Fax: [phone]  Physical Therapy Treatment Note/ Progress Report:       Date:  2023    Patient Name:  Janeen Hall    :  1950  MRN: [id number]  Restrictions/Precautions:    Medical Diagnosis:  Other closed displaced fracture of proximal end of left humerus with routine healing, subsequent encounter [S42.292D]  Treatment Diagnosis:      ICD-10-CM     1. Other closed displaced fracture of proximal end of left humerus with routine healing, subsequent encounter  S42.292D         2. Impaired strength of shoulder muscles  R29.898         3. Impaired range of motion of left shoulder  M25.612           Insurance/Certification information:  PT Insurance Information: Medicare/[employer]  Physician Information:  Ami Lovell MD  Plan of care signed (Y/N): Y    Date of Patient follow up with Physician:      Progress Report: []  Yes  [x]  No     Date Range for reporting period:  Beginnin22  Ending:     Progress report due (10 Rx/or 30 days whichever is less):      Recertification due (POC duration/ or 90 days whichever is less): 3/28/23     Visit # Insurance Allowable Auth Needed   28 BMN []Yes    [x]No     Pain level:  0/10 in L shoulder;  0/10 pain R shoulder     SUBJECTIVE: Pt reports that she is doing well today. She did not walk prior to session today, so she doesn't feel as tired.     Functional Disability Index: FOTO physical FS primary measure score = 42; Risk adjusted = 46 Date: 22  Functional Disability Index: FOTO physical FS primary measure score =  41    Date: 22  Functional Disability Index: FOTO physical FS primary measure score =  44 DASH = 54   Date: 23  QuickDASH: raw score - 32; dysfunction - 48%    OBJECTIVE:   Observation: bilateral upper trap compensation with shoulder elevation; improving L  strength and tolerance  Test

## 2023-03-28 ENCOUNTER — HOSPITAL ENCOUNTER (OUTPATIENT)
Dept: PHYSICAL THERAPY | Age: 73
Setting detail: THERAPIES SERIES
Discharge: HOME | End: 2023-03-28
Payer: MEDICARE

## 2023-03-28 PROCEDURE — 97530 THERAPEUTIC ACTIVITIES: CPT

## 2023-03-28 PROCEDURE — 97110 THERAPEUTIC EXERCISES: CPT

## 2023-03-28 NOTE — PLAN OF CARE
Progressing: [] Met: [] Not Met: [] Adjusted  6. Patient will demonstrate reaching behind her back to at least L1 levels to demonstrate needs for self-bathing and back washing without symptoms or restrictions. [x] Progressing: [] Met: [] Not Met: [] Added 2/28    ASSESSMENT: Sara Galicia has been demonstrating improvements during her time in physical therapy including bilateral shoulder elevation and ROM, strength, muscular endurance, pain, and neuromuscular control. Therapy sessions continue to focus on improving bilateral shoulder activation and posture to assist with achieving shoulder elevation. One limiting factor to larger improvements is Aiyana's posture which has lead to a lower cervical flexion, upper cervical extension, and increased thoracic kyphosis. Exercises continue to be utilized to bilateral shoulder girdle as well as posterior doris-scapular musculature to address her current impairments. Sara Galicia current impairments continue to limit her ability to reach overhead, wash her back, and fully wash the back of her head. She will continue to benefit from skilled physical therapy to continue to improve her above listed impairments perform her ADLs without symptoms or restrictions. Return to Play: (if applicable)   []  Stage 1: Intro to Strength   []  Stage 2: Dynamic Strength and Intro to Plyometrics   []  Stage 3: Advanced Plyometrics and Intro to Throwing   []  Stage 4: Sport specific Training/Return to Sport     []  Ready to Return to Play, [employer]   []  Not Ready for Return to Sports   Comments:      Treatment/Activity Tolerance:  [x] Patient tolerated treatment well [] Patient limited by fatique  [] Patient limited by pain  [] Patient limited by other medical complications  [] Other:     Overall Progression Towards Functional goals/ Treatment Progress Update:  [] Patient is progressing as expected towards functional goals listed.     [x] Progression is slowed due to

## 2023-03-30 ENCOUNTER — HOSPITAL ENCOUNTER (OUTPATIENT)
Dept: PHYSICAL THERAPY | Age: 73
Setting detail: THERAPIES SERIES
Discharge: HOME | End: 2023-03-30
Payer: MEDICARE

## 2023-03-30 PROCEDURE — 97110 THERAPEUTIC EXERCISES: CPT

## 2023-03-30 PROCEDURE — 97530 THERAPEUTIC ACTIVITIES: CPT

## 2023-03-30 NOTE — FLOWSHEET NOTE
(MOD) 69878 (typically 30 minutes face-to-face)  [] EVAL (HIGH) 26003 (typically 45 minutes face-to-face)  [] RE-EVAL     [x] ID(99464) x 2    [] DRY NEEDLE 1 OR 2 MUSCLES  [] NMR (98107) x     [] DRY NEEDLE 3+ MUSCLES  [] Manual (90317) x       [x] TA (57986) x 1    [] Mech Traction (10086)  [] ES(attended) (81791)     [] ES (un) (17458):   [] VASO (74112)  [] Other:    GOALS:  Patient stated goal: full arm motion  [x] Progressing: [] Met: [] Not Met: [] Adjusted  Therapist goals for Patient:   Short Term Goals: To be achieved in: 2 weeks  1. Independent in HEP and progression per patient tolerance, in order to prevent re-injury. [] Progressing: [x] Met: [] Not Met: [] Adjusted  2. Patient will have a decrease in pain to facilitate improvement in movement, function, and ADLs as indicated by Functional Deficits. [] Progressing: [x] Met: [] Not Met: [] Adjusted    Long Term Goals: To be achieved in: 10-12 weeks  1. Patient will improve her QuickDASH score by at least 10 points to assist with reaching prior level of function with activities such as bathing and reaching overhead. [x] Progressing: [] Met: [] Not Met: [] Adjusted 2/28  2. Patient will demonstrate increased L shoulder flexion AROM to 120 deg to allow for proper joint functioning as indicated by patients Functional Deficits. [x] Progressing: [] Met: [] Not Met: [] Adjusted  3. Patient will demonstrate an increase in global L shoulder strength to 4+/5 to allow for proper functional mobility as indicated by patients Functional Deficits. [x] Progressing: [] Met: [] Not Met: [] Adjusted  4. Patient will return to all dressing activities, such has wearing shirts and jackets without increased symptoms or restriction. [] Progressing: [x] Met: [] Not Met: [] Adjusted  5. Patient will demonstrate 5 repeated overhead reaches with 5# to demonstrate replacing items from overhead cabinets without symptoms or restrictions.     [x] Progressing: [] Met: [] Not

## 2023-04-18 ENCOUNTER — HOSPITAL ENCOUNTER (OUTPATIENT)
Dept: PHYSICAL THERAPY | Age: 73
Setting detail: THERAPIES SERIES
Discharge: HOME | End: 2023-04-18
Payer: MEDICARE

## 2023-04-18 PROCEDURE — 97110 THERAPEUTIC EXERCISES: CPT

## 2023-04-18 PROCEDURE — 97140 MANUAL THERAPY 1/> REGIONS: CPT

## 2023-04-18 NOTE — FLOWSHEET NOTE
Update:  [] Patient is progressing as expected towards functional goals listed. [x] Progression is slowed due to complexities/Impairments listed. [x] Progression has been slowed due to co-morbidities. [] Plan just implemented, too soon to assess goals progression <30days   [] Goals require adjustment due to lack of progress  [] Patient is not progressing as expected and requires additional follow up with physician  [] Other    Prognosis for POC: [] Good [x] Fair  [] Poor    Patient requires continued skilled intervention: [x] Yes  [] No      PLAN: Improve bilateral shoulder elevation ROM, global bilateral shoulder strengthening  [x] Continue per plan of care [] Alter current plan (see comments)  [] Plan of care initiated [] Hold pending MD visit [] Discharge    Electronically signed by: Diana Dominguez, PT, DPT    Note: If patient does not return for scheduled/recommended follow up visits, this note will serve as a discharge from care along with the most recent update on progress.

## 2023-04-20 ENCOUNTER — APPOINTMENT (OUTPATIENT)
Dept: PHYSICAL THERAPY | Age: 73
End: 2023-04-20
Payer: MEDICARE

## 2023-04-25 ENCOUNTER — HOSPITAL ENCOUNTER (OUTPATIENT)
Dept: PHYSICAL THERAPY | Age: 73
Setting detail: THERAPIES SERIES
Discharge: HOME | End: 2023-04-25
Payer: MEDICARE

## 2023-04-25 PROCEDURE — 97140 MANUAL THERAPY 1/> REGIONS: CPT

## 2023-04-25 PROCEDURE — 97110 THERAPEUTIC EXERCISES: CPT

## 2023-04-25 NOTE — FLOWSHEET NOTE
[facility] [address] [facility]  Phone: [phone] Fax: [phone]    Physical Therapy Treatment Note/ Progress Report:       Date:  2023    Patient Name:  Marguerite Diaz    :  1950  MRN: [id number]  Restrictions/Precautions:    Medical Diagnosis:  Other closed displaced fracture of proximal end of left humerus with routine healing, subsequent encounter [S42.292D]  Treatment Diagnosis:      ICD-10-CM     1. Other closed displaced fracture of proximal end of left humerus with routine healing, subsequent encounter  S42.292D         2. Impaired strength of shoulder muscles  R29.898         3. Impaired range of motion of left shoulder  M25.612           Insurance/Certification information:  PT Insurance Information: Medicare/[employer]  Physician Information:  Lianet Bolton MD  Plan of care signed (Y/N): Y    Date of Patient follow up with Physician:      Progress Report: []  Yes  [x]  No     Date Range for reporting period:  Beginnin22  Ending:     Progress report due (10 Rx/or 30 days whichever is less):      Recertification due (POC duration/ or 90 days whichever is less): 23     Visit # Insurance Allowable Auth Needed   34 BMN []Yes    [x]No     Pain level:  0/10 in L shoulder;  0/10 pain R shoulder     SUBJECTIVE: Christie Harrison states that she has been trying to work on her arm pulley at home, but has found doing it sitting is more difficult than standing. States both of her shoulder are doing well today. Notes she is going to get a facial massage today to help with her tightness through her neck and shoulders.      Functional Disability Index: FOTO physical FS primary measure score = 42; Risk adjusted = 46 Date: 22  Functional Disability Index: FOTO physical FS primary measure score =  41    Date: 22  Functional Disability Index: FOTO physical FS primary measure score =  44 DASH = 54  Date:

## 2023-04-27 ENCOUNTER — HOSPITAL ENCOUNTER (OUTPATIENT)
Dept: PHYSICAL THERAPY | Age: 73
Setting detail: THERAPIES SERIES
Discharge: HOME | End: 2023-04-27
Payer: MEDICARE

## 2023-04-27 PROCEDURE — 97110 THERAPEUTIC EXERCISES: CPT

## 2023-04-27 PROCEDURE — 97140 MANUAL THERAPY 1/> REGIONS: CPT

## 2023-04-27 NOTE — FLOWSHEET NOTE
[facility] [address]  Phone: [phone] Fax: [phone]    Physical Therapy Treatment Note/ Progress Report:       Date:  2023    Patient Name:  Clemencia Gomez    :  1950  MRN: [id number]  Restrictions/Precautions:    Medical Diagnosis:  Other closed displaced fracture of proximal end of left humerus with routine healing, subsequent encounter [S42.292D]  Treatment Diagnosis:      ICD-10-CM     1. Other closed displaced fracture of proximal end of left humerus with routine healing, subsequent encounter  S42.292D         2. Impaired strength of shoulder muscles  R29.898         3. Impaired range of motion of left shoulder  M25.612           Insurance/Certification information:  PT Insurance Information: Medicare/BiiCodeP  Physician Information:  Mahesh Zamora MD  Plan of care signed (Y/N): Y    Date of Patient follow up with Physician:      Progress Report: []  Yes  [x]  No     Date Range for reporting period:  Beginnin22  Ending:     Progress report due (10 Rx/or 30 days whichever is less): 3/93/34     Recertification due (POC duration/ or 90 days whichever is less): 23     Visit # Insurance Allowable Auth Needed   35 BMN []Yes    [x]No     Pain level:  1-2/10 in L shoulder;  0/10 pain R shoulder     SUBJECTIVE: Scarlet Cuba reports that she has an upcoming follow-up with Dr. Sonya Youssef and feels she needs a new therapy order for her hands. Reports that she is still having numbness into her hands occasionally. She is unable to identify specific activities or postures around times when her hands are numb. Reports yesterday was a very busy day for her and she was using her L arm a lot. When she went to get her nails done she had increased pain that persisted through the evening. Denies any shortness of breath, feelings of uneasiness, feelings of her heart skipping beats.     Functional Disability Index: FOTO

## 2023-05-02 ENCOUNTER — HOSPITAL ENCOUNTER (OUTPATIENT)
Dept: PHYSICAL THERAPY | Age: 73
Setting detail: THERAPIES SERIES
Discharge: HOME | End: 2023-05-02
Payer: MEDICARE

## 2023-05-02 PROCEDURE — 97110 THERAPEUTIC EXERCISES: CPT

## 2023-05-02 PROCEDURE — 97140 MANUAL THERAPY 1/> REGIONS: CPT

## 2023-05-02 NOTE — PLAN OF CARE
remained nearly identical compared to previous session, but she has demonstrated improvements of her bilateral shoulder strength in all directions. Corlis Medicine contributes her increase of the [employer] store to limitations of her L hand and not her shoulder. Goals have been adjusted to better fit Aiyana's prognosis moving forward. Decreased L scapular and GH joint mobility along with kyphotic posture with forward head also limit Aiyana's ROM progressions. Eduction of importance of upright posture continues to be stressed throughout sessions. Also further education about scapular and cervical positioning to assist with onset of hand symptoms. Current plan is to decrease frequency of physical therapy with an increased focus of HEP to progress L shoulder ROM and to continue to assess bilateral hands symptoms. Vinodshubham Isaac will continue to benefit from skilled physical therapy to address to her L shoulder ROM deficits, strength, neuromuscular control, numbness and tingling, and endurance to return to PLOF and perform ADLs, such as reaching overhead and washing the back of her head and back without symptoms or restrictions. Return to Play: (if applicable)   []  Stage 1: Intro to Strength   []  Stage 2: Dynamic Strength and Intro to Plyometrics   []  Stage 3: Advanced Plyometrics and Intro to Throwing   []  Stage 4: Sport specific Training/Return to Sport     []  Ready to Return to Play, [employer]   []  Not Ready for Return to Sports   Comments:      Treatment/Activity Tolerance:  [x] Patient tolerated treatment well [] Patient limited by fatique  [] Patient limited by pain  [] Patient limited by other medical complications  [] Other:     Overall Progression Towards Functional goals/ Treatment Progress Update:  [] Patient is progressing as expected towards functional goals listed. [x] Progression is slowed due to complexities/Impairments listed. [x] Progression has been slowed due to co-morbidities.   []

## 2023-05-03 RX ORDER — SPIRONOLACTONE 25 MG/1
TABLET ORAL
Qty: 90 TABLET | Refills: 3 | Status: SHIPPED | OUTPATIENT
Start: 2023-05-03

## 2023-05-04 ENCOUNTER — APPOINTMENT (OUTPATIENT)
Dept: PHYSICAL THERAPY | Age: 73
End: 2023-05-04
Payer: MEDICARE

## 2023-05-08 ENCOUNTER — HOSPITAL ENCOUNTER (OUTPATIENT)
Dept: PHYSICAL THERAPY | Age: 73
Setting detail: THERAPIES SERIES
Discharge: HOME | End: 2023-05-08
Payer: MEDICARE

## 2023-05-08 PROCEDURE — 97530 THERAPEUTIC ACTIVITIES: CPT

## 2023-05-08 PROCEDURE — 97110 THERAPEUTIC EXERCISES: CPT

## 2023-05-08 PROCEDURE — 97140 MANUAL THERAPY 1/> REGIONS: CPT

## 2023-05-08 NOTE — FLOWSHEET NOTE
Adjusted  4. Patient will return to all dressing activities, such has wearing shirts and jackets without increased symptoms or restriction. [] Progressing: [x] Met: [] Not Met: [] Adjusted  5. Patient will demonstrate 5 repeated overhead reaches with 5# to demonstrate replacing items from overhead cabinets without symptoms or restrictions. [x] Progressing: [] Met: [] Not Met: [] Adjusted  6. Patient will demonstrate reaching behind her back to at least L3 levels to demonstrate needs for self-bathing and back washing without symptoms or restrictions. [] Progressing: [] Met: [] Not Met: [x] Modified 5/2  7. Patient will increase shoulder flexion and external rotation to wash the back of her head with her LUE without symptoms or restrictions. [] Progressing: [] Met: [] Not Met: [x] Added 5/2    ASSESSMENT: Aiyana tolerated her treatment session well today. Continued focus of improve R shoulder ROM. Manual performed with focus of posterior capsule, teres major, minor and latissimus stretching to help promote mobility overhead. Tenderness to palpation through muscle bellies lateral to lateral scapular border. Difficulty for pt to differentiate scapular motion from glenohumeral motion when reaching overhead. Verbal and tactile cues along with demonstration required for theraband exercises during external and internal rotations. Misael Quiñones will continue to benefit from skilled physical therapy to improve her bilateral shoulder ROM, strength, muscular endurance, and neuromuscular control to return to PLOF and perform ADLs, such as reaching overhead and washing the back of her head and back without symptoms or restrictions.         Return to Play: (if applicable)   []  Stage 1: Intro to Strength   []  Stage 2: Dynamic Strength and Intro to Plyometrics   []  Stage 3: Advanced Plyometrics and Intro to Throwing   []  Stage 4: Sport specific Training/Return to Sport     []  Ready to Return to Play, [employer]

## 2023-05-11 ENCOUNTER — APPOINTMENT (OUTPATIENT)
Dept: PHYSICAL THERAPY | Age: 73
End: 2023-05-11
Payer: MEDICARE

## 2023-05-11 NOTE — TELEPHONE ENCOUNTER
Received refill request for Furosemide from [facility].     Last ov: 03/02/2023 LES    Last Refill: 02/21/2023 #180 w/ 0 refills    Next appointment: 08/22/2023 LES

## 2023-05-12 RX ORDER — FUROSEMIDE 20 MG/1
TABLET ORAL
Qty: 180 TABLET | Refills: 0 | Status: SHIPPED | OUTPATIENT
Start: 2023-05-12

## 2023-05-15 ENCOUNTER — HOSPITAL ENCOUNTER (OUTPATIENT)
Dept: PHYSICAL THERAPY | Age: 73
Setting detail: THERAPIES SERIES
Discharge: HOME | End: 2023-05-15
Payer: MEDICARE

## 2023-05-15 PROCEDURE — 97110 THERAPEUTIC EXERCISES: CPT

## 2023-05-15 PROCEDURE — 97140 MANUAL THERAPY 1/> REGIONS: CPT

## 2023-05-22 ENCOUNTER — HOSPITAL ENCOUNTER (OUTPATIENT)
Dept: PHYSICAL THERAPY | Age: 73
Setting detail: THERAPIES SERIES
Discharge: HOME | End: 2023-05-22
Payer: MEDICARE

## 2023-05-22 PROCEDURE — 97110 THERAPEUTIC EXERCISES: CPT

## 2023-05-22 PROCEDURE — 97140 MANUAL THERAPY 1/> REGIONS: CPT

## 2023-05-22 NOTE — FLOWSHEET NOTE
[facility] [address] [facility]  Phone: [phone] Fax: [phone]    Physical Therapy Treatment Note/ Progress Report:       Date:  2023    Patient Name:  Carson Arita    :  1950  MRN: [id number]  Restrictions/Precautions:    Medical Diagnosis:  Other closed displaced fracture of proximal end of left humerus with routine healing, subsequent encounter [S42.292D]  Treatment Diagnosis:      ICD-10-CM     1. Other closed displaced fracture of proximal end of left humerus with routine healing, subsequent encounter  S42.292D         2. Impaired strength of shoulder muscles  R29.898         3. Impaired range of motion of left shoulder  M25.612           Insurance/Certification information:  PT Insurance Information: Medicare/PanÃ¨veP  Physician Information:  Lorena Carmona MD  Plan of care signed (Y/N): Y    Date of Patient follow up with Physician:      Progress Report: []  Yes  [x]  No     Date Range for reporting period:  Beginnin22  Ending:     Progress report due (10 Rx/or 30 days whichever is less): 27     Recertification due (POC duration/ or 90 days whichever is less): 23     Visit # Insurance Allowable Auth Needed   39 BMN []Yes    [x]No     Pain level:  0/10 in L shoulder;  0/10 pain R shoulder     SUBJECTIVE: Sedonia Midget reports that her arms are feeling \"pretty good\" today. She has an appointment with Dr. Andrew Duvall on Thursday for further examination of her intermittent hand numbness. Only one reported episode of numbness since previous session. Has second cataract procedure tomorrow.      Functional Disability Index: FOTO physical FS primary measure score = 42; Risk adjusted = 46 Date: 22  Functional Disability Index: FOTO physical FS primary measure score =  41    Date: 22  Functional Disability Index: FOTO physical FS primary measure score =  44 DASH = 54  Date: 23  Quick[employer]: raw

## 2023-05-30 ENCOUNTER — HOSPITAL ENCOUNTER (OUTPATIENT)
Dept: PHYSICAL THERAPY | Age: 73
Setting detail: THERAPIES SERIES
Discharge: HOME | End: 2023-05-30
Payer: MEDICARE

## 2023-05-30 NOTE — FLOWSHEET NOTE
[facility]     Physical Therapy  Cancellation/No-show Note  Patient Name:  Harlan Sylvester  :  1950   Date:  2023  Cancelled visits to date: 4  No-shows to date: 0    Patient status for today's appointment patient:  [x]  Cancelled  []  Rescheduled appointment  []  No-show     Reason given by patient:  []  Patient ill  []  Conflicting appointment  []  No transportation    []  Conflict with work  []  No reason given  [x]  Other:     Comments: Santa Minors called stating she was feeling a little bit dizzy and did not feel comfortable driving today. Rescheduled her appointment. Phone call information:   []  Phone call made today to patient at pm at number provided:      []  Patient answered, conversation as follows:    []  Patient did not answer, message left as follows:  []  Phone call not made today  [x]  Phone call not needed - pt contacted us to cancel and provided reason for cancellation.      Electronically signed by:  Byron Arce PT

## 2023-06-01 ENCOUNTER — HOSPITAL ENCOUNTER (OUTPATIENT)
Dept: PHYSICAL THERAPY | Age: 73
Setting detail: THERAPIES SERIES
Discharge: HOME | End: 2023-06-01
Payer: MEDICARE

## 2023-06-01 PROCEDURE — 97140 MANUAL THERAPY 1/> REGIONS: CPT

## 2023-06-01 PROCEDURE — 97110 THERAPEUTIC EXERCISES: CPT

## 2023-06-01 NOTE — PLAN OF CARE
trunk lean for stretch   TRX strap forward walk with flexion 1 8    Supine OH flexion w/ R UE assist 2 10 For OH mobility/ROM   No monies 5 sec 12 Standing against wall, with cervical and scapular retraction   Standing cervical retractions 5 sec 8    Seated rows 2 15 Red TB   Seated forward punch 2 15 ea 2# DB   Seated ABD 2 8    Seated twisting 1 10 ea Thoracic and cervical stretch   Pec doorway stretch 10 sec 5 Performed with hands at sides; terminated secondary to pt's reported balance   Seated AAROM scaption stretch 5 sec 10 Cane utilized for RUE   Sidelying flexion 1 15 Right sidelying   Abduction isometric 10 sec 5 Manual resistance   Slouch to upright posture 1 15    Standing TB ER/IR 2 8 ea Red TB; towel utilized for hand    Supine cane flexion  10 sec 10 Brown tube; underhand    Wall slides 1 10 Bilateral   Sidelying flexion 1 10 No weight   Sidelying shoulder ER 2 8 2#   Supine UK deltoid 1 min 2 Bilateral; through available range   Seated bilateral shoulder horizontal ABD 2 6-8 Very difficult; flexion to 90 then ABD   Seated scaption and ABD 2 8 ea Yellow weighted ball   Standing shoulder retraction against wall 5 sec 10    Seated bilateral scaption with TB ER 2 5 Red TB   Thoracic rotation stretch 10 sec 10 ea    Seated thoracic extension over chair 3 sec 10    Seated - TB pulldown - red TB 5s 15 2 sets   Towel IR stretch behind back 10 sec 5 Performed at table   Cross body stretch 10 sec 5    Seated banded eccentric shoulder flexion 1 8 ea Red TB   Updated measurements, POC education, HEP updates  5 min          Manual Intervention  ([id number]) 12 min      Shld/GH Mobs - inferior, posterior - sitting  2 min Gr. II-III; sidelying AP mobilizations   STM  3 min L teres major and minor, subscap, infraspinatus, cervical   L shoulder PROM/stretching -  seated  3 min Flexion, ER, scaption, ABD   Cervical spine mobs   min Grade II general distraction, UPA bilateral C3-6   Posterior capsule stretch  4 min

## 2023-06-20 ENCOUNTER — APPOINTMENT (OUTPATIENT)
Dept: PHYSICAL THERAPY | Age: 73
End: 2023-06-20
Payer: MEDICARE

## 2023-06-26 ENCOUNTER — HOSPITAL ENCOUNTER (OUTPATIENT)
Dept: PHYSICAL THERAPY | Age: 73
Setting detail: THERAPIES SERIES
Discharge: HOME | End: 2023-06-26
Payer: MEDICARE

## 2023-07-20 ENCOUNTER — HOSPITAL ENCOUNTER (OUTPATIENT)
Dept: PHYSICAL THERAPY | Age: 73
Setting detail: THERAPIES SERIES
Discharge: HOME | End: 2023-07-20
Payer: MEDICARE

## 2023-07-20 PROCEDURE — 97530 THERAPEUTIC ACTIVITIES: CPT

## 2023-07-20 PROCEDURE — 97110 THERAPEUTIC EXERCISES: CPT

## 2023-07-20 NOTE — PLAN OF CARE
return to all dressing activities, such has wearing shirts and jackets without increased symptoms or restriction. [] Progressing: [x] Met: [] Not Met: [] Adjusted  5. Patient will demonstrate 5 repeated overhead reaches with 5# to demonstrate replacing items from overhead cabinets without symptoms or restrictions. [x] Progressing: [] Met: [] Not Met: [] Adjusted  6. Patient will demonstrate reaching behind her back to at least L3 levels to demonstrate needs for self-bathing and back washing without symptoms or restrictions. [x] Progressing: [] Met: [] Not Met: [] Modified 5/2  7. Patient will increase shoulder flexion and external rotation to wash and brush the back of her head with her LUE without symptoms or restrictions. [] Progressing: [] Met: [] Not Met: [] Adjusted 7/20 secondary to impairments since previous health concerns  8. Patient will track her HEP compliance and demonstrate daily exercise performance for continued at home progressions which will result in improvements of her L shoulder ROM and strength, translating to decreased disability. [] Progressing: [] Met: [] Not Met: [] Added 7/20    ASSESSMENT: Luis Manuel Yap returns to physical therapy today after a 5 week absence secondary to additional health concerns. Primary health concerns involved prolonged dizziness and malaise. She had imaging and EMG testing performed to rule out sinister pathologies. Per the pt, the testing revealed no definitive findings for her dizziness. Per pt, she also reports she had follow-up with her PCP, Dr. Elizabeth Douglass, and was instructed to perform relative rest. With the extended rest, Luis Manuel Yap reports she has had complete resolution of her dizziness symptoms now, is feeling healthier, and has returned to ambulating without an assistive device.  However, given the relative rest for the last 5 weeks, Luis Manuel Yap has had a decrease in her HEP compliance and presents with primary complaints today of decrease L shoulder ROM and

## 2023-07-25 ENCOUNTER — HOSPITAL ENCOUNTER (OUTPATIENT)
Dept: PHYSICAL THERAPY | Age: 73
Setting detail: THERAPIES SERIES
Discharge: HOME | End: 2023-07-25
Payer: MEDICARE

## 2023-07-25 PROCEDURE — 97110 THERAPEUTIC EXERCISES: CPT

## 2023-07-25 NOTE — FLOWSHEET NOTE
[address], [address] [place] [address]  Phone: [phone] Fax: [phone]  Physical Therapy Treatment Note/ Progress Report:       Date:  2023    Patient Name:  Olamide Soria    :  1950  MRN: [id number]  Restrictions/Precautions:    Medical Diagnosis:  Other closed displaced fracture of proximal end of left humerus with routine healing, subsequent encounter [S42.292D]  Treatment Diagnosis:      ICD-10-CM     1. Other closed displaced fracture of proximal end of left humerus with routine healing, subsequent encounter  S42.292D         2. Impaired strength of shoulder muscles  R29.898         3. Impaired range of motion of left shoulder  M25.612           Insurance/Certification information:  PT Insurance Information: Medicare/AARP  Physician Information:  Charles Avila MD  Plan of care signed (Y/N): Y    Date of Patient follow up with Physician:      Progress Report: []  Yes  [x]  No     Date Range for reporting period:  Beginnin22  Ending:     Progress report due (10 Rx/or 30 days whichever is less): 19     Recertification due (POC duration/ or 90 days whichever is less): 23     Visit # Insurance Allowable Auth Needed   43 BMN []Yes    [x]No     Pain level:  0/10 in L shoulder;  0/10 pain R shoulder     SUBJECTIVE: Usman Nava reports her shoulder feels less stiff than previous session. States that she has been able to work in her stretches throughout her day. \"Lifting it up is still difficult. \"    Functional Disability Index: FOTO physical FS primary measure score = 42; Risk adjusted = 46 Date: 22  QuickDASH: raw score - 32; dysfunction - 48%   Date: 23  QuickDASH: raw score - 27; dysfunction - 36%   Date: 3/28/23  QuickDASH: raw score - 31; dysfunction - 42%   Date: 23  QuickDASH: raw score - 24; dysfunction - 29.5%   Date: 23  QuickDASH: raw score - 28; dysfunction - 38%   Date:

## 2023-08-02 ENCOUNTER — HOSPITAL ENCOUNTER (OUTPATIENT)
Dept: PHYSICAL THERAPY | Age: 73
Setting detail: THERAPIES SERIES
Discharge: HOME | End: 2023-08-02
Payer: MEDICARE

## 2023-08-02 PROCEDURE — 97110 THERAPEUTIC EXERCISES: CPT

## 2023-08-02 PROCEDURE — 97140 MANUAL THERAPY 1/> REGIONS: CPT

## 2023-08-02 NOTE — FLOWSHEET NOTE
[address], [address] [place] [address]  Phone: [phone] Fax: [phone]  Physical Therapy Treatment Note/ Progress Report:       Date:  2023    Patient Name:  Jonnie Litten    :  1950  MRN: [id number]  Restrictions/Precautions:    Medical Diagnosis:  Other closed displaced fracture of proximal end of left humerus with routine healing, subsequent encounter [S42.292D]  Treatment Diagnosis:      ICD-10-CM     1. Other closed displaced fracture of proximal end of left humerus with routine healing, subsequent encounter  S42.292D         2. Impaired strength of shoulder muscles  R29.898         3. Impaired range of motion of left shoulder  M25.612           Insurance/Certification information:  PT Insurance Information: Medicare/AARP  Physician Information:  Elpidio Younger MD  Plan of care signed (Y/N): Y    Date of Patient follow up with Physician:      Progress Report: []  Yes  [x]  No     Date Range for reporting period:  Beginnin22  Ending:     Progress report due (10 Rx/or 30 days whichever is less): 3/46/69     Recertification due (POC duration/ or 90 days whichever is less): 23     Visit # Insurance Allowable Auth Needed   44 BMN []Yes    [x]No     Pain level:  0/10 in L shoulder;  0/10 pain R shoulder     SUBJECTIVE: Enid Mckinley states that her brother passed away this week. She has several [employer] coming to [place] as well as needing to go to her brother's service next week.      Functional Disability Index: FOTO physical FS primary measure score = 42; Risk adjusted = 46 Date: 22  QuickDASH: raw score - 32; dysfunction - 48%   Date: 23  QuickDASH: raw score - 27; dysfunction - 36%   Date: 3/28/23  QuickDASH: raw score - 31; dysfunction - 42%   Date: 23  QuickDASH: raw score - 24; dysfunction - 29.5%   Date: 23  QuickDASH: raw score - 28; dysfunction - 38%   Date: 23    OBJECTIVE:

## 2023-08-03 RX ORDER — FUROSEMIDE 20 MG/1
TABLET ORAL
Qty: 180 TABLET | Refills: 0 | Status: SHIPPED | OUTPATIENT
Start: 2023-08-03

## 2023-08-03 NOTE — TELEPHONE ENCOUNTER
Received refill request for furosemide from [address] [place] [address].     Last ov: 03/02/2023 LES    Last Refill: 05/12/2023 #180 w/ 0 refills    Next appointment: 08/22/2023 LES

## 2023-08-07 NOTE — PROGRESS NOTES
[facility]   Cardiac follow up    Referring Provider:  Nereida Hammans, MD     Chief Complaint   Patient presents with    Hypertension    Hyperlipidemia      History of Present Illness:  Ashlie Caal is a 74 y.o. female with a history of diabetes, dHF, hyperlipidemia, and hypertension. She was previously following with Dr. Suhas Savage with [facility].     At [facility] in July 2022 she reported that she was diagnosed with breast cancer. Today she is here for 3 mos follow up. She still in therapy for her arm, she will be going to [facility] for evaluation of her neck to see if it is effecting use of her hand. She has not taken AM medications yet today. She takes procardia once a day. Yesterday at home her bp was 111/71. Pt denies exertional chest pain, GAYLE/PND, palpitations, light-headedness, edema. She is walking with a cane. She want to get \"inspire\" since she does not use cpap, but to qualify she has to weigh 215 lb, she has to lose 4 more lbs. Past Medical History:   has a past medical history of Arthritis, Back pain, CHF (congestive heart failure) ([address]), Chronic diastolic heart failure ([address]), Diabetes mellitus ([address]), Diabetes mellitus ([address]), GERD (gastroesophageal reflux disease), High cholesterol, Hypertension, Malignant neoplasm of right female breast ([address]), Polio, Polio, Shingles, Sleep apnea, and Urinary problem. Surgical History:   has a past surgical history that includes joint replacement; knee surgery; Colonoscopy; other surgical history (01/13/2014); hernia repair; joint replacement; SABRINA STEREO BREAST BX W LOC DEVICE 1ST LESION RIGHT (Right, 04/06/2022); back surgery; back surgery; Breast biopsy (Right, 05/03/2022); Breast surgery (Right, 05/03/2022); Humerus fracture surgery (Left, 08/23/2022); and Arm Surgery (Left). Social History:   reports that she has never smoked.  She has never used smokeless tobacco. She reports that she does not drink alcohol and does not

## 2023-08-09 RX ORDER — FUROSEMIDE 20 MG/1
40 TABLET ORAL DAILY
Qty: 180 TABLET | Refills: 3 | Status: SHIPPED | OUTPATIENT
Start: 2023-08-09

## 2023-08-22 ENCOUNTER — OFFICE VISIT (OUTPATIENT)
Dept: CARDIOLOGY CLINIC | Age: 73
End: 2023-08-22
Payer: MEDICARE

## 2023-08-22 VITALS
HEART RATE: 82 BPM | BODY MASS INDEX: 35.2 KG/M2 | OXYGEN SATURATION: 97 % | DIASTOLIC BLOOD PRESSURE: 74 MMHG | HEIGHT: 66 IN | WEIGHT: 219 LBS | SYSTOLIC BLOOD PRESSURE: 128 MMHG

## 2023-08-22 DIAGNOSIS — I50.32 CHRONIC DIASTOLIC HEART FAILURE (HCC): Primary | ICD-10-CM

## 2023-08-22 DIAGNOSIS — I35.1 AORTIC VALVE INSUFFICIENCY, ETIOLOGY OF CARDIAC VALVE DISEASE UNSPECIFIED: ICD-10-CM

## 2023-08-22 DIAGNOSIS — I10 PRIMARY HYPERTENSION: ICD-10-CM

## 2023-08-22 DIAGNOSIS — G47.33 OSA (OBSTRUCTIVE SLEEP APNEA): ICD-10-CM

## 2023-08-22 DIAGNOSIS — E78.5 HYPERLIPIDEMIA, UNSPECIFIED HYPERLIPIDEMIA TYPE: ICD-10-CM

## 2023-08-22 PROCEDURE — 99214 OFFICE O/P EST MOD 30 MIN: CPT | Performed by: INTERNAL MEDICINE

## 2023-08-22 PROCEDURE — 3017F COLORECTAL CA SCREEN DOC REV: CPT | Performed by: INTERNAL MEDICINE

## 2023-08-22 PROCEDURE — 1036F TOBACCO NON-USER: CPT | Performed by: INTERNAL MEDICINE

## 2023-08-22 PROCEDURE — 1123F ACP DISCUSS/DSCN MKR DOCD: CPT | Performed by: INTERNAL MEDICINE

## 2023-08-22 PROCEDURE — G8399 PT W/DXA RESULTS DOCUMENT: HCPCS | Performed by: INTERNAL MEDICINE

## 2023-08-22 PROCEDURE — 1090F PRES/ABSN URINE INCON ASSESS: CPT | Performed by: INTERNAL MEDICINE

## 2023-08-22 PROCEDURE — 3074F SYST BP LT 130 MM HG: CPT | Performed by: INTERNAL MEDICINE

## 2023-08-22 PROCEDURE — G8417 CALC BMI ABV UP PARAM F/U: HCPCS | Performed by: INTERNAL MEDICINE

## 2023-08-22 PROCEDURE — G8427 DOCREV CUR MEDS BY ELIG CLIN: HCPCS | Performed by: INTERNAL MEDICINE

## 2023-08-22 PROCEDURE — 3078F DIAST BP <80 MM HG: CPT | Performed by: INTERNAL MEDICINE

## 2023-08-23 ENCOUNTER — TELEPHONE (OUTPATIENT)
Dept: ENT CLINIC | Age: 73
End: 2023-08-23

## 2023-08-23 ENCOUNTER — OFFICE VISIT (OUTPATIENT)
Dept: ORTHOPEDIC SURGERY | Age: 73
End: 2023-08-23
Payer: MEDICARE

## 2023-08-23 VITALS — HEIGHT: 66 IN | WEIGHT: 219 LBS | BODY MASS INDEX: 35.2 KG/M2

## 2023-08-23 DIAGNOSIS — S42.292D OTHER CLOSED DISPLACED FRACTURE OF PROXIMAL END OF LEFT HUMERUS WITH ROUTINE HEALING, SUBSEQUENT ENCOUNTER: Primary | ICD-10-CM

## 2023-08-23 DIAGNOSIS — Z87.81 S/P ORIF (OPEN REDUCTION INTERNAL FIXATION) FRACTURE: ICD-10-CM

## 2023-08-23 DIAGNOSIS — Z98.890 S/P ORIF (OPEN REDUCTION INTERNAL FIXATION) FRACTURE: ICD-10-CM

## 2023-08-23 PROCEDURE — 3017F COLORECTAL CA SCREEN DOC REV: CPT | Performed by: PHYSICIAN ASSISTANT

## 2023-08-23 PROCEDURE — 1123F ACP DISCUSS/DSCN MKR DOCD: CPT | Performed by: PHYSICIAN ASSISTANT

## 2023-08-23 PROCEDURE — G8399 PT W/DXA RESULTS DOCUMENT: HCPCS | Performed by: PHYSICIAN ASSISTANT

## 2023-08-23 PROCEDURE — 1090F PRES/ABSN URINE INCON ASSESS: CPT | Performed by: PHYSICIAN ASSISTANT

## 2023-08-23 PROCEDURE — 99213 OFFICE O/P EST LOW 20 MIN: CPT | Performed by: PHYSICIAN ASSISTANT

## 2023-08-23 PROCEDURE — 1036F TOBACCO NON-USER: CPT | Performed by: PHYSICIAN ASSISTANT

## 2023-08-23 PROCEDURE — G8417 CALC BMI ABV UP PARAM F/U: HCPCS | Performed by: PHYSICIAN ASSISTANT

## 2023-08-23 PROCEDURE — G8427 DOCREV CUR MEDS BY ELIG CLIN: HCPCS | Performed by: PHYSICIAN ASSISTANT

## 2023-08-23 NOTE — TELEPHONE ENCOUNTER
Pt.states she was told by you to call the office and talk to you about Inspire when she got her weight down.

## 2023-08-23 NOTE — TELEPHONE ENCOUNTER
Left a message for the patient to call back to schedule an appointment with Dr. Bhakti Crowder to discuss Inspire. - Also left on vm that I would go over things with her if she calls back to schedule the appointment.

## 2023-08-23 NOTE — PROGRESS NOTES
showed no issues with brachial plexus    Cont PT/HEP as needed   Encouraged follow-up with [facility]  Activity as tolerated    Follow-up on an as-needed basis  If the patient continues having issues in the future, we could consider and/or lysis of adhesions, but would not recommend at this time.       Clau Astudillo PA-C

## 2023-08-24 ENCOUNTER — OFFICE VISIT (OUTPATIENT)
Dept: ENT CLINIC | Age: 73
End: 2023-08-24
Payer: MEDICARE

## 2023-08-24 ENCOUNTER — TELEPHONE (OUTPATIENT)
Dept: CARDIOLOGY CLINIC | Age: 73
End: 2023-08-24

## 2023-08-24 VITALS
HEART RATE: 72 BPM | OXYGEN SATURATION: 97 % | BODY MASS INDEX: 34.39 KG/M2 | DIASTOLIC BLOOD PRESSURE: 73 MMHG | WEIGHT: 214 LBS | TEMPERATURE: 96.9 F | HEIGHT: 66 IN | SYSTOLIC BLOOD PRESSURE: 132 MMHG

## 2023-08-24 DIAGNOSIS — G47.33 OBSTRUCTIVE SLEEP APNEA: Primary | ICD-10-CM

## 2023-08-24 DIAGNOSIS — Z78.9 INTOLERANCE OF CONTINUOUS POSITIVE AIRWAY PRESSURE (CPAP) VENTILATION: ICD-10-CM

## 2023-08-24 PROCEDURE — 3074F SYST BP LT 130 MM HG: CPT | Performed by: STUDENT IN AN ORGANIZED HEALTH CARE EDUCATION/TRAINING PROGRAM

## 2023-08-24 PROCEDURE — 1036F TOBACCO NON-USER: CPT | Performed by: STUDENT IN AN ORGANIZED HEALTH CARE EDUCATION/TRAINING PROGRAM

## 2023-08-24 PROCEDURE — 99213 OFFICE O/P EST LOW 20 MIN: CPT | Performed by: STUDENT IN AN ORGANIZED HEALTH CARE EDUCATION/TRAINING PROGRAM

## 2023-08-24 PROCEDURE — 1123F ACP DISCUSS/DSCN MKR DOCD: CPT | Performed by: STUDENT IN AN ORGANIZED HEALTH CARE EDUCATION/TRAINING PROGRAM

## 2023-08-24 PROCEDURE — 1090F PRES/ABSN URINE INCON ASSESS: CPT | Performed by: STUDENT IN AN ORGANIZED HEALTH CARE EDUCATION/TRAINING PROGRAM

## 2023-08-24 PROCEDURE — G8427 DOCREV CUR MEDS BY ELIG CLIN: HCPCS | Performed by: STUDENT IN AN ORGANIZED HEALTH CARE EDUCATION/TRAINING PROGRAM

## 2023-08-24 PROCEDURE — 3078F DIAST BP <80 MM HG: CPT | Performed by: STUDENT IN AN ORGANIZED HEALTH CARE EDUCATION/TRAINING PROGRAM

## 2023-08-24 PROCEDURE — 3017F COLORECTAL CA SCREEN DOC REV: CPT | Performed by: STUDENT IN AN ORGANIZED HEALTH CARE EDUCATION/TRAINING PROGRAM

## 2023-08-24 PROCEDURE — G8399 PT W/DXA RESULTS DOCUMENT: HCPCS | Performed by: STUDENT IN AN ORGANIZED HEALTH CARE EDUCATION/TRAINING PROGRAM

## 2023-08-24 PROCEDURE — G8417 CALC BMI ABV UP PARAM F/U: HCPCS | Performed by: STUDENT IN AN ORGANIZED HEALTH CARE EDUCATION/TRAINING PROGRAM

## 2023-08-24 NOTE — TELEPHONE ENCOUNTER
CARDIAC CLEARANCE     What type of procedure are you having? Drug induced sleep endoscopy    Which physician is performing your procedure? Irvin Garcia     When is your procedure scheduled for?  8/30/23    Where are you having this procedure? [facility]    Are you taking Blood Thinners? If so what? (Name/dose/frequesncy)  Asprine, low dose     Does the surgeon want you to stop your blood thinner? If so for how long?   5-7 days    Phone Number and Contact Name for Physicians office:  [address] [place] [address] 690 [phone]        Fax number to send information:

## 2023-08-24 NOTE — PROGRESS NOTES
[facility] [address] VISIT      Patient Name: Twyla Barlow Record Number:  [id number]  Primary Care Physician:  Sharon Acosta MD    ChiefComplaint     Chief Complaint   Patient presents with    Other     Discuss inspire        History of Present Illness     Patricia Brice is an 74 y.o. female previously seen for a obstructive sleep apnea. Interval History:   She has continued to lose significant mount of weight. She has been using weight watchers. No history of neck surgery. On baby aspirin daily, no other blood thinners or anticoagulants. She is still very interested in [employer]    Past Medical History     Past Medical History:   Diagnosis Date    Arthritis     Back pain     CHF (congestive heart failure) (HCC)     Chronic diastolic heart failure (HCC)     Diabetes mellitus ([address])     Diabetes mellitus ([address])     TYPE 2    GERD (gastroesophageal reflux disease)     High cholesterol     Hypertension     Malignant neoplasm of right female breast ([address]) 05/03/2022    Polio     Polio     AS INFANT    Shingles     OCT 2013    Sleep apnea     Urinary problem     HAS BASHFUL BLADDER       Past Surgical History     Past Surgical History:   Procedure Laterality Date    ARM SURGERY Left     BACK SURGERY      BACK SURGERY      1/13/14 x2    BREAST BIOPSY Right 05/03/2022    RIGHT RADIOFREQUENCY IDENTIFICATION TAG LOCALIZED PARTIAL MASTECTOMY RIGHT SENTINEL LYMPH NODE BIOPSY performed by Loraine Guardado MD at [facility] Right 05/03/2022    .  performed by Loraine Guardado MD at [facility] Left 08/23/2022    OPEN REDUCTION INTERNAL FIXATION LEFT PROXIMAL HUMERUS AND SHAFT FRACTURES-REGIONAL-SYNTHES performed by Rangel Russell MD at [facility]      bilateral knee replacements    JOINT REPLACEMENT      [facility]

## 2023-08-25 NOTE — PROGRESS NOTES
Name_______________________________________Printed:____________________  Date and time of surgery__8/30/2023 1230______________________Arrival Time:___1100_____________   1. The instructions given regarding when and if a patient needs to stop oral intake prior to surgery varies. Follow the specific instructions you were given                  _x  __Nothing to eat or to drink after Midnight the night before.                   ____Carbo loading or instructions will be given to select patients-if you have been given those instructions -please do the following                           The evening before your surgery after dinner before midnight drink 40 ounces of gatorade. If you are diabetic use sugar free. The morning of surgery drink 40 ounces of water. This needs to be finished 3 hours prior to your surgery start time. 2. Take the following pills with a small sip of water on the morning of surgery__nifedipine metoprolol and pantoprazole_________________________________________________                  Do not take blood pressure medications ending in pril or sartan the desirae prior to surgery or the morning of surgery. Dr Jone Toussaint patient are not to take any medications the AM of surgery. 3. Aspirin, Ibuprofen, Advil, Naproxen, Vitamin E and other Anti-inflammatory products and supplements should be stopped for 5 -7days before surgery or as directed by your physician. 4. Check with your Doctor regarding stopping Plavix, Coumadin,Eliquis, Lovenox,Effient,Pradaxa,Xarelto, Fragmin or other blood thinners and follow their instructions. 5. Do not smoke, and do not drink any alcoholic beverages 24 hours prior to surgery. This includes NA Beer. Refrain from the usage of any recreational drugs. 6. You may brush your teeth and gargle the morning of surgery. DO NOT SWALLOW WATER   7. You MUST make arrangements for a responsible adult to stay on site while you are here and take you home after your surgery.  You

## 2023-08-28 NOTE — TELEPHONE ENCOUNTER
Clearance letter sent electronically to Dr Ritu Gloria office. Daryl Whatley called and informed of above information.

## 2023-08-30 ENCOUNTER — ANESTHESIA (OUTPATIENT)
Dept: OPERATING ROOM | Age: 73
End: 2023-08-30
Payer: MEDICARE

## 2023-08-30 ENCOUNTER — ANESTHESIA EVENT (OUTPATIENT)
Dept: OPERATING ROOM | Age: 73
End: 2023-08-30
Payer: MEDICARE

## 2023-08-30 ENCOUNTER — HOSPITAL ENCOUNTER (OUTPATIENT)
Age: 73
Setting detail: OUTPATIENT SURGERY
Discharge: HOME | End: 2023-08-30
Attending: STUDENT IN AN ORGANIZED HEALTH CARE EDUCATION/TRAINING PROGRAM | Admitting: STUDENT IN AN ORGANIZED HEALTH CARE EDUCATION/TRAINING PROGRAM
Payer: MEDICARE

## 2023-08-30 VITALS
TEMPERATURE: 97.7 F | BODY MASS INDEX: 34.92 KG/M2 | WEIGHT: 217.31 LBS | RESPIRATION RATE: 20 BRPM | DIASTOLIC BLOOD PRESSURE: 70 MMHG | HEART RATE: 69 BPM | OXYGEN SATURATION: 99 % | SYSTOLIC BLOOD PRESSURE: 170 MMHG | HEIGHT: 66 IN

## 2023-08-30 LAB
GLUCOSE BLD-MCNC: 71 MG/DL (ref 70–99)
GLUCOSE BLD-MCNC: 89 MG/DL (ref 70–99)
PERFORMED ON: NORMAL
PERFORMED ON: NORMAL

## 2023-08-30 PROCEDURE — 6360000002 HC RX W HCPCS: Performed by: NURSE ANESTHETIST, CERTIFIED REGISTERED

## 2023-08-30 PROCEDURE — 7100000011 HC PHASE II RECOVERY - ADDTL 15 MIN: Performed by: STUDENT IN AN ORGANIZED HEALTH CARE EDUCATION/TRAINING PROGRAM

## 2023-08-30 PROCEDURE — 3700000000 HC ANESTHESIA ATTENDED CARE: Performed by: STUDENT IN AN ORGANIZED HEALTH CARE EDUCATION/TRAINING PROGRAM

## 2023-08-30 PROCEDURE — 2500000003 HC RX 250 WO HCPCS: Performed by: NURSE ANESTHETIST, CERTIFIED REGISTERED

## 2023-08-30 PROCEDURE — 3600000002 HC SURGERY LEVEL 2 BASE: Performed by: STUDENT IN AN ORGANIZED HEALTH CARE EDUCATION/TRAINING PROGRAM

## 2023-08-30 PROCEDURE — 7100000001 HC PACU RECOVERY - ADDTL 15 MIN: Performed by: STUDENT IN AN ORGANIZED HEALTH CARE EDUCATION/TRAINING PROGRAM

## 2023-08-30 PROCEDURE — 2580000003 HC RX 258: Performed by: ANESTHESIOLOGY

## 2023-08-30 PROCEDURE — 2709999900 HC NON-CHARGEABLE SUPPLY: Performed by: STUDENT IN AN ORGANIZED HEALTH CARE EDUCATION/TRAINING PROGRAM

## 2023-08-30 PROCEDURE — 7100000000 HC PACU RECOVERY - FIRST 15 MIN: Performed by: STUDENT IN AN ORGANIZED HEALTH CARE EDUCATION/TRAINING PROGRAM

## 2023-08-30 PROCEDURE — 2720000010 HC SURG SUPPLY STERILE: Performed by: STUDENT IN AN ORGANIZED HEALTH CARE EDUCATION/TRAINING PROGRAM

## 2023-08-30 PROCEDURE — 42975 DISE EVAL SLP DO BRTH FLX DX: CPT | Performed by: STUDENT IN AN ORGANIZED HEALTH CARE EDUCATION/TRAINING PROGRAM

## 2023-08-30 PROCEDURE — 3600000012 HC SURGERY LEVEL 2 ADDTL 15MIN: Performed by: STUDENT IN AN ORGANIZED HEALTH CARE EDUCATION/TRAINING PROGRAM

## 2023-08-30 PROCEDURE — 3700000001 HC ADD 15 MINUTES (ANESTHESIA): Performed by: STUDENT IN AN ORGANIZED HEALTH CARE EDUCATION/TRAINING PROGRAM

## 2023-08-30 PROCEDURE — 7100000010 HC PHASE II RECOVERY - FIRST 15 MIN: Performed by: STUDENT IN AN ORGANIZED HEALTH CARE EDUCATION/TRAINING PROGRAM

## 2023-08-30 RX ORDER — SODIUM CHLORIDE 9 MG/ML
INJECTION, SOLUTION INTRAVENOUS PRN
Status: DISCONTINUED | OUTPATIENT
Start: 2023-08-30 | End: 2023-08-30 | Stop reason: HOSPADM

## 2023-08-30 RX ORDER — SODIUM CHLORIDE 0.9 % (FLUSH) 0.9 %
5-40 SYRINGE (ML) INJECTION PRN
Status: DISCONTINUED | OUTPATIENT
Start: 2023-08-30 | End: 2023-08-30 | Stop reason: HOSPADM

## 2023-08-30 RX ORDER — SODIUM CHLORIDE 9 MG/ML
INJECTION, SOLUTION INTRAVENOUS CONTINUOUS
Status: CANCELLED | OUTPATIENT
Start: 2023-08-30

## 2023-08-30 RX ORDER — PROPOFOL 10 MG/ML
INJECTION, EMULSION INTRAVENOUS CONTINUOUS PRN
Status: DISCONTINUED | OUTPATIENT
Start: 2023-08-30 | End: 2023-08-30 | Stop reason: SDUPTHER

## 2023-08-30 RX ORDER — HYDROMORPHONE HYDROCHLORIDE 2 MG/ML
0.25 INJECTION, SOLUTION INTRAMUSCULAR; INTRAVENOUS; SUBCUTANEOUS EVERY 5 MIN PRN
Status: CANCELLED | OUTPATIENT
Start: 2023-08-30

## 2023-08-30 RX ORDER — HYDROMORPHONE HYDROCHLORIDE 2 MG/ML
0.5 INJECTION, SOLUTION INTRAMUSCULAR; INTRAVENOUS; SUBCUTANEOUS EVERY 5 MIN PRN
Status: CANCELLED | OUTPATIENT
Start: 2023-08-30

## 2023-08-30 RX ORDER — ONDANSETRON 2 MG/ML
4 INJECTION INTRAMUSCULAR; INTRAVENOUS
Status: CANCELLED | OUTPATIENT
Start: 2023-08-30 | End: 2023-08-31

## 2023-08-30 RX ORDER — SODIUM CHLORIDE 0.9 % (FLUSH) 0.9 %
5-40 SYRINGE (ML) INJECTION EVERY 12 HOURS SCHEDULED
Status: CANCELLED | OUTPATIENT
Start: 2023-08-30

## 2023-08-30 RX ORDER — SODIUM CHLORIDE 0.9 % (FLUSH) 0.9 %
5-40 SYRINGE (ML) INJECTION PRN
Status: CANCELLED | OUTPATIENT
Start: 2023-08-30

## 2023-08-30 RX ORDER — LABETALOL HYDROCHLORIDE 5 MG/ML
5 INJECTION, SOLUTION INTRAVENOUS
Status: CANCELLED | OUTPATIENT
Start: 2023-08-30

## 2023-08-30 RX ORDER — SODIUM CHLORIDE 0.9 % (FLUSH) 0.9 %
5-40 SYRINGE (ML) INJECTION EVERY 12 HOURS SCHEDULED
Status: DISCONTINUED | OUTPATIENT
Start: 2023-08-30 | End: 2023-08-30 | Stop reason: HOSPADM

## 2023-08-30 RX ORDER — GABAPENTIN 100 MG/1
100 CAPSULE ORAL 3 TIMES DAILY
COMMUNITY

## 2023-08-30 RX ORDER — LIDOCAINE HYDROCHLORIDE 10 MG/ML
1 INJECTION, SOLUTION EPIDURAL; INFILTRATION; INTRACAUDAL; PERINEURAL
Status: DISCONTINUED | OUTPATIENT
Start: 2023-08-30 | End: 2023-08-30 | Stop reason: HOSPADM

## 2023-08-30 RX ORDER — LIDOCAINE HYDROCHLORIDE 20 MG/ML
INJECTION, SOLUTION INFILTRATION; PERINEURAL PRN
Status: DISCONTINUED | OUTPATIENT
Start: 2023-08-30 | End: 2023-08-30 | Stop reason: SDUPTHER

## 2023-08-30 RX ORDER — SODIUM CHLORIDE 9 MG/ML
INJECTION, SOLUTION INTRAVENOUS PRN
Status: CANCELLED | OUTPATIENT
Start: 2023-08-30

## 2023-08-30 RX ADMIN — PROPOFOL 100 MCG/KG/MIN: 10 INJECTION, EMULSION INTRAVENOUS at 14:03

## 2023-08-30 RX ADMIN — LIDOCAINE HYDROCHLORIDE 100 MG: 20 INJECTION, SOLUTION INFILTRATION; PERINEURAL at 14:03

## 2023-08-30 RX ADMIN — SODIUM CHLORIDE: 9 INJECTION, SOLUTION INTRAVENOUS at 12:14

## 2023-08-30 ASSESSMENT — PAIN - FUNCTIONAL ASSESSMENT: PAIN_FUNCTIONAL_ASSESSMENT: 0-10

## 2023-08-30 ASSESSMENT — PAIN SCALES - GENERAL
PAINLEVEL_OUTOF10: 0
PAINLEVEL_OUTOF10: 0

## 2023-08-30 NOTE — ANESTHESIA POSTPROCEDURE EVALUATION
Department of Anesthesiology  Postprocedure Note    Patient: Kathy Triplett  MRN: [id number]  YOB: 1950  Date of evaluation: 8/30/2023      Procedure Summary     Date: 08/30/23 Room / Location: [address]    Anesthesia Start: 1401 Anesthesia Stop: 6119    Procedure: DRUG INDUCED SLEEP ENDOSCOPY (Nose) Diagnosis:       Obstructive sleep apnea      (Obstructive sleep apnea [G47.33])    Surgeons: Kee Jones DO Responsible Provider: Tristen Ramirez MD    Anesthesia Type: MAC ASA Status: 3          Anesthesia Type: No value filed.     Aimee Phase I: Aimee Score: 8    Aimee Phase II:        Anesthesia Post Evaluation    Patient location during evaluation: PACU  Patient participation: complete - patient participated  Level of consciousness: awake and alert  Airway patency: patent  Nausea & Vomiting: no nausea and no vomiting  Complications: no  Cardiovascular status: blood pressure returned to baseline  Respiratory status: acceptable  Hydration status: euvolemic  Multimodal analgesia pain management approach  Pain management: adequate

## 2023-08-30 NOTE — PROGRESS NOTES
All discharge information explained to patient and responsible party (friend Brionna George in waiting room) to include home medications,  diet, activity (including fall risk),     Patient/ responsible party voiced clear understanding of discharge instructions.  Papers signed and copies given

## 2023-08-30 NOTE — ANESTHESIA PRE PROCEDURE
Department of Anesthesiology  Preprocedure Note       Name:  Dima Holman   Age:  74 y.o.  :  1950                                          MRN:  [id number]         Date:  2023      Surgeon: Padmini Weller):  Juan Carlos Sanchez DO    Procedure: Procedure(s):  DRUG INDUCED SLEEP ENDOSCOPY    Medications prior to admission:   Prior to Admission medications    Medication Sig Start Date End Date Taking?  Authorizing Provider   furosemide (LASIX) 20 MG tablet Take 2 tablets by mouth daily 23   Elsy Woodruff MD   metoprolol succinate (TOPROL XL) 25 MG extended release tablet Take 1 tablet by mouth once daily 23   Elsy Woodruff MD   spironolactone (ALDACTONE) 25 MG tablet Take 1 tablet by mouth once daily 5/3/23   Elsy Woodruff MD   TRULICITY 1.5 AA/7.6XO SC injection  23   Historical Provider, MD   NIFEdipine (PROCARDIA XL) 30 MG extended release tablet Take 1 tablet by mouth daily 3/2/23   Elsy Woodruff MD   losartan (COZAAR) 50 MG tablet Take 2 tablets by mouth daily 23   Elsy Woodruff MD   Cholecalciferol (VITAMIN D3 PO) Take 1,000 mg by mouth    Historical Provider, MD   Multiple Vitamins-Minerals (HAIR SKIN AND NAILS FORMULA) TABS Take by mouth    Historical Provider, MD   vitamin C (ASCORBIC ACID) 500 MG tablet Take 1 tablet by mouth daily    Historical Provider, MD   Multiple Vitamins-Minerals (THERAPEUTIC MULTIVITAMIN-MINERALS) tablet Take 1 tablet by mouth daily Essential 1 Pro caps    Historical Provider, MD   metoprolol succinate (TOPROL XL) 50 MG extended release tablet Take 1 tablet by mouth daily 2/15/22   Elsy Woodruff MD   meloxicam (MOBIC) 15 MG tablet Take 0.5 tablets by mouth daily    Historical Provider, MD   insulin glargine (LANTUS) 100 UNIT/ML injection vial Inject 26 Units into the skin nightly    Historical Provider, MD   pantoprazole (PROTONIX) 40 MG tablet Take 1 tablet by mouth daily 20   Historical Provider, MD   famotidine (PEPCID) 40 MG tablet Take 1

## 2023-08-30 NOTE — H&P
Date of Surgery Update:  Fiordaliza Valenzuela was seen, history and physical examination reviewed, and patient examined by me today. There have been no significant clinical changes since the completion of the previous history and physical.    The risk, benefits, and alternatives of the proposed procedure have been explained to the patient (or appropriate guardian) and understanding verbalized. All questions answered. Patient wishes to proceed.     Electronically signed by: Libby Noe DO,8/30/2023,1:40 PM

## 2023-08-30 NOTE — PROGRESS NOTES
Arrived into PACU following procedure with Dr Julie Collet ( Drug induced sleep endoscopy)  Report received from [address] [place] [address] St and CRNA . Awake answering questions. Denies pain or nausea. (Just slight discomfort right nares)  VSS. Monitor shows SR with 1st degree block. Will continue to monitor.

## 2023-08-30 NOTE — DISCHARGE INSTRUCTIONS
ANESTHESIA DISCHARGE INSTRUCTIONS    Wear your seatbelt home. You are under the influence of drugs-do not drink alcohol, drive, operate machinery, make any important decisions or sign any legal documents for 24 hours. Children should not ride bikes, [place] or play on gym sets for 24 hours after surgery. A responsible adult needs to be with you for 24 hours. You may experience lightheadedness, dizziness, or sleepiness following surgery. Rest at home today- increase activity as tolerated. Progress slowly to a regular diet unless your physician has instructed you otherwise. Drink plenty of water. If persistent nausea and vomiting becomes a problem, call your physician. Coughing, sore throat and muscle aches are other side effects of anesthesia, and should improve with time.

## 2023-08-30 NOTE — PROGRESS NOTES
Blood sugar= 71. Given pt apple juice per her request. Meets criteria to be discharged from phase 1 of recovery care. Will plan on discharge in phase 2.   Dr Chelsey Garcia at bedside to speak with pt

## 2023-08-30 NOTE — OP NOTE
[facility]  OPERATIVE REPORT    Patient Name: Mary Shannon  YOB: 1950  Medical Record Number:  [id number]  Billing Number:  [id number]  Date of Procedure: [unfilled]  Time: 5860    Pre Operative Diagnoses: Obstructive Sleep Apnea  Post Operative Diagnoses:  Obstructive Sleep Apnea           Procedure:  1. Drug Induced Sleep endoscopy (52591)       Surgeon: Jazmin Connor DO    OR Staff/ Assistant:  Circulator: Rg Yi RN; Liset Levin RN  Scrub Person First: Jaspreet Corporal  Circulator Assist: Gaetana Fleischer    Anesthesia:  Sedation     Findings:  1) Anterior posterior collapse    Indications: This is a 74 y.o. female with history of moderate to severe symptomatic obstructive sleep apnea, who is intolerant unable to achieve benefit with positive pressure therapy, presents today for drug-induced sleep endoscopy to better characterize her locations and pattern of obstruction to predict appropriate medical and/or surgical options moving forward. Risks and benefits discussed with the patient including alternate treatment options, Informed consent was obtained, the patient elected to proceed with the planned procedure. DETAILS OF PROCEDURE(S):       The patient was brought to the operating room and was anesthetized via the standard drug-induced sleep endoscopy protocol. The propofol infusion rate was started at 100 MCG and increase gradually to level at which conditions that mimic sleep were gradually observed. With the patient unresponsive to verbal commands, but still with spontaneous respiration, sleep disordered breathing events and associated desaturations were clearly observed. Under these conditions, flexible endoscope was inserted to examine both sides of the nose as well as the pharynx. The nose was relatively unremarkable. The retropatellar space showed a more obliquely oriented palate.   A mild lateral wall component was noted, but the palatal collapse was primarily an anterior posterior fashion. More distally, mild lateral oropharyngeal wall component was noted, but again no complete lateral oropharyngeal collapse. In the hypopharynx, a very large, tongue base is observed in complete anterior-posterior retrolingual/retroepiglottic obstruction. In summary, there is no evidence of complete concentric palatal obstruction and does appear to be a candidate anatomically for hypoglossal nerve stimulation therapy. I was present for and performed the entire procedure. Estimated Blood Loss: 0mL                 Specimens: None           Complications: There were no complications.     Bhakti Ring DO

## 2023-09-05 RX ORDER — METOPROLOL SUCCINATE 25 MG/1
TABLET, EXTENDED RELEASE ORAL
Qty: 90 TABLET | Refills: 0 | Status: SHIPPED | OUTPATIENT
Start: 2023-09-05

## 2023-09-05 NOTE — TELEPHONE ENCOUNTER
Received refill request for metoprolol from [address] [place] [address].     Last ov: 08/22/2023 LES    Last Refill: 06/13/2023 #90 w/ 0 refills    Next appointment: 02/18/2024 LES   (RECALL LIST)

## 2023-09-06 ENCOUNTER — OFFICE VISIT (OUTPATIENT)
Dept: PULMONOLOGY | Age: 73
End: 2023-09-06
Payer: MEDICARE

## 2023-09-06 VITALS
DIASTOLIC BLOOD PRESSURE: 62 MMHG | OXYGEN SATURATION: 97 % | HEIGHT: 66 IN | HEART RATE: 79 BPM | BODY MASS INDEX: 35.03 KG/M2 | SYSTOLIC BLOOD PRESSURE: 134 MMHG | WEIGHT: 218 LBS

## 2023-09-06 DIAGNOSIS — Z99.89 OSA ON CPAP: Primary | ICD-10-CM

## 2023-09-06 DIAGNOSIS — G47.33 OSA ON CPAP: Primary | ICD-10-CM

## 2023-09-06 DIAGNOSIS — I10 PRIMARY HYPERTENSION: ICD-10-CM

## 2023-09-06 DIAGNOSIS — E66.01 CLASS 2 SEVERE OBESITY DUE TO EXCESS CALORIES WITH SERIOUS COMORBIDITY AND BODY MASS INDEX (BMI) OF 35.0 TO 35.9 IN ADULT (HCC): ICD-10-CM

## 2023-09-06 PROCEDURE — 3075F SYST BP GE 130 - 139MM HG: CPT | Performed by: INTERNAL MEDICINE

## 2023-09-06 PROCEDURE — 99204 OFFICE O/P NEW MOD 45 MIN: CPT | Performed by: INTERNAL MEDICINE

## 2023-09-06 PROCEDURE — G8427 DOCREV CUR MEDS BY ELIG CLIN: HCPCS | Performed by: INTERNAL MEDICINE

## 2023-09-06 PROCEDURE — 1123F ACP DISCUSS/DSCN MKR DOCD: CPT | Performed by: INTERNAL MEDICINE

## 2023-09-06 PROCEDURE — 1090F PRES/ABSN URINE INCON ASSESS: CPT | Performed by: INTERNAL MEDICINE

## 2023-09-06 PROCEDURE — 1036F TOBACCO NON-USER: CPT | Performed by: INTERNAL MEDICINE

## 2023-09-06 PROCEDURE — G8399 PT W/DXA RESULTS DOCUMENT: HCPCS | Performed by: INTERNAL MEDICINE

## 2023-09-06 PROCEDURE — 3078F DIAST BP <80 MM HG: CPT | Performed by: INTERNAL MEDICINE

## 2023-09-06 PROCEDURE — G8417 CALC BMI ABV UP PARAM F/U: HCPCS | Performed by: INTERNAL MEDICINE

## 2023-09-06 PROCEDURE — 3017F COLORECTAL CA SCREEN DOC REV: CPT | Performed by: INTERNAL MEDICINE

## 2023-09-06 NOTE — PROGRESS NOTES
PULMONARY OFFICE NEW PATIENT VISIT    CONSULTING PHYSICIAN:  Carola Hooks MD , Arnulfo Guerra DO     REASON FOR VISIT:   Chief Complaint   Patient presents with    New Patient     Ref: Dr. Lupe Morales to discuss Inspire        DATE OF VISIT: 9/6/2023    HISTORY OF PRESENT ILLNESS: 74y.o. year old female is into the pulmonary office for the first time. Patient reports that for last several years she had been having poor quality sleep with snoring, gasping, choking at nighttime. This led to a sleep study in May 2021 which showed she had moderate obstructive sleep apnea. She was started on CPAP therapy. She used it for about a year. She had difficulty getting used to the CPAP therapy. Thereafter in late 2022 she had severe left arm fracture requiring surgery with subsequent weakness/non-functional left hand. Has been unable to use CPAP as she can no longer manipulate the mask onto her face/head. Without the CPAP therapy she has been having her sleep symptoms come back and feeling sleepy through the day. She has been trying to lose weight by following with weight watchers and exercising regularly. She is exploring alternative options to CPAP which she can use consistently in order to relieve her sleep symptoms. She has already undergone drug-induced sleep endoscopy which showed favorable anatomy of the posterior pharyngeal area with anterior-posterior collapse of the airway. REVIEW OF SYSTEMS:   CONSTITUTIONAL SYMPTOMS: The patient denies fever, fatigue, night sweats, weight loss or weight gain. HEENT: No vision changes. No tinnitus, Denies sinus pain. No hoarseness, or dysphagia. NECK: Patient denies swelling in the neck. CARDIOVASCULAR: Denies chest pain, palpitation, syncope. RESPIRATORY: As per HPI. GASTROINTESTINAL: Denies nausea, abdominal pain or change in bowel function. GENITOURINARY: Denies obstructive symptoms. No history of incontinence.   BREASTS:

## 2023-09-12 ENCOUNTER — OFFICE VISIT (OUTPATIENT)
Dept: ENT CLINIC | Age: 73
End: 2023-09-12
Payer: MEDICARE

## 2023-09-12 VITALS
SYSTOLIC BLOOD PRESSURE: 117 MMHG | WEIGHT: 212 LBS | OXYGEN SATURATION: 97 % | HEIGHT: 66 IN | TEMPERATURE: 97.3 F | DIASTOLIC BLOOD PRESSURE: 70 MMHG | HEART RATE: 75 BPM | BODY MASS INDEX: 34.07 KG/M2

## 2023-09-12 DIAGNOSIS — Z78.9 INTOLERANCE OF CONTINUOUS POSITIVE AIRWAY PRESSURE (CPAP) VENTILATION: ICD-10-CM

## 2023-09-12 DIAGNOSIS — G47.33 OBSTRUCTIVE SLEEP APNEA: Primary | ICD-10-CM

## 2023-09-12 PROCEDURE — G8417 CALC BMI ABV UP PARAM F/U: HCPCS | Performed by: STUDENT IN AN ORGANIZED HEALTH CARE EDUCATION/TRAINING PROGRAM

## 2023-09-12 PROCEDURE — G8427 DOCREV CUR MEDS BY ELIG CLIN: HCPCS | Performed by: STUDENT IN AN ORGANIZED HEALTH CARE EDUCATION/TRAINING PROGRAM

## 2023-09-12 PROCEDURE — 3078F DIAST BP <80 MM HG: CPT | Performed by: STUDENT IN AN ORGANIZED HEALTH CARE EDUCATION/TRAINING PROGRAM

## 2023-09-12 PROCEDURE — 1090F PRES/ABSN URINE INCON ASSESS: CPT | Performed by: STUDENT IN AN ORGANIZED HEALTH CARE EDUCATION/TRAINING PROGRAM

## 2023-09-12 PROCEDURE — 3074F SYST BP LT 130 MM HG: CPT | Performed by: STUDENT IN AN ORGANIZED HEALTH CARE EDUCATION/TRAINING PROGRAM

## 2023-09-12 PROCEDURE — 1036F TOBACCO NON-USER: CPT | Performed by: STUDENT IN AN ORGANIZED HEALTH CARE EDUCATION/TRAINING PROGRAM

## 2023-09-12 PROCEDURE — 99212 OFFICE O/P EST SF 10 MIN: CPT | Performed by: STUDENT IN AN ORGANIZED HEALTH CARE EDUCATION/TRAINING PROGRAM

## 2023-09-12 PROCEDURE — G8399 PT W/DXA RESULTS DOCUMENT: HCPCS | Performed by: STUDENT IN AN ORGANIZED HEALTH CARE EDUCATION/TRAINING PROGRAM

## 2023-09-12 PROCEDURE — 1123F ACP DISCUSS/DSCN MKR DOCD: CPT | Performed by: STUDENT IN AN ORGANIZED HEALTH CARE EDUCATION/TRAINING PROGRAM

## 2023-09-12 PROCEDURE — 3017F COLORECTAL CA SCREEN DOC REV: CPT | Performed by: STUDENT IN AN ORGANIZED HEALTH CARE EDUCATION/TRAINING PROGRAM

## 2023-09-12 NOTE — PROGRESS NOTES
[facility] [address] VISIT      Patient Name: Twyla Barlow Record Number:  [id number]  Primary Care Physician:  John Ornelas MD    ChiefComplaint     Chief Complaint   Patient presents with    Post-Op Check     Post-op Dise        History of Present Illness     Abel Johnson is an 74 y.o. female previously seen for JOSE MANUEL. Interval History:   No issues after DISE procedure. Sleep study is scheduled for the 9/22. Past Medical History     Past Medical History:   Diagnosis Date    Aortic stenosis     Arthritis     Back pain     CHF (congestive heart failure) (HCC)     Chronic diastolic heart failure (HCC)     Diabetes mellitus (HCC)     Diabetes mellitus (HCC)     TYPE 2    GERD (gastroesophageal reflux disease)     High cholesterol     Hypertension     Malignant neoplasm of right female breast ([address]) 05/03/2022    Polio     Polio     AS INFANT    Shingles     OCT 2013; Pt denies-states  them    Sleep apnea     Urinary problem     HAS BASHFUL BLADDER       Past Surgical History     Past Surgical History:   Procedure Laterality Date    ARM SURGERY Left     BACK SURGERY      BACK SURGERY      1/13/14 x2    BREAST BIOPSY Right 05/03/2022    RIGHT RADIOFREQUENCY IDENTIFICATION TAG LOCALIZED PARTIAL MASTECTOMY RIGHT SENTINEL LYMPH NODE BIOPSY performed by Marty Sorenson MD at [facility] Right 05/03/2022    .  performed by Marty Sorenson MD at [address] [facility] N/A 8/30/2023    DRUG INDUCED SLEEP ENDOSCOPY performed by Ciro Aviles DO at [address] Left 08/23/2022    OPEN REDUCTION INTERNAL FIXATION LEFT PROXIMAL HUMERUS AND SHAFT FRACTURES-REGIONAL-SYNTHES performed by Austin Rincon MD at [facility]      bilateral knee replacements    JOINT REPLACEMENT      [place]

## 2023-09-22 ENCOUNTER — HOSPITAL ENCOUNTER (OUTPATIENT)
Dept: SLEEP CENTER | Age: 73
Discharge: HOME | End: 2023-09-22
Payer: MEDICARE

## 2023-09-22 DIAGNOSIS — Z99.89 OSA ON CPAP: ICD-10-CM

## 2023-09-22 DIAGNOSIS — G47.33 OSA ON CPAP: ICD-10-CM

## 2023-09-22 PROCEDURE — 95806 SLEEP STUDY UNATT&RESP EFFT: CPT

## 2023-10-09 ENCOUNTER — OFFICE VISIT (OUTPATIENT)
Dept: ENT CLINIC | Age: 73
End: 2023-10-09
Payer: MEDICARE

## 2023-10-09 VITALS
HEART RATE: 79 BPM | WEIGHT: 212 LBS | BODY MASS INDEX: 34.07 KG/M2 | HEIGHT: 66 IN | DIASTOLIC BLOOD PRESSURE: 72 MMHG | TEMPERATURE: 97.3 F | OXYGEN SATURATION: 96 % | SYSTOLIC BLOOD PRESSURE: 146 MMHG

## 2023-10-09 DIAGNOSIS — Z78.9 INTOLERANCE OF CONTINUOUS POSITIVE AIRWAY PRESSURE (CPAP) VENTILATION: ICD-10-CM

## 2023-10-09 DIAGNOSIS — G47.33 OBSTRUCTIVE SLEEP APNEA: Primary | ICD-10-CM

## 2023-10-09 PROCEDURE — 99213 OFFICE O/P EST LOW 20 MIN: CPT | Performed by: STUDENT IN AN ORGANIZED HEALTH CARE EDUCATION/TRAINING PROGRAM

## 2023-10-09 NOTE — PROGRESS NOTES
oropharyngeal masses or oropharyngeal obstruction. Tonsils 2+. NECK: Normal range of motion, no thyromegaly, trachea is midline, no palpable lymphadenopathy or neck masses, no crepitus  CHEST: Normal respiratory effort, breathing comfortably, no retractions  SKIN: No rashes, normal appearing skin, no evidence of skin lesions/tumors  NEURO: Cranial Nerves 2, 3, 4, 5, 6, 7, 11, 12 grossly intact bilaterally     I have performed a head and neck physical exam personally or was physically present during the key or critical portions of the service. Data/Imaging Review        Media Information      Document Information    Other Order:  Sleep Lab Result   HSAT Report 9/23/23 09/21/2023 11:45   Attached To:   [address] [[id number]]   Hospital Encounter on 9/22/23 with [address], [place]  [facility]       Assessment and Plan     1. Obstructive sleep apnea  2. Intolerance of continuous positive airway pressure (CPAP) ventilation  -Currently meets candidacy for Inspire. Unfortunately we are yet to be able to implant Medicare patients. Patient will follow-up at the beginning of next month and hopefully we will have ability to implant Medicare patients at that time and if so we can schedule patient for surgery if she continues to meet criteria. Follow-Up     No follow-ups on file. Dr. Elva Marsh, [facility]  Department of Otolaryngology/Head and Neck Surgery  10/9/23    Medical Decision Making: The following items were considered in medical decision making:  Independent review of images  Review / order clinical lab tests  Review / order radiology tests  Decision to obtain old records      This note was generated completely or in part utilizing Dragon dictation speech recognition software.   Occasionally, words are mistranscribed and despite editing, the text may contain inaccuracies due to incorrect word

## 2023-11-02 ENCOUNTER — HOSPITAL ENCOUNTER (EMERGENCY)
Age: 73
Discharge: HOME | End: 2023-11-02
Attending: EMERGENCY MEDICINE
Payer: MEDICARE

## 2023-11-02 ENCOUNTER — OFFICE VISIT (OUTPATIENT)
Dept: ENT CLINIC | Age: 73
End: 2023-11-02
Payer: MEDICARE

## 2023-11-02 ENCOUNTER — APPOINTMENT (OUTPATIENT)
Dept: GENERAL RADIOLOGY | Age: 73
End: 2023-11-02
Payer: MEDICARE

## 2023-11-02 VITALS
RESPIRATION RATE: 18 BRPM | TEMPERATURE: 98 F | HEART RATE: 83 BPM | DIASTOLIC BLOOD PRESSURE: 83 MMHG | SYSTOLIC BLOOD PRESSURE: 159 MMHG | OXYGEN SATURATION: 100 %

## 2023-11-02 VITALS
SYSTOLIC BLOOD PRESSURE: 155 MMHG | TEMPERATURE: 97.1 F | BODY MASS INDEX: 34.07 KG/M2 | HEIGHT: 66 IN | DIASTOLIC BLOOD PRESSURE: 67 MMHG | HEART RATE: 79 BPM | OXYGEN SATURATION: 98 % | WEIGHT: 212 LBS

## 2023-11-02 DIAGNOSIS — G47.33 OBSTRUCTIVE SLEEP APNEA: Primary | ICD-10-CM

## 2023-11-02 DIAGNOSIS — S62.111S: ICD-10-CM

## 2023-11-02 DIAGNOSIS — M25.541 ARTHRALGIA OF HAND, RIGHT: Primary | ICD-10-CM

## 2023-11-02 DIAGNOSIS — Z78.9 INTOLERANCE OF CONTINUOUS POSITIVE AIRWAY PRESSURE (CPAP) VENTILATION: ICD-10-CM

## 2023-11-02 PROCEDURE — G8417 CALC BMI ABV UP PARAM F/U: HCPCS | Performed by: STUDENT IN AN ORGANIZED HEALTH CARE EDUCATION/TRAINING PROGRAM

## 2023-11-02 PROCEDURE — 3078F DIAST BP <80 MM HG: CPT | Performed by: STUDENT IN AN ORGANIZED HEALTH CARE EDUCATION/TRAINING PROGRAM

## 2023-11-02 PROCEDURE — G8427 DOCREV CUR MEDS BY ELIG CLIN: HCPCS | Performed by: STUDENT IN AN ORGANIZED HEALTH CARE EDUCATION/TRAINING PROGRAM

## 2023-11-02 PROCEDURE — 1123F ACP DISCUSS/DSCN MKR DOCD: CPT | Performed by: STUDENT IN AN ORGANIZED HEALTH CARE EDUCATION/TRAINING PROGRAM

## 2023-11-02 PROCEDURE — G8484 FLU IMMUNIZE NO ADMIN: HCPCS | Performed by: STUDENT IN AN ORGANIZED HEALTH CARE EDUCATION/TRAINING PROGRAM

## 2023-11-02 PROCEDURE — 3077F SYST BP >= 140 MM HG: CPT | Performed by: STUDENT IN AN ORGANIZED HEALTH CARE EDUCATION/TRAINING PROGRAM

## 2023-11-02 PROCEDURE — 1036F TOBACCO NON-USER: CPT | Performed by: STUDENT IN AN ORGANIZED HEALTH CARE EDUCATION/TRAINING PROGRAM

## 2023-11-02 PROCEDURE — 99283 EMERGENCY DEPT VISIT LOW MDM: CPT

## 2023-11-02 PROCEDURE — 3017F COLORECTAL CA SCREEN DOC REV: CPT | Performed by: STUDENT IN AN ORGANIZED HEALTH CARE EDUCATION/TRAINING PROGRAM

## 2023-11-02 PROCEDURE — 1090F PRES/ABSN URINE INCON ASSESS: CPT | Performed by: STUDENT IN AN ORGANIZED HEALTH CARE EDUCATION/TRAINING PROGRAM

## 2023-11-02 PROCEDURE — G8399 PT W/DXA RESULTS DOCUMENT: HCPCS | Performed by: STUDENT IN AN ORGANIZED HEALTH CARE EDUCATION/TRAINING PROGRAM

## 2023-11-02 PROCEDURE — 73130 X-RAY EXAM OF HAND: CPT

## 2023-11-02 PROCEDURE — 99214 OFFICE O/P EST MOD 30 MIN: CPT | Performed by: STUDENT IN AN ORGANIZED HEALTH CARE EDUCATION/TRAINING PROGRAM

## 2023-11-02 NOTE — PROGRESS NOTES
at [facility]      bilateral knee replacements    JOINT REPLACEMENT      [place] KNEES    KNEE SURGERY      [facility] BREAST BIOPSY RIGHT Right 04/06/2022    [facility] BREAST BIOPSY RIGHT 4/6/2022  [address]    OTHER SURGICAL HISTORY  01/13/2014    L4-5 DECOMPRESSION, POSTERIOR LATERAL FUSION AND PEDICLE       Family History     Family History   Problem Relation Age of Onset    Heart Disease Mother     Cancer Mother         breast cancer, 5    Breast Cancer Mother     Early Death Father     Diabetes Sister        Social History     Social History     Tobacco Use    Smoking status: Never     Passive exposure: Past    Smokeless tobacco: Never   Vaping Use    Vaping Use: Never used   Substance Use Topics    Alcohol use: No    Drug use: No        Allergies     Allergies   Allergen Reactions    Metformin Nausea Only and Other (See Comments)     \"ate the lining of her stomach\"      Ace Inhibitors      cough  cough      Adhesive Tape      Skin irritation and blisters  - does okay with steri strips and tegaderm    Oxycodone-Acetaminophen        Medications     Current Outpatient Medications   Medication Sig Dispense Refill    metoprolol succinate (TOPROL XL) 25 MG extended release tablet Take 1 tablet by mouth once daily 90 tablet 0    gabapentin (NEURONTIN) 100 MG capsule Take 1 capsule by mouth 3 times daily.       furosemide (LASIX) 20 MG tablet Take 2 tablets by mouth daily 180 tablet 3    spironolactone (ALDACTONE) 25 MG tablet Take 1 tablet by mouth once daily 90 tablet 3    TRULICITY 1.5 FN/1.9FN SC injection       NIFEdipine (PROCARDIA XL) 30 MG extended release tablet Take 1 tablet by mouth daily 90 tablet 3    losartan (COZAAR) 50 MG tablet Take 2 tablets by mouth daily 30 tablet 3    Cholecalciferol (VITAMIN D3 PO) Take 1,000 mg by mouth      Multiple Vitamins-Minerals (HAIR SKIN AND NAILS FORMULA) TABS Take by mouth      vitamin C (ASCORBIC ACID) 500 MG tablet Take 1

## 2023-11-02 NOTE — DISCHARGE INSTRUCTIONS
You have a subacute right triquetral fracture as well as palmar fibrosis, arthritis.  Follow outpt with your orthopedic surgeon

## 2023-11-15 ENCOUNTER — HOSPITAL ENCOUNTER (OUTPATIENT)
Dept: PHYSICAL THERAPY | Age: 73
Setting detail: THERAPIES SERIES
Discharge: HOME | End: 2023-11-15
Payer: MEDICARE

## 2023-11-15 DIAGNOSIS — R29.898 DECREASED STRENGTH OF TRUNK AND BACK: ICD-10-CM

## 2023-11-15 DIAGNOSIS — M79.642 BILATERAL HAND PAIN: ICD-10-CM

## 2023-11-15 DIAGNOSIS — M54.2 CERVICAL PAIN: Primary | ICD-10-CM

## 2023-11-15 DIAGNOSIS — M53.82 DECREASED RANGE OF MOTION OF INTERVERTEBRAL DISCS OF CERVICAL SPINE: ICD-10-CM

## 2023-11-15 DIAGNOSIS — M79.641 BILATERAL HAND PAIN: ICD-10-CM

## 2023-11-15 PROCEDURE — 97162 PT EVAL MOD COMPLEX 30 MIN: CPT

## 2023-11-15 PROCEDURE — 97140 MANUAL THERAPY 1/> REGIONS: CPT

## 2023-11-15 PROCEDURE — 97530 THERAPEUTIC ACTIVITIES: CPT

## 2023-11-15 NOTE — FLOWSHEET NOTE
Manual (42432) 22 2  [] Estim Unattended (51481)     [x] Ther. Act (62219) 20 1  [] [facility]. Traction (07885)     [] Gait (95803)    [] Dry Needle 1-2 muscle (80669)     [] Aquatic Therex (28237)    [] Dry Needle 3+ muscle (80574)     [] Iontophoresis (44168)    [] VASO (60751)     [] Ultrasound (04464)    [] Group Therapy (85107)     [] Estim Attended (40503)    [] Other: Total Timed Code Tx Minutes 46 3       Total Treatment Minutes 80        Charge Justification:  (50479) THERAPEUTIC EXERCISE - Provided verbal/tactile cueing for activities related to strengthening, flexibility, endurance, ROM performed to prevent loss of range of motion, maintain or improve muscular strength or increase flexibility, following either an injury or surgery. (16994) [facility]- Reviewed/Progressed HEP activities related to strengthening, flexibility, endurance, ROM performed to prevent loss of range of motion, maintain or improve muscular strength or increase flexibility, following either an injury or surgery. (76049) NEUROMUSCULAR RE-EDUCATION- Therapeutic procedure, 1 or more areas, each 15 minutes; neuromuscular reeducation of movement, balance, coordination, kinesthetic sense, posture, and/or proprioception for sitting and/or standing activities  (33611) [facility]- Reviewed/Progressed HEP activities related to neuromuscular reeducation of movement, balance, coordination, kinesthetic sense, posture, and/or proprioception for sitting and/or standing activities    (77122) THERAPEUTIC ACTIVITY- use of dynamic activities to improve functional performance. (Ex include squatting, ascending/descending stairs, walking, bending, lifting, catching, throwing, pushing, pulling, jumping.)  Direct, one on one contact, billed in 15-minute increments.   (15240) MANUAL THERAPY-  Manual therapy techniques, 1 or more regions, each 15 minutes (Mobilization/manipulation, manual lymphatic drainage, manual traction) for the

## 2023-11-17 ENCOUNTER — HOSPITAL ENCOUNTER (OUTPATIENT)
Dept: PHYSICAL THERAPY | Age: 73
Setting detail: THERAPIES SERIES
Discharge: HOME | End: 2023-11-17
Payer: MEDICARE

## 2023-11-17 PROCEDURE — 97140 MANUAL THERAPY 1/> REGIONS: CPT

## 2023-11-17 PROCEDURE — 97530 THERAPEUTIC ACTIVITIES: CPT

## 2023-11-28 ENCOUNTER — APPOINTMENT (OUTPATIENT)
Dept: PHYSICAL THERAPY | Age: 73
End: 2023-11-28
Payer: MEDICARE

## 2023-11-28 ENCOUNTER — APPOINTMENT (OUTPATIENT)
Dept: MRI IMAGING | Age: 73
End: 2023-11-28
Payer: MEDICARE

## 2023-11-28 ENCOUNTER — HOSPITAL ENCOUNTER (INPATIENT)
Age: 73
LOS: 3 days | Discharge: HOME | End: 2023-12-02
Attending: EMERGENCY MEDICINE | Admitting: INTERNAL MEDICINE
Payer: MEDICARE

## 2023-11-28 DIAGNOSIS — G89.29 ACUTE EXACERBATION OF CHRONIC LOW BACK PAIN: ICD-10-CM

## 2023-11-28 DIAGNOSIS — M48.00 CENTRAL STENOSIS OF SPINAL CANAL: Primary | ICD-10-CM

## 2023-11-28 DIAGNOSIS — M54.9 INTRACTABLE BACK PAIN: ICD-10-CM

## 2023-11-28 DIAGNOSIS — M54.50 ACUTE EXACERBATION OF CHRONIC LOW BACK PAIN: ICD-10-CM

## 2023-11-28 LAB
ANION GAP SERPL CALCULATED.3IONS-SCNC: 7 MMOL/L (ref 3–16)
BACTERIA URNS QL MICRO: NORMAL /HPF
BASOPHILS # BLD: 0 K/UL (ref 0–0.2)
BASOPHILS NFR BLD: 0.5 %
BILIRUB UR QL STRIP.AUTO: NEGATIVE
BUN SERPL-MCNC: 23 MG/DL (ref 7–20)
CALCIUM SERPL-MCNC: 9.3 MG/DL (ref 8.3–10.6)
CHLORIDE SERPL-SCNC: 103 MMOL/L (ref 99–110)
CLARITY UR: CLEAR
CO2 SERPL-SCNC: 29 MMOL/L (ref 21–32)
COLOR UR: YELLOW
CREAT SERPL-MCNC: 1.2 MG/DL (ref 0.6–1.2)
DEPRECATED RDW RBC AUTO: 15 % (ref 12.4–15.4)
EOSINOPHIL # BLD: 0.5 K/UL (ref 0–0.6)
EOSINOPHIL NFR BLD: 7.4 %
EPI CELLS #/AREA URNS AUTO: 2 /HPF (ref 0–5)
ERYTHROCYTE [SEDIMENTATION RATE] IN BLOOD BY WESTERGREN METHOD: 32 MM/HR (ref 0–30)
GFR SERPLBLD CREATININE-BSD FMLA CKD-EPI: 47 ML/MIN/{1.73_M2}
GLUCOSE BLD-MCNC: 108 MG/DL (ref 70–99)
GLUCOSE SERPL-MCNC: 127 MG/DL (ref 70–99)
GLUCOSE UR STRIP.AUTO-MCNC: NEGATIVE MG/DL
HCT VFR BLD AUTO: 36.5 % (ref 36–48)
HGB BLD-MCNC: 11.5 G/DL (ref 12–16)
HGB UR QL STRIP.AUTO: NEGATIVE
HYALINE CASTS #/AREA URNS AUTO: 2 /LPF (ref 0–8)
KETONES UR STRIP.AUTO-MCNC: NEGATIVE MG/DL
LEUKOCYTE ESTERASE UR QL STRIP.AUTO: ABNORMAL
LYMPHOCYTES # BLD: 1.8 K/UL (ref 1–5.1)
LYMPHOCYTES NFR BLD: 28.2 %
MCH RBC QN AUTO: 27.7 PG (ref 26–34)
MCHC RBC AUTO-ENTMCNC: 31.6 G/DL (ref 31–36)
MCV RBC AUTO: 87.9 FL (ref 80–100)
MONOCYTES # BLD: 0.4 K/UL (ref 0–1.3)
MONOCYTES NFR BLD: 5.7 %
NEUTROPHILS # BLD: 3.6 K/UL (ref 1.7–7.7)
NEUTROPHILS NFR BLD: 58.2 %
NITRITE UR QL STRIP.AUTO: NEGATIVE
PERFORMED ON: ABNORMAL
PH UR STRIP.AUTO: 5.5 [PH] (ref 5–8)
PLATELET # BLD AUTO: 201 K/UL (ref 135–450)
PMV BLD AUTO: 8.5 FL (ref 5–10.5)
POTASSIUM SERPL-SCNC: 4.2 MMOL/L (ref 3.5–5.1)
PROT UR STRIP.AUTO-MCNC: NEGATIVE MG/DL
RBC # BLD AUTO: 4.16 M/UL (ref 4–5.2)
RBC CLUMPS #/AREA URNS AUTO: 0 /HPF (ref 0–4)
SODIUM SERPL-SCNC: 139 MMOL/L (ref 136–145)
SP GR UR STRIP.AUTO: 1.01 (ref 1–1.03)
UA COMPLETE W REFLEX CULTURE PNL UR: ABNORMAL
UA DIPSTICK W REFLEX MICRO PNL UR: YES
URN SPEC COLLECT METH UR: ABNORMAL
UROBILINOGEN UR STRIP-ACNC: 0.2 E.U./DL
WBC # BLD AUTO: 6.3 K/UL (ref 4–11)
WBC #/AREA URNS AUTO: 4 /HPF (ref 0–5)

## 2023-11-28 PROCEDURE — 96372 THER/PROPH/DIAG INJ SC/IM: CPT

## 2023-11-28 PROCEDURE — 81001 URINALYSIS AUTO W/SCOPE: CPT

## 2023-11-28 PROCEDURE — 72157 MRI CHEST SPINE W/O & W/DYE: CPT

## 2023-11-28 PROCEDURE — 96374 THER/PROPH/DIAG INJ IV PUSH: CPT

## 2023-11-28 PROCEDURE — 80048 BASIC METABOLIC PNL TOTAL CA: CPT

## 2023-11-28 PROCEDURE — 85025 COMPLETE CBC W/AUTO DIFF WBC: CPT

## 2023-11-28 PROCEDURE — 6360000002 HC RX W HCPCS: Performed by: EMERGENCY MEDICINE

## 2023-11-28 PROCEDURE — 6360000002 HC RX W HCPCS: Performed by: INTERNAL MEDICINE

## 2023-11-28 PROCEDURE — 6370000000 HC RX 637 (ALT 250 FOR IP): Performed by: INTERNAL MEDICINE

## 2023-11-28 PROCEDURE — 51798 US URINE CAPACITY MEASURE: CPT

## 2023-11-28 PROCEDURE — 99285 EMERGENCY DEPT VISIT HI MDM: CPT

## 2023-11-28 PROCEDURE — 85652 RBC SED RATE AUTOMATED: CPT

## 2023-11-28 PROCEDURE — G0378 HOSPITAL OBSERVATION PER HR: HCPCS

## 2023-11-28 PROCEDURE — 6360000004 HC RX CONTRAST MEDICATION: Performed by: EMERGENCY MEDICINE

## 2023-11-28 PROCEDURE — 2580000003 HC RX 258: Performed by: INTERNAL MEDICINE

## 2023-11-28 PROCEDURE — A9577 INJ MULTIHANCE: HCPCS | Performed by: EMERGENCY MEDICINE

## 2023-11-28 PROCEDURE — 6370000000 HC RX 637 (ALT 250 FOR IP): Performed by: EMERGENCY MEDICINE

## 2023-11-28 PROCEDURE — 96375 TX/PRO/DX INJ NEW DRUG ADDON: CPT

## 2023-11-28 PROCEDURE — 72158 MRI LUMBAR SPINE W/O & W/DYE: CPT

## 2023-11-28 RX ORDER — SODIUM CHLORIDE 0.9 % (FLUSH) 0.9 %
10 SYRINGE (ML) INJECTION PRN
Status: DISCONTINUED | OUTPATIENT
Start: 2023-11-28 | End: 2023-12-02 | Stop reason: HOSPADM

## 2023-11-28 RX ORDER — KETOROLAC TROMETHAMINE 15 MG/ML
30 INJECTION, SOLUTION INTRAMUSCULAR; INTRAVENOUS ONCE
Status: COMPLETED | OUTPATIENT
Start: 2023-11-28 | End: 2023-11-28

## 2023-11-28 RX ORDER — METHOCARBAMOL 750 MG/1
750 TABLET, FILM COATED ORAL ONCE
Status: COMPLETED | OUTPATIENT
Start: 2023-11-28 | End: 2023-11-28

## 2023-11-28 RX ORDER — HYDROMORPHONE HYDROCHLORIDE 1 MG/ML
1 INJECTION, SOLUTION INTRAMUSCULAR; INTRAVENOUS; SUBCUTANEOUS ONCE
Status: COMPLETED | OUTPATIENT
Start: 2023-11-28 | End: 2023-11-28

## 2023-11-28 RX ORDER — ONDANSETRON 2 MG/ML
4 INJECTION INTRAMUSCULAR; INTRAVENOUS EVERY 6 HOURS PRN
Status: DISCONTINUED | OUTPATIENT
Start: 2023-11-28 | End: 2023-12-02 | Stop reason: HOSPADM

## 2023-11-28 RX ORDER — ACETAMINOPHEN 650 MG/1
650 SUPPOSITORY RECTAL EVERY 6 HOURS PRN
Status: DISCONTINUED | OUTPATIENT
Start: 2023-11-28 | End: 2023-11-30

## 2023-11-28 RX ORDER — PROMETHAZINE HYDROCHLORIDE 25 MG/1
12.5 TABLET ORAL EVERY 6 HOURS PRN
Status: DISCONTINUED | OUTPATIENT
Start: 2023-11-28 | End: 2023-12-02 | Stop reason: HOSPADM

## 2023-11-28 RX ORDER — HYDROMORPHONE HYDROCHLORIDE 1 MG/ML
0.25 INJECTION, SOLUTION INTRAMUSCULAR; INTRAVENOUS; SUBCUTANEOUS
Status: DISCONTINUED | OUTPATIENT
Start: 2023-11-28 | End: 2023-12-01

## 2023-11-28 RX ORDER — SODIUM CHLORIDE 9 MG/ML
INJECTION, SOLUTION INTRAVENOUS PRN
Status: DISCONTINUED | OUTPATIENT
Start: 2023-11-28 | End: 2023-12-02 | Stop reason: HOSPADM

## 2023-11-28 RX ORDER — LANOLIN ALCOHOL/MO/W.PET/CERES
3 CREAM (GRAM) TOPICAL NIGHTLY
Status: DISCONTINUED | OUTPATIENT
Start: 2023-11-28 | End: 2023-12-02 | Stop reason: HOSPADM

## 2023-11-28 RX ORDER — HYDROMORPHONE HYDROCHLORIDE 1 MG/ML
0.5 INJECTION, SOLUTION INTRAMUSCULAR; INTRAVENOUS; SUBCUTANEOUS
Status: DISCONTINUED | OUTPATIENT
Start: 2023-11-28 | End: 2023-11-30

## 2023-11-28 RX ORDER — SODIUM CHLORIDE 0.9 % (FLUSH) 0.9 %
10 SYRINGE (ML) INJECTION EVERY 12 HOURS SCHEDULED
Status: DISCONTINUED | OUTPATIENT
Start: 2023-11-28 | End: 2023-12-02 | Stop reason: HOSPADM

## 2023-11-28 RX ORDER — KETOROLAC TROMETHAMINE 15 MG/ML
15 INJECTION, SOLUTION INTRAMUSCULAR; INTRAVENOUS ONCE
Status: DISCONTINUED | OUTPATIENT
Start: 2023-11-28 | End: 2023-11-28

## 2023-11-28 RX ORDER — ACETAMINOPHEN 325 MG/1
650 TABLET ORAL EVERY 6 HOURS PRN
Status: DISCONTINUED | OUTPATIENT
Start: 2023-11-28 | End: 2023-11-30

## 2023-11-28 RX ORDER — NICOTINE 21 MG/24HR
1 PATCH, TRANSDERMAL 24 HOURS TRANSDERMAL DAILY
Status: DISCONTINUED | OUTPATIENT
Start: 2023-11-29 | End: 2023-11-29

## 2023-11-28 RX ORDER — METOPROLOL SUCCINATE 25 MG/1
TABLET, EXTENDED RELEASE ORAL
Qty: 90 TABLET | Refills: 0 | Status: ON HOLD | OUTPATIENT
Start: 2023-11-28 | End: 2023-12-02 | Stop reason: HOSPADM

## 2023-11-28 RX ORDER — ENOXAPARIN SODIUM 100 MG/ML
40 INJECTION SUBCUTANEOUS DAILY
Status: DISCONTINUED | OUTPATIENT
Start: 2023-11-29 | End: 2023-11-29

## 2023-11-28 RX ORDER — DEXAMETHASONE SODIUM PHOSPHATE 4 MG/ML
4 INJECTION, SOLUTION INTRA-ARTICULAR; INTRALESIONAL; INTRAMUSCULAR; INTRAVENOUS; SOFT TISSUE EVERY 8 HOURS
Status: DISCONTINUED | OUTPATIENT
Start: 2023-11-28 | End: 2023-11-29

## 2023-11-28 RX ORDER — DIAZEPAM 5 MG/1
5 TABLET ORAL ONCE
Status: COMPLETED | OUTPATIENT
Start: 2023-11-28 | End: 2023-11-28

## 2023-11-28 RX ORDER — MAGNESIUM SULFATE IN WATER 40 MG/ML
2000 INJECTION, SOLUTION INTRAVENOUS PRN
Status: DISCONTINUED | OUTPATIENT
Start: 2023-11-28 | End: 2023-12-02 | Stop reason: HOSPADM

## 2023-11-28 RX ORDER — POTASSIUM CHLORIDE 7.45 MG/ML
10 INJECTION INTRAVENOUS PRN
Status: DISCONTINUED | OUTPATIENT
Start: 2023-11-28 | End: 2023-12-02 | Stop reason: HOSPADM

## 2023-11-28 RX ADMIN — Medication 10 ML: at 22:09

## 2023-11-28 RX ADMIN — METHOCARBAMOL 750 MG: 750 TABLET ORAL at 13:03

## 2023-11-28 RX ADMIN — HYDROMORPHONE HYDROCHLORIDE 1 MG: 1 INJECTION, SOLUTION INTRAMUSCULAR; INTRAVENOUS; SUBCUTANEOUS at 19:09

## 2023-11-28 RX ADMIN — MELATONIN TAB 3 MG 3 MG: 3 TAB at 21:55

## 2023-11-28 RX ADMIN — GADOBENATE DIMEGLUMINE 18 ML: 529 INJECTION, SOLUTION INTRAVENOUS at 17:30

## 2023-11-28 RX ADMIN — HYDROMORPHONE HYDROCHLORIDE 0.5 MG: 1 INJECTION, SOLUTION INTRAMUSCULAR; INTRAVENOUS; SUBCUTANEOUS at 22:05

## 2023-11-28 RX ADMIN — DEXAMETHASONE SODIUM PHOSPHATE 4 MG: 4 INJECTION, SOLUTION INTRAMUSCULAR; INTRAVENOUS at 20:26

## 2023-11-28 RX ADMIN — DIAZEPAM 5 MG: 5 TABLET ORAL at 19:10

## 2023-11-28 RX ADMIN — KETOROLAC TROMETHAMINE 30 MG: 15 INJECTION, SOLUTION INTRAMUSCULAR; INTRAVENOUS at 13:03

## 2023-11-28 ASSESSMENT — PAIN SCALES - GENERAL
PAINLEVEL_OUTOF10: 7
PAINLEVEL_OUTOF10: 6
PAINLEVEL_OUTOF10: 9
PAINLEVEL_OUTOF10: 0
PAINLEVEL_OUTOF10: 0
PAINLEVEL_OUTOF10: 7
PAINLEVEL_OUTOF10: 9
PAINLEVEL_OUTOF10: 7

## 2023-11-28 ASSESSMENT — PAIN DESCRIPTION - ORIENTATION: ORIENTATION: LEFT;RIGHT

## 2023-11-28 ASSESSMENT — PAIN - FUNCTIONAL ASSESSMENT
PAIN_FUNCTIONAL_ASSESSMENT: 0-10
PAIN_FUNCTIONAL_ASSESSMENT: 0-10

## 2023-11-28 ASSESSMENT — PAIN DESCRIPTION - LOCATION
LOCATION: BACK;GENERALIZED
LOCATION: BACK
LOCATION: BACK

## 2023-11-28 NOTE — ED PROVIDER NOTES
EMERGENCY MEDICINE PROVIDER NOTE    Patient Identification  Pt Name: Hung Adams  MRN: [id number]  [address] 1950  Date of evaluation: 2023  Provider: Freida Lynn MD  PCP: Romario Lagunas MD    Chief Complaint  Back Pain (Reports bilateral lower back pain radiating down the legs x2 weeks now, hx of back surgery. Pt also reports urinary issues, states \"I'll feel like I have to pee and can't, and sometimes I just pee on myself. \" Pt ambulated to room with a cane, pt states increasing pain when walking and worsening when lying down. 9/10 pain level. )      HPI  (History provided by patient)  This is a 74 y.o. female who was brought in by self for severe low back pain. She has a history of chronic back pain and previous back surgery. However, her current pain is much more severe. This started approximately 2 weeks ago, but worsened significantly over time. It is now severe, making regular function difficult. Pain starts in the low back and radiates down both legs. She occasionally has intermittent numbness down the legs. Pain is worse with movement and walking. She reports urinary hesitancy bordering on retention, although patient has never developed suprapubic pain. She has also had persistent urinary incontinence, which seems worse since this began. She denies fecal incontinence. She is not having saddle anesthesia, fever, chills, or new motor weakness in the legs. She does have chronic left foot and leg weakness secondary to polio she had when she was a child. This is unchanged.     I have reviewed the following nursing documentation:  Allergies: Metformin, Ace inhibitors, Adhesive tape, and Oxycodone-acetaminophen    Past medical history:   Past Medical History:   Diagnosis Date    Aortic stenosis     Arthritis     Back pain     CHF (congestive heart failure) (HCC)     Chronic diastolic heart failure (HCC)     Diabetes mellitus ([address])     Diabetes mellitus ([address])     TYPE 2 or palpable evidence of trauma. Neurological: Alert and oriented. Normal sensation to light touch throughout the bilateral medial and lateral thighs, medial and lateral lower legs, and plantar and dorsal feet. Plantarflexion and dorsiflexion intact bilaterally. However, there is mild weakness in left dorsiflexion, consistent with the patient's chronic weakness. Normal flexion and extension of the bilateral knees and hips. Patellar reflexes intact bilaterally. Intact flexion and extension of toes in the bilateral feet, although mildly diminished in left foot, consistent with patient's known chronic weakness. Skin: Warm and dry. No rash. Radiology  MRI THORACIC SPINE W WO CONTRAST   Final Result   Small central disc protrusion at C7-T1. No significant thoracic spine stenosis or abnormal enhancement. MRI LUMBAR SPINE W WO CONTRAST   Final Result   Posterior lumbar fusion and laminectomy at L4-L5. Severe central canal stenosis and moderate bilateral foraminal stenosis at   L2-L3. Mild to moderate central canal stenosis at L3-L4 with associated mild   bilateral foraminal stenosis. Moderate bilateral foraminal stenosis at L5-S1.              Labs  Results for orders placed or performed during the hospital encounter of 11/28/23   CBC with Auto Differential   Result Value Ref Range    WBC 6.3 4.0 - 11.0 K/uL    RBC 4.16 4.00 - 5.20 M/uL    Hemoglobin 11.5 (L) 12.0 - 16.0 g/dL    Hematocrit 36.5 36.0 - 48.0 %    MCV 87.9 80.0 - 100.0 fL    MCH 27.7 26.0 - 34.0 pg    MCHC 31.6 31.0 - 36.0 g/dL    RDW 15.0 12.4 - 15.4 %    Platelets 498 116 - 677 K/uL    MPV 8.5 5.0 - 10.5 fL    Neutrophils % 58.2 %    Lymphocytes % 28.2 %    Monocytes % 5.7 %    Eosinophils % 7.4 %    Basophils % 0.5 %    Neutrophils Absolute 3.6 1.7 - 7.7 K/uL    Lymphocytes Absolute 1.8 1.0 - 5.1 K/uL    Monocytes Absolute 0.4 0.0 - 1.3 K/uL    Eosinophils Absolute 0.5 0.0 - 0.6 K/uL    Basophils Absolute 0.0 0.0 - 0.2

## 2023-11-28 NOTE — TELEPHONE ENCOUNTER
Last OV: 23 LES  Next OV: on RC list  Last EK21  Last Fill:   metoprolol succinate (TOPROL XL) 25 MG extended release tablet 90 tablet 0 2023     Sig: Take 1 tablet by mouth once daily    Sent to pharmacy as: Metoprolol Succinate ER 25 MG Oral Tablet Extended Release 24 Hour (TOPROL XL)    E-Prescribing Status: Receipt confirmed by pharmacy (2023 12:57 PM EDT)

## 2023-11-29 ENCOUNTER — APPOINTMENT (OUTPATIENT)
Dept: CT IMAGING | Age: 73
End: 2023-11-29
Payer: MEDICARE

## 2023-11-29 PROBLEM — M54.9 INTRACTABLE BACK PAIN: Status: ACTIVE | Noted: 2023-11-29

## 2023-11-29 LAB
ALBUMIN SERPL-MCNC: 3.7 G/DL (ref 3.4–5)
ALBUMIN/GLOB SERPL: 1.4 {RATIO} (ref 1.1–2.2)
ALP SERPL-CCNC: 66 U/L (ref 40–129)
ALT SERPL-CCNC: 15 U/L (ref 10–40)
ANION GAP SERPL CALCULATED.3IONS-SCNC: 11 MMOL/L (ref 3–16)
ANION GAP SERPL CALCULATED.3IONS-SCNC: 9 MMOL/L (ref 3–16)
AST SERPL-CCNC: 17 U/L (ref 15–37)
BASOPHILS # BLD: 0 K/UL (ref 0–0.2)
BASOPHILS NFR BLD: 0.2 %
BILIRUB SERPL-MCNC: <0.2 MG/DL (ref 0–1)
BUN SERPL-MCNC: 25 MG/DL (ref 7–20)
BUN SERPL-MCNC: 26 MG/DL (ref 7–20)
CALCIUM SERPL-MCNC: 9.2 MG/DL (ref 8.3–10.6)
CALCIUM SERPL-MCNC: 9.3 MG/DL (ref 8.3–10.6)
CHLORIDE SERPL-SCNC: 102 MMOL/L (ref 99–110)
CHLORIDE SERPL-SCNC: 103 MMOL/L (ref 99–110)
CO2 SERPL-SCNC: 24 MMOL/L (ref 21–32)
CO2 SERPL-SCNC: 26 MMOL/L (ref 21–32)
CREAT SERPL-MCNC: 0.8 MG/DL (ref 0.6–1.2)
CREAT SERPL-MCNC: 1 MG/DL (ref 0.6–1.2)
DEPRECATED RDW RBC AUTO: 14.9 % (ref 12.4–15.4)
EKG ATRIAL RATE: 77 BPM
EKG DIAGNOSIS: NORMAL
EKG P AXIS: 61 DEGREES
EKG P-R INTERVAL: 238 MS
EKG Q-T INTERVAL: 398 MS
EKG QRS DURATION: 90 MS
EKG QTC CALCULATION (BAZETT): 450 MS
EKG R AXIS: 1 DEGREES
EKG T AXIS: 40 DEGREES
EKG VENTRICULAR RATE: 77 BPM
EOSINOPHIL # BLD: 0 K/UL (ref 0–0.6)
EOSINOPHIL NFR BLD: 0.1 %
GFR SERPLBLD CREATININE-BSD FMLA CKD-EPI: 59 ML/MIN/{1.73_M2}
GFR SERPLBLD CREATININE-BSD FMLA CKD-EPI: >60 ML/MIN/{1.73_M2}
GLUCOSE BLD-MCNC: 108 MG/DL (ref 70–99)
GLUCOSE BLD-MCNC: 128 MG/DL (ref 70–99)
GLUCOSE BLD-MCNC: 131 MG/DL (ref 70–99)
GLUCOSE BLD-MCNC: 145 MG/DL (ref 70–99)
GLUCOSE BLD-MCNC: 162 MG/DL (ref 70–99)
GLUCOSE SERPL-MCNC: 130 MG/DL (ref 70–99)
GLUCOSE SERPL-MCNC: 139 MG/DL (ref 70–99)
HCT VFR BLD AUTO: 34.5 % (ref 36–48)
HGB BLD-MCNC: 11.3 G/DL (ref 12–16)
LYMPHOCYTES # BLD: 0.7 K/UL (ref 1–5.1)
LYMPHOCYTES NFR BLD: 11.4 %
MCH RBC QN AUTO: 28.6 PG (ref 26–34)
MCHC RBC AUTO-ENTMCNC: 32.7 G/DL (ref 31–36)
MCV RBC AUTO: 87.6 FL (ref 80–100)
MONOCYTES # BLD: 0.2 K/UL (ref 0–1.3)
MONOCYTES NFR BLD: 2.6 %
NEUTROPHILS # BLD: 5.2 K/UL (ref 1.7–7.7)
NEUTROPHILS NFR BLD: 85.7 %
PERFORMED ON: ABNORMAL
PLATELET # BLD AUTO: 183 K/UL (ref 135–450)
PMV BLD AUTO: 8.3 FL (ref 5–10.5)
POTASSIUM SERPL-SCNC: 4.1 MMOL/L (ref 3.5–5.1)
POTASSIUM SERPL-SCNC: 4.3 MMOL/L (ref 3.5–5.1)
PROT SERPL-MCNC: 6.4 G/DL (ref 6.4–8.2)
RBC # BLD AUTO: 3.94 M/UL (ref 4–5.2)
SODIUM SERPL-SCNC: 137 MMOL/L (ref 136–145)
SODIUM SERPL-SCNC: 138 MMOL/L (ref 136–145)
WBC # BLD AUTO: 6.1 K/UL (ref 4–11)

## 2023-11-29 PROCEDURE — 97530 THERAPEUTIC ACTIVITIES: CPT

## 2023-11-29 PROCEDURE — 1200000000 HC SEMI PRIVATE

## 2023-11-29 PROCEDURE — 6360000002 HC RX W HCPCS: Performed by: EMERGENCY MEDICINE

## 2023-11-29 PROCEDURE — 51701 INSERT BLADDER CATHETER: CPT

## 2023-11-29 PROCEDURE — 93010 ELECTROCARDIOGRAM REPORT: CPT | Performed by: INTERNAL MEDICINE

## 2023-11-29 PROCEDURE — 72131 CT LUMBAR SPINE W/O DYE: CPT

## 2023-11-29 PROCEDURE — 85025 COMPLETE CBC W/AUTO DIFF WBC: CPT

## 2023-11-29 PROCEDURE — 6360000002 HC RX W HCPCS: Performed by: INTERNAL MEDICINE

## 2023-11-29 PROCEDURE — G0378 HOSPITAL OBSERVATION PER HR: HCPCS

## 2023-11-29 PROCEDURE — 97165 OT EVAL LOW COMPLEX 30 MIN: CPT

## 2023-11-29 PROCEDURE — 97161 PT EVAL LOW COMPLEX 20 MIN: CPT

## 2023-11-29 PROCEDURE — 80053 COMPREHEN METABOLIC PANEL: CPT

## 2023-11-29 PROCEDURE — 93005 ELECTROCARDIOGRAM TRACING: CPT | Performed by: INTERNAL MEDICINE

## 2023-11-29 PROCEDURE — 51798 US URINE CAPACITY MEASURE: CPT

## 2023-11-29 PROCEDURE — 6370000000 HC RX 637 (ALT 250 FOR IP): Performed by: INTERNAL MEDICINE

## 2023-11-29 PROCEDURE — 2580000003 HC RX 258: Performed by: INTERNAL MEDICINE

## 2023-11-29 PROCEDURE — 97116 GAIT TRAINING THERAPY: CPT

## 2023-11-29 PROCEDURE — 83036 HEMOGLOBIN GLYCOSYLATED A1C: CPT

## 2023-11-29 PROCEDURE — 36415 COLL VENOUS BLD VENIPUNCTURE: CPT

## 2023-11-29 RX ORDER — GLUCAGON 1 MG/ML
1 KIT INJECTION PRN
Status: DISCONTINUED | OUTPATIENT
Start: 2023-11-29 | End: 2023-12-02 | Stop reason: HOSPADM

## 2023-11-29 RX ORDER — PANTOPRAZOLE SODIUM 40 MG/1
40 TABLET, DELAYED RELEASE ORAL
Status: DISCONTINUED | OUTPATIENT
Start: 2023-11-29 | End: 2023-12-02 | Stop reason: HOSPADM

## 2023-11-29 RX ORDER — METHYLPREDNISOLONE 4 MG/1
4 TABLET ORAL
Status: DISCONTINUED | OUTPATIENT
Start: 2023-11-30 | End: 2023-12-02 | Stop reason: HOSPADM

## 2023-11-29 RX ORDER — METHYLPREDNISOLONE 4 MG/1
4 TABLET ORAL NIGHTLY
Status: DISCONTINUED | OUTPATIENT
Start: 2023-12-01 | End: 2023-12-02 | Stop reason: HOSPADM

## 2023-11-29 RX ORDER — TIZANIDINE 4 MG/1
4 TABLET ORAL EVERY 8 HOURS PRN
Status: ON HOLD | COMMUNITY
Start: 2023-11-14 | End: 2023-12-02 | Stop reason: HOSPADM

## 2023-11-29 RX ORDER — METOPROLOL SUCCINATE 50 MG/1
75 TABLET, EXTENDED RELEASE ORAL DAILY
Status: DISCONTINUED | OUTPATIENT
Start: 2023-11-29 | End: 2023-12-02 | Stop reason: HOSPADM

## 2023-11-29 RX ORDER — SPIRONOLACTONE 25 MG/1
25 TABLET ORAL DAILY
Status: DISCONTINUED | OUTPATIENT
Start: 2023-11-29 | End: 2023-12-02 | Stop reason: HOSPADM

## 2023-11-29 RX ORDER — FUROSEMIDE 40 MG/1
40 TABLET ORAL DAILY
Status: DISCONTINUED | OUTPATIENT
Start: 2023-11-30 | End: 2023-12-02 | Stop reason: HOSPADM

## 2023-11-29 RX ORDER — FLUCONAZOLE 2 MG/ML
400 INJECTION, SOLUTION INTRAVENOUS ONCE
Status: COMPLETED | OUTPATIENT
Start: 2023-11-29 | End: 2023-11-29

## 2023-11-29 RX ORDER — GABAPENTIN 300 MG/1
300 CAPSULE ORAL 3 TIMES DAILY
Status: DISCONTINUED | OUTPATIENT
Start: 2023-11-29 | End: 2023-11-30

## 2023-11-29 RX ORDER — INSULIN LISPRO 100 [IU]/ML
0-8 INJECTION, SOLUTION INTRAVENOUS; SUBCUTANEOUS
Status: DISCONTINUED | OUTPATIENT
Start: 2023-11-29 | End: 2023-12-02 | Stop reason: HOSPADM

## 2023-11-29 RX ORDER — POLYETHYLENE GLYCOL 3350 17 G/17G
17 POWDER, FOR SOLUTION ORAL DAILY
Status: DISCONTINUED | OUTPATIENT
Start: 2023-11-29 | End: 2023-12-01

## 2023-11-29 RX ORDER — GABAPENTIN 300 MG/1
300 CAPSULE ORAL 3 TIMES DAILY
Status: ON HOLD | COMMUNITY
Start: 2023-11-07 | End: 2023-12-02 | Stop reason: HOSPADM

## 2023-11-29 RX ORDER — METHYLPREDNISOLONE 4 MG/1
4 TABLET ORAL
Status: COMPLETED | OUTPATIENT
Start: 2023-11-30 | End: 2023-12-01

## 2023-11-29 RX ORDER — LOSARTAN POTASSIUM 100 MG/1
100 TABLET ORAL DAILY
Status: DISCONTINUED | OUTPATIENT
Start: 2023-11-29 | End: 2023-12-02 | Stop reason: HOSPADM

## 2023-11-29 RX ORDER — LOSARTAN POTASSIUM 100 MG/1
100 TABLET ORAL DAILY
COMMUNITY
Start: 2023-09-09

## 2023-11-29 RX ORDER — INSULIN GLARGINE 100 [IU]/ML
13 INJECTION, SOLUTION SUBCUTANEOUS NIGHTLY
Status: DISCONTINUED | OUTPATIENT
Start: 2023-11-29 | End: 2023-12-02 | Stop reason: HOSPADM

## 2023-11-29 RX ORDER — DEXTROSE MONOHYDRATE 100 MG/ML
INJECTION, SOLUTION INTRAVENOUS CONTINUOUS PRN
Status: DISCONTINUED | OUTPATIENT
Start: 2023-11-29 | End: 2023-12-02 | Stop reason: HOSPADM

## 2023-11-29 RX ORDER — GLUCAGON 1 MG/ML
1 KIT INJECTION PRN
Status: DISCONTINUED | OUTPATIENT
Start: 2023-11-29 | End: 2023-11-29

## 2023-11-29 RX ORDER — NIFEDIPINE 30 MG/1
30 TABLET, EXTENDED RELEASE ORAL DAILY
Status: DISCONTINUED | OUTPATIENT
Start: 2023-11-29 | End: 2023-12-02 | Stop reason: HOSPADM

## 2023-11-29 RX ORDER — INSULIN LISPRO 100 [IU]/ML
0-4 INJECTION, SOLUTION INTRAVENOUS; SUBCUTANEOUS NIGHTLY
Status: DISCONTINUED | OUTPATIENT
Start: 2023-11-29 | End: 2023-12-02 | Stop reason: HOSPADM

## 2023-11-29 RX ORDER — DEXTROSE MONOHYDRATE 100 MG/ML
INJECTION, SOLUTION INTRAVENOUS CONTINUOUS PRN
Status: DISCONTINUED | OUTPATIENT
Start: 2023-11-29 | End: 2023-11-29

## 2023-11-29 RX ORDER — BISACODYL 10 MG
10 SUPPOSITORY, RECTAL RECTAL DAILY PRN
Status: DISCONTINUED | OUTPATIENT
Start: 2023-11-29 | End: 2023-11-30

## 2023-11-29 RX ORDER — METHYLPREDNISOLONE 4 MG/1
8 TABLET ORAL NIGHTLY
Status: COMPLETED | OUTPATIENT
Start: 2023-11-30 | End: 2023-11-30

## 2023-11-29 RX ORDER — METHYLPREDNISOLONE 4 MG/1
24 TABLET ORAL ONCE
Status: COMPLETED | OUTPATIENT
Start: 2023-11-29 | End: 2023-11-29

## 2023-11-29 RX ORDER — INSULIN LISPRO 100 [IU]/ML
0-8 INJECTION, SOLUTION INTRAVENOUS; SUBCUTANEOUS EVERY 4 HOURS
Status: DISCONTINUED | OUTPATIENT
Start: 2023-11-29 | End: 2023-11-29

## 2023-11-29 RX ORDER — FAMOTIDINE 20 MG/1
40 TABLET, FILM COATED ORAL NIGHTLY
Status: DISCONTINUED | OUTPATIENT
Start: 2023-11-29 | End: 2023-12-02 | Stop reason: HOSPADM

## 2023-11-29 RX ADMIN — FAMOTIDINE 40 MG: 20 TABLET ORAL at 20:50

## 2023-11-29 RX ADMIN — HYDROMORPHONE HYDROCHLORIDE 0.5 MG: 1 INJECTION, SOLUTION INTRAMUSCULAR; INTRAVENOUS; SUBCUTANEOUS at 14:42

## 2023-11-29 RX ADMIN — HYDROMORPHONE HYDROCHLORIDE 0.5 MG: 1 INJECTION, SOLUTION INTRAMUSCULAR; INTRAVENOUS; SUBCUTANEOUS at 17:41

## 2023-11-29 RX ADMIN — Medication 10 ML: at 21:09

## 2023-11-29 RX ADMIN — GABAPENTIN 300 MG: 300 CAPSULE ORAL at 09:07

## 2023-11-29 RX ADMIN — LOSARTAN POTASSIUM 100 MG: 100 TABLET, FILM COATED ORAL at 09:07

## 2023-11-29 RX ADMIN — POLYETHYLENE GLYCOL 3350 17 G: 17 POWDER, FOR SOLUTION ORAL at 14:41

## 2023-11-29 RX ADMIN — METHYLPREDNISOLONE 24 MG: 4 TABLET ORAL at 16:49

## 2023-11-29 RX ADMIN — GABAPENTIN 300 MG: 300 CAPSULE ORAL at 14:41

## 2023-11-29 RX ADMIN — NIFEDIPINE 30 MG: 30 TABLET, EXTENDED RELEASE ORAL at 09:07

## 2023-11-29 RX ADMIN — METOPROLOL SUCCINATE 75 MG: 50 TABLET, EXTENDED RELEASE ORAL at 09:07

## 2023-11-29 RX ADMIN — FLUCONAZOLE 400 MG: 400 INJECTION, SOLUTION INTRAVENOUS at 14:36

## 2023-11-29 RX ADMIN — Medication 10 ML: at 09:15

## 2023-11-29 RX ADMIN — SPIRONOLACTONE 25 MG: 25 TABLET ORAL at 09:07

## 2023-11-29 RX ADMIN — DEXAMETHASONE SODIUM PHOSPHATE 4 MG: 4 INJECTION, SOLUTION INTRAMUSCULAR; INTRAVENOUS at 05:12

## 2023-11-29 RX ADMIN — HYDROMORPHONE HYDROCHLORIDE 0.25 MG: 1 INJECTION, SOLUTION INTRAMUSCULAR; INTRAVENOUS; SUBCUTANEOUS at 21:06

## 2023-11-29 RX ADMIN — MELATONIN TAB 3 MG 3 MG: 3 TAB at 20:52

## 2023-11-29 RX ADMIN — GABAPENTIN 300 MG: 300 CAPSULE ORAL at 20:51

## 2023-11-29 ASSESSMENT — PAIN SCALES - GENERAL
PAINLEVEL_OUTOF10: 5
PAINLEVEL_OUTOF10: 0
PAINLEVEL_OUTOF10: 7
PAINLEVEL_OUTOF10: 0
PAINLEVEL_OUTOF10: 0
PAINLEVEL_OUTOF10: 8

## 2023-11-29 ASSESSMENT — PAIN DESCRIPTION - LOCATION
LOCATION: BACK;LEG
LOCATION: BACK

## 2023-11-29 ASSESSMENT — PAIN DESCRIPTION - ORIENTATION: ORIENTATION: LOWER

## 2023-11-29 ASSESSMENT — PAIN DESCRIPTION - DESCRIPTORS
DESCRIPTORS: ACHING;SPASM
DESCRIPTORS: ACHING;SPASM

## 2023-11-29 NOTE — PROGRESS NOTES
patient's home setting. Based on the patient's AM-PAC score and their current functional mobility deficits, it is recommended that the patient have 2-3 sessions per week of Physical Therapy at d/c to increase the patient's independence. At this time, this patient demonstrates the endurance and safety to discharge home with home health and a follow up treatment frequency of 2-3x/wk. Please see assessment section for further patient specific details. If patient discharges prior to next session this note will serve as a discharge summary. Please see below for the latest assessment towards goals. HOME HEALTH CARE: LEVEL 1 STANDARD    - Initial home health evaluation to occur within 24-48 hours, in patient home   - Therapy to evaluate with goal of regaining prior level of functioning   - Therapy to evaluate if patient has [address] needs for personal care    DME Required For Discharge: rolling walker   Precautions/Restrictions: high fall risk  Weight Bearing Restrictions: no restrictions  [] Right Upper Extremity  [] Left Upper Extremity [] Right Lower Extremity  [] Left Lower Extremity     Required Braces/Orthotics: no braces required   [] Right  [] Left  Positional Restrictions:no positional restrictions    Pre-Admission Information   Lives With: alone    Type of Home: house  Home Layout: one level, handicap accessible.  Patient reports her doors are wide enough to fit a wheelchair   Home Access:  1 step to enter without rails ; patient reports one short step into the threshold of the door   Bathroom Layout: walk in shower  Bathroom Equipment: grab bars in shower, shower chair, hand held shower head, toilet raiser  Toilet Height: elevated height  Home Equipment: rolling walker, quad cane, lift chair, reacher, sock aide, long handled shoe horn, alert button  Transfer Assistance: Independent without use of device  Ambulation Assistance:modified independent with use of quad cane  ADL Assistance:  Patient decreased endurance, decreased balance, increased pain, decreased posture  Prognosis: good  Clinical Assessment: Patient is a 74year old female admitted for back pain. Prior to admission, patient reports mod I with use of SPC. Currently, patient requires CGA with a SPC and SBA with a RW for short distance gait training. Patient is mainly limited by pain in her low back that is affecting her mobility. She has been very involved in OPPT for her back and L shoulder. Patient would benefit from additional skilled PT upon discharge to promote independence and safety with functional mobility. Safety Interventions: patient left in bed, call light within reach, gait belt, patient at risk for falls, nurse notified, family/caregiver present, and patient sitting EOB with RN present to administer medication     Plan  Frequency: 3-5 x/per week  Current Treatment Recommendations: strengthening, balance training, functional mobility training, transfer training, gait training, stair training, endurance training, pain management, home exercise program, and positioning    Goals  Patient Goals: To increase independence    Short Term Goals:  Time Frame: Upon discharge   Patient will complete bed mobility at modified independent   Patient will complete transfers at [facility] independent   Patient will ambulate 75 ft with use of LRAD at supervision  Patient will ascend/descend 1 stairs with (B) handrail at supervision    Above goals reviewed on 11/29/2023. All goals are ongoing at this time unless indicated above.       Therapy Session Time      Individual Group Co-treatment   Time In 0818       Time Out 0915       Minutes 57         Timed Code Treatment Minutes:  42 Minutes  Total Treatment Minutes:  57 minutes        Electronically Signed By: Lorette Harada PT, DPT [id number]

## 2023-11-29 NOTE — PROGRESS NOTES
4 Eyes Skin Assessment     NAME:  Thom Colindres  YOB: 1950  MEDICAL RECORD NUMBER:  [id number]    The patient is being assessed for  Admission    I agree that at least one RN has performed a thorough Head to Toe Skin Assessment on the patient. ALL assessment sites listed below have been assessed. Areas assessed by both nurses:    Head, Face, Ears, Shoulders, Back, Chest, Arms, Elbows, Hands, Sacrum. Buttock, Coccyx, Ischium, and Legs. Feet and Heels        Does the Patient have a Wound?  No noted wound(s)       Merrick Prevention initiated by RN: No  Wound Care Orders initiated by RN: No    Pressure Injury (Stage 3,4, Unstageable, DTI, NWPT, and Complex wounds) if present, place Wound referral order by RN under : No    New Ostomies, if present place, Ostomy referral order under : No     Nurse 1 eSignature: Electronically signed by Shreya Salas RN on 11/29/23 at 3:18 AM EST    **SHARE this note so that the co-signing nurse can place an eSignature**    Nurse 2 eSignature: Electronically signed by Nneka Carlton RN on 11/29/23 at 4:25 AM EST

## 2023-11-29 NOTE — H&P
Hospital Medicine History & Physical      PCP: Gina Fuchs MD    Date of Admission: 11/28/2023    Date of Service: Pt seen/examined on 11/28/2023    Pt seen/examined face to face on and admitted as inpatient with expected LOS to be two days but can change depending on diagnostic work up and treatment response. Chief Complaint:    Chief Complaint   Patient presents with    Back Pain     Reports bilateral lower back pain radiating down the legs x2 weeks now, hx of back surgery. Pt also reports urinary issues, states \"I'll feel like I have to pee and can't, and sometimes I just pee on myself. \" Pt ambulated to room with a cane, pt states increasing pain when walking and worsening when lying down. 9/10 pain level. History Of Present Illness:      74 y.o. female who presented to [facility] with past medical history of aortic stenosis, back pain, type 2 diabetes, GERD, hyperlipidemia, polio, sleep apnea presented to the ED with chief complaint of back pain    Patient reported that she had a mechanical fall reports that she started having some back pain lower quadrant no known alleviating or exacerbating factors other than movement. Patient denied having any bowel incontinence does report control for urinary continence however reports that it takes her a lot of time to get up and use taking a straw walking before the urine starts to dribble. Active burning, no nausea vomiting chest pain shortness of breath palpitations abdominal pain or dysuria or incontinence prior to the fall. There was no loss of conscious, patient denied hitting her head as well.   Patient reports the pain is 9/10 sharp achy      Past Medical History:          Diagnosis Date    Aortic stenosis     Arthritis     Back pain     CHF (congestive heart failure) (HCC)     Chronic diastolic heart failure (HCC)     Diabetes mellitus (HCC)     Diabetes mellitus ([address] [facility])     TYPE 2    GERD (gastroesophageal reflux disease)     High TRULICITY 1.5 EW/0.3VC SC injection  2/9/23   Louise Fierro MD   NIFEdipine (PROCARDIA XL) 30 MG extended release tablet Take 1 tablet by mouth daily 3/2/23   Alvarado Arguello MD   losartan (COZAAR) 50 MG tablet Take 2 tablets by mouth daily 1/19/23   Alvarado Arguello MD   Cholecalciferol (VITAMIN D3 PO) Take 1,000 mg by mouth    Louise Fierro MD   Multiple Vitamins-Minerals (HAIR SKIN AND NAILS FORMULA) TABS Take by mouth    Louise Fierro MD   vitamin C (ASCORBIC ACID) 500 MG tablet Take 1 tablet by mouth daily    Louise Fierro MD   Multiple Vitamins-Minerals (THERAPEUTIC MULTIVITAMIN-MINERALS) tablet Take 1 tablet by mouth daily Essential 1 Pro caps    Louise Fierro MD   metoprolol succinate (TOPROL XL) 50 MG extended release tablet Take 1 tablet by mouth daily 2/15/22   Alvarado Arguello MD   meloxicam (MOBIC) 15 MG tablet Take 0.5 tablets by mouth daily    Louise Fierro MD   insulin glargine (LANTUS) 100 UNIT/ML injection vial Inject 26 Units into the skin nightly PT states she takes this on a sliding scale    Louise Fierro MD   pantoprazole (PROTONIX) 40 MG tablet Take 1 tablet by mouth daily 12/29/20   Louise Fierro MD   famotidine (PEPCID) 40 MG tablet Take 1 tablet by mouth daily    Louise Fierro MD   aspirin 81 MG chewable tablet Take 1 tablet by mouth daily. 2/26/15   Sharmila Amaya MD   acetaminophen (TYLENOL) 500 MG tablet Take 1 tablet by mouth every 6 hours as needed for Pain    Louise Fierro MD       Allergies:  Metformin, Ace inhibitors, Adhesive tape, and Oxycodone-acetaminophen    Social History:          TOBACCO:   reports that she has never smoked. She has been exposed to tobacco smoke. She has never used smokeless tobacco.  ETOH:   reports no history of alcohol use.   E-cigarette/Vaping       Questions Responses    E-cigarette/Vaping Use Never User    Start Date     Passive Exposure No    Quit Date     Counseling

## 2023-11-29 NOTE — ACP (ADVANCE CARE PLANNING)
Advanced Care Planning Note. Purpose of Encounter: Advanced care planning in light of intractable back pain  Parties In Attendance: Patient, daughter  Decisional Capacity: Yes  Subjective: Patient with back pain  Objective: Cr 0.8  Goals of Care Determination: Patient wants full support (CPR, vent, surgery, HD, trach, PEG)  Plan:  Steroids, CT L spine, NS consult, PT/OT, Urology consult, [place]  Code Status: Full code   Time spent on Advanced care Plannin minutes  Advanced Care Planning Documents: Completed advanced directives on chart, daughter is the POA.     Mustapha Mae MD  2023 3:01 PM

## 2023-11-29 NOTE — CARE COORDINATION
Case Management Note:    Patient admitted as Observation with an anticipated short hospitalization length of stay. Chart reviewed and it appears that patient has minimal needs for discharge at this time. Medical staff to inform case management team if discharge needs are identified. PT/OT pending. Case management will continue to follow progress and update discharge plan as needed.      Electronically signed by Cintia Salgado on 11/29/23 at 8:41 AM EST

## 2023-11-29 NOTE — CARE COORDINATION
Case Management Assessment  Initial Evaluation    Date/Time of Evaluation: 11/29/2023 1:59 PM  Assessment Completed by: Kathy Jerry    If patient is discharged prior to next notation, then this note serves as note for discharge by case management. Patient Name: Blanka Haines                   YOB: 1950  Diagnosis: Acute exacerbation of chronic low back pain [M54.50, G89.29]  Acute back pain, unspecified back location, unspecified back pain laterality [M54.9]  Central stenosis of spinal canal [M48.00]                   Date / Time: 11/28/2023 12:23 PM    Patient Admission Status: Observation   Readmission Risk (Low < 19, Mod (19-27), High > 27): No data recorded  Current PCP: Dariel Saab MD  PCP verified by CM? Yes    Chart Reviewed: Yes      History Provided by: Patient  Patient Orientation: Alert and Oriented    Patient Cognition: Alert    Hospitalization in the last 30 days (Readmission):  No    If yes, Readmission Assessment in CM Navigator will be completed. Advance Directives:      Code Status: Full Code   Patient's Primary Decision Maker is: Legal Next of Kin      Discharge Planning:    Patient lives with: Alone Type of Home: House  Primary Care Giver: Self  Patient Support Systems include: Children   Current Financial resources: Medicare  Current community resources: None (COA)  Current services prior to admission: None (COA)            Current DME:              Type of Home Care services:  None    ADLS  Prior functional level: Dressing, Toileting, Shopping, Mobility, Housework, Cooking, Feeding, Independent in ADLs/IADLs  Current functional level: Assistance with the following:, Bathing, Dressing, Toileting, Mobility    PT AM-PAC: 20 /24  OT AM-PAC:   /24    Family can provide assistance at DC: Yes  Would you like Case Management to discuss the discharge plan with any other family members/significant others, and if so, who?  Yes  Plans to Return to Present Housing: Yes  Other Identified Issues/Barriers to RETURNING to current housing: Yes  Potential Assistance needed at discharge: Home Care            Potential DME:    Patient expects to discharge to: [address] [facility] for transportation at discharge:      Financial    Payor: [address] / Plan: MEDICARE PART A AND B / Product Type: *No Product type* /     Does insurance require precert for SNF: No    Potential assistance Purchasing Medications: No  Meds-to-Beds request:        [address] [place] [address] [address] [facility], [address] [phone] Verline Fall [phone]  [facility]  Phone: [phone] Fax: [phone]      Notes:    Factors facilitating achievement of predicted outcomes: Family support, Motivated, Cooperative, and Pleasant    Barriers to discharge: Pain    Additional Case Management Notes: Patient from home where she lives alone. She was admitted from the ER with Acute Back pain. Patient will not need surgery this hospital visit, however likely outpatient surgery in the near future. Patient was seen by therapy who recommends [employer] [facility] on discharge. Patient is agreeable to [employer] [facility] services. She used [facility] in the past and would like to use again. CM will send referral. Patient also has aide serviced with [facility]. CM will call her CM and update. Patient will discharge when medically ready. The Plan for Transition of Care is related to the following treatment goals of Acute exacerbation of chronic low back pain [M54.50, G89.29]  Acute back pain, unspecified back location, unspecified back pain laterality [M54.9]  Central stenosis of spinal canal [G90.06]    IF APPLICABLE: The Patient and/or patient representative Laurita Rod and her family were provided with a choice of provider and agrees with the discharge plan.  Freedom of choice list with basic dialogue that supports the patient's individualized plan of care/goals and shares the quality data associated with the providers was

## 2023-11-29 NOTE — CARE COORDINATION
Discharge Planning:     (CM) called and LVM for Anai @ [facility] for referral.  Cm called Donna Nevarez with [facility] to update on discharge plans.     Electronically signed by Marjan Ochoa on 11/29/23 at 2:10 PM EST

## 2023-11-29 NOTE — ED NOTES
Pt bladder scan 150 ml, just went to the bathroom before bladder scan       Randa Pascual RN  11/28/23 1923
Pt given snacks and turkey sandwich.       Brandon Witt RN  11/28/23 2020
Reports bilateral lower back pain radiating down the legs x2 weeks now, hx of back surgery. Pt also reports urinary issues, states \"I'll feel like I have to pee and can't, and sometimes I just pee on myself. \" Pt ambulated to room with a cane, pt states increasing pain when walking and worsening when lying down. 9/10 pain level. Patient alert and oriented x4. GCS 15/15. Skin appropriate for ethnicity, dry and intact. No signs of acute distress noted at this time. Regular respiratory pattern, normal respiratory depth, unlabored respirations. 7/10 pain. Pt placed on a continuous pulse oximetry and BP. Bedside Monitor on with Alarms audible and alarms set. Pt on cycling blood pressure. Pt independent with cane, call light in reach, bed side table within reach, will continue to monitor.        Arnulfo Kim RN  11/28/23 2024
Score:    C-SSRS: Risk of Suicide: No Risk  Bedside swallow:    [place] Coma Scale (GCS): Jr Coma Scale  Eye Opening: Spontaneous  Best Verbal Response: Oriented  Best Motor Response: Obeys commands  Jr Coma Scale Score: 15  Active LDA's:   Peripheral IV 11/28/23 (Active)   Site Assessment Clean, dry & intact 11/28/23 1724     PO Status: Regular  Pertinent or High Risk Medications/Drips: no   If Yes, please provide details: na  Pending Blood Product Administration: no       You may also review the ED PT Care Timeline found under the Summary Nursing Index tab. Recommendation    Pending orders ED orders done  Plan for Discharge (if known):    Additional Comments: na   If any further questions, please call Sending RN at [phone]    Electronically signed by: Electronically signed by Twyla Collazo RN on 11/28/2023 at 8:20 PM      Paloma Stewart  11/28/23 2022

## 2023-11-29 NOTE — CARE COORDINATION
CM notified by therapy that they will be recommending a walker at discharge. HIMANSHU sent message to MD that was read and HIMANSHU called [facility] with [address] [facility] [phone] and he will monitor for order.        Zach Nieto RN, BSN  [phone]

## 2023-11-29 NOTE — PROGRESS NOTES
been receiving OP therapy. Pt also has 1 cm lag in L hand to distal palmar crease and feels numbness/tingling in ulnar aspect of hand. Strength:   Not formally assessed d/t pain in B shoulders, significant weakness noted in B hands, pt reports she has difficult maintain grasps on objects. Therapist Clinical Decision Making (Complexity): low complexity  Clinical Presentation: stable      Subjective  General: Pt supine in bed upon arrival, pleasant and agreeable to OT evaluation and treat. Pt reports that her pain has got so severe   Pain: 9/10. Location: lower back  Pain Interventions: pain medication in place prior to arrival, repositioned , and therapy activities modified        Activities of Daily Living  Basic Activities of Daily Living  General Comments: pt declined ADL participation this date. Pt eating lunch IND at [address]. Instrumental Activities of Daily Living  No IADL completed on this date. Functional Mobility  Bed Mobility:  Supine to Sit: minimal assistance  Sit to Supine: stand by assistance  Rolling Right: stand by assistance  Scooting: stand by assistance  Comments: pt educated on use of log roll technique and demo understanding, pt with shooting pain while competing sup>sit transfer requiring increased assist to get fully upright. Transfers:  Sit to stand transfer:contact guard assistance  Stand to sit transfer: contact guard assistance  Comments: increased pain with transitional movements  Functional Mobility  Functional Mobility Activity: 5' in room  Device Use: rolling walker  Required Assistance: contact guard assistance  Comment: increased pain with mobility in lower back, improves slightly with RW vs quad cane More steady with use of RW.    Balance:  Static Sitting Balance: fair (+): maintains balance at SBA/supervision without use of UE support  Dynamic Sitting Balance: fair (+): maintains balance at SBA/supervision without use of UE support  Static Standing Balance: fair (-):

## 2023-11-30 LAB
ANION GAP SERPL CALCULATED.3IONS-SCNC: 6 MMOL/L (ref 3–16)
BASOPHILS # BLD: 0 K/UL (ref 0–0.2)
BASOPHILS NFR BLD: 0.2 %
BUN SERPL-MCNC: 22 MG/DL (ref 7–20)
CALCIUM SERPL-MCNC: 9.8 MG/DL (ref 8.3–10.6)
CHLORIDE SERPL-SCNC: 104 MMOL/L (ref 99–110)
CO2 SERPL-SCNC: 29 MMOL/L (ref 21–32)
CREAT SERPL-MCNC: 0.8 MG/DL (ref 0.6–1.2)
DEPRECATED RDW RBC AUTO: 14.7 % (ref 12.4–15.4)
EOSINOPHIL # BLD: 0 K/UL (ref 0–0.6)
EOSINOPHIL NFR BLD: 0 %
EST. AVERAGE GLUCOSE BLD GHB EST-MCNC: 114 MG/DL
FERRITIN SERPL IA-MCNC: 269.2 NG/ML (ref 15–150)
FOLATE SERPL-MCNC: >20 NG/ML (ref 4.78–24.2)
GFR SERPLBLD CREATININE-BSD FMLA CKD-EPI: >60 ML/MIN/{1.73_M2}
GLUCOSE BLD-MCNC: 127 MG/DL (ref 70–99)
GLUCOSE BLD-MCNC: 135 MG/DL (ref 70–99)
GLUCOSE BLD-MCNC: 153 MG/DL (ref 70–99)
GLUCOSE BLD-MCNC: 156 MG/DL (ref 70–99)
GLUCOSE BLD-MCNC: 173 MG/DL (ref 70–99)
GLUCOSE SERPL-MCNC: 157 MG/DL (ref 70–99)
HBA1C MFR BLD: 5.6 %
HCT VFR BLD AUTO: 35.6 % (ref 36–48)
HGB BLD-MCNC: 11.7 G/DL (ref 12–16)
IRON SATN MFR SERPL: 24 % (ref 15–50)
IRON SERPL-MCNC: 61 UG/DL (ref 37–145)
LYMPHOCYTES # BLD: 0.8 K/UL (ref 1–5.1)
LYMPHOCYTES NFR BLD: 10.5 %
MCH RBC QN AUTO: 28.6 PG (ref 26–34)
MCHC RBC AUTO-ENTMCNC: 32.7 G/DL (ref 31–36)
MCV RBC AUTO: 87.3 FL (ref 80–100)
MONOCYTES # BLD: 0.2 K/UL (ref 0–1.3)
MONOCYTES NFR BLD: 2.5 %
NEUTROPHILS # BLD: 6.7 K/UL (ref 1.7–7.7)
NEUTROPHILS NFR BLD: 86.8 %
PERFORMED ON: ABNORMAL
PLATELET # BLD AUTO: 197 K/UL (ref 135–450)
PMV BLD AUTO: 8.2 FL (ref 5–10.5)
POTASSIUM SERPL-SCNC: 4.5 MMOL/L (ref 3.5–5.1)
RBC # BLD AUTO: 4.08 M/UL (ref 4–5.2)
SODIUM SERPL-SCNC: 139 MMOL/L (ref 136–145)
TIBC SERPL-MCNC: 254 UG/DL (ref 260–445)
VIT B12 SERPL-MCNC: 405 PG/ML (ref 211–911)
WBC # BLD AUTO: 7.7 K/UL (ref 4–11)

## 2023-11-30 PROCEDURE — 6370000000 HC RX 637 (ALT 250 FOR IP): Performed by: INTERNAL MEDICINE

## 2023-11-30 PROCEDURE — 83540 ASSAY OF IRON: CPT

## 2023-11-30 PROCEDURE — 36415 COLL VENOUS BLD VENIPUNCTURE: CPT

## 2023-11-30 PROCEDURE — 6370000000 HC RX 637 (ALT 250 FOR IP): Performed by: NURSE PRACTITIONER

## 2023-11-30 PROCEDURE — 6360000002 HC RX W HCPCS: Performed by: INTERNAL MEDICINE

## 2023-11-30 PROCEDURE — 94150 VITAL CAPACITY TEST: CPT

## 2023-11-30 PROCEDURE — 83550 IRON BINDING TEST: CPT

## 2023-11-30 PROCEDURE — 82746 ASSAY OF FOLIC ACID SERUM: CPT

## 2023-11-30 PROCEDURE — 97530 THERAPEUTIC ACTIVITIES: CPT

## 2023-11-30 PROCEDURE — 2580000003 HC RX 258: Performed by: INTERNAL MEDICINE

## 2023-11-30 PROCEDURE — 82607 VITAMIN B-12: CPT

## 2023-11-30 PROCEDURE — 1200000000 HC SEMI PRIVATE

## 2023-11-30 PROCEDURE — 85025 COMPLETE CBC W/AUTO DIFF WBC: CPT

## 2023-11-30 PROCEDURE — 80048 BASIC METABOLIC PNL TOTAL CA: CPT

## 2023-11-30 PROCEDURE — 82728 ASSAY OF FERRITIN: CPT

## 2023-11-30 RX ORDER — METHOCARBAMOL 500 MG/1
750 TABLET, FILM COATED ORAL 4 TIMES DAILY PRN
Status: DISCONTINUED | OUTPATIENT
Start: 2023-11-30 | End: 2023-12-01

## 2023-11-30 RX ORDER — BISACODYL 5 MG/1
10 TABLET, DELAYED RELEASE ORAL DAILY
Status: DISCONTINUED | OUTPATIENT
Start: 2023-11-30 | End: 2023-12-02 | Stop reason: HOSPADM

## 2023-11-30 RX ORDER — ACETAMINOPHEN 500 MG
1000 TABLET ORAL EVERY 8 HOURS SCHEDULED
Status: DISCONTINUED | OUTPATIENT
Start: 2023-11-30 | End: 2023-12-02 | Stop reason: HOSPADM

## 2023-11-30 RX ORDER — OXYCODONE HYDROCHLORIDE 5 MG/1
5 TABLET ORAL EVERY 4 HOURS PRN
Status: DISCONTINUED | OUTPATIENT
Start: 2023-11-30 | End: 2023-12-02 | Stop reason: HOSPADM

## 2023-11-30 RX ORDER — GABAPENTIN 300 MG/1
600 CAPSULE ORAL 3 TIMES DAILY
Status: DISCONTINUED | OUTPATIENT
Start: 2023-11-30 | End: 2023-12-02 | Stop reason: HOSPADM

## 2023-11-30 RX ORDER — OXYCODONE HYDROCHLORIDE 5 MG/1
10 TABLET ORAL EVERY 4 HOURS PRN
Status: DISCONTINUED | OUTPATIENT
Start: 2023-11-30 | End: 2023-12-02 | Stop reason: HOSPADM

## 2023-11-30 RX ADMIN — ACETAMINOPHEN 1000 MG: 500 TABLET ORAL at 20:18

## 2023-11-30 RX ADMIN — OXYCODONE 10 MG: 5 TABLET ORAL at 16:21

## 2023-11-30 RX ADMIN — SODIUM CHLORIDE, PRESERVATIVE FREE 10 ML: 5 INJECTION INTRAVENOUS at 09:37

## 2023-11-30 RX ADMIN — HYDROMORPHONE HYDROCHLORIDE 0.5 MG: 1 INJECTION, SOLUTION INTRAMUSCULAR; INTRAVENOUS; SUBCUTANEOUS at 09:34

## 2023-11-30 RX ADMIN — Medication 10 ML: at 20:34

## 2023-11-30 RX ADMIN — FAMOTIDINE 40 MG: 20 TABLET ORAL at 20:19

## 2023-11-30 RX ADMIN — METHYLPREDNISOLONE 8 MG: 4 TABLET ORAL at 21:20

## 2023-11-30 RX ADMIN — METHYLPREDNISOLONE 4 MG: 4 TABLET ORAL at 17:48

## 2023-11-30 RX ADMIN — LOSARTAN POTASSIUM 100 MG: 100 TABLET, FILM COATED ORAL at 08:22

## 2023-11-30 RX ADMIN — SPIRONOLACTONE 25 MG: 25 TABLET ORAL at 08:22

## 2023-11-30 RX ADMIN — Medication 10 ML: at 08:22

## 2023-11-30 RX ADMIN — BISACODYL 10 MG: 5 TABLET, COATED ORAL at 13:44

## 2023-11-30 RX ADMIN — PANTOPRAZOLE SODIUM 40 MG: 40 TABLET, DELAYED RELEASE ORAL at 06:01

## 2023-11-30 RX ADMIN — METOPROLOL SUCCINATE 75 MG: 50 TABLET, EXTENDED RELEASE ORAL at 08:21

## 2023-11-30 RX ADMIN — GABAPENTIN 600 MG: 300 CAPSULE ORAL at 13:44

## 2023-11-30 RX ADMIN — NIFEDIPINE 30 MG: 30 TABLET, EXTENDED RELEASE ORAL at 08:22

## 2023-11-30 RX ADMIN — POLYETHYLENE GLYCOL 3350 17 G: 17 POWDER, FOR SOLUTION ORAL at 08:24

## 2023-11-30 RX ADMIN — METHYLPREDNISOLONE 4 MG: 4 TABLET ORAL at 08:20

## 2023-11-30 RX ADMIN — ACETAMINOPHEN 1000 MG: 500 TABLET ORAL at 13:43

## 2023-11-30 RX ADMIN — METHYLPREDNISOLONE 4 MG: 4 TABLET ORAL at 13:46

## 2023-11-30 RX ADMIN — GABAPENTIN 600 MG: 300 CAPSULE ORAL at 20:19

## 2023-11-30 RX ADMIN — INSULIN GLARGINE 13 UNITS: 100 INJECTION, SOLUTION SUBCUTANEOUS at 20:22

## 2023-11-30 RX ADMIN — INSULIN GLARGINE 13 UNITS: 100 INJECTION, SOLUTION SUBCUTANEOUS at 00:44

## 2023-11-30 RX ADMIN — FUROSEMIDE 40 MG: 40 TABLET ORAL at 08:22

## 2023-11-30 RX ADMIN — GABAPENTIN 300 MG: 300 CAPSULE ORAL at 08:22

## 2023-11-30 RX ADMIN — MELATONIN TAB 3 MG 3 MG: 3 TAB at 20:20

## 2023-11-30 ASSESSMENT — PAIN SCALES - GENERAL
PAINLEVEL_OUTOF10: 8
PAINLEVEL_OUTOF10: 8

## 2023-11-30 ASSESSMENT — PAIN DESCRIPTION - DESCRIPTORS
DESCRIPTORS: ACHING
DESCRIPTORS: SHOOTING;SHARP
DESCRIPTORS: ACHING;SPASM

## 2023-11-30 ASSESSMENT — PAIN DESCRIPTION - LOCATION
LOCATION: BACK

## 2023-11-30 ASSESSMENT — PAIN DESCRIPTION - ORIENTATION
ORIENTATION: LOWER
ORIENTATION: LOWER

## 2023-11-30 NOTE — PROGRESS NOTES
Controlled  Continue Cozaar, Toprol XL, Lasix, nifedipine and Aldactone. DM 2 on long-term insulin with neuropathy:  Continue Lantus with SSI. A1c 5.6% 11/29/2023. Chronic diastolic CHF: Appears compensated. Continue Lasix and Aldactone. 7.    Mild anemia: Follow-up workup. 8.     Constipation: Started on bowel regimen. DVT Prophylaxis: Lovenox  Diet: ADULT DIET; Regular  Code Status: Full Code  PT/OT Eval Status:  Following    Dispo - Home with home PT/OT recommended    Marialuisa Box MD

## 2023-11-30 NOTE — PROGRESS NOTES
Physical Therapy    [address] [place] [address] Department   Phone: [phone]    Physical Therapy    [] Initial Evaluation            [x] Daily Treatment Note         [] Discharge Summary      Patient: Dany Chris   : 1950   MRN: [id number]   Date of Service:  2023  Admitting Diagnosis: Acute back pain, unspecified back location, unspecified back pain laterality  Current Admission Summary: Dany Chris is a 74year old women who presents with back pain. Reports bilateral lower back pain radiating down the legs x2 weeks now, hx of back surgery. Pt also reports urinary issues, states \"I'll feel like I have to pee and can't, and sometimes I just pee on myself. \" Pt ambulated to room with a cane, pt states increasing pain when walking and worsening when lying down. 9/10 pain level. Past Medical History:  has a past medical history of Aortic stenosis, Arthritis, Back pain, CHF (congestive heart failure) ([address]), Chronic diastolic heart failure ([address]), Diabetes mellitus ([address]), Diabetes mellitus ([address]), GERD (gastroesophageal reflux disease), High cholesterol, Hypertension, Malignant neoplasm of right female breast ([address]), Polio, Polio, Shingles, Sleep apnea, and Urinary problem. Past Surgical History:  has a past surgical history that includes joint replacement; knee surgery; Colonoscopy; other surgical history (2014); hernia repair; joint replacement; SABRINA STEREO BREAST BX W LOC DEVICE 1ST LESION RIGHT (Right, 2022); back surgery; back surgery; Breast biopsy (Right, 2022); Breast surgery (Right, 2022); Humerus fracture surgery (Left, 2022); Arm Surgery (Left); fracture surgery; eye surgery; and Examination under anesthesia (N/A, 2023). Discharge Recommendations: Dany Chris scored a 20/24 on the AM-PAC short mobility form.  Current research shows that an AM-PAC score of 18 or greater is typically associated with a discharge to the educated on goals, PT role and benefits, plan of care, general safety, functional mobility training, proper use of assistive device/equipment, transfer training, discharge recommendations  Learning Assessment:  patient verbalizes and demonstrates understanding    Assessment  Activity Tolerance: limited by back pain   Impairments Requiring Therapeutic Intervention: decreased functional mobility, decreased strength, decreased endurance, decreased balance, increased pain, decreased posture  Prognosis: good  Clinical Assessment: Pt continues with back pain especially when trying to stand fully upright in addition to several other areas of pain (neck, Right wrist, and left shoulder) when utilizing a RW. Pt, however, was unsafe with the QC. Utilizing the RW with a slightly flexed posture allowed pt to ambulate a further distance today (still a shorter household distance). Pt would benefit from continued skilled PT services to promote further improvements in function in addition to pain management. Safety Interventions: patient left in bed, bed alarm in place, call light within reach, gait belt, patient at risk for falls, and nurse notified    Plan  Frequency: 3-5 x/per week  Current Treatment Recommendations: strengthening, balance training, functional mobility training, transfer training, gait training, stair training, endurance training, pain management, home exercise program, and positioning    Goals  Patient Goals: To increase independence    Short Term Goals:  Time Frame: Upon discharge   Patient will complete bed mobility at modified independent   Patient will complete transfers at [facility] independent   Patient will ambulate 75 ft with use of LRAD at supervision  Patient will ascend/descend 1 stairs with (B) handrail at supervision    Above goals reviewed on 11/30/2023. All goals are ongoing at this time unless indicated above.       Therapy Session Time      Individual Group Co-treatment   Time In 2888

## 2023-11-30 NOTE — CARE COORDINATION
SW attempted to call May Organ again with [facility], [phone], but went straight to voicemail. Faxed a [address] [place] [address] referral to them at [phone]. Waiting on an answer if can accept.     Electronically signed by ROCK Irwin, LSW on 11/30/2023 at 1:20 PM

## 2023-11-30 NOTE — PROGRESS NOTES
Incentive Spirometry education and demonstration completed by Respiratory Therapy Yes      Response to education: Excellent     Teaching Time: 15 minutes    Minimum Predicted Vital Capacity - 593 mL. Patient's Actual Vital Capacity - 2000 mL. Turning over to Nursing for routine follow-up Yes.     Comments: IS goal met    Electronically signed by Bijal Fleming RCP on 11/30/2023 at 1:21 PM

## 2023-11-30 NOTE — CARE COORDINATION
11/30/23 1339   IMM Letter   IMM Letter given to Patient/Family/Significant other/Guardian/POA/by: Given to patient by Danny VIZCARRA   IMM Letter date given: 11/30/23   IMM Letter time given: 1100     Electronically signed by Mikki Rao on 11/30/23 at 1:40 PM EST

## 2023-12-01 LAB
GLUCOSE BLD-MCNC: 118 MG/DL (ref 70–99)
GLUCOSE BLD-MCNC: 143 MG/DL (ref 70–99)
GLUCOSE BLD-MCNC: 152 MG/DL (ref 70–99)
GLUCOSE BLD-MCNC: 161 MG/DL (ref 70–99)
PERFORMED ON: ABNORMAL

## 2023-12-01 PROCEDURE — 6370000000 HC RX 637 (ALT 250 FOR IP): Performed by: INTERNAL MEDICINE

## 2023-12-01 PROCEDURE — 6360000002 HC RX W HCPCS: Performed by: INTERNAL MEDICINE

## 2023-12-01 PROCEDURE — 97530 THERAPEUTIC ACTIVITIES: CPT

## 2023-12-01 PROCEDURE — 1200000000 HC SEMI PRIVATE

## 2023-12-01 PROCEDURE — 97116 GAIT TRAINING THERAPY: CPT

## 2023-12-01 PROCEDURE — 97535 SELF CARE MNGMENT TRAINING: CPT

## 2023-12-01 PROCEDURE — 6370000000 HC RX 637 (ALT 250 FOR IP): Performed by: NURSE PRACTITIONER

## 2023-12-01 PROCEDURE — 2580000003 HC RX 258: Performed by: INTERNAL MEDICINE

## 2023-12-01 RX ORDER — POLYETHYLENE GLYCOL 3350 17 G/17G
17 POWDER, FOR SOLUTION ORAL 2 TIMES DAILY
Status: DISCONTINUED | OUTPATIENT
Start: 2023-12-01 | End: 2023-12-02 | Stop reason: HOSPADM

## 2023-12-01 RX ORDER — METHOCARBAMOL 500 MG/1
750 TABLET, FILM COATED ORAL 3 TIMES DAILY
Status: DISCONTINUED | OUTPATIENT
Start: 2023-12-01 | End: 2023-12-02 | Stop reason: HOSPADM

## 2023-12-01 RX ORDER — BISACODYL 10 MG
10 SUPPOSITORY, RECTAL RECTAL ONCE
Status: DISCONTINUED | OUTPATIENT
Start: 2023-12-01 | End: 2023-12-02 | Stop reason: HOSPADM

## 2023-12-01 RX ADMIN — NALOXEGOL OXALATE 25 MG: 25 TABLET, FILM COATED ORAL at 06:12

## 2023-12-01 RX ADMIN — FAMOTIDINE 40 MG: 20 TABLET ORAL at 21:08

## 2023-12-01 RX ADMIN — METHOCARBAMOL 750 MG: 500 TABLET ORAL at 21:07

## 2023-12-01 RX ADMIN — OXYCODONE 5 MG: 5 TABLET ORAL at 08:55

## 2023-12-01 RX ADMIN — METHYLPREDNISOLONE 4 MG: 4 TABLET ORAL at 15:52

## 2023-12-01 RX ADMIN — NIFEDIPINE 30 MG: 30 TABLET, EXTENDED RELEASE ORAL at 08:42

## 2023-12-01 RX ADMIN — METHOCARBAMOL 750 MG: 500 TABLET ORAL at 18:14

## 2023-12-01 RX ADMIN — ACETAMINOPHEN 1000 MG: 500 TABLET ORAL at 14:23

## 2023-12-01 RX ADMIN — INSULIN GLARGINE 13 UNITS: 100 INJECTION, SOLUTION SUBCUTANEOUS at 21:09

## 2023-12-01 RX ADMIN — GABAPENTIN 600 MG: 300 CAPSULE ORAL at 08:38

## 2023-12-01 RX ADMIN — FUROSEMIDE 40 MG: 40 TABLET ORAL at 08:42

## 2023-12-01 RX ADMIN — METOPROLOL SUCCINATE 75 MG: 50 TABLET, EXTENDED RELEASE ORAL at 08:38

## 2023-12-01 RX ADMIN — POLYETHYLENE GLYCOL 3350 17 G: 17 POWDER, FOR SOLUTION ORAL at 08:42

## 2023-12-01 RX ADMIN — METHYLPREDNISOLONE 4 MG: 4 TABLET ORAL at 06:13

## 2023-12-01 RX ADMIN — GABAPENTIN 600 MG: 300 CAPSULE ORAL at 21:08

## 2023-12-01 RX ADMIN — Medication 10 ML: at 21:10

## 2023-12-01 RX ADMIN — PANTOPRAZOLE SODIUM 40 MG: 40 TABLET, DELAYED RELEASE ORAL at 06:12

## 2023-12-01 RX ADMIN — LOSARTAN POTASSIUM 100 MG: 100 TABLET, FILM COATED ORAL at 08:42

## 2023-12-01 RX ADMIN — METHYLPREDNISOLONE 4 MG: 4 TABLET ORAL at 21:08

## 2023-12-01 RX ADMIN — GABAPENTIN 600 MG: 300 CAPSULE ORAL at 14:22

## 2023-12-01 RX ADMIN — Medication 10 ML: at 08:48

## 2023-12-01 RX ADMIN — METHYLPREDNISOLONE 4 MG: 4 TABLET ORAL at 19:05

## 2023-12-01 RX ADMIN — BISACODYL 10 MG: 5 TABLET, COATED ORAL at 08:38

## 2023-12-01 RX ADMIN — ACETAMINOPHEN 1000 MG: 500 TABLET ORAL at 06:12

## 2023-12-01 RX ADMIN — SPIRONOLACTONE 25 MG: 25 TABLET ORAL at 08:42

## 2023-12-01 RX ADMIN — MELATONIN TAB 3 MG 3 MG: 3 TAB at 21:08

## 2023-12-01 RX ADMIN — OXYCODONE 10 MG: 5 TABLET ORAL at 21:08

## 2023-12-01 ASSESSMENT — PAIN DESCRIPTION - LOCATION
LOCATION: BACK
LOCATION: OTHER (COMMENT)
LOCATION: BACK;LEG
LOCATION: BACK

## 2023-12-01 ASSESSMENT — PAIN DESCRIPTION - ORIENTATION
ORIENTATION: LOWER

## 2023-12-01 ASSESSMENT — PAIN SCALES - GENERAL
PAINLEVEL_OUTOF10: 3
PAINLEVEL_OUTOF10: 7
PAINLEVEL_OUTOF10: 5
PAINLEVEL_OUTOF10: 7

## 2023-12-01 ASSESSMENT — PAIN DESCRIPTION - DESCRIPTORS
DESCRIPTORS: ACHING

## 2023-12-01 NOTE — CONSULTS
[facility] Consult Note  [facility] [address] [facility]    Provider: Janey Johnston MD Patient ID:  Admission Date: 2023 Name: Lexy Jarvis Date: n/a MRN: [id number]   Patient Location: [id number] : 1950  Attending: Jessa Paz MD Date of Service: 2023  PCP: Antonino Minor MD     Diagnoses:  1. Central stenosis of spinal canal    2. Acute exacerbation of chronic low back pain      Pt here with lumbar spine issues. Diff voiding and roberts placed. No bleeding. Ns saw pt and plan is outpt surg. Assessment/Plan:  Retention  Spinal issues  Void diff    Recc      Best practice if for pt to str cath herself tid  Roberts as last resort  Can fu office  Can try urecholine 25mg bid    All the patients questions were answered in detail. She understands the plan as listed above. Review/order of labs  Review/order of radiology studies  discussion with referring MD  Decision to obtain old records or supplemental information from family. yes  transferring data from old records into today's office visit record  Independent review and interpretation of test or study   Total time spent face-to-face with the patient 14min, including greater than 50% of this time in discussion with the patient/family concerning the following:  Recommended tests  management options  risks/benefits of management options  importance of compliance  Prognosis  Risk factor reduction  Patient/family education                                                                                                                                                      CC:   Chief Complaint   Patient presents with    Back Pain     Reports bilateral lower back pain radiating down the legs x2 weeks now, hx of back surgery. Pt also reports urinary issues, states \"I'll feel like I have to pee and can't, and sometimes I just pee on myself. \" Pt ambulated to room with a cane, pt states increasing pain when

## 2023-12-01 NOTE — PROGRESS NOTES
Pt refusing to have roberts catheter removed. Discussed infection risk with pt. Discussed voiding trial and needing to void before discharge. Pt states \"I have a bashful bladder. I've never been able to pee in a public place. \"  Pt states she has had roberts catheters removed right before she leaves the hospital in the past and had no problems voiding upon returning home. Expressed concerns with pt and she stated \"there's hospitals in [place] if I need it\". Pt states \"If you take it out tonight, I'm going to be miserable until I leave because I will be so full. I will stop eating and drinking until I leave so I won't be miserable. \"  Roberts left in place. Dr. Ginna Segovia made aware.

## 2023-12-01 NOTE — PROGRESS NOTES
Physical Therapy    [address] [place] [address] Department   Phone: [phone]    Physical Therapy    [] Initial Evaluation            [x] Daily Treatment Note         [] Discharge Summary      Patient: Iker Harvey   : 1950   MRN: [id number]   Date of Service:  2023  Admitting Diagnosis: Acute back pain, unspecified back location, unspecified back pain laterality  Current Admission Summary: Iker Harvey is a 74year old women who presents with back pain. Reports bilateral lower back pain radiating down the legs x2 weeks now, hx of back surgery. Pt also reports urinary issues, states \"I'll feel like I have to pee and can't, and sometimes I just pee on myself. \" Pt ambulated to room with a cane, pt states increasing pain when walking and worsening when lying down. 9/10 pain level. Past Medical History:  has a past medical history of Aortic stenosis, Arthritis, Back pain, CHF (congestive heart failure) ([address]), Chronic diastolic heart failure ([address]), Diabetes mellitus ([address]), Diabetes mellitus ([address]), GERD (gastroesophageal reflux disease), High cholesterol, Hypertension, Malignant neoplasm of right female breast ([address]), Polio, Polio, Shingles, Sleep apnea, and Urinary problem. Past Surgical History:  has a past surgical history that includes joint replacement; knee surgery; Colonoscopy; other surgical history (2014); hernia repair; joint replacement; SABRINA STEREO BREAST BX W LOC DEVICE 1ST LESION RIGHT (Right, 2022); back surgery; back surgery; Breast biopsy (Right, 2022); Breast surgery (Right, 2022); Humerus fracture surgery (Left, 2022); Arm Surgery (Left); fracture surgery; eye surgery; and Examination under anesthesia (N/A, 2023). Discharge Recommendations: Iker aHrvey scored a 20/24 on the AM-PAC short mobility form.  Current research shows that an AM-PAC score of 18 or greater is typically associated with a discharge to the home as needed, use of rolling walker for all mobility. .   Safety Interventions: patient left in chair, chair alarm in place, call light within reach, gait belt, patient at risk for falls, nurse notified, and family/caregiver present    Plan  Frequency: 3-5 x/per week  Current Treatment Recommendations: strengthening, balance training, functional mobility training, transfer training, gait training, stair training, endurance training, pain management, home exercise program, and positioning    Goals  Patient Goals: To increase independence    Short Term Goals:  Time Frame: Upon discharge   Patient will complete bed mobility at modified independent   Patient will complete transfers at [facility]   Patient will ambulate 75 ft with use of rolling walker at modified independent  Patient will ascend/descend 1 stairs without use of HR at supervision    Above goals reviewed on 12/1/2023. All goals are ongoing at this time unless indicated above.       Therapy Session Time      Individual Group Co-treatment   Time In 1030       Time Out 1200       Minutes 90         Timed Code Treatment Minutes:  90 Minutes  Total Treatment Minutes:  90 minutes        Electronically Signed By: ASHWIN [id number]

## 2023-12-01 NOTE — CARE COORDINATION
Discharge Planning:     (CM) spoke with patient and family. Patient plans to discharge tomorrow. Mavis Fernandez with [facility] notified and will deliver RW. Ata LVM for Anai with [facility] to update. No further needs at this time.     Electronically signed by Carmelita Cottrell on 12/1/23 at 2:28 PM EST

## 2023-12-01 NOTE — PROGRESS NOTES
[address] [facility] Department   Phone: [phone]    Occupational Therapy    [] Initial Evaluation            [x] Daily Treatment Note         [] Discharge Summary      Patient: Caryn Younger   : 1950   MRN: [id number]   Date of Service:  2023    Admitting Diagnosis:  Acute back pain, unspecified back location, unspecified back pain laterality  Current Admission Summary: Per H&P \"63 y.o. female who was brought in by self for severe low back pain. She has a history of chronic back pain and previous back surgery. However, her current pain is much more severe. This started approximately 2 weeks ago, but worsened significantly over time. It is now severe, making regular function difficult. Pain starts in the low back and radiates down both legs. She occasionally has intermittent numbness down the legs. Pain is worse with movement and walking. She reports urinary hesitancy bordering on retention, although patient has never developed suprapubic pain. She has also had persistent urinary incontinence, which seems worse since this began. She denies fecal incontinence. She is not having saddle anesthesia, fever, chills, or new motor weakness in the legs. She does have chronic left foot and leg weakness secondary to polio she had when she was a child. This is unchanged. \"  Past Medical History:  has a past medical history of Aortic stenosis, Arthritis, Back pain, CHF (congestive heart failure) ([address]), Chronic diastolic heart failure ([address]), Diabetes mellitus ([address]), Diabetes mellitus ([address]), GERD (gastroesophageal reflux disease), High cholesterol, Hypertension, Malignant neoplasm of right female breast ([address]), Polio, Polio, Shingles, Sleep apnea, and Urinary problem. Past Surgical History:  has a past surgical history that includes joint replacement; knee surgery;  Colonoscopy; other surgical history (2014); hernia repair; joint replacement; SABRINA STEREO BREAST BX

## 2023-12-02 VITALS
TEMPERATURE: 98 F | OXYGEN SATURATION: 96 % | RESPIRATION RATE: 18 BRPM | SYSTOLIC BLOOD PRESSURE: 120 MMHG | HEIGHT: 66 IN | HEART RATE: 73 BPM | BODY MASS INDEX: 33.66 KG/M2 | WEIGHT: 209.44 LBS | DIASTOLIC BLOOD PRESSURE: 68 MMHG

## 2023-12-02 LAB
ANION GAP SERPL CALCULATED.3IONS-SCNC: 5 MMOL/L (ref 3–16)
BASOPHILS # BLD: 0 K/UL (ref 0–0.2)
BASOPHILS NFR BLD: 0.4 %
BUN SERPL-MCNC: 26 MG/DL (ref 7–20)
CALCIUM SERPL-MCNC: 9 MG/DL (ref 8.3–10.6)
CHLORIDE SERPL-SCNC: 104 MMOL/L (ref 99–110)
CO2 SERPL-SCNC: 30 MMOL/L (ref 21–32)
CREAT SERPL-MCNC: 0.9 MG/DL (ref 0.6–1.2)
DEPRECATED RDW RBC AUTO: 14.5 % (ref 12.4–15.4)
EOSINOPHIL # BLD: 0.1 K/UL (ref 0–0.6)
EOSINOPHIL NFR BLD: 0.7 %
GFR SERPLBLD CREATININE-BSD FMLA CKD-EPI: >60 ML/MIN/{1.73_M2}
GLUCOSE BLD-MCNC: 112 MG/DL (ref 70–99)
GLUCOSE BLD-MCNC: 151 MG/DL (ref 70–99)
GLUCOSE SERPL-MCNC: 126 MG/DL (ref 70–99)
HCT VFR BLD AUTO: 34.5 % (ref 36–48)
HGB BLD-MCNC: 11.3 G/DL (ref 12–16)
LYMPHOCYTES # BLD: 1.2 K/UL (ref 1–5.1)
LYMPHOCYTES NFR BLD: 15.2 %
MCH RBC QN AUTO: 28.7 PG (ref 26–34)
MCHC RBC AUTO-ENTMCNC: 32.7 G/DL (ref 31–36)
MCV RBC AUTO: 87.7 FL (ref 80–100)
MONOCYTES # BLD: 0.4 K/UL (ref 0–1.3)
MONOCYTES NFR BLD: 5.6 %
NEUTROPHILS # BLD: 6 K/UL (ref 1.7–7.7)
NEUTROPHILS NFR BLD: 78.1 %
PERFORMED ON: ABNORMAL
PERFORMED ON: ABNORMAL
PLATELET # BLD AUTO: 180 K/UL (ref 135–450)
PMV BLD AUTO: 8.1 FL (ref 5–10.5)
POTASSIUM SERPL-SCNC: 4.9 MMOL/L (ref 3.5–5.1)
RBC # BLD AUTO: 3.93 M/UL (ref 4–5.2)
SODIUM SERPL-SCNC: 139 MMOL/L (ref 136–145)
WBC # BLD AUTO: 7.7 K/UL (ref 4–11)

## 2023-12-02 PROCEDURE — 2580000003 HC RX 258: Performed by: INTERNAL MEDICINE

## 2023-12-02 PROCEDURE — 6370000000 HC RX 637 (ALT 250 FOR IP): Performed by: INTERNAL MEDICINE

## 2023-12-02 PROCEDURE — 80048 BASIC METABOLIC PNL TOTAL CA: CPT

## 2023-12-02 PROCEDURE — 6370000000 HC RX 637 (ALT 250 FOR IP): Performed by: NURSE PRACTITIONER

## 2023-12-02 PROCEDURE — 85025 COMPLETE CBC W/AUTO DIFF WBC: CPT

## 2023-12-02 PROCEDURE — 36415 COLL VENOUS BLD VENIPUNCTURE: CPT

## 2023-12-02 RX ORDER — METOPROLOL SUCCINATE 25 MG/1
75 TABLET, EXTENDED RELEASE ORAL DAILY
Qty: 30 TABLET | Refills: 3 | Status: SHIPPED | OUTPATIENT
Start: 2023-12-02

## 2023-12-02 RX ORDER — GABAPENTIN 300 MG/1
600 CAPSULE ORAL 3 TIMES DAILY
Qty: 180 CAPSULE | Refills: 0 | Status: SHIPPED | OUTPATIENT
Start: 2023-12-02 | End: 2024-01-01

## 2023-12-02 RX ORDER — OXYCODONE HYDROCHLORIDE 5 MG/1
5 TABLET ORAL EVERY 6 HOURS PRN
Qty: 16 TABLET | Refills: 0 | Status: SHIPPED | OUTPATIENT
Start: 2023-12-02 | End: 2023-12-06

## 2023-12-02 RX ORDER — POLYETHYLENE GLYCOL 3350 17 G/17G
17 POWDER, FOR SOLUTION ORAL DAILY
Qty: 510 G | Refills: 0 | Status: SHIPPED | OUTPATIENT
Start: 2023-12-02 | End: 2024-01-01

## 2023-12-02 RX ORDER — METHOCARBAMOL 750 MG/1
750 TABLET, FILM COATED ORAL 3 TIMES DAILY
Qty: 30 TABLET | Refills: 0 | Status: SHIPPED | OUTPATIENT
Start: 2023-12-02 | End: 2023-12-12

## 2023-12-02 RX ORDER — METHYLPREDNISOLONE 4 MG/1
TABLET ORAL
Qty: 12 TABLET | Refills: 0 | Status: SHIPPED | OUTPATIENT
Start: 2023-12-02

## 2023-12-02 RX ADMIN — FUROSEMIDE 40 MG: 40 TABLET ORAL at 10:40

## 2023-12-02 RX ADMIN — METOPROLOL SUCCINATE 75 MG: 50 TABLET, EXTENDED RELEASE ORAL at 10:40

## 2023-12-02 RX ADMIN — GABAPENTIN 600 MG: 300 CAPSULE ORAL at 10:40

## 2023-12-02 RX ADMIN — NIFEDIPINE 30 MG: 30 TABLET, EXTENDED RELEASE ORAL at 10:40

## 2023-12-02 RX ADMIN — OXYCODONE 5 MG: 5 TABLET ORAL at 05:59

## 2023-12-02 RX ADMIN — Medication 10 ML: at 10:41

## 2023-12-02 RX ADMIN — BISACODYL 10 MG: 5 TABLET, COATED ORAL at 10:41

## 2023-12-02 RX ADMIN — OXYCODONE 10 MG: 5 TABLET ORAL at 10:53

## 2023-12-02 RX ADMIN — PANTOPRAZOLE SODIUM 40 MG: 40 TABLET, DELAYED RELEASE ORAL at 05:59

## 2023-12-02 RX ADMIN — LOSARTAN POTASSIUM 100 MG: 100 TABLET, FILM COATED ORAL at 10:41

## 2023-12-02 RX ADMIN — SPIRONOLACTONE 25 MG: 25 TABLET ORAL at 10:41

## 2023-12-02 RX ADMIN — ACETAMINOPHEN 1000 MG: 500 TABLET ORAL at 00:59

## 2023-12-02 RX ADMIN — NALOXEGOL OXALATE 25 MG: 25 TABLET, FILM COATED ORAL at 05:59

## 2023-12-02 RX ADMIN — METHOCARBAMOL 750 MG: 500 TABLET ORAL at 10:40

## 2023-12-02 ASSESSMENT — PAIN SCALES - GENERAL
PAINLEVEL_OUTOF10: 2
PAINLEVEL_OUTOF10: 7
PAINLEVEL_OUTOF10: 4
PAINLEVEL_OUTOF10: 5
PAINLEVEL_OUTOF10: 7
PAINLEVEL_OUTOF10: 5

## 2023-12-02 ASSESSMENT — PAIN DESCRIPTION - LOCATION: LOCATION: BACK

## 2023-12-02 NOTE — PROGRESS NOTES
Pt indicated that her aide will show and straight cath her at home. Pt indicated that she understands everything.

## 2023-12-02 NOTE — PROGRESS NOTES
Pt given straight cath kit. Pt stated that she was told that she should take it home. Pt indicated that she will take it home just in case she needs it.

## 2023-12-02 NOTE — DISCHARGE INSTR - COC
Continuity of Care Form    Patient Name: Smita Dooley   :  1950  MRN:  [id number]    Admit date:  2023  Discharge date:  ***    Code Status Order: Full Code   Advance Directives:     Admitting Physician:  Lamont Anthony MD  PCP: Chika Lopez MD    Discharging Nurse: [facility] Unit/Room#: 7NB-9663/4134-34  Discharging Unit Phone Number: ***    Emergency Contact:   Extended Emergency Contact Information  Primary Emergency Contact: Krystal Rosario  Address: [address] [place] [address], [facility] [address] [place] [address] of [address] [facility] Phone: [phone]  Relation: Child  Secondary Emergency Contact: [address] [place] [address] Phone: [phone]  [place] Phone: [phone]  Relation: Child    Past Surgical History:  Past Surgical History:   Procedure Laterality Date    ARM SURGERY Left     BACK SURGERY      BACK SURGERY      1/13/14 x2    BREAST BIOPSY Right 2022    RIGHT RADIOFREQUENCY IDENTIFICATION TAG LOCALIZED PARTIAL MASTECTOMY RIGHT SENTINEL LYMPH NODE BIOPSY performed by Shanell Edward MD at [facility] Right 2022    .  performed by Shanell Edward MD at [address] [facility] N/A 2023    DRUG INDUCED SLEEP ENDOSCOPY performed by Neri Quiñones DO at [facility] Left 2022    OPEN REDUCTION INTERNAL FIXATION LEFT PROXIMAL HUMERUS AND SHAFT FRACTURES-REGIONAL-SYNTHES performed by Sabina Strauss MD at [facility]      bilateral knee replacements    JOINT REPLACEMENT      [facility]      SABRINA STEROTACTIC LOC BREAST BIOPSY RIGHT Right 2022    SABRINA STEROTACTIC LOC BREAST BIOPSY RIGHT 2022 [address] [place] [address]    OTHER SURGICAL HISTORY  2014    L4-5 DECOMPRESSION, POSTERIOR LATERAL FUSION AND PEDICLE       Immunization History:   Immunization History   Administered Date(s) [phone]   . / signature: Electronically signed by Alireza Aponte RN on 12/2/23 at 10:55 AM EST    PHYSICIAN SECTION    Prognosis: Good    Condition at Discharge: Stable    Rehab Potential (if transferring to Rehab): Good    Recommended Labs or Other Treatments After Discharge: none     Physician Certification: I certify the above information and transfer of Govind Laguna  is necessary for the continuing treatment of the diagnosis listed and that she requires Home Care for greater 30 days.      Update Admission H&P: No change in H&P    PHYSICIAN SIGNATURE:  Electronically signed by Sneha Lozano MD on 12/2/23 at 12:52 PM EST

## 2023-12-02 NOTE — DISCHARGE SUMMARY
Patient: Leopold Barba     Gender: female  : 1950   Age: 74 y.o. MRN: [id number]    Admitting Physician: Yong Pierre MD  Discharge Physician: Jyoti James MD     Code Status: Full Code     Admit Date: 2023   Discharge Date:  2023     Disposition:  Home    Discharge Diagnoses:  Intractable back pain with radiculopathy secondary to severe spinal stenosis at L2-L3  Urinary retention resolved  Hypertension  Type 2 diabetes mellitus    Active Hospital Problems    Diagnosis Date Noted    Intractable back pain [M54.9] 2023    Acute back pain, unspecified back location, unspecified back pain laterality [M54.9] 2023       Follow-up appointments:  one week    Outpatient to do list: Follow-up with neurosurgery in 1 week time    Condition at Discharge:  Stable    Hospital Course:   73 yo F with obesity, chronic diastolic CHF, AS, GERD, DM 2, HTN, hyperlipidemia, JOSE MANUEL on CPAP, h/o prior lumbar fusion between L2-L4 was admitted as inpatient for intractable back pain. MRI L spine with severe spinal stenosis at L2/L3. Seen  by NS. Started on Medrol dose pack. Patient requests Zuleta and Urology consult for urinary retention. Intractable back pain with radiculopathy:  History of L4-L5 fusion:  MRI L-spine with severe spinal stenosis at L2-L3 and moderate stenosis at L3-L4. Neurosurgery consult appreciated: Recommended no acute surgical intervention. CT L spine as requested by neurosurgery showed no acute abnormality but moderate to degenerative changes worse at L2-L3 as seen on MRI. Continue Medrol Dose pack  Continue Gabapentin, scheduled Tylenol, Robaxin, and oxycodone  Weaned off Dilaudid IV PRN for breakthrough pain. PT OT recommending home care. Urinary retention:  Urology consult appreciated: Continue Zuleta. Voding trial done seen by urology      HTN: Controlled  Continue Cozaar, Toprol XL, Lasix, nifedipine and Aldactone.      DM 2 on long-term insulin with 12/02/2023 05:52 AM    K 4.5 11/30/2023 05:52 AM     12/02/2023 05:52 AM    CO2 30 12/02/2023 05:52 AM    BUN 26 12/02/2023 05:52 AM    CREATININE 0.9 12/02/2023 05:52 AM    CALCIUM 9.0 12/02/2023 05:52 AM    PHOS 3.3 05/26/2021 09:04 AM    ALKPHOS 66 11/29/2023 06:03 AM    ALT 15 11/29/2023 06:03 AM    AST 17 11/29/2023 06:03 AM    BILITOT <0.2 11/29/2023 06:03 AM    LABALBU 3.7 11/29/2023 06:03 AM    LDLCALC 109 02/18/2022 09:22 AM    TRIG 79 02/18/2022 09:22 AM     Lab Results   Component Value Date    INR 1.04 08/19/2022    INR 1.02 02/24/2015    INR 0.98 01/02/2014           The patient was seen and examined on day of discharge and this discharge summary is in conjunction with any daily progress note from day of discharge. Time Spent on discharge is 45 minutes  in the examination, evaluation, counseling and review of medications and discharge plan. Note that greater  than 30 minutes was spent in preparing discharge papers, discussing discharge with patient, medication review, etc.       Signed:    Han Javier MD   12/2/2023      Thank you Arnaldo Ward MD for the opportunity to be involved in this patient's care.  If you have any questions or concerns please feel free to contact me

## 2023-12-02 NOTE — PROGRESS NOTES
Zuleta removed. Pt indicated,\" that she doesn't want to stay to void, she want cath removed and to just go home because once she's at home, she'll start to urinate. \" Pt refused to wait to void and trial.

## 2023-12-02 NOTE — PROGRESS NOTES
RN discharge summary from [address] [place] to home. This patient has had a discharge order placed. They are returning home and being picked up in the lobby. Discharge paperwork has been printed, highlighted, and gone over with the patient by this RN. Patient understands teaching and has no further questions at this time. IV has been removed with no complications. Telemetry has been removed. Pt has all belongings present.

## 2023-12-02 NOTE — PLAN OF CARE
Problem: Pain  Goal: Verbalizes/displays adequate comfort level or baseline comfort level  12/2/2023 1335 by Delores Cummings RN  Outcome: Progressing  12/2/2023 0005 by Wilmon Litten, RN  Outcome: Progressing     Problem: Safety - Adult  Goal: Free from fall injury  12/2/2023 1335 by Delores Cummings RN  Outcome: Progressing  12/2/2023 0005 by Wilmon Litten, RN  Outcome: Progressing     Problem: ABCDS Injury Assessment  Goal: Absence of physical injury  12/2/2023 1335 by Delores Cummings RN  Outcome: Progressing  12/2/2023 0005 by Wilmon Litten, RN  Outcome: Progressing     Problem: Chronic Conditions and Co-morbidities  Goal: Patient's chronic conditions and co-morbidity symptoms are monitored and maintained or improved  12/2/2023 1335 by Delores Cummings RN  Outcome: Progressing  12/2/2023 0005 by Wilmon Litten, RN  Outcome: Progressing

## 2023-12-02 NOTE — CARE COORDINATION
12/02/23 1133   IMM Letter   IMM Letter given to Patient/Family/Significant other/Guardian/POA/by: Given to patient by Griselda Perry RN. Pt is agreeable to recieving the letter less than four hours from discharge.    IMM Letter date given: 12/02/23   IMM Letter time given: 1134       Electronically signed by Thierry Pepper RN on 12/2/2023 at 11:34 AM

## 2023-12-02 NOTE — PROGRESS NOTES
Shift assessment completed. Routine vitals completed. Scheduled medications given. Patient is awake, alert and oriented. Respirations are unlabored. Patient does not appear to be in distress, in bed  at this time. Call light within reach.

## 2024-01-15 ENCOUNTER — TELEPHONE (OUTPATIENT)
Dept: ENT CLINIC | Age: 74
End: 2024-01-15

## 2024-01-15 NOTE — TELEPHONE ENCOUNTER
Called pt to discuss scheduling inspire implant  Patient is scheduled for an intensive back surgery end of Feb 2024  that will require several months of rehab.  She will call back when she recovers

## 2024-01-19 ENCOUNTER — TELEPHONE (OUTPATIENT)
Dept: CARDIOLOGY CLINIC | Age: 74
End: 2024-01-19

## 2024-01-19 NOTE — TELEPHONE ENCOUNTER
Clearance form filled out/signed per LES.  Form faxed to [facility], confirmation received. Called patient, informed her of above information.

## 2024-01-24 NOTE — PROGRESS NOTES
[facility]   Cardiac follow up    Referring Provider:  Jose Maria Haynes MD     Chief Complaint   Patient presents with    Hypertension    Hyperlipidemia      History of Present Illness:  Aiyana Catalan is a 73 y.o. female with a history of diabetes, dHF, hyperlipidemia, and hypertension. She was previously following with Dr. Interiano with [facility].     At  in July 2022 she reported that she was diagnosed with breast cancer.    Today, Aiyana is here for a follow up.  She is scheduled for back surgery at [facility] on 2/26/24 and 2/28/24.  Reports that she is doing well aside from the pain that she is having from her back.  She is noted to be ambulating with a 4 wheeled walker.  States that she has been taking all of her important medications as prescribed.  She has not been taking some of her vitamins because she has been overwhelmed with the amount of medications she is supposed to take.  Patient denies exertional chest pain, GAYLE/PND, palpitations, light-headedness, edema.      Past Medical History:   has a past medical history of Aortic stenosis, Arthritis, Back pain, CHF (congestive heart failure) (HCC), Chronic diastolic heart failure (HCC), Diabetes mellitus (HCC), Diabetes mellitus (HCC), GERD (gastroesophageal reflux disease), High cholesterol, Hypertension, Malignant neoplasm of right female breast (HCC), Polio, Polio, Shingles, Sleep apnea, and Urinary problem.    Surgical History:   has a past surgical history that includes joint replacement; knee surgery; Colonoscopy; other surgical history (01/13/2014); hernia repair; joint replacement; SABRINA STEREO BREAST BX W LOC DEVICE 1ST LESION RIGHT (Right, 04/06/2022); back surgery; back surgery; Breast biopsy (Right, 05/03/2022); Breast surgery (Right, 05/03/2022); Humerus fracture surgery (Left, 08/23/2022); Arm Surgery (Left); fracture surgery; eye surgery; and Examination under anesthesia (N/A, 8/30/2023).     Social History:   reports

## 2024-02-01 ENCOUNTER — OFFICE VISIT (OUTPATIENT)
Dept: CARDIOLOGY CLINIC | Age: 74
End: 2024-02-01
Payer: MEDICARE

## 2024-02-01 VITALS
WEIGHT: 219.2 LBS | DIASTOLIC BLOOD PRESSURE: 62 MMHG | OXYGEN SATURATION: 98 % | HEIGHT: 66 IN | BODY MASS INDEX: 35.23 KG/M2 | HEART RATE: 85 BPM | SYSTOLIC BLOOD PRESSURE: 112 MMHG

## 2024-02-01 DIAGNOSIS — E78.00 HIGH CHOLESTEROL: ICD-10-CM

## 2024-02-01 DIAGNOSIS — I10 PRIMARY HYPERTENSION: ICD-10-CM

## 2024-02-01 DIAGNOSIS — I50.32 CHRONIC DIASTOLIC HEART FAILURE (HCC): Primary | ICD-10-CM

## 2024-02-01 DIAGNOSIS — G47.33 OSA ON CPAP: ICD-10-CM

## 2024-02-01 PROBLEM — Z01.810 PREOP CARDIOVASCULAR EXAM: Status: ACTIVE | Noted: 2024-02-01

## 2024-02-01 PROCEDURE — 1123F ACP DISCUSS/DSCN MKR DOCD: CPT | Performed by: INTERNAL MEDICINE

## 2024-02-01 PROCEDURE — 3078F DIAST BP <80 MM HG: CPT | Performed by: INTERNAL MEDICINE

## 2024-02-01 PROCEDURE — G8484 FLU IMMUNIZE NO ADMIN: HCPCS | Performed by: INTERNAL MEDICINE

## 2024-02-01 PROCEDURE — 3017F COLORECTAL CA SCREEN DOC REV: CPT | Performed by: INTERNAL MEDICINE

## 2024-02-01 PROCEDURE — G8417 CALC BMI ABV UP PARAM F/U: HCPCS | Performed by: INTERNAL MEDICINE

## 2024-02-01 PROCEDURE — 3074F SYST BP LT 130 MM HG: CPT | Performed by: INTERNAL MEDICINE

## 2024-02-01 PROCEDURE — 1090F PRES/ABSN URINE INCON ASSESS: CPT | Performed by: INTERNAL MEDICINE

## 2024-02-01 PROCEDURE — 99214 OFFICE O/P EST MOD 30 MIN: CPT | Performed by: INTERNAL MEDICINE

## 2024-02-01 PROCEDURE — G8427 DOCREV CUR MEDS BY ELIG CLIN: HCPCS | Performed by: INTERNAL MEDICINE

## 2024-02-01 PROCEDURE — 1036F TOBACCO NON-USER: CPT | Performed by: INTERNAL MEDICINE

## 2024-02-01 PROCEDURE — G8399 PT W/DXA RESULTS DOCUMENT: HCPCS | Performed by: INTERNAL MEDICINE

## 2024-02-01 RX ORDER — NIFEDIPINE 30 MG/1
30 TABLET, EXTENDED RELEASE ORAL DAILY
Qty: 90 TABLET | Refills: 3 | Status: SHIPPED | OUTPATIENT
Start: 2024-02-01

## 2024-02-01 RX ORDER — SPIRONOLACTONE 25 MG/1
25 TABLET ORAL DAILY
Qty: 90 TABLET | Refills: 3 | Status: SHIPPED | OUTPATIENT
Start: 2024-02-01

## 2024-02-01 RX ORDER — HYDROCODONE BITARTRATE AND ACETAMINOPHEN 5; 325 MG/1; MG/1
1 TABLET ORAL EVERY 8 HOURS PRN
COMMUNITY

## 2024-02-21 RX ORDER — FAMOTIDINE 40 MG/1
40 TABLET, FILM COATED ORAL NIGHTLY
COMMUNITY

## 2024-02-24 ENCOUNTER — ANESTHESIA EVENT (OUTPATIENT)
Dept: OPERATING ROOM | Age: 74
End: 2024-02-24
Payer: MEDICARE

## 2024-02-24 NOTE — ANESTHESIA PRE PROCEDURE
Department of Anesthesiology  Preprocedure Note       Name:  Aiyana Catalan   Age:  73 y.o.  :  1950                                          MRN:  [id number]         Date:  2024      Surgeon: Surgeon(s):  Gilbert Huizar MD Kuhn, Brian, MD    Procedure: Procedure(s):  LUMBAR 5-SACRAL 1 ANTERIOR LUMBAR INTERBODY FUSION AND LUMBAR 2-LUMBAR 3, LUMBAR 3-LUMBAR 4 DIRECT LATERAL INTERBODY FUSION  .  .    Medications prior to admission:   Prior to Admission medications    Medication Sig Start Date End Date Taking? Authorizing Provider   famotidine (PEPCID) 40 MG tablet Take 1 tablet by mouth at bedtime   Yes Louise Fierro MD   TIZANIDINE HCL PO Take by mouth   Yes Louise Fierro MD   HYDROcodone-acetaminophen (NORCO) 5-325 MG per tablet Take 1 tablet by mouth every 8 hours as needed for Pain.    Louise Fierro MD   NIFEdipine (PROCARDIA XL) 30 MG extended release tablet Take 1 tablet by mouth daily 24   Eddi Light MD   spironolactone (ALDACTONE) 25 MG tablet Take 1 tablet by mouth daily 24   Eddi Light MD   gabapentin (NEURONTIN) 300 MG capsule Take 2 capsules by mouth 3 times daily for 30 days. 23  Lisandra Case MD   metoprolol succinate (TOPROL XL) 25 MG extended release tablet Take 3 tablets by mouth daily 23   Lisandra Case MD   losartan (COZAAR) 100 MG tablet Take 1 tablet by mouth daily 23   Louise Fierro MD   furosemide (LASIX) 20 MG tablet Take 2 tablets by mouth daily 23   Eddi Light MD   TRULICITY 1.5 MG/0.5ML SC injection  23   Louise Fierro MD   Multiple Vitamins-Minerals (HAIR SKIN AND NAILS FORMULA) TABS Take by mouth    Louise Fierro MD   vitamin C (ASCORBIC ACID) 500 MG tablet Take 1 tablet by mouth daily  Patient not taking: Reported on 2024    Louise Fierro MD   insulin glargine (LANTUS) 100 UNIT/ML injection vial Inject 26 Units into the skin nightly PT states she takes this

## 2024-02-26 ENCOUNTER — APPOINTMENT (OUTPATIENT)
Dept: GENERAL RADIOLOGY | Age: 74
DRG: 454 | End: 2024-02-26
Attending: STUDENT IN AN ORGANIZED HEALTH CARE EDUCATION/TRAINING PROGRAM
Payer: MEDICARE

## 2024-02-26 ENCOUNTER — HOSPITAL ENCOUNTER (INPATIENT)
Age: 74
LOS: 8 days | Discharge: NURSING, REHAB OR LONG-TERM CARE | DRG: 454 | End: 2024-03-05
Attending: STUDENT IN AN ORGANIZED HEALTH CARE EDUCATION/TRAINING PROGRAM | Admitting: STUDENT IN AN ORGANIZED HEALTH CARE EDUCATION/TRAINING PROGRAM
Payer: MEDICARE

## 2024-02-26 ENCOUNTER — ANESTHESIA (OUTPATIENT)
Dept: OPERATING ROOM | Age: 74
End: 2024-02-26
Payer: MEDICARE

## 2024-02-26 DIAGNOSIS — M79.604 BILATERAL LEG PAIN: ICD-10-CM

## 2024-02-26 DIAGNOSIS — Z98.1 S/P LUMBAR SPINAL FUSION: Primary | ICD-10-CM

## 2024-02-26 DIAGNOSIS — G47.33 OSA ON CPAP: ICD-10-CM

## 2024-02-26 DIAGNOSIS — G45.9 TIA (TRANSIENT ISCHEMIC ATTACK): ICD-10-CM

## 2024-02-26 DIAGNOSIS — S42.352A CLOSED DISPLACED COMMINUTED FRACTURE OF SHAFT OF LEFT HUMERUS, INITIAL ENCOUNTER: ICD-10-CM

## 2024-02-26 DIAGNOSIS — E78.00 HIGH CHOLESTEROL: ICD-10-CM

## 2024-02-26 DIAGNOSIS — M79.605 BILATERAL LEG PAIN: ICD-10-CM

## 2024-02-26 DIAGNOSIS — G47.10 HYPERSOMNOLENCE: ICD-10-CM

## 2024-02-26 DIAGNOSIS — M54.9 INTRACTABLE BACK PAIN: ICD-10-CM

## 2024-02-26 DIAGNOSIS — Z01.810 PREOP CARDIOVASCULAR EXAM: ICD-10-CM

## 2024-02-26 DIAGNOSIS — I50.32 CHRONIC DIASTOLIC HEART FAILURE (HCC): ICD-10-CM

## 2024-02-26 DIAGNOSIS — E66.9 DIABETES MELLITUS TYPE 2 IN OBESE (HCC): ICD-10-CM

## 2024-02-26 DIAGNOSIS — R06.83 SNORING: ICD-10-CM

## 2024-02-26 DIAGNOSIS — E11.69 DIABETES MELLITUS TYPE 2 IN OBESE (HCC): ICD-10-CM

## 2024-02-26 DIAGNOSIS — M75.102 TEAR OF LEFT SUPRASPINATUS TENDON: ICD-10-CM

## 2024-02-26 DIAGNOSIS — M54.9 ACUTE BACK PAIN, UNSPECIFIED BACK LOCATION, UNSPECIFIED BACK PAIN LATERALITY: ICD-10-CM

## 2024-02-26 DIAGNOSIS — M54.16 LUMBAR RADICULOPATHY: ICD-10-CM

## 2024-02-26 LAB
ANION GAP SERPL CALCULATED.3IONS-SCNC: 10 MMOL/L (ref 3–16)
APTT BLD: 28.1 SEC (ref 22.7–35.9)
BUN SERPL-MCNC: 11 MG/DL (ref 7–20)
CALCIUM SERPL-MCNC: 9.6 MG/DL (ref 8.3–10.6)
CHLORIDE SERPL-SCNC: 105 MMOL/L (ref 99–110)
CO2 SERPL-SCNC: 27 MMOL/L (ref 21–32)
CREAT SERPL-MCNC: 0.7 MG/DL (ref 0.6–1.2)
DEPRECATED RDW RBC AUTO: 14.9 % (ref 12.4–15.4)
GFR SERPLBLD CREATININE-BSD FMLA CKD-EPI: >60 ML/MIN/{1.73_M2}
GLUCOSE BLD-MCNC: 120 MG/DL (ref 70–99)
GLUCOSE BLD-MCNC: 163 MG/DL (ref 70–99)
GLUCOSE BLD-MCNC: 180 MG/DL (ref 70–99)
GLUCOSE SERPL-MCNC: 112 MG/DL (ref 70–99)
HCT VFR BLD AUTO: 31.7 % (ref 36–48)
HGB BLD-MCNC: 10.4 G/DL (ref 12–16)
INR PPP: 0.98 (ref 0.84–1.16)
MCH RBC QN AUTO: 29.2 PG (ref 26–34)
MCHC RBC AUTO-ENTMCNC: 32.7 G/DL (ref 31–36)
MCV RBC AUTO: 89.3 FL (ref 80–100)
PERFORMED ON: ABNORMAL
PLATELET # BLD AUTO: 167 K/UL (ref 135–450)
PMV BLD AUTO: 7.7 FL (ref 5–10.5)
POTASSIUM SERPL-SCNC: 4.2 MMOL/L (ref 3.5–5.1)
PROTHROMBIN TIME: 13 SEC (ref 11.5–14.8)
RBC # BLD AUTO: 3.55 M/UL (ref 4–5.2)
SODIUM SERPL-SCNC: 142 MMOL/L (ref 136–145)
WBC # BLD AUTO: 5.3 K/UL (ref 4–11)

## 2024-02-26 PROCEDURE — 0SG10A0 FUSION OF 2 OR MORE LUMBAR VERTEBRAL JOINTS WITH INTERBODY FUSION DEVICE, ANTERIOR APPROACH, ANTERIOR COLUMN, OPEN APPROACH: ICD-10-PCS | Performed by: STUDENT IN AN ORGANIZED HEALTH CARE EDUCATION/TRAINING PROGRAM

## 2024-02-26 PROCEDURE — 0ST40ZZ RESECTION OF LUMBOSACRAL DISC, OPEN APPROACH: ICD-10-PCS | Performed by: STUDENT IN AN ORGANIZED HEALTH CARE EDUCATION/TRAINING PROGRAM

## 2024-02-26 PROCEDURE — 6360000002 HC RX W HCPCS

## 2024-02-26 PROCEDURE — 2500000003 HC RX 250 WO HCPCS

## 2024-02-26 PROCEDURE — 2580000003 HC RX 258: Performed by: PHYSICIAN ASSISTANT

## 2024-02-26 PROCEDURE — 6360000002 HC RX W HCPCS: Performed by: ANESTHESIOLOGY

## 2024-02-26 PROCEDURE — 0ST20ZZ RESECTION OF LUMBAR VERTEBRAL DISC, OPEN APPROACH: ICD-10-PCS | Performed by: STUDENT IN AN ORGANIZED HEALTH CARE EDUCATION/TRAINING PROGRAM

## 2024-02-26 PROCEDURE — 6370000000 HC RX 637 (ALT 250 FOR IP): Performed by: FAMILY MEDICINE

## 2024-02-26 PROCEDURE — 2780000010 HC IMPLANT OTHER: Performed by: STUDENT IN AN ORGANIZED HEALTH CARE EDUCATION/TRAINING PROGRAM

## 2024-02-26 PROCEDURE — 0SG30A0 FUSION OF LUMBOSACRAL JOINT WITH INTERBODY FUSION DEVICE, ANTERIOR APPROACH, ANTERIOR COLUMN, OPEN APPROACH: ICD-10-PCS | Performed by: STUDENT IN AN ORGANIZED HEALTH CARE EDUCATION/TRAINING PROGRAM

## 2024-02-26 PROCEDURE — 2580000003 HC RX 258: Performed by: STUDENT IN AN ORGANIZED HEALTH CARE EDUCATION/TRAINING PROGRAM

## 2024-02-26 PROCEDURE — 85730 THROMBOPLASTIN TIME PARTIAL: CPT

## 2024-02-26 PROCEDURE — 72100 X-RAY EXAM L-S SPINE 2/3 VWS: CPT

## 2024-02-26 PROCEDURE — 3600000014 HC SURGERY LEVEL 4 ADDTL 15MIN: Performed by: STUDENT IN AN ORGANIZED HEALTH CARE EDUCATION/TRAINING PROGRAM

## 2024-02-26 PROCEDURE — 2580000003 HC RX 258

## 2024-02-26 PROCEDURE — 2720000010 HC SURG SUPPLY STERILE: Performed by: STUDENT IN AN ORGANIZED HEALTH CARE EDUCATION/TRAINING PROGRAM

## 2024-02-26 PROCEDURE — 3600000004 HC SURGERY LEVEL 4 BASE: Performed by: STUDENT IN AN ORGANIZED HEALTH CARE EDUCATION/TRAINING PROGRAM

## 2024-02-26 PROCEDURE — 6370000000 HC RX 637 (ALT 250 FOR IP): Performed by: PHYSICIAN ASSISTANT

## 2024-02-26 PROCEDURE — 93005 ELECTROCARDIOGRAM TRACING: CPT | Performed by: STUDENT IN AN ORGANIZED HEALTH CARE EDUCATION/TRAINING PROGRAM

## 2024-02-26 PROCEDURE — 6360000002 HC RX W HCPCS: Performed by: STUDENT IN AN ORGANIZED HEALTH CARE EDUCATION/TRAINING PROGRAM

## 2024-02-26 PROCEDURE — 2060000000 HC ICU INTERMEDIATE R&B

## 2024-02-26 PROCEDURE — 6360000002 HC RX W HCPCS: Performed by: PHYSICIAN ASSISTANT

## 2024-02-26 PROCEDURE — 2500000003 HC RX 250 WO HCPCS: Performed by: STUDENT IN AN ORGANIZED HEALTH CARE EDUCATION/TRAINING PROGRAM

## 2024-02-26 PROCEDURE — 80048 BASIC METABOLIC PNL TOTAL CA: CPT

## 2024-02-26 PROCEDURE — 7100000001 HC PACU RECOVERY - ADDTL 15 MIN: Performed by: STUDENT IN AN ORGANIZED HEALTH CARE EDUCATION/TRAINING PROGRAM

## 2024-02-26 PROCEDURE — 3700000000 HC ANESTHESIA ATTENDED CARE: Performed by: STUDENT IN AN ORGANIZED HEALTH CARE EDUCATION/TRAINING PROGRAM

## 2024-02-26 PROCEDURE — A4217 STERILE WATER/SALINE, 500 ML: HCPCS | Performed by: STUDENT IN AN ORGANIZED HEALTH CARE EDUCATION/TRAINING PROGRAM

## 2024-02-26 PROCEDURE — 2709999900 HC NON-CHARGEABLE SUPPLY: Performed by: STUDENT IN AN ORGANIZED HEALTH CARE EDUCATION/TRAINING PROGRAM

## 2024-02-26 PROCEDURE — C1889 IMPLANT/INSERT DEVICE, NOC: HCPCS | Performed by: STUDENT IN AN ORGANIZED HEALTH CARE EDUCATION/TRAINING PROGRAM

## 2024-02-26 PROCEDURE — 2580000003 HC RX 258: Performed by: ANESTHESIOLOGY

## 2024-02-26 PROCEDURE — 85610 PROTHROMBIN TIME: CPT

## 2024-02-26 PROCEDURE — 3700000001 HC ADD 15 MINUTES (ANESTHESIA): Performed by: STUDENT IN AN ORGANIZED HEALTH CARE EDUCATION/TRAINING PROGRAM

## 2024-02-26 PROCEDURE — 7100000000 HC PACU RECOVERY - FIRST 15 MIN: Performed by: STUDENT IN AN ORGANIZED HEALTH CARE EDUCATION/TRAINING PROGRAM

## 2024-02-26 PROCEDURE — C1729 CATH, DRAINAGE: HCPCS | Performed by: STUDENT IN AN ORGANIZED HEALTH CARE EDUCATION/TRAINING PROGRAM

## 2024-02-26 PROCEDURE — 85027 COMPLETE CBC AUTOMATED: CPT

## 2024-02-26 PROCEDURE — C1713 ANCHOR/SCREW BN/BN,TIS/BN: HCPCS | Performed by: STUDENT IN AN ORGANIZED HEALTH CARE EDUCATION/TRAINING PROGRAM

## 2024-02-26 DEVICE — IMPLANTABLE DEVICE: Type: IMPLANTABLE DEVICE | Site: SPINE LUMBAR | Status: FUNCTIONAL

## 2024-02-26 DEVICE — SCREW SPNL L25MM DIA4MM TI ALLY ST LOK FULL THRD FN TIP DBL: Type: IMPLANTABLE DEVICE | Site: SPINE LUMBAR | Status: FUNCTIONAL

## 2024-02-26 DEVICE — ALLOGRAFT BNE 10 CC FD FIBER PLIAFX PRIM: Type: IMPLANTABLE DEVICE | Site: SPINE LUMBAR | Status: FUNCTIONAL

## 2024-02-26 DEVICE — BONE GRAFT KIT 7510100 INFUSE X SMALL
Type: IMPLANTABLE DEVICE | Site: SPINE LUMBAR | Status: FUNCTIONAL
Brand: INFUSE® BONE GRAFT

## 2024-02-26 DEVICE — ALLOGRAFT BNE 5 CC FIBER PLIAFX PRIM: Type: IMPLANTABLE DEVICE | Site: SPINE LUMBAR | Status: FUNCTIONAL

## 2024-02-26 DEVICE — SPACER SPNL M H15X8.9MM 14DEG 3.8CC ANTR LUM INTBDY FUS PEEK: Type: IMPLANTABLE DEVICE | Site: SPINE LUMBAR | Status: FUNCTIONAL

## 2024-02-26 RX ORDER — SODIUM CHLORIDE 0.9 % (FLUSH) 0.9 %
5-40 SYRINGE (ML) INJECTION PRN
Status: DISCONTINUED | OUTPATIENT
Start: 2024-02-26 | End: 2024-02-26

## 2024-02-26 RX ORDER — DIPHENHYDRAMINE HYDROCHLORIDE 50 MG/ML
12.5 INJECTION INTRAMUSCULAR; INTRAVENOUS
Status: DISCONTINUED | OUTPATIENT
Start: 2024-02-26 | End: 2024-02-26 | Stop reason: HOSPADM

## 2024-02-26 RX ORDER — MEPERIDINE HYDROCHLORIDE 25 MG/ML
12.5 INJECTION INTRAMUSCULAR; INTRAVENOUS; SUBCUTANEOUS EVERY 5 MIN PRN
Status: DISCONTINUED | OUTPATIENT
Start: 2024-02-26 | End: 2024-02-26 | Stop reason: HOSPADM

## 2024-02-26 RX ORDER — INSULIN LISPRO 100 [IU]/ML
0-4 INJECTION, SOLUTION INTRAVENOUS; SUBCUTANEOUS NIGHTLY
Status: DISCONTINUED | OUTPATIENT
Start: 2024-02-26 | End: 2024-03-05 | Stop reason: HOSPADM

## 2024-02-26 RX ORDER — SODIUM CHLORIDE 9 MG/ML
INJECTION, SOLUTION INTRAVENOUS PRN
Status: DISCONTINUED | OUTPATIENT
Start: 2024-02-26 | End: 2024-03-05 | Stop reason: HOSPADM

## 2024-02-26 RX ORDER — METOCLOPRAMIDE HYDROCHLORIDE 5 MG/ML
10 INJECTION INTRAMUSCULAR; INTRAVENOUS
Status: DISCONTINUED | OUTPATIENT
Start: 2024-02-26 | End: 2024-02-26 | Stop reason: HOSPADM

## 2024-02-26 RX ORDER — TRAMADOL HYDROCHLORIDE 50 MG/1
100 TABLET ORAL PRN
Status: DISCONTINUED | OUTPATIENT
Start: 2024-02-26 | End: 2024-02-26 | Stop reason: HOSPADM

## 2024-02-26 RX ORDER — SODIUM CHLORIDE 0.9 % (FLUSH) 0.9 %
5-40 SYRINGE (ML) INJECTION EVERY 12 HOURS SCHEDULED
Status: DISCONTINUED | OUTPATIENT
Start: 2024-02-26 | End: 2024-02-26 | Stop reason: HOSPADM

## 2024-02-26 RX ORDER — METHOCARBAMOL 750 MG/1
750 TABLET, FILM COATED ORAL EVERY 8 HOURS PRN
Status: DISPENSED | OUTPATIENT
Start: 2024-02-26 | End: 2024-02-29

## 2024-02-26 RX ORDER — INSULIN GLARGINE 100 [IU]/ML
26 INJECTION, SOLUTION SUBCUTANEOUS NIGHTLY
Status: DISCONTINUED | OUTPATIENT
Start: 2024-02-26 | End: 2024-02-26

## 2024-02-26 RX ORDER — GLUCAGON 1 MG/ML
1 KIT INJECTION PRN
Status: DISCONTINUED | OUTPATIENT
Start: 2024-02-26 | End: 2024-03-05 | Stop reason: HOSPADM

## 2024-02-26 RX ORDER — KETAMINE HCL IN NACL, ISO-OSM 20 MG/2 ML
SYRINGE (ML) INJECTION PRN
Status: DISCONTINUED | OUTPATIENT
Start: 2024-02-26 | End: 2024-02-26 | Stop reason: SDUPTHER

## 2024-02-26 RX ORDER — SODIUM CHLORIDE 0.9 % (FLUSH) 0.9 %
5-40 SYRINGE (ML) INJECTION PRN
Status: DISCONTINUED | OUTPATIENT
Start: 2024-02-26 | End: 2024-02-26 | Stop reason: HOSPADM

## 2024-02-26 RX ORDER — INSULIN LISPRO 100 [IU]/ML
0-8 INJECTION, SOLUTION INTRAVENOUS; SUBCUTANEOUS
Status: DISCONTINUED | OUTPATIENT
Start: 2024-02-27 | End: 2024-03-05 | Stop reason: HOSPADM

## 2024-02-26 RX ORDER — ACETAMINOPHEN 325 MG/1
650 TABLET ORAL EVERY 6 HOURS
Status: DISCONTINUED | OUTPATIENT
Start: 2024-02-26 | End: 2024-02-27

## 2024-02-26 RX ORDER — TRAMADOL HYDROCHLORIDE 50 MG/1
50 TABLET ORAL PRN
Status: DISCONTINUED | OUTPATIENT
Start: 2024-02-26 | End: 2024-02-26 | Stop reason: HOSPADM

## 2024-02-26 RX ORDER — ENOXAPARIN SODIUM 100 MG/ML
30 INJECTION SUBCUTANEOUS DAILY
Status: COMPLETED | OUTPATIENT
Start: 2024-02-27 | End: 2024-02-27

## 2024-02-26 RX ORDER — DEXTROSE MONOHYDRATE 100 MG/ML
INJECTION, SOLUTION INTRAVENOUS CONTINUOUS PRN
Status: DISCONTINUED | OUTPATIENT
Start: 2024-02-26 | End: 2024-03-05 | Stop reason: HOSPADM

## 2024-02-26 RX ORDER — NIFEDIPINE 30 MG
30 TABLET, EXTENDED RELEASE ORAL DAILY
Status: DISCONTINUED | OUTPATIENT
Start: 2024-02-27 | End: 2024-03-05 | Stop reason: HOSPADM

## 2024-02-26 RX ORDER — POLYETHYLENE GLYCOL 3350 17 G/17G
17 POWDER, FOR SOLUTION ORAL DAILY
Status: DISCONTINUED | OUTPATIENT
Start: 2024-02-27 | End: 2024-03-05 | Stop reason: HOSPADM

## 2024-02-26 RX ORDER — LABETALOL HYDROCHLORIDE 5 MG/ML
10 INJECTION, SOLUTION INTRAVENOUS
Status: DISCONTINUED | OUTPATIENT
Start: 2024-02-26 | End: 2024-03-05 | Stop reason: HOSPADM

## 2024-02-26 RX ORDER — SODIUM CHLORIDE 0.9 % (FLUSH) 0.9 %
5-40 SYRINGE (ML) INJECTION EVERY 12 HOURS SCHEDULED
Status: DISCONTINUED | OUTPATIENT
Start: 2024-02-26 | End: 2024-03-05 | Stop reason: HOSPADM

## 2024-02-26 RX ORDER — REMIFENTANIL HYDROCHLORIDE 1 MG/ML
INJECTION, POWDER, LYOPHILIZED, FOR SOLUTION INTRAVENOUS CONTINUOUS PRN
Status: DISCONTINUED | OUTPATIENT
Start: 2024-02-26 | End: 2024-02-26 | Stop reason: SDUPTHER

## 2024-02-26 RX ORDER — MEPERIDINE HYDROCHLORIDE 25 MG/ML
12.5 INJECTION INTRAMUSCULAR; INTRAVENOUS; SUBCUTANEOUS EVERY 5 MIN PRN
Status: DISCONTINUED | OUTPATIENT
Start: 2024-02-26 | End: 2024-02-26 | Stop reason: SDUPTHER

## 2024-02-26 RX ORDER — LORAZEPAM 2 MG/ML
0.5 INJECTION INTRAMUSCULAR
Status: COMPLETED | OUTPATIENT
Start: 2024-02-26 | End: 2024-02-26

## 2024-02-26 RX ORDER — FUROSEMIDE 40 MG/1
40 TABLET ORAL DAILY
Status: DISCONTINUED | OUTPATIENT
Start: 2024-02-27 | End: 2024-02-27

## 2024-02-26 RX ORDER — FAMOTIDINE 20 MG/1
40 TABLET, FILM COATED ORAL NIGHTLY
Status: DISCONTINUED | OUTPATIENT
Start: 2024-02-26 | End: 2024-03-05 | Stop reason: HOSPADM

## 2024-02-26 RX ORDER — SODIUM CHLORIDE, SODIUM LACTATE, POTASSIUM CHLORIDE, CALCIUM CHLORIDE 600; 310; 30; 20 MG/100ML; MG/100ML; MG/100ML; MG/100ML
INJECTION, SOLUTION INTRAVENOUS CONTINUOUS PRN
Status: DISCONTINUED | OUTPATIENT
Start: 2024-02-26 | End: 2024-02-26 | Stop reason: SDUPTHER

## 2024-02-26 RX ORDER — ONDANSETRON 2 MG/ML
INJECTION INTRAMUSCULAR; INTRAVENOUS PRN
Status: DISCONTINUED | OUTPATIENT
Start: 2024-02-26 | End: 2024-02-26 | Stop reason: SDUPTHER

## 2024-02-26 RX ORDER — HYDROMORPHONE HYDROCHLORIDE 1 MG/ML
0.5 INJECTION, SOLUTION INTRAMUSCULAR; INTRAVENOUS; SUBCUTANEOUS EVERY 5 MIN PRN
Status: COMPLETED | OUTPATIENT
Start: 2024-02-26 | End: 2024-02-26

## 2024-02-26 RX ORDER — LABETALOL HYDROCHLORIDE 5 MG/ML
10 INJECTION, SOLUTION INTRAVENOUS
Status: DISCONTINUED | OUTPATIENT
Start: 2024-02-26 | End: 2024-02-26 | Stop reason: HOSPADM

## 2024-02-26 RX ORDER — PANTOPRAZOLE SODIUM 40 MG/1
40 TABLET, DELAYED RELEASE ORAL DAILY
Status: DISCONTINUED | OUTPATIENT
Start: 2024-02-27 | End: 2024-03-05 | Stop reason: HOSPADM

## 2024-02-26 RX ORDER — SODIUM CHLORIDE 0.9 % (FLUSH) 0.9 %
5-40 SYRINGE (ML) INJECTION PRN
Status: DISCONTINUED | OUTPATIENT
Start: 2024-02-26 | End: 2024-03-05 | Stop reason: HOSPADM

## 2024-02-26 RX ORDER — OXYCODONE HYDROCHLORIDE 5 MG/1
10 TABLET ORAL EVERY 4 HOURS PRN
Status: DISCONTINUED | OUTPATIENT
Start: 2024-02-26 | End: 2024-03-02

## 2024-02-26 RX ORDER — OXYCODONE HYDROCHLORIDE 5 MG/1
5 TABLET ORAL EVERY 4 HOURS PRN
Status: DISCONTINUED | OUTPATIENT
Start: 2024-02-26 | End: 2024-03-02

## 2024-02-26 RX ORDER — FENTANYL CITRATE 50 UG/ML
INJECTION, SOLUTION INTRAMUSCULAR; INTRAVENOUS PRN
Status: DISCONTINUED | OUTPATIENT
Start: 2024-02-26 | End: 2024-02-26 | Stop reason: SDUPTHER

## 2024-02-26 RX ORDER — PROPOFOL 10 MG/ML
INJECTION, EMULSION INTRAVENOUS CONTINUOUS PRN
Status: DISCONTINUED | OUTPATIENT
Start: 2024-02-26 | End: 2024-02-26 | Stop reason: SDUPTHER

## 2024-02-26 RX ORDER — SUCCINYLCHOLINE/SOD CL,ISO/PF 200MG/10ML
SYRINGE (ML) INTRAVENOUS PRN
Status: DISCONTINUED | OUTPATIENT
Start: 2024-02-26 | End: 2024-02-26 | Stop reason: SDUPTHER

## 2024-02-26 RX ORDER — LOSARTAN POTASSIUM 50 MG/1
100 TABLET ORAL DAILY
Status: DISCONTINUED | OUTPATIENT
Start: 2024-02-27 | End: 2024-03-05 | Stop reason: HOSPADM

## 2024-02-26 RX ORDER — GLUCAGON 1 MG/ML
1 KIT INJECTION PRN
Status: DISCONTINUED | OUTPATIENT
Start: 2024-02-26 | End: 2024-02-26 | Stop reason: SDUPTHER

## 2024-02-26 RX ORDER — HYDROMORPHONE HYDROCHLORIDE 2 MG/ML
INJECTION, SOLUTION INTRAMUSCULAR; INTRAVENOUS; SUBCUTANEOUS PRN
Status: DISCONTINUED | OUTPATIENT
Start: 2024-02-26 | End: 2024-02-26 | Stop reason: SDUPTHER

## 2024-02-26 RX ORDER — SODIUM CHLORIDE 9 MG/ML
INJECTION, SOLUTION INTRAVENOUS PRN
Status: DISCONTINUED | OUTPATIENT
Start: 2024-02-26 | End: 2024-02-26 | Stop reason: HOSPADM

## 2024-02-26 RX ORDER — LABETALOL HYDROCHLORIDE 5 MG/ML
INJECTION, SOLUTION INTRAVENOUS PRN
Status: DISCONTINUED | OUTPATIENT
Start: 2024-02-26 | End: 2024-02-26 | Stop reason: SDUPTHER

## 2024-02-26 RX ORDER — ROCURONIUM BROMIDE 10 MG/ML
INJECTION, SOLUTION INTRAVENOUS PRN
Status: DISCONTINUED | OUTPATIENT
Start: 2024-02-26 | End: 2024-02-26 | Stop reason: SDUPTHER

## 2024-02-26 RX ORDER — HYDROMORPHONE HYDROCHLORIDE 1 MG/ML
0.5 INJECTION, SOLUTION INTRAMUSCULAR; INTRAVENOUS; SUBCUTANEOUS EVERY 10 MIN PRN
Status: DISCONTINUED | OUTPATIENT
Start: 2024-02-26 | End: 2024-02-26 | Stop reason: HOSPADM

## 2024-02-26 RX ORDER — HYDRALAZINE HYDROCHLORIDE 20 MG/ML
10 INJECTION INTRAMUSCULAR; INTRAVENOUS
Status: DISCONTINUED | OUTPATIENT
Start: 2024-02-26 | End: 2024-02-26 | Stop reason: HOSPADM

## 2024-02-26 RX ORDER — TIZANIDINE 4 MG/1
4 TABLET ORAL ONCE
Status: COMPLETED | OUTPATIENT
Start: 2024-02-26 | End: 2024-02-26

## 2024-02-26 RX ORDER — ONDANSETRON 2 MG/ML
4 INJECTION INTRAMUSCULAR; INTRAVENOUS
Status: DISCONTINUED | OUTPATIENT
Start: 2024-02-26 | End: 2024-02-26 | Stop reason: HOSPADM

## 2024-02-26 RX ORDER — SODIUM CHLORIDE 0.9 % (FLUSH) 0.9 %
5-40 SYRINGE (ML) INJECTION PRN
Status: CANCELLED | OUTPATIENT
Start: 2024-02-26

## 2024-02-26 RX ORDER — SODIUM CHLORIDE 9 MG/ML
INJECTION, SOLUTION INTRAVENOUS CONTINUOUS
Status: DISCONTINUED | OUTPATIENT
Start: 2024-02-26 | End: 2024-03-04

## 2024-02-26 RX ORDER — SODIUM CHLORIDE, SODIUM LACTATE, POTASSIUM CHLORIDE, CALCIUM CHLORIDE 600; 310; 30; 20 MG/100ML; MG/100ML; MG/100ML; MG/100ML
INJECTION, SOLUTION INTRAVENOUS CONTINUOUS
Status: DISCONTINUED | OUTPATIENT
Start: 2024-02-26 | End: 2024-02-26 | Stop reason: HOSPADM

## 2024-02-26 RX ORDER — HYDROMORPHONE HYDROCHLORIDE 1 MG/ML
0.5 INJECTION, SOLUTION INTRAMUSCULAR; INTRAVENOUS; SUBCUTANEOUS
Status: DISCONTINUED | OUTPATIENT
Start: 2024-02-26 | End: 2024-03-04

## 2024-02-26 RX ORDER — ONDANSETRON 2 MG/ML
4 INJECTION INTRAMUSCULAR; INTRAVENOUS EVERY 6 HOURS PRN
Status: DISCONTINUED | OUTPATIENT
Start: 2024-02-26 | End: 2024-03-05 | Stop reason: HOSPADM

## 2024-02-26 RX ORDER — ONDANSETRON 4 MG/1
4 TABLET, ORALLY DISINTEGRATING ORAL EVERY 8 HOURS PRN
Status: DISCONTINUED | OUTPATIENT
Start: 2024-02-26 | End: 2024-03-05 | Stop reason: HOSPADM

## 2024-02-26 RX ORDER — DEXAMETHASONE SODIUM PHOSPHATE 4 MG/ML
INJECTION, SOLUTION INTRA-ARTICULAR; INTRALESIONAL; INTRAMUSCULAR; INTRAVENOUS; SOFT TISSUE PRN
Status: DISCONTINUED | OUTPATIENT
Start: 2024-02-26 | End: 2024-02-26 | Stop reason: SDUPTHER

## 2024-02-26 RX ORDER — LIDOCAINE HYDROCHLORIDE 20 MG/ML
INJECTION, SOLUTION INTRAVENOUS PRN
Status: DISCONTINUED | OUTPATIENT
Start: 2024-02-26 | End: 2024-02-26 | Stop reason: SDUPTHER

## 2024-02-26 RX ORDER — HYDRALAZINE HYDROCHLORIDE 20 MG/ML
10 INJECTION INTRAMUSCULAR; INTRAVENOUS
Status: COMPLETED | OUTPATIENT
Start: 2024-02-26 | End: 2024-02-26

## 2024-02-26 RX ORDER — METHOCARBAMOL 100 MG/ML
INJECTION, SOLUTION INTRAMUSCULAR; INTRAVENOUS PRN
Status: DISCONTINUED | OUTPATIENT
Start: 2024-02-26 | End: 2024-02-26 | Stop reason: SDUPTHER

## 2024-02-26 RX ORDER — INSULIN GLARGINE 100 [IU]/ML
15 INJECTION, SOLUTION SUBCUTANEOUS NIGHTLY
Status: DISCONTINUED | OUTPATIENT
Start: 2024-02-27 | End: 2024-02-26

## 2024-02-26 RX ORDER — SPIRONOLACTONE 25 MG/1
25 TABLET ORAL DAILY
Status: DISCONTINUED | OUTPATIENT
Start: 2024-02-27 | End: 2024-03-05 | Stop reason: HOSPADM

## 2024-02-26 RX ORDER — GABAPENTIN 300 MG/1
600 CAPSULE ORAL 3 TIMES DAILY
Status: DISCONTINUED | OUTPATIENT
Start: 2024-02-26 | End: 2024-03-05 | Stop reason: HOSPADM

## 2024-02-26 RX ORDER — LABETALOL HYDROCHLORIDE 5 MG/ML
10 INJECTION, SOLUTION INTRAVENOUS
Status: COMPLETED | OUTPATIENT
Start: 2024-02-26 | End: 2024-02-26

## 2024-02-26 RX ORDER — FENTANYL CITRATE 50 UG/ML
25 INJECTION, SOLUTION INTRAMUSCULAR; INTRAVENOUS EVERY 5 MIN PRN
Status: COMPLETED | OUTPATIENT
Start: 2024-02-26 | End: 2024-02-26

## 2024-02-26 RX ORDER — INSULIN GLARGINE 100 [IU]/ML
15 INJECTION, SOLUTION SUBCUTANEOUS NIGHTLY
Status: DISCONTINUED | OUTPATIENT
Start: 2024-02-26 | End: 2024-03-05 | Stop reason: HOSPADM

## 2024-02-26 RX ADMIN — FENTANYL CITRATE 25 MCG: 50 INJECTION INTRAMUSCULAR; INTRAVENOUS at 17:57

## 2024-02-26 RX ADMIN — DEXMEDETOMIDINE HYDROCHLORIDE 4 MCG: 100 INJECTION, SOLUTION INTRAVENOUS at 14:22

## 2024-02-26 RX ADMIN — Medication 140 MG: at 12:24

## 2024-02-26 RX ADMIN — PROPOFOL 60 MG: 10 INJECTION, EMULSION INTRAVENOUS at 12:21

## 2024-02-26 RX ADMIN — FAMOTIDINE 40 MG: 20 TABLET, FILM COATED ORAL at 22:30

## 2024-02-26 RX ADMIN — HYDROMORPHONE HYDROCHLORIDE 0.5 MG: 1 INJECTION, SOLUTION INTRAMUSCULAR; INTRAVENOUS; SUBCUTANEOUS at 16:37

## 2024-02-26 RX ADMIN — SODIUM CHLORIDE, POTASSIUM CHLORIDE, SODIUM LACTATE AND CALCIUM CHLORIDE: 600; 310; 30; 20 INJECTION, SOLUTION INTRAVENOUS at 11:57

## 2024-02-26 RX ADMIN — METHOCARBAMOL 1000 MG: 100 INJECTION INTRAMUSCULAR; INTRAVENOUS at 19:17

## 2024-02-26 RX ADMIN — HYDROMORPHONE HYDROCHLORIDE 0.5 MG: 1 INJECTION, SOLUTION INTRAMUSCULAR; INTRAVENOUS; SUBCUTANEOUS at 16:09

## 2024-02-26 RX ADMIN — GABAPENTIN 600 MG: 300 CAPSULE ORAL at 20:58

## 2024-02-26 RX ADMIN — INSULIN GLARGINE 15 UNITS: 100 INJECTION, SOLUTION SUBCUTANEOUS at 22:38

## 2024-02-26 RX ADMIN — HYDROMORPHONE HYDROCHLORIDE 0.5 MG: 2 INJECTION, SOLUTION INTRAMUSCULAR; INTRAVENOUS; SUBCUTANEOUS at 14:28

## 2024-02-26 RX ADMIN — HYDROMORPHONE HYDROCHLORIDE 0.5 MG: 2 INJECTION, SOLUTION INTRAMUSCULAR; INTRAVENOUS; SUBCUTANEOUS at 14:18

## 2024-02-26 RX ADMIN — LABETALOL HYDROCHLORIDE 2.5 MG: 5 INJECTION, SOLUTION INTRAVENOUS at 13:03

## 2024-02-26 RX ADMIN — ACETAMINOPHEN 650 MG: 325 TABLET ORAL at 22:30

## 2024-02-26 RX ADMIN — HYDRALAZINE HYDROCHLORIDE 10 MG: 20 INJECTION, SOLUTION INTRAMUSCULAR; INTRAVENOUS at 17:23

## 2024-02-26 RX ADMIN — SUGAMMADEX 200 MG: 100 INJECTION, SOLUTION INTRAVENOUS at 14:10

## 2024-02-26 RX ADMIN — HYDROMORPHONE HYDROCHLORIDE 0.5 MG: 1 INJECTION, SOLUTION INTRAMUSCULAR; INTRAVENOUS; SUBCUTANEOUS at 20:18

## 2024-02-26 RX ADMIN — TIZANIDINE 4 MG: 4 TABLET ORAL at 22:33

## 2024-02-26 RX ADMIN — Medication 20 MG: at 13:58

## 2024-02-26 RX ADMIN — LIDOCAINE HYDROCHLORIDE 100 MG: 20 INJECTION, SOLUTION INTRAVENOUS at 12:21

## 2024-02-26 RX ADMIN — LABETALOL HYDROCHLORIDE 10 MG: 5 INJECTION, SOLUTION INTRAVENOUS at 19:02

## 2024-02-26 RX ADMIN — HYDROMORPHONE HYDROCHLORIDE 0.5 MG: 1 INJECTION, SOLUTION INTRAMUSCULAR; INTRAVENOUS; SUBCUTANEOUS at 15:57

## 2024-02-26 RX ADMIN — ROCURONIUM BROMIDE 20 MG: 10 INJECTION, SOLUTION INTRAVENOUS at 13:55

## 2024-02-26 RX ADMIN — PROPOFOL 40 MG: 10 INJECTION, EMULSION INTRAVENOUS at 12:23

## 2024-02-26 RX ADMIN — LORAZEPAM 0.5 MG: 2 INJECTION INTRAMUSCULAR; INTRAVENOUS at 21:38

## 2024-02-26 RX ADMIN — SODIUM CHLORIDE 2000 MG: 900 INJECTION INTRAVENOUS at 22:37

## 2024-02-26 RX ADMIN — HYDROMORPHONE HYDROCHLORIDE 0.5 MG: 1 INJECTION, SOLUTION INTRAMUSCULAR; INTRAVENOUS; SUBCUTANEOUS at 19:44

## 2024-02-26 RX ADMIN — FENTANYL CITRATE 25 MCG: 50 INJECTION INTRAMUSCULAR; INTRAVENOUS at 17:38

## 2024-02-26 RX ADMIN — FENTANYL CITRATE 25 MCG: 50 INJECTION, SOLUTION INTRAMUSCULAR; INTRAVENOUS at 12:43

## 2024-02-26 RX ADMIN — PHENYLEPHRINE HYDROCHLORIDE 25 MCG/MIN: 10 INJECTION INTRAVENOUS at 12:26

## 2024-02-26 RX ADMIN — HYDROMORPHONE HYDROCHLORIDE 0.5 MG: 2 INJECTION, SOLUTION INTRAMUSCULAR; INTRAVENOUS; SUBCUTANEOUS at 12:47

## 2024-02-26 RX ADMIN — LABETALOL HYDROCHLORIDE 5 MG: 5 INJECTION, SOLUTION INTRAVENOUS at 13:06

## 2024-02-26 RX ADMIN — HYDRALAZINE HYDROCHLORIDE 10 MG: 20 INJECTION, SOLUTION INTRAMUSCULAR; INTRAVENOUS at 16:17

## 2024-02-26 RX ADMIN — HYDROMORPHONE HYDROCHLORIDE 0.5 MG: 2 INJECTION, SOLUTION INTRAMUSCULAR; INTRAVENOUS; SUBCUTANEOUS at 13:15

## 2024-02-26 RX ADMIN — PROPOFOL 150 MCG/KG/MIN: 10 INJECTION, EMULSION INTRAVENOUS at 12:26

## 2024-02-26 RX ADMIN — REMIFENTANIL HYDROCHLORIDE 0.15 MCG/KG/MIN: 1 INJECTION, POWDER, LYOPHILIZED, FOR SOLUTION INTRAVENOUS at 12:26

## 2024-02-26 RX ADMIN — MEPERIDINE HYDROCHLORIDE 12.5 MG: 25 INJECTION INTRAMUSCULAR; INTRAVENOUS; SUBCUTANEOUS at 15:45

## 2024-02-26 RX ADMIN — ROCURONIUM BROMIDE 50 MG: 10 INJECTION, SOLUTION INTRAVENOUS at 12:38

## 2024-02-26 RX ADMIN — Medication 10 MG: at 12:43

## 2024-02-26 RX ADMIN — Medication 10 MG: at 12:23

## 2024-02-26 RX ADMIN — SODIUM CHLORIDE 2000 MG: 900 INJECTION INTRAVENOUS at 12:14

## 2024-02-26 RX ADMIN — SODIUM CHLORIDE, SODIUM LACTATE, POTASSIUM CHLORIDE, AND CALCIUM CHLORIDE: .6; .31; .03; .02 INJECTION, SOLUTION INTRAVENOUS at 12:26

## 2024-02-26 RX ADMIN — ONDANSETRON 4 MG: 2 INJECTION INTRAMUSCULAR; INTRAVENOUS at 15:13

## 2024-02-26 RX ADMIN — HYDROMORPHONE HYDROCHLORIDE 0.5 MG: 1 INJECTION, SOLUTION INTRAMUSCULAR; INTRAVENOUS; SUBCUTANEOUS at 17:11

## 2024-02-26 RX ADMIN — SUGAMMADEX 200 MG: 100 INJECTION, SOLUTION INTRAVENOUS at 14:07

## 2024-02-26 RX ADMIN — DEXAMETHASONE SODIUM PHOSPHATE 8 MG: 4 INJECTION INTRA-ARTICULAR; INTRALESIONAL; INTRAMUSCULAR; INTRAVENOUS; SOFT TISSUE at 12:32

## 2024-02-26 RX ADMIN — METHOCARBAMOL 300 MG: 100 INJECTION INTRAMUSCULAR; INTRAVENOUS at 15:13

## 2024-02-26 ASSESSMENT — PAIN DESCRIPTION - LOCATION
LOCATION: BACK;LEG
LOCATION: BACK;LEG
LOCATION: BACK
LOCATION: BACK
LOCATION: LEG
LOCATION: BACK
LOCATION: BACK;LEG
LOCATION: ABDOMEN

## 2024-02-26 ASSESSMENT — PAIN DESCRIPTION - PAIN TYPE
TYPE: CHRONIC PAIN;SURGICAL PAIN
TYPE: SURGICAL PAIN
TYPE: SURGICAL PAIN
TYPE: SURGICAL PAIN;CHRONIC PAIN
TYPE: ACUTE PAIN;SURGICAL PAIN
TYPE: NEUROPATHIC PAIN;CHRONIC PAIN
TYPE: SURGICAL PAIN

## 2024-02-26 ASSESSMENT — PAIN SCALES - GENERAL
PAINLEVEL_OUTOF10: 9
PAINLEVEL_OUTOF10: 6
PAINLEVEL_OUTOF10: 9
PAINLEVEL_OUTOF10: 9
PAINLEVEL_OUTOF10: 6
PAINLEVEL_OUTOF10: 9
PAINLEVEL_OUTOF10: 3
PAINLEVEL_OUTOF10: 9
PAINLEVEL_OUTOF10: 7
PAINLEVEL_OUTOF10: 5
PAINLEVEL_OUTOF10: 6

## 2024-02-26 ASSESSMENT — PAIN DESCRIPTION - FREQUENCY: FREQUENCY: CONTINUOUS

## 2024-02-26 ASSESSMENT — PAIN DESCRIPTION - DESCRIPTORS
DESCRIPTORS: ACHING
DESCRIPTORS: ACHING;BURNING
DESCRIPTORS: ACHING

## 2024-02-26 ASSESSMENT — PAIN DESCRIPTION - ORIENTATION
ORIENTATION: RIGHT;LEFT;LOWER;MID
ORIENTATION: LOWER;MID
ORIENTATION: RIGHT;LEFT
ORIENTATION: LOWER;MID
ORIENTATION: MID;LOWER
ORIENTATION: LOWER;MID
ORIENTATION: MID;LEFT

## 2024-02-26 ASSESSMENT — PAIN - FUNCTIONAL ASSESSMENT
PAIN_FUNCTIONAL_ASSESSMENT: 0-10
PAIN_FUNCTIONAL_ASSESSMENT: PREVENTS OR INTERFERES SOME ACTIVE ACTIVITIES AND ADLS

## 2024-02-26 ASSESSMENT — PAIN DESCRIPTION - ONSET: ONSET: ON-GOING

## 2024-02-26 NOTE — ANESTHESIA PRE PROCEDURE
Department of Anesthesiology  Preprocedure Note       Name:  Aiyana Catalan   Age:  73 y.o.  :  1950                                          MRN:  [id number]         Date:  2024      Surgeon: Surgeon(s):  Gilbert Huizar MD Kuhn, Brian, MD    Procedure: Procedure(s):  LUMBAR 5-SACRAL 1 ANTERIOR LUMBAR INTERBODY FUSION AND LUMBAR 2-LUMBAR 3, LUMBAR 3-LUMBAR 4 DIRECT LATERAL INTERBODY FUSION  .  .    Medications prior to admission:   Prior to Admission medications    Medication Sig Start Date End Date Taking? Authorizing Provider   famotidine (PEPCID) 40 MG tablet Take 1 tablet by mouth at bedtime   Yes Louise Fierro MD   TIZANIDINE HCL PO Take by mouth   Yes Louise Fierro MD   HYDROcodone-acetaminophen (NORCO) 5-325 MG per tablet Take 1 tablet by mouth every 8 hours as needed for Pain.    Louise Fierro MD   NIFEdipine (PROCARDIA XL) 30 MG extended release tablet Take 1 tablet by mouth daily 24   Eddi Light MD   spironolactone (ALDACTONE) 25 MG tablet Take 1 tablet by mouth daily 24   Eddi Light MD   gabapentin (NEURONTIN) 300 MG capsule Take 2 capsules by mouth 3 times daily for 30 days. 23  Lisandra Case MD   metoprolol succinate (TOPROL XL) 25 MG extended release tablet Take 3 tablets by mouth daily 23   Lisandra Case MD   losartan (COZAAR) 100 MG tablet Take 1 tablet by mouth daily 23   Louise Fierro MD   furosemide (LASIX) 20 MG tablet Take 2 tablets by mouth daily 23   Eddi Light MD   TRULICITY 1.5 MG/0.5ML SC injection  23   Louise Fierro MD   Multiple Vitamins-Minerals (HAIR SKIN AND NAILS FORMULA) TABS Take by mouth    Louise Fierro MD   vitamin C (ASCORBIC ACID) 500 MG tablet Take 1 tablet by mouth daily  Patient not taking: Reported on 2024    Louise Fierro MD   insulin glargine (LANTUS) 100 UNIT/ML injection vial Inject 26 Units into the skin nightly PT states she takes this

## 2024-02-26 NOTE — H&P
Interval HP    Patient presents for elective staged L2-pelvis with L5-S1 ALIF And L2-4 XLIF, no changes. Patient will be admitted postop     Eliazar Huizar MD

## 2024-02-26 NOTE — BRIEF OP NOTE
Brief Postoperative Note      Patient: Aiyana Catalan  YOB: 1950  MRN: [id number]    Date of Procedure: 2/26/2024    Pre-Op Diagnosis Codes:     * Spinal stenosis, lumbar region with neurogenic claudication [M48.062]     * Lumbar spondylolysis [M43.06]    Post-Op Diagnosis: Same       Procedure(s):  LUMBAR 5-SACRAL 1 ANTERIOR LUMBAR INTERBODY FUSION AND LUMBAR 2-LUMBAR 3, LUMBAR 3-LUMBAR 4 DIRECT LATERAL INTERBODY FUSION  .    Surgeon(s):  Raffi Iglesias MD Draper, Kelsey L, PA-C Plummer, Zachary, MD Plummer, Zachary, MD    Assistant:  Surgical Assistant: Janelle Aguilera    Anesthesia: General    Estimated Blood Loss (mL): less than 100     Complications: None    Specimens:   * No specimens in log *    Implants:  Implant Name Type Inv. Item Serial No.  Lot No. LRB No. Used Action   ALLOGRAFT BNE 10 CC FD FIBER PLIAFX PRIM - [id number]  ALLOGRAFT BNE 10 CC FD FIBER PLIAFX PRIM [id number] [facility] TISSUE [facility] [id number] N/A 1 Implanted   ALLOGRAFT BNE 5 CC FIBER PLIAFX PRIM - [id number]  ALLOGRAFT BNE 5 CC FIBER PLIAFX PRIM [id number] [facility] TISSUE [facility] [id number] N/A 1 Implanted         Drains:   Urinary Catheter 02/26/24 Zuleta (Active)   Urine Color Yellow 02/26/24 1358   Urine Appearance Clear 02/26/24 1358   Collection Container Standard 02/26/24 1358       Findings:       Electronically signed by Gilbert Huizar MD on 2/26/2024 at 3:13 PM

## 2024-02-26 NOTE — ANESTHESIA POSTPROCEDURE EVALUATION
Department of Anesthesiology  Postprocedure Note    Patient: Aiyana Catalan  MRN: [id number]  YOB: 1950  Date of evaluation: 2/26/2024    Procedure Summary       Date: 02/26/24 Room / Location: [place] [address] / [facility]    Anesthesia Start: 1213 Anesthesia Stop: 1540    Procedures:       LUMBAR 5-SACRAL 1 ANTERIOR LUMBAR INTERBODY FUSION AND LUMBAR 2-LUMBAR 3, LUMBAR 3-LUMBAR 4 DIRECT LATERAL INTERBODY FUSION (Spine Lumbar)      . Diagnosis:       Spinal stenosis, lumbar region with neurogenic claudication      Lumbar spondylolysis      (Spinal stenosis, lumbar region with neurogenic claudication [M48.062])      (Lumbar spondylolysis [M43.06])    Surgeons: Gilbert Huizar MD Responsible Provider: Beto Hull MD    Anesthesia Type: general ASA Status: 3            Anesthesia Type: No value filed.    Aimee Phase I: Aimee Score: 8    Aimee Phase II:      Anesthesia Post Evaluation    Patient location during evaluation: PACU  Patient participation: complete - patient participated  Level of consciousness: awake  Pain score: 0  Nausea & Vomiting: no nausea and no vomiting  Cardiovascular status: blood pressure returned to baseline  Respiratory status: acceptable  Hydration status: euvolemic  Pain management: adequate    No notable events documented.

## 2024-02-27 LAB
ABO + RH BLD: NORMAL
ANION GAP SERPL CALCULATED.3IONS-SCNC: 11 MMOL/L (ref 3–16)
BLD GP AB SCN SERPL QL: NORMAL
BUN SERPL-MCNC: 12 MG/DL (ref 7–20)
CALCIUM SERPL-MCNC: 8.9 MG/DL (ref 8.3–10.6)
CHLORIDE SERPL-SCNC: 105 MMOL/L (ref 99–110)
CO2 SERPL-SCNC: 27 MMOL/L (ref 21–32)
CREAT SERPL-MCNC: 0.7 MG/DL (ref 0.6–1.2)
DEPRECATED RDW RBC AUTO: 14.7 % (ref 12.4–15.4)
EKG ATRIAL RATE: 74 BPM
EKG DIAGNOSIS: NORMAL
EKG P AXIS: 62 DEGREES
EKG P-R INTERVAL: 230 MS
EKG Q-T INTERVAL: 414 MS
EKG QRS DURATION: 82 MS
EKG QTC CALCULATION (BAZETT): 459 MS
EKG R AXIS: 20 DEGREES
EKG T AXIS: 34 DEGREES
EKG VENTRICULAR RATE: 74 BPM
GFR SERPLBLD CREATININE-BSD FMLA CKD-EPI: >60 ML/MIN/{1.73_M2}
GLUCOSE BLD-MCNC: 127 MG/DL (ref 70–99)
GLUCOSE BLD-MCNC: 133 MG/DL (ref 70–99)
GLUCOSE BLD-MCNC: 136 MG/DL (ref 70–99)
GLUCOSE BLD-MCNC: 149 MG/DL (ref 70–99)
GLUCOSE BLD-MCNC: 150 MG/DL (ref 70–99)
GLUCOSE SERPL-MCNC: 115 MG/DL (ref 70–99)
HCT VFR BLD AUTO: 29.5 % (ref 36–48)
HGB BLD-MCNC: 9.6 G/DL (ref 12–16)
INR PPP: 1.13 (ref 0.84–1.16)
MCH RBC QN AUTO: 29.2 PG (ref 26–34)
MCHC RBC AUTO-ENTMCNC: 32.7 G/DL (ref 31–36)
MCV RBC AUTO: 89.3 FL (ref 80–100)
PERFORMED ON: ABNORMAL
PLATELET # BLD AUTO: 160 K/UL (ref 135–450)
PMV BLD AUTO: 8.2 FL (ref 5–10.5)
POTASSIUM SERPL-SCNC: 4 MMOL/L (ref 3.5–5.1)
PROTHROMBIN TIME: 14.5 SEC (ref 11.5–14.8)
RBC # BLD AUTO: 3.3 M/UL (ref 4–5.2)
SODIUM SERPL-SCNC: 143 MMOL/L (ref 136–145)
WBC # BLD AUTO: 9.2 K/UL (ref 4–11)

## 2024-02-27 PROCEDURE — 86901 BLOOD TYPING SEROLOGIC RH(D): CPT

## 2024-02-27 PROCEDURE — 86850 RBC ANTIBODY SCREEN: CPT

## 2024-02-27 PROCEDURE — P9016 RBC LEUKOCYTES REDUCED: HCPCS

## 2024-02-27 PROCEDURE — 6370000000 HC RX 637 (ALT 250 FOR IP): Performed by: NURSE PRACTITIONER

## 2024-02-27 PROCEDURE — 99024 POSTOP FOLLOW-UP VISIT: CPT | Performed by: NURSE PRACTITIONER

## 2024-02-27 PROCEDURE — 6360000002 HC RX W HCPCS: Performed by: PHYSICIAN ASSISTANT

## 2024-02-27 PROCEDURE — 80048 BASIC METABOLIC PNL TOTAL CA: CPT

## 2024-02-27 PROCEDURE — 86923 COMPATIBILITY TEST ELECTRIC: CPT

## 2024-02-27 PROCEDURE — 94799 UNLISTED PULMONARY SVC/PX: CPT

## 2024-02-27 PROCEDURE — 85610 PROTHROMBIN TIME: CPT

## 2024-02-27 PROCEDURE — 85027 COMPLETE CBC AUTOMATED: CPT

## 2024-02-27 PROCEDURE — 6370000000 HC RX 637 (ALT 250 FOR IP): Performed by: PHYSICIAN ASSISTANT

## 2024-02-27 PROCEDURE — APPNB45 APP NON BILLABLE 31-45 MINUTES: Performed by: NURSE PRACTITIONER

## 2024-02-27 PROCEDURE — 86900 BLOOD TYPING SEROLOGIC ABO: CPT

## 2024-02-27 PROCEDURE — 94150 VITAL CAPACITY TEST: CPT

## 2024-02-27 PROCEDURE — 2580000003 HC RX 258: Performed by: PHYSICIAN ASSISTANT

## 2024-02-27 PROCEDURE — 93010 ELECTROCARDIOGRAM REPORT: CPT | Performed by: INTERNAL MEDICINE

## 2024-02-27 PROCEDURE — 2060000000 HC ICU INTERMEDIATE R&B

## 2024-02-27 PROCEDURE — 36415 COLL VENOUS BLD VENIPUNCTURE: CPT

## 2024-02-27 RX ORDER — ACETAMINOPHEN 500 MG
1000 TABLET ORAL EVERY 6 HOURS
Status: DISCONTINUED | OUTPATIENT
Start: 2024-02-27 | End: 2024-03-04

## 2024-02-27 RX ORDER — SENNA AND DOCUSATE SODIUM 50; 8.6 MG/1; MG/1
2 TABLET, FILM COATED ORAL 2 TIMES DAILY
Status: DISCONTINUED | OUTPATIENT
Start: 2024-02-27 | End: 2024-03-05 | Stop reason: HOSPADM

## 2024-02-27 RX ADMIN — GABAPENTIN 600 MG: 300 CAPSULE ORAL at 08:54

## 2024-02-27 RX ADMIN — GABAPENTIN 600 MG: 300 CAPSULE ORAL at 20:53

## 2024-02-27 RX ADMIN — DOCUSATE SODIUM 50 MG AND SENNOSIDES 8.6 MG 2 TABLET: 8.6; 5 TABLET, FILM COATED ORAL at 20:53

## 2024-02-27 RX ADMIN — POLYETHYLENE GLYCOL 3350 17 G: 17 POWDER, FOR SOLUTION ORAL at 08:54

## 2024-02-27 RX ADMIN — ACETAMINOPHEN 1000 MG: 500 TABLET ORAL at 20:52

## 2024-02-27 RX ADMIN — FAMOTIDINE 40 MG: 20 TABLET, FILM COATED ORAL at 20:53

## 2024-02-27 RX ADMIN — OXYCODONE 10 MG: 5 TABLET ORAL at 18:22

## 2024-02-27 RX ADMIN — METHOCARBAMOL 1000 MG: 100 INJECTION INTRAMUSCULAR; INTRAVENOUS at 10:37

## 2024-02-27 RX ADMIN — PANTOPRAZOLE SODIUM 40 MG: 40 TABLET, DELAYED RELEASE ORAL at 08:54

## 2024-02-27 RX ADMIN — ACETAMINOPHEN 1000 MG: 500 TABLET ORAL at 08:53

## 2024-02-27 RX ADMIN — GABAPENTIN 600 MG: 300 CAPSULE ORAL at 14:16

## 2024-02-27 RX ADMIN — ENOXAPARIN SODIUM 30 MG: 100 INJECTION SUBCUTANEOUS at 08:55

## 2024-02-27 RX ADMIN — HYDROMORPHONE HYDROCHLORIDE 0.5 MG: 1 INJECTION, SOLUTION INTRAMUSCULAR; INTRAVENOUS; SUBCUTANEOUS at 20:56

## 2024-02-27 RX ADMIN — HYDROMORPHONE HYDROCHLORIDE 0.5 MG: 1 INJECTION, SOLUTION INTRAMUSCULAR; INTRAVENOUS; SUBCUTANEOUS at 10:37

## 2024-02-27 RX ADMIN — FUROSEMIDE 40 MG: 40 TABLET ORAL at 08:54

## 2024-02-27 RX ADMIN — METOPROLOL SUCCINATE 75 MG: 50 TABLET, EXTENDED RELEASE ORAL at 08:54

## 2024-02-27 RX ADMIN — SODIUM CHLORIDE, PRESERVATIVE FREE 10 ML: 5 INJECTION INTRAVENOUS at 08:55

## 2024-02-27 RX ADMIN — LOSARTAN POTASSIUM 100 MG: 50 TABLET, FILM COATED ORAL at 08:54

## 2024-02-27 RX ADMIN — OXYCODONE 10 MG: 5 TABLET ORAL at 08:53

## 2024-02-27 RX ADMIN — ACETAMINOPHEN 650 MG: 325 TABLET ORAL at 03:55

## 2024-02-27 RX ADMIN — NIFEDIPINE 30 MG: 30 TABLET, EXTENDED RELEASE ORAL at 08:54

## 2024-02-27 RX ADMIN — SODIUM CHLORIDE 2000 MG: 900 INJECTION INTRAVENOUS at 06:06

## 2024-02-27 RX ADMIN — SODIUM CHLORIDE, PRESERVATIVE FREE 10 ML: 5 INJECTION INTRAVENOUS at 20:55

## 2024-02-27 RX ADMIN — HYDROMORPHONE HYDROCHLORIDE 0.5 MG: 1 INJECTION, SOLUTION INTRAMUSCULAR; INTRAVENOUS; SUBCUTANEOUS at 04:35

## 2024-02-27 RX ADMIN — OXYCODONE 10 MG: 5 TABLET ORAL at 01:55

## 2024-02-27 RX ADMIN — HYDROMORPHONE HYDROCHLORIDE 0.5 MG: 1 INJECTION, SOLUTION INTRAMUSCULAR; INTRAVENOUS; SUBCUTANEOUS at 15:19

## 2024-02-27 RX ADMIN — SPIRONOLACTONE 25 MG: 25 TABLET ORAL at 08:54

## 2024-02-27 RX ADMIN — OXYCODONE 10 MG: 5 TABLET ORAL at 14:16

## 2024-02-27 ASSESSMENT — PAIN DESCRIPTION - DESCRIPTORS
DESCRIPTORS: ACHING;DISCOMFORT
DESCRIPTORS: ACHING
DESCRIPTORS: ACHING;DISCOMFORT
DESCRIPTORS: ACHING
DESCRIPTORS: ACHING;DISCOMFORT

## 2024-02-27 ASSESSMENT — PAIN SCALES - GENERAL
PAINLEVEL_OUTOF10: 9
PAINLEVEL_OUTOF10: 3
PAINLEVEL_OUTOF10: 10
PAINLEVEL_OUTOF10: 6
PAINLEVEL_OUTOF10: 6
PAINLEVEL_OUTOF10: 4
PAINLEVEL_OUTOF10: 8
PAINLEVEL_OUTOF10: 4
PAINLEVEL_OUTOF10: 10
PAINLEVEL_OUTOF10: 4
PAINLEVEL_OUTOF10: 9
PAINLEVEL_OUTOF10: 7
PAINLEVEL_OUTOF10: 3
PAINLEVEL_OUTOF10: 7
PAINLEVEL_OUTOF10: 5
PAINLEVEL_OUTOF10: 10
PAINLEVEL_OUTOF10: 7
PAINLEVEL_OUTOF10: 7

## 2024-02-27 ASSESSMENT — PAIN DESCRIPTION - LOCATION
LOCATION: BACK
LOCATION: ABDOMEN
LOCATION: BACK
LOCATION: BACK;ABDOMEN

## 2024-02-27 ASSESSMENT — PAIN DESCRIPTION - FREQUENCY
FREQUENCY: CONTINUOUS

## 2024-02-27 ASSESSMENT — PAIN DESCRIPTION - ONSET
ONSET: ON-GOING

## 2024-02-27 ASSESSMENT — PAIN - FUNCTIONAL ASSESSMENT
PAIN_FUNCTIONAL_ASSESSMENT: ACTIVITIES ARE NOT PREVENTED
PAIN_FUNCTIONAL_ASSESSMENT: PREVENTS OR INTERFERES SOME ACTIVE ACTIVITIES AND ADLS

## 2024-02-27 ASSESSMENT — PAIN DESCRIPTION - PAIN TYPE
TYPE: SURGICAL PAIN
TYPE: ACUTE PAIN;SURGICAL PAIN
TYPE: SURGICAL PAIN
TYPE: ACUTE PAIN;SURGICAL PAIN
TYPE: SURGICAL PAIN

## 2024-02-27 ASSESSMENT — PAIN DESCRIPTION - ORIENTATION
ORIENTATION: MID;LOWER
ORIENTATION: LEFT
ORIENTATION: MID;LEFT
ORIENTATION: MID;LEFT
ORIENTATION: MID;LOWER
ORIENTATION: MID;LEFT
ORIENTATION: MID;LOWER
ORIENTATION: LEFT
ORIENTATION: POSTERIOR

## 2024-02-27 NOTE — PLAN OF CARE
Problem: Chronic Conditions and Co-morbidities  Goal: Patient's chronic conditions and co-morbidity symptoms are monitored and maintained or improved  Outcome: Progressing  Flowsheets (Taken 2/27/2024 0800)  Care Plan - Patient's Chronic Conditions and Co-Morbidity Symptoms are Monitored and Maintained or Improved: Monitor and assess patient's chronic conditions and comorbid symptoms for stability, deterioration, or improvement   Pts BP and BG will be WNL this shift  Problem: Discharge Planning  Goal: Discharge to home or other facility with appropriate resources  Outcome: Progressing  Flowsheets (Taken 2/27/2024 0800)  Discharge to home or other facility with appropriate resources: Identify barriers to discharge with patient and caregiver   Pt will be assessed by PT/OT 2/28 after surgery or next day  Problem: Pain  Goal: Verbalizes/displays adequate comfort level or baseline comfort level  2/27/2024 1454 by Rambo Mccray, RN  Outcome: Progressing  Flowsheets (Taken 2/27/2024 0830)  Verbalizes/displays adequate comfort level or baseline comfort level: Encourage patient to monitor pain and request assistance   Pt will have adequate pain control this shift.   Problem: Safety - Adult  Goal: Free from fall injury  2/27/2024 1454 by Rambo Mccray, RN  Outcome: Progressing  Flowsheets (Taken 2/27/2024 0800)  Free From Fall Injury: Instruct family/caregiver on patient safety   All fall prevention measures remain in place

## 2024-02-27 NOTE — PLAN OF CARE
Problem: Pain  Goal: Verbalizes/displays adequate comfort level or baseline comfort level  Outcome: Progressing  Note: Pain managed via nonpharm measures and medication per MAR.      Problem: Safety - Adult  Goal: Free from fall injury  Outcome: Progressing  Note: Fall precautions in place. Bed alarm on, bed in lowest position, call light within reach, non slip socks, hourly rounding.

## 2024-02-27 NOTE — SIGNIFICANT EVENT
Received routine consult to assist with management of diabetes.  Patient is typically on 26 units of Lantus nightly.  Glucose levels have been quite soft here.  Will reduce home Lantus dose to 15 units while in-house and adjust pending clinical course.  Placed on sliding scale protocol.  Please do not hesitate to contact hospital service for any additional urgent/emergent needs overnight.  Otherwise daytime hospitalist team will provide formal consult note during the day.

## 2024-02-27 NOTE — CONSULTS
Hospitalist Consultation Note    Patient's PCP: Jose Maria Coates MD     Requesting Physician: Gilbert Tobar MD    Reason for Consultation: medical management    HISTORY OF PRESENT ILLNESS:    This is a very pleasant 73 y.o. female with a history of multiple medical problems presenting for lumbar surgery consulted for medical management likely to include dm.  Patient is otherwise doing ok, has improved pain with dilaudid.  Patient denies nausea or vomiting no chest pain or shortness of breath.  Remarkable pain control with current pain medication regimen.      Past Medical / Surgical History:    Past Medical History:   Diagnosis Date    Aortic stenosis     Arthritis     Back pain     CHF (congestive heart failure) (HCC)     Chronic diastolic heart failure (HCC)     Diabetes mellitus (HCC)     Diabetes mellitus (HCC)     TYPE 2    GERD (gastroesophageal reflux disease)     High cholesterol     History of blood transfusion     Hypertension     Malignant neoplasm of right female breast (HCC) 05/03/2022    JOSE MANUEL (obstructive sleep apnea)     Polio     Polio     AS INFANT    Shingles     OCT 2013; Pt denies-states  them    Sleep apnea     Urinary problem     HAS BASHFUL BLADDER       Past Surgical History:   Procedure Laterality Date    ARM SURGERY Left     BACK SURGERY      BACK SURGERY      1/13/14 x2    BREAST BIOPSY Right 05/03/2022    RIGHT RADIOFREQUENCY IDENTIFICATION TAG LOCALIZED PARTIAL MASTECTOMY RIGHT SENTINEL LYMPH NODE BIOPSY performed by Elise Sanchez MD at [facility] OR    BREAST SURGERY Right 05/03/2022    . performed by Elise Sanchez MD at [facility] OR    COLONOSCOPY      EXAMINATION UNDER ANESTHESIA N/A 8/30/2023    DRUG INDUCED SLEEP ENDOSCOPY performed by Lenny Encarnacion DO at [facility] OR    EYE SURGERY      FRACTURE SURGERY      HERNIA REPAIR      HUMERUS FRACTURE SURGERY Left 08/23/2022    OPEN REDUCTION INTERNAL FIXATION LEFT PROXIMAL HUMERUS AND SHAFT

## 2024-02-27 NOTE — CARE COORDINATION
Case Management Assessment  Initial Evaluation    Date/Time of Evaluation: 2/27/2024 4:13 PM  Assessment Completed by: Hedy Dumont RN    If patient is discharged prior to next notation, then this note serves as note for discharge by case management.    Patient Name: Aiyana Catalan                   YOB: 1950  Diagnosis: Spinal stenosis, lumbar region with neurogenic claudication [M48.062]  Lumbar spondylolysis [M43.06]  Lumbar radiculopathy [M54.16]                   Date / Time: 2/26/2024 10:45 AM    Patient Admission Status: Inpatient   Readmission Risk (Low < 19, Mod (19-27), High > 27): Readmission Risk Score: 14.2    Current PCP: Jose Maria Haynes MD  PCP verified by CM? No    Chart Reviewed: Yes      History Provided by: Patient  Patient Orientation: Alert and Oriented    Patient Cognition: Alert    Hospitalization in the last 30 days (Readmission):  No    If yes, Readmission Assessment in CM Navigator will be completed.    Advance Directives:      Code Status: Full Code   Patient's Primary Decision Maker is: Named in Scanned ACP Document      Discharge Planning:    Patient lives with: Alone Type of Home: House  Primary Care Giver: Self  Patient Support Systems include: Children   Current Financial resources: Medicare  Current community resources: None  Current services prior to admission: Skilled Nursing Facility            Current DME:              Type of Home Care services:  None    ADLS  Prior functional level: Independent in ADLs/IADLs  Current functional level: Assistance with the following:, Mobility    PT AM-PAC:   /24  OT AM-PAC:   /24    Family can provide assistance at DC: Yes  Would you like Case Management to discuss the discharge plan with any other family members/significant others, and if so, who? No  Plans to Return to Present Housing: Unknown at present  Other Identified Issues/Barriers to RETURNING to current housing: none  Potential Assistance needed at discharge:

## 2024-02-28 ENCOUNTER — ANESTHESIA EVENT (OUTPATIENT)
Dept: OPERATING ROOM | Age: 74
End: 2024-02-28
Payer: MEDICARE

## 2024-02-28 ENCOUNTER — ANESTHESIA (OUTPATIENT)
Dept: OPERATING ROOM | Age: 74
End: 2024-02-28
Payer: MEDICARE

## 2024-02-28 ENCOUNTER — APPOINTMENT (OUTPATIENT)
Dept: GENERAL RADIOLOGY | Age: 74
DRG: 454 | End: 2024-02-28
Attending: STUDENT IN AN ORGANIZED HEALTH CARE EDUCATION/TRAINING PROGRAM
Payer: MEDICARE

## 2024-02-28 LAB
GLUCOSE BLD-MCNC: 114 MG/DL (ref 70–99)
GLUCOSE BLD-MCNC: 127 MG/DL (ref 70–99)
GLUCOSE BLD-MCNC: 130 MG/DL (ref 70–99)
GLUCOSE BLD-MCNC: 188 MG/DL (ref 70–99)
GLUCOSE BLD-MCNC: 235 MG/DL (ref 70–99)
PERFORMED ON: ABNORMAL

## 2024-02-28 PROCEDURE — 2580000003 HC RX 258: Performed by: PHYSICIAN ASSISTANT

## 2024-02-28 PROCEDURE — C1713 ANCHOR/SCREW BN/BN,TIS/BN: HCPCS | Performed by: STUDENT IN AN ORGANIZED HEALTH CARE EDUCATION/TRAINING PROGRAM

## 2024-02-28 PROCEDURE — 6360000002 HC RX W HCPCS: Performed by: ANESTHESIOLOGY

## 2024-02-28 PROCEDURE — 0SG1071 FUSION OF 2 OR MORE LUMBAR VERTEBRAL JOINTS WITH AUTOLOGOUS TISSUE SUBSTITUTE, POSTERIOR APPROACH, POSTERIOR COLUMN, OPEN APPROACH: ICD-10-PCS | Performed by: STUDENT IN AN ORGANIZED HEALTH CARE EDUCATION/TRAINING PROGRAM

## 2024-02-28 PROCEDURE — 6370000000 HC RX 637 (ALT 250 FOR IP): Performed by: PHYSICIAN ASSISTANT

## 2024-02-28 PROCEDURE — 7100000001 HC PACU RECOVERY - ADDTL 15 MIN: Performed by: STUDENT IN AN ORGANIZED HEALTH CARE EDUCATION/TRAINING PROGRAM

## 2024-02-28 PROCEDURE — 0QH204Z INSERTION OF INTERNAL FIXATION DEVICE INTO RIGHT PELVIC BONE, OPEN APPROACH: ICD-10-PCS | Performed by: STUDENT IN AN ORGANIZED HEALTH CARE EDUCATION/TRAINING PROGRAM

## 2024-02-28 PROCEDURE — 2500000003 HC RX 250 WO HCPCS: Performed by: NURSE ANESTHETIST, CERTIFIED REGISTERED

## 2024-02-28 PROCEDURE — 6370000000 HC RX 637 (ALT 250 FOR IP): Performed by: NURSE PRACTITIONER

## 2024-02-28 PROCEDURE — C1762 CONN TISS, HUMAN(INC FASCIA): HCPCS | Performed by: STUDENT IN AN ORGANIZED HEALTH CARE EDUCATION/TRAINING PROGRAM

## 2024-02-28 PROCEDURE — 2060000000 HC ICU INTERMEDIATE R&B

## 2024-02-28 PROCEDURE — 2580000003 HC RX 258: Performed by: NURSE ANESTHETIST, CERTIFIED REGISTERED

## 2024-02-28 PROCEDURE — 72100 X-RAY EXAM L-S SPINE 2/3 VWS: CPT

## 2024-02-28 PROCEDURE — APPNB45 APP NON BILLABLE 31-45 MINUTES: Performed by: NURSE PRACTITIONER

## 2024-02-28 PROCEDURE — 99024 POSTOP FOLLOW-UP VISIT: CPT | Performed by: NURSE PRACTITIONER

## 2024-02-28 PROCEDURE — 6360000002 HC RX W HCPCS: Performed by: PHYSICIAN ASSISTANT

## 2024-02-28 PROCEDURE — C9290 INJ, BUPIVACAINE LIPOSOME: HCPCS | Performed by: STUDENT IN AN ORGANIZED HEALTH CARE EDUCATION/TRAINING PROGRAM

## 2024-02-28 PROCEDURE — 6360000002 HC RX W HCPCS: Performed by: STUDENT IN AN ORGANIZED HEALTH CARE EDUCATION/TRAINING PROGRAM

## 2024-02-28 PROCEDURE — 2709999900 HC NON-CHARGEABLE SUPPLY: Performed by: STUDENT IN AN ORGANIZED HEALTH CARE EDUCATION/TRAINING PROGRAM

## 2024-02-28 PROCEDURE — 0SG3071 FUSION OF LUMBOSACRAL JOINT WITH AUTOLOGOUS TISSUE SUBSTITUTE, POSTERIOR APPROACH, POSTERIOR COLUMN, OPEN APPROACH: ICD-10-PCS | Performed by: STUDENT IN AN ORGANIZED HEALTH CARE EDUCATION/TRAINING PROGRAM

## 2024-02-28 PROCEDURE — 3600000014 HC SURGERY LEVEL 4 ADDTL 15MIN: Performed by: STUDENT IN AN ORGANIZED HEALTH CARE EDUCATION/TRAINING PROGRAM

## 2024-02-28 PROCEDURE — 3700000001 HC ADD 15 MINUTES (ANESTHESIA): Performed by: STUDENT IN AN ORGANIZED HEALTH CARE EDUCATION/TRAINING PROGRAM

## 2024-02-28 PROCEDURE — 6360000002 HC RX W HCPCS: Performed by: NURSE ANESTHETIST, CERTIFIED REGISTERED

## 2024-02-28 PROCEDURE — 01NB0ZZ RELEASE LUMBAR NERVE, OPEN APPROACH: ICD-10-PCS | Performed by: STUDENT IN AN ORGANIZED HEALTH CARE EDUCATION/TRAINING PROGRAM

## 2024-02-28 PROCEDURE — 2720000010 HC SURG SUPPLY STERILE: Performed by: STUDENT IN AN ORGANIZED HEALTH CARE EDUCATION/TRAINING PROGRAM

## 2024-02-28 PROCEDURE — 3600000004 HC SURGERY LEVEL 4 BASE: Performed by: STUDENT IN AN ORGANIZED HEALTH CARE EDUCATION/TRAINING PROGRAM

## 2024-02-28 PROCEDURE — 8E0WXBG COMPUTER ASSISTED PROCEDURE OF TRUNK REGION, WITH COMPUTERIZED TOMOGRAPHY: ICD-10-PCS | Performed by: STUDENT IN AN ORGANIZED HEALTH CARE EDUCATION/TRAINING PROGRAM

## 2024-02-28 PROCEDURE — 3700000000 HC ANESTHESIA ATTENDED CARE: Performed by: STUDENT IN AN ORGANIZED HEALTH CARE EDUCATION/TRAINING PROGRAM

## 2024-02-28 PROCEDURE — 0QH304Z INSERTION OF INTERNAL FIXATION DEVICE INTO LEFT PELVIC BONE, OPEN APPROACH: ICD-10-PCS | Performed by: STUDENT IN AN ORGANIZED HEALTH CARE EDUCATION/TRAINING PROGRAM

## 2024-02-28 PROCEDURE — A4217 STERILE WATER/SALINE, 500 ML: HCPCS | Performed by: STUDENT IN AN ORGANIZED HEALTH CARE EDUCATION/TRAINING PROGRAM

## 2024-02-28 PROCEDURE — 2580000003 HC RX 258: Performed by: STUDENT IN AN ORGANIZED HEALTH CARE EDUCATION/TRAINING PROGRAM

## 2024-02-28 PROCEDURE — 7100000000 HC PACU RECOVERY - FIRST 15 MIN: Performed by: STUDENT IN AN ORGANIZED HEALTH CARE EDUCATION/TRAINING PROGRAM

## 2024-02-28 PROCEDURE — 0QP004Z REMOVAL OF INTERNAL FIXATION DEVICE FROM LUMBAR VERTEBRA, OPEN APPROACH: ICD-10-PCS | Performed by: STUDENT IN AN ORGANIZED HEALTH CARE EDUCATION/TRAINING PROGRAM

## 2024-02-28 DEVICE — IMPLANTABLE DEVICE: Type: IMPLANTABLE DEVICE | Site: SPINE LUMBAR | Status: FUNCTIONAL

## 2024-02-28 DEVICE — SET SCR SPNL L25MM DIA5.5MM TI FOR 5.5MM ROD EXPEDIUM: Type: IMPLANTABLE DEVICE | Site: SPINE LUMBAR | Status: FUNCTIONAL

## 2024-02-28 DEVICE — SCREW SPNL L45MM DIA7MM TI SGL INNR POLYAX FOR 5.5MM ROD: Type: IMPLANTABLE DEVICE | Site: SPINE LUMBAR | Status: FUNCTIONAL

## 2024-02-28 DEVICE — SCREW SPNL L40MM DIA7MM TI SGL INNR POLYAX FOR 5.5MM ROD: Type: IMPLANTABLE DEVICE | Site: SPINE LUMBAR | Status: FUNCTIONAL

## 2024-02-28 DEVICE — SCREW SPNL 8X80 MM IL T27 TI VIPER +: Type: IMPLANTABLE DEVICE | Site: SPINE LUMBAR | Status: FUNCTIONAL

## 2024-02-28 DEVICE — DBM 7509211 MAGNIFUSE 1 X 10CM
Type: IMPLANTABLE DEVICE | Site: SPINE LUMBAR | Status: FUNCTIONAL
Brand: MAGNIFUSE® BONE GRAFT

## 2024-02-28 RX ORDER — LIDOCAINE HCL/PF 100 MG/5ML
SYRINGE (ML) INJECTION PRN
Status: DISCONTINUED | OUTPATIENT
Start: 2024-02-28 | End: 2024-02-28 | Stop reason: SDUPTHER

## 2024-02-28 RX ORDER — HYDROMORPHONE HYDROCHLORIDE 1 MG/ML
0.5 INJECTION, SOLUTION INTRAMUSCULAR; INTRAVENOUS; SUBCUTANEOUS EVERY 5 MIN PRN
Status: COMPLETED | OUTPATIENT
Start: 2024-02-28 | End: 2024-02-28

## 2024-02-28 RX ORDER — VANCOMYCIN HYDROCHLORIDE 1 G/20ML
INJECTION, POWDER, LYOPHILIZED, FOR SOLUTION INTRAVENOUS PRN
Status: DISCONTINUED | OUTPATIENT
Start: 2024-02-28 | End: 2024-02-28 | Stop reason: HOSPADM

## 2024-02-28 RX ORDER — DEXAMETHASONE SODIUM PHOSPHATE 4 MG/ML
INJECTION, SOLUTION INTRA-ARTICULAR; INTRALESIONAL; INTRAMUSCULAR; INTRAVENOUS; SOFT TISSUE PRN
Status: DISCONTINUED | OUTPATIENT
Start: 2024-02-28 | End: 2024-02-28 | Stop reason: SDUPTHER

## 2024-02-28 RX ORDER — SODIUM CHLORIDE 9 MG/ML
INJECTION, SOLUTION INTRAVENOUS PRN
Status: DISCONTINUED | OUTPATIENT
Start: 2024-02-28 | End: 2024-02-28 | Stop reason: HOSPADM

## 2024-02-28 RX ORDER — POLYETHYLENE GLYCOL 3350 17 G/17G
17 POWDER, FOR SOLUTION ORAL DAILY PRN
Status: DISCONTINUED | OUTPATIENT
Start: 2024-02-28 | End: 2024-02-28

## 2024-02-28 RX ORDER — IPRATROPIUM BROMIDE AND ALBUTEROL SULFATE 2.5; .5 MG/3ML; MG/3ML
1 SOLUTION RESPIRATORY (INHALATION)
Status: DISCONTINUED | OUTPATIENT
Start: 2024-02-28 | End: 2024-02-28 | Stop reason: HOSPADM

## 2024-02-28 RX ORDER — LORAZEPAM 2 MG/ML
0.5 INJECTION INTRAMUSCULAR
Status: DISCONTINUED | OUTPATIENT
Start: 2024-02-28 | End: 2024-02-28 | Stop reason: HOSPADM

## 2024-02-28 RX ORDER — CEFAZOLIN SODIUM 1 G/3ML
INJECTION, POWDER, FOR SOLUTION INTRAMUSCULAR; INTRAVENOUS PRN
Status: DISCONTINUED | OUTPATIENT
Start: 2024-02-28 | End: 2024-02-28 | Stop reason: SDUPTHER

## 2024-02-28 RX ORDER — ONDANSETRON 2 MG/ML
INJECTION INTRAMUSCULAR; INTRAVENOUS PRN
Status: DISCONTINUED | OUTPATIENT
Start: 2024-02-28 | End: 2024-02-28 | Stop reason: SDUPTHER

## 2024-02-28 RX ORDER — LABETALOL HYDROCHLORIDE 5 MG/ML
10 INJECTION, SOLUTION INTRAVENOUS
Status: DISCONTINUED | OUTPATIENT
Start: 2024-02-28 | End: 2024-02-28 | Stop reason: HOSPADM

## 2024-02-28 RX ORDER — DIAZEPAM 5 MG/1
5 TABLET ORAL EVERY 6 HOURS PRN
Status: DISCONTINUED | OUTPATIENT
Start: 2024-02-28 | End: 2024-03-05 | Stop reason: HOSPADM

## 2024-02-28 RX ORDER — SODIUM CHLORIDE 0.9 % (FLUSH) 0.9 %
5-40 SYRINGE (ML) INJECTION PRN
Status: DISCONTINUED | OUTPATIENT
Start: 2024-02-28 | End: 2024-02-28 | Stop reason: HOSPADM

## 2024-02-28 RX ORDER — NALOXONE HYDROCHLORIDE 0.4 MG/ML
INJECTION, SOLUTION INTRAMUSCULAR; INTRAVENOUS; SUBCUTANEOUS PRN
Status: DISCONTINUED | OUTPATIENT
Start: 2024-02-28 | End: 2024-02-28

## 2024-02-28 RX ORDER — ROCURONIUM BROMIDE 10 MG/ML
INJECTION, SOLUTION INTRAVENOUS PRN
Status: DISCONTINUED | OUTPATIENT
Start: 2024-02-28 | End: 2024-02-28 | Stop reason: SDUPTHER

## 2024-02-28 RX ORDER — PROPOFOL 10 MG/ML
INJECTION, EMULSION INTRAVENOUS PRN
Status: DISCONTINUED | OUTPATIENT
Start: 2024-02-28 | End: 2024-02-28 | Stop reason: SDUPTHER

## 2024-02-28 RX ORDER — SODIUM CHLORIDE 9 MG/ML
INJECTION, SOLUTION INTRAVENOUS CONTINUOUS PRN
Status: DISCONTINUED | OUTPATIENT
Start: 2024-02-28 | End: 2024-02-28 | Stop reason: SDUPTHER

## 2024-02-28 RX ORDER — PROCHLORPERAZINE EDISYLATE 5 MG/ML
5 INJECTION INTRAMUSCULAR; INTRAVENOUS
Status: DISCONTINUED | OUTPATIENT
Start: 2024-02-28 | End: 2024-02-28 | Stop reason: HOSPADM

## 2024-02-28 RX ORDER — ONDANSETRON 2 MG/ML
4 INJECTION INTRAMUSCULAR; INTRAVENOUS
Status: DISCONTINUED | OUTPATIENT
Start: 2024-02-28 | End: 2024-02-28 | Stop reason: HOSPADM

## 2024-02-28 RX ORDER — BUPIVACAINE HYDROCHLORIDE 5 MG/ML
INJECTION, SOLUTION EPIDURAL; INTRACAUDAL PRN
Status: DISCONTINUED | OUTPATIENT
Start: 2024-02-28 | End: 2024-02-28 | Stop reason: HOSPADM

## 2024-02-28 RX ORDER — MAGNESIUM HYDROXIDE 1200 MG/15ML
LIQUID ORAL CONTINUOUS PRN
Status: DISCONTINUED | OUTPATIENT
Start: 2024-02-28 | End: 2024-02-28 | Stop reason: HOSPADM

## 2024-02-28 RX ORDER — FENTANYL CITRATE 50 UG/ML
INJECTION, SOLUTION INTRAMUSCULAR; INTRAVENOUS PRN
Status: DISCONTINUED | OUTPATIENT
Start: 2024-02-28 | End: 2024-02-28 | Stop reason: SDUPTHER

## 2024-02-28 RX ORDER — SODIUM CHLORIDE 0.9 % (FLUSH) 0.9 %
5-40 SYRINGE (ML) INJECTION EVERY 12 HOURS SCHEDULED
Status: DISCONTINUED | OUTPATIENT
Start: 2024-02-28 | End: 2024-02-28 | Stop reason: HOSPADM

## 2024-02-28 RX ORDER — DIPHENHYDRAMINE HYDROCHLORIDE 50 MG/ML
12.5 INJECTION INTRAMUSCULAR; INTRAVENOUS
Status: DISCONTINUED | OUTPATIENT
Start: 2024-02-28 | End: 2024-02-28 | Stop reason: HOSPADM

## 2024-02-28 RX ORDER — FENTANYL CITRATE 50 UG/ML
25 INJECTION, SOLUTION INTRAMUSCULAR; INTRAVENOUS EVERY 5 MIN PRN
Status: DISCONTINUED | OUTPATIENT
Start: 2024-02-28 | End: 2024-02-28 | Stop reason: HOSPADM

## 2024-02-28 RX ORDER — MEPERIDINE HYDROCHLORIDE 25 MG/ML
12.5 INJECTION INTRAMUSCULAR; INTRAVENOUS; SUBCUTANEOUS EVERY 5 MIN PRN
Status: DISCONTINUED | OUTPATIENT
Start: 2024-02-28 | End: 2024-02-28 | Stop reason: HOSPADM

## 2024-02-28 RX ADMIN — METHOCARBAMOL 1 G: 100 INJECTION, SOLUTION INTRAMUSCULAR; INTRAVENOUS at 14:42

## 2024-02-28 RX ADMIN — DEXAMETHASONE SODIUM PHOSPHATE 4 MG: 4 INJECTION INTRA-ARTICULAR; INTRALESIONAL; INTRAMUSCULAR; INTRAVENOUS; SOFT TISSUE at 13:32

## 2024-02-28 RX ADMIN — HYDROMORPHONE HYDROCHLORIDE 0.5 MG: 1 INJECTION, SOLUTION INTRAMUSCULAR; INTRAVENOUS; SUBCUTANEOUS at 17:16

## 2024-02-28 RX ADMIN — OXYCODONE 10 MG: 5 TABLET ORAL at 01:15

## 2024-02-28 RX ADMIN — FENTANYL CITRATE 50 MCG: 50 INJECTION, SOLUTION INTRAMUSCULAR; INTRAVENOUS at 13:35

## 2024-02-28 RX ADMIN — ONDANSETRON 4 MG: 2 INJECTION INTRAMUSCULAR; INTRAVENOUS at 13:35

## 2024-02-28 RX ADMIN — METHOCARBAMOL 750 MG: 750 TABLET ORAL at 19:35

## 2024-02-28 RX ADMIN — DOCUSATE SODIUM 50 MG AND SENNOSIDES 8.6 MG 2 TABLET: 8.6; 5 TABLET, FILM COATED ORAL at 19:35

## 2024-02-28 RX ADMIN — FAMOTIDINE 40 MG: 20 TABLET, FILM COATED ORAL at 19:35

## 2024-02-28 RX ADMIN — SODIUM CHLORIDE: 9 INJECTION, SOLUTION INTRAVENOUS at 09:44

## 2024-02-28 RX ADMIN — ROCURONIUM BROMIDE 20 MG: 10 INJECTION, SOLUTION INTRAVENOUS at 14:41

## 2024-02-28 RX ADMIN — SUGAMMADEX 200 MG: 100 INJECTION, SOLUTION INTRAVENOUS at 16:04

## 2024-02-28 RX ADMIN — SODIUM CHLORIDE, PRESERVATIVE FREE 10 ML: 5 INJECTION INTRAVENOUS at 09:37

## 2024-02-28 RX ADMIN — GABAPENTIN 600 MG: 300 CAPSULE ORAL at 19:35

## 2024-02-28 RX ADMIN — FENTANYL CITRATE 50 MCG: 50 INJECTION, SOLUTION INTRAMUSCULAR; INTRAVENOUS at 13:20

## 2024-02-28 RX ADMIN — ACETAMINOPHEN 1000 MG: 500 TABLET ORAL at 09:26

## 2024-02-28 RX ADMIN — HYDROMORPHONE HYDROCHLORIDE 0.5 MG: 1 INJECTION, SOLUTION INTRAMUSCULAR; INTRAVENOUS; SUBCUTANEOUS at 22:55

## 2024-02-28 RX ADMIN — SODIUM CHLORIDE: 9 INJECTION, SOLUTION INTRAVENOUS at 13:20

## 2024-02-28 RX ADMIN — SODIUM CHLORIDE: 9 INJECTION, SOLUTION INTRAVENOUS at 18:33

## 2024-02-28 RX ADMIN — PANTOPRAZOLE SODIUM 40 MG: 40 TABLET, DELAYED RELEASE ORAL at 09:27

## 2024-02-28 RX ADMIN — OXYCODONE 10 MG: 5 TABLET ORAL at 19:35

## 2024-02-28 RX ADMIN — OXYCODONE 10 MG: 5 TABLET ORAL at 09:26

## 2024-02-28 RX ADMIN — ROCURONIUM BROMIDE 50 MG: 10 INJECTION, SOLUTION INTRAVENOUS at 13:25

## 2024-02-28 RX ADMIN — Medication 60 MG: at 13:23

## 2024-02-28 RX ADMIN — PROPOFOL 100 MG: 10 INJECTION, EMULSION INTRAVENOUS at 13:24

## 2024-02-28 RX ADMIN — HYDROMORPHONE HYDROCHLORIDE 0.5 MG: 1 INJECTION, SOLUTION INTRAMUSCULAR; INTRAVENOUS; SUBCUTANEOUS at 16:33

## 2024-02-28 RX ADMIN — HYDROMORPHONE HYDROCHLORIDE 0.5 MG: 1 INJECTION, SOLUTION INTRAMUSCULAR; INTRAVENOUS; SUBCUTANEOUS at 18:30

## 2024-02-28 RX ADMIN — METHOCARBAMOL 1000 MG: 100 INJECTION INTRAMUSCULAR; INTRAVENOUS at 09:45

## 2024-02-28 RX ADMIN — NIFEDIPINE 30 MG: 30 TABLET, EXTENDED RELEASE ORAL at 09:26

## 2024-02-28 RX ADMIN — SODIUM CHLORIDE: 9 INJECTION, SOLUTION INTRAVENOUS at 14:42

## 2024-02-28 RX ADMIN — HYDROMORPHONE HYDROCHLORIDE 0.5 MG: 1 INJECTION, SOLUTION INTRAMUSCULAR; INTRAVENOUS; SUBCUTANEOUS at 17:37

## 2024-02-28 RX ADMIN — HYDROMORPHONE HYDROCHLORIDE 0.5 MG: 1 INJECTION, SOLUTION INTRAMUSCULAR; INTRAVENOUS; SUBCUTANEOUS at 16:48

## 2024-02-28 RX ADMIN — METOPROLOL SUCCINATE 75 MG: 50 TABLET, EXTENDED RELEASE ORAL at 09:26

## 2024-02-28 RX ADMIN — GABAPENTIN 600 MG: 300 CAPSULE ORAL at 09:26

## 2024-02-28 RX ADMIN — HYDROMORPHONE HYDROCHLORIDE 0.5 MG: 1 INJECTION, SOLUTION INTRAMUSCULAR; INTRAVENOUS; SUBCUTANEOUS at 10:35

## 2024-02-28 RX ADMIN — CEFAZOLIN SODIUM 2 G: 1 POWDER, FOR SOLUTION INTRAMUSCULAR; INTRAVENOUS at 13:29

## 2024-02-28 ASSESSMENT — PAIN SCALES - GENERAL
PAINLEVEL_OUTOF10: 9
PAINLEVEL_OUTOF10: 4
PAINLEVEL_OUTOF10: 3
PAINLEVEL_OUTOF10: 10
PAINLEVEL_OUTOF10: 9
PAINLEVEL_OUTOF10: 10
PAINLEVEL_OUTOF10: 7
PAINLEVEL_OUTOF10: 9
PAINLEVEL_OUTOF10: 9
PAINLEVEL_OUTOF10: 8
PAINLEVEL_OUTOF10: 3
PAINLEVEL_OUTOF10: 8
PAINLEVEL_OUTOF10: 10
PAINLEVEL_OUTOF10: 2
PAINLEVEL_OUTOF10: 8
PAINLEVEL_OUTOF10: 8
PAINLEVEL_OUTOF10: 2

## 2024-02-28 ASSESSMENT — PAIN - FUNCTIONAL ASSESSMENT
PAIN_FUNCTIONAL_ASSESSMENT: ACTIVITIES ARE NOT PREVENTED
PAIN_FUNCTIONAL_ASSESSMENT: ACTIVITIES ARE NOT PREVENTED
PAIN_FUNCTIONAL_ASSESSMENT: PREVENTS OR INTERFERES SOME ACTIVE ACTIVITIES AND ADLS
PAIN_FUNCTIONAL_ASSESSMENT: ACTIVITIES ARE NOT PREVENTED

## 2024-02-28 ASSESSMENT — PAIN DESCRIPTION - LOCATION
LOCATION: BACK

## 2024-02-28 ASSESSMENT — PAIN DESCRIPTION - ORIENTATION
ORIENTATION: MID
ORIENTATION: LOWER;MID
ORIENTATION: LOWER;MID
ORIENTATION: LOWER
ORIENTATION: LOWER
ORIENTATION: LOWER;MID
ORIENTATION: MID;LOWER
ORIENTATION: RIGHT;LOWER
ORIENTATION: MID
ORIENTATION: LOWER;MID
ORIENTATION: LOWER;MID

## 2024-02-28 ASSESSMENT — PAIN DESCRIPTION - ONSET
ONSET: ON-GOING

## 2024-02-28 ASSESSMENT — PAIN DESCRIPTION - PAIN TYPE
TYPE: SURGICAL PAIN
TYPE: ACUTE PAIN;SURGICAL PAIN
TYPE: ACUTE PAIN;SURGICAL PAIN
TYPE: SURGICAL PAIN
TYPE: ACUTE PAIN;SURGICAL PAIN
TYPE: SURGICAL PAIN

## 2024-02-28 ASSESSMENT — PAIN DESCRIPTION - DESCRIPTORS
DESCRIPTORS: ACHING
DESCRIPTORS: ACHING;DISCOMFORT
DESCRIPTORS: ACHING;DISCOMFORT
DESCRIPTORS: ACHING

## 2024-02-28 ASSESSMENT — PAIN DESCRIPTION - FREQUENCY
FREQUENCY: CONTINUOUS

## 2024-02-28 NOTE — PLAN OF CARE
Problem: Pain  Goal: Verbalizes/displays adequate comfort level or baseline comfort level  2/27/2024 2222 by Elvia Camacho, RN  Outcome: Progressing  Note: Pain managed via nonpharm measures and medication per MAR.     Problem: Safety - Adult  Goal: Free from fall injury  2/27/2024 2222 by Elvia Camacho, RN  Outcome: Progressing  Note: Fall precautions in place. Bed alarm on, bed in lowest position, call light within reach, non slip socks, hourly rounding.      Problem: ABCDS Injury Assessment  Goal: Absence of physical injury  Outcome: Progressing

## 2024-02-28 NOTE — PLAN OF CARE
Problem: Chronic Conditions and Co-morbidities  Goal: Patient's chronic conditions and co-morbidity symptoms are monitored and maintained or improved  Outcome: Progressing  Flowsheets (Taken 2/28/2024 0800)  Care Plan - Patient's Chronic Conditions and Co-Morbidity Symptoms are Monitored and Maintained or Improved: Monitor and assess patient's chronic conditions and comorbid symptoms for stability, deterioration, or improvement   Pts BP will remain WNL this shift.   Problem: Discharge Planning  Goal: Discharge to home or other facility with appropriate resources  Outcome: Progressing  Flowsheets (Taken 2/28/2024 0800)  Discharge to home or other facility with appropriate resources: Identify barriers to discharge with patient and caregiver   Pt will be assessed by PT/OT after surgery   Problem: Pain  Goal: Verbalizes/displays adequate comfort level or baseline comfort level  2/28/2024 1054 by Rambo Mccray RN  Outcome: Progressing  Flowsheets (Taken 2/28/2024 0800)  Verbalizes/displays adequate comfort level or baseline comfort level: Encourage patient to monitor pain and request assistance   Pt will have adequate pain control this shift.   Problem: Safety - Adult  Goal: Free from fall injury  2/28/2024 1054 by Rambo Mccray RN  Outcome: Progressing  Flowsheets (Taken 2/28/2024 0800)  Free From Fall Injury: Instruct family/caregiver on patient safety   Pt will be free from falls and injury this shift, pt will remain on bedrest.   Problem: ABCDS Injury Assessment  Goal: Absence of physical injury  2/28/2024 1054 by Rambo Mccray, RN  Outcome: Progressing  Flowsheets (Taken 2/28/2024 0800)  Absence of Physical Injury: Implement safety measures based on patient assessment   All fall prevention measures in place.

## 2024-02-28 NOTE — CARE COORDINATION
CM left voicemail for Emily in admissions at the [facility].  No pre-cert needed once accepted.  Patient is going to OR today.  Will continue to follow.    Hedy Dumont RN, BSN,    Ortho/Neuro   [phone]

## 2024-02-28 NOTE — BRIEF OP NOTE
Brief Postoperative Note      Patient: Aiyana Catalan  YOB: 1950  MRN: [id number]    Date of Procedure: 2/28/2024    Pre-Op Diagnosis Codes:     * Spinal stenosis, lumbar region with neurogenic claudication [M48.062]     * Lumbar spondylolysis [M43.06]    Post-Op Diagnosis: Same       Procedure(s):  LUMBAR 2-PELVIS DECOMPRESSION, FUSION, AND FIXATION    Surgeon(s):  Gilbert Huizar MD Mejia Munne, Juan C, MD    Assistant:  * No surgical staff found *    Anesthesia: General    Estimated Blood Loss (mL): 800cc    Complications: None    Specimens:   * No specimens in log *    Implants:  Implant Name Type Inv. Item Serial No.  Lot No. LRB No. Used Action   GRAFT BNE SUB [id number] SPNL DEFORMITY MAGNIFUSE SC - [id number]  GRAFT BNE SUB [id number] SPNL DEFORMITY MAGNIFUSE SC [id number] [employer] SPINAL[employer] TECH-  N/A 1 Implanted   ALLOGRAFT BNE 10 CC FD FIBER PLIAFX PRIM - [id number]  ALLOGRAFT BNE 10 CC FD FIBER PLIAFX PRIM [id number] [facility]  N/A 1 Implanted   ALLOGRAFT BNE 10 CC FD FIBER PLIAFX PRIM - [id number]  ALLOGRAFT BNE 10 CC FD FIBER PLIAFX PRIM [id number] [facility] TISSUE [facility]  N/A 1 Implanted         Drains:   Closed/Suction Drain Left;Medial Back Accordion (Active)   Dressing Status New dressing applied;Clean, dry & intact 02/28/24 1555   Drain Status Compressed 02/28/24 1555       Closed/Suction Drain Right;Medial Back Accordion (Active)   Dressing Status New dressing applied;Clean, dry & intact 02/28/24 1555   Drain Status Compressed 02/28/24 1555       Urinary Catheter 02/26/24 Zuleta (Active)   Catheter Indications Perioperative use for selected surgical procedures 02/28/24 1200   Site Assessment [place] 02/28/24 1200   Urine Color Yellow 02/28/24 1200   Urine Appearance Clear 02/28/24 1200   Urine Odor Other (Comment) 02/28/24 1200   Collection Container Standard 02/28/24 1200   Securement Method Securing device (Describe)

## 2024-02-28 NOTE — ANESTHESIA PRE PROCEDURE
02/01/24 99.4 kg (219 lb 3.2 oz)   12/02/23 95 kg (209 lb 7 oz)     Body mass index is 36.77 kg/m².    CBC:   Lab Results   Component Value Date/Time    WBC 9.2 02/27/2024 08:15 AM    RBC 3.30 02/27/2024 08:15 AM    HGB 9.6 02/27/2024 08:15 AM    HCT 29.5 02/27/2024 08:15 AM    MCV 89.3 02/27/2024 08:15 AM    RDW 14.7 02/27/2024 08:15 AM     02/27/2024 08:15 AM       CMP:   Lab Results   Component Value Date/Time     02/27/2024 08:15 AM    K 4.0 02/27/2024 08:15 AM    K 4.5 11/30/2023 05:52 AM     02/27/2024 08:15 AM    CO2 27 02/27/2024 08:15 AM    BUN 12 02/27/2024 08:15 AM    CREATININE 0.7 02/27/2024 08:15 AM    GFRAA >60 08/19/2022 10:48 AM    AGRATIO 1.4 11/29/2023 06:03 AM    LABGLOM >60 02/27/2024 08:15 AM    GLUCOSE 115 02/27/2024 08:15 AM    PROT 6.4 11/29/2023 06:03 AM    PROT 7.1 01/07/2012 05:21 PM    CALCIUM 8.9 02/27/2024 08:15 AM    BILITOT <0.2 11/29/2023 06:03 AM    ALKPHOS 66 11/29/2023 06:03 AM    AST 17 11/29/2023 06:03 AM    ALT 15 11/29/2023 06:03 AM       POC Tests:   Recent Labs     02/28/24  1255   POCGLU 114*       Coags:   Lab Results   Component Value Date/Time    PROTIME 14.5 02/27/2024 03:18 PM    INR 1.13 02/27/2024 03:18 PM    APTT 28.1 02/26/2024 11:57 AM       HCG (If Applicable): No results found for: \"PREGTESTUR\", \"PREGSERUM\", \"HCG\", \"HCGQUANT\"     ABGs: No results found for: \"PHART\", \"PO2ART\", \"JFU0BRF\", \"HND9FWH\", \"BEART\", \"G3YRCVBC\"     Type & Screen (If Applicable):  No results found for: \"LABABO\", \"LABRH\"    Drug/Infectious Status (If Applicable):  No results found for: \"HIV\", \"HEPCAB\"    COVID-19 Screening (If Applicable):   Lab Results   Component Value Date/Time    COVID19 Not Detected 05/14/2021 10:49 AM           Anesthesia Evaluation  Patient summary reviewed and Nursing notes reviewed   no history of anesthetic complications:   Airway: Mallampati: II  TM distance: >3 FB   Neck ROM: full  Mouth opening: > = 3 FB   Dental:    (+) upper dentures

## 2024-02-29 LAB
BASOPHILS # BLD: 0 K/UL (ref 0–0.2)
BASOPHILS NFR BLD: 0.1 %
BLOOD BANK DISPENSE STATUS: NORMAL
BLOOD BANK DISPENSE STATUS: NORMAL
BLOOD BANK PRODUCT CODE: NORMAL
BLOOD BANK PRODUCT CODE: NORMAL
BPU ID: NORMAL
BPU ID: NORMAL
DEPRECATED RDW RBC AUTO: 15.9 % (ref 12.4–15.4)
DESCRIPTION BLOOD BANK: NORMAL
DESCRIPTION BLOOD BANK: NORMAL
EOSINOPHIL # BLD: 0 K/UL (ref 0–0.6)
EOSINOPHIL NFR BLD: 0.2 %
GLUCOSE BLD-MCNC: 144 MG/DL (ref 70–99)
GLUCOSE BLD-MCNC: 147 MG/DL (ref 70–99)
GLUCOSE BLD-MCNC: 187 MG/DL (ref 70–99)
GLUCOSE BLD-MCNC: 199 MG/DL (ref 70–99)
HCT VFR BLD AUTO: 24.6 % (ref 36–48)
HCT VFR BLD AUTO: 26.2 % (ref 36–48)
HGB BLD-MCNC: 7.4 G/DL (ref 12–16)
HGB BLD-MCNC: 8.5 G/DL (ref 12–16)
LYMPHOCYTES # BLD: 1.7 K/UL (ref 1–5.1)
LYMPHOCYTES NFR BLD: 16.2 %
MCH RBC QN AUTO: 29.5 PG (ref 26–34)
MCHC RBC AUTO-ENTMCNC: 30.1 G/DL (ref 31–36)
MCV RBC AUTO: 98.3 FL (ref 80–100)
MONOCYTES # BLD: 1.1 K/UL (ref 0–1.3)
MONOCYTES NFR BLD: 10.9 %
NEUTROPHILS # BLD: 7.5 K/UL (ref 1.7–7.7)
NEUTROPHILS NFR BLD: 72.6 %
PERFORMED ON: ABNORMAL
PLATELET # BLD AUTO: 138 K/UL (ref 135–450)
PMV BLD AUTO: 7.8 FL (ref 5–10.5)
RBC # BLD AUTO: 2.51 M/UL (ref 4–5.2)
WBC # BLD AUTO: 10.3 K/UL (ref 4–11)

## 2024-02-29 PROCEDURE — 6370000000 HC RX 637 (ALT 250 FOR IP): Performed by: PHYSICIAN ASSISTANT

## 2024-02-29 PROCEDURE — 97530 THERAPEUTIC ACTIVITIES: CPT

## 2024-02-29 PROCEDURE — 6360000002 HC RX W HCPCS: Performed by: NURSE PRACTITIONER

## 2024-02-29 PROCEDURE — 36430 TRANSFUSION BLD/BLD COMPNT: CPT

## 2024-02-29 PROCEDURE — 97162 PT EVAL MOD COMPLEX 30 MIN: CPT

## 2024-02-29 PROCEDURE — 2060000000 HC ICU INTERMEDIATE R&B

## 2024-02-29 PROCEDURE — 2580000003 HC RX 258: Performed by: PHYSICIAN ASSISTANT

## 2024-02-29 PROCEDURE — 85025 COMPLETE CBC W/AUTO DIFF WBC: CPT

## 2024-02-29 PROCEDURE — 51701 INSERT BLADDER CATHETER: CPT

## 2024-02-29 PROCEDURE — 99024 POSTOP FOLLOW-UP VISIT: CPT | Performed by: NURSE PRACTITIONER

## 2024-02-29 PROCEDURE — 51798 US URINE CAPACITY MEASURE: CPT

## 2024-02-29 PROCEDURE — 97166 OT EVAL MOD COMPLEX 45 MIN: CPT

## 2024-02-29 PROCEDURE — 30233N1 TRANSFUSION OF NONAUTOLOGOUS RED BLOOD CELLS INTO PERIPHERAL VEIN, PERCUTANEOUS APPROACH: ICD-10-PCS | Performed by: STUDENT IN AN ORGANIZED HEALTH CARE EDUCATION/TRAINING PROGRAM

## 2024-02-29 PROCEDURE — APPNB45 APP NON BILLABLE 31-45 MINUTES: Performed by: NURSE PRACTITIONER

## 2024-02-29 PROCEDURE — 85014 HEMATOCRIT: CPT

## 2024-02-29 PROCEDURE — 97535 SELF CARE MNGMENT TRAINING: CPT

## 2024-02-29 PROCEDURE — 36415 COLL VENOUS BLD VENIPUNCTURE: CPT

## 2024-02-29 PROCEDURE — 85018 HEMOGLOBIN: CPT

## 2024-02-29 PROCEDURE — 6360000002 HC RX W HCPCS: Performed by: PHYSICIAN ASSISTANT

## 2024-02-29 RX ORDER — SODIUM CHLORIDE 9 MG/ML
INJECTION, SOLUTION INTRAVENOUS PRN
Status: DISCONTINUED | OUTPATIENT
Start: 2024-02-29 | End: 2024-03-05 | Stop reason: HOSPADM

## 2024-02-29 RX ORDER — ENOXAPARIN SODIUM 100 MG/ML
30 INJECTION SUBCUTANEOUS 2 TIMES DAILY
Status: DISCONTINUED | OUTPATIENT
Start: 2024-02-29 | End: 2024-03-05 | Stop reason: HOSPADM

## 2024-02-29 RX ADMIN — OXYCODONE 10 MG: 5 TABLET ORAL at 21:15

## 2024-02-29 RX ADMIN — DIAZEPAM 5 MG: 5 TABLET ORAL at 17:26

## 2024-02-29 RX ADMIN — DOCUSATE SODIUM 50 MG AND SENNOSIDES 8.6 MG 2 TABLET: 8.6; 5 TABLET, FILM COATED ORAL at 08:54

## 2024-02-29 RX ADMIN — GABAPENTIN 600 MG: 300 CAPSULE ORAL at 21:14

## 2024-02-29 RX ADMIN — HYDROMORPHONE HYDROCHLORIDE 0.5 MG: 1 INJECTION, SOLUTION INTRAMUSCULAR; INTRAVENOUS; SUBCUTANEOUS at 03:52

## 2024-02-29 RX ADMIN — SODIUM CHLORIDE, PRESERVATIVE FREE 10 ML: 5 INJECTION INTRAVENOUS at 08:54

## 2024-02-29 RX ADMIN — DOCUSATE SODIUM 50 MG AND SENNOSIDES 8.6 MG 2 TABLET: 8.6; 5 TABLET, FILM COATED ORAL at 21:14

## 2024-02-29 RX ADMIN — ACETAMINOPHEN 1000 MG: 500 TABLET ORAL at 21:14

## 2024-02-29 RX ADMIN — ACETAMINOPHEN 1000 MG: 500 TABLET ORAL at 17:26

## 2024-02-29 RX ADMIN — GABAPENTIN 600 MG: 300 CAPSULE ORAL at 08:53

## 2024-02-29 RX ADMIN — OXYCODONE 10 MG: 5 TABLET ORAL at 08:56

## 2024-02-29 RX ADMIN — OXYCODONE 10 MG: 5 TABLET ORAL at 02:33

## 2024-02-29 RX ADMIN — ENOXAPARIN SODIUM 30 MG: 100 INJECTION SUBCUTANEOUS at 21:14

## 2024-02-29 RX ADMIN — PANTOPRAZOLE SODIUM 40 MG: 40 TABLET, DELAYED RELEASE ORAL at 08:53

## 2024-02-29 RX ADMIN — METHOCARBAMOL 750 MG: 750 TABLET ORAL at 06:55

## 2024-02-29 RX ADMIN — FAMOTIDINE 40 MG: 20 TABLET, FILM COATED ORAL at 21:14

## 2024-02-29 RX ADMIN — POLYETHYLENE GLYCOL 3350 17 G: 17 POWDER, FOR SOLUTION ORAL at 08:54

## 2024-02-29 RX ADMIN — OXYCODONE 5 MG: 5 TABLET ORAL at 17:26

## 2024-02-29 RX ADMIN — ACETAMINOPHEN 1000 MG: 500 TABLET ORAL at 08:54

## 2024-02-29 ASSESSMENT — PAIN SCALES - GENERAL
PAINLEVEL_OUTOF10: 8
PAINLEVEL_OUTOF10: 2
PAINLEVEL_OUTOF10: 4
PAINLEVEL_OUTOF10: 2
PAINLEVEL_OUTOF10: 8
PAINLEVEL_OUTOF10: 5
PAINLEVEL_OUTOF10: 8
PAINLEVEL_OUTOF10: 7
PAINLEVEL_OUTOF10: 7

## 2024-02-29 ASSESSMENT — PAIN DESCRIPTION - DESCRIPTORS
DESCRIPTORS: ACHING
DESCRIPTORS: ACHING;DISCOMFORT
DESCRIPTORS: ACHING
DESCRIPTORS: ACHING;DISCOMFORT

## 2024-02-29 ASSESSMENT — PAIN - FUNCTIONAL ASSESSMENT
PAIN_FUNCTIONAL_ASSESSMENT: PREVENTS OR INTERFERES SOME ACTIVE ACTIVITIES AND ADLS
PAIN_FUNCTIONAL_ASSESSMENT: PREVENTS OR INTERFERES WITH MANY ACTIVE NOT PASSIVE ACTIVITIES
PAIN_FUNCTIONAL_ASSESSMENT: ACTIVITIES ARE NOT PREVENTED
PAIN_FUNCTIONAL_ASSESSMENT: PREVENTS OR INTERFERES WITH MANY ACTIVE NOT PASSIVE ACTIVITIES
PAIN_FUNCTIONAL_ASSESSMENT: ACTIVITIES ARE NOT PREVENTED
PAIN_FUNCTIONAL_ASSESSMENT: ACTIVITIES ARE NOT PREVENTED

## 2024-02-29 ASSESSMENT — PAIN DESCRIPTION - LOCATION
LOCATION: BACK

## 2024-02-29 ASSESSMENT — PAIN DESCRIPTION - ORIENTATION
ORIENTATION: MID
ORIENTATION: MID
ORIENTATION: MID;LOWER
ORIENTATION: MID;LOWER
ORIENTATION: MID
ORIENTATION: MID

## 2024-02-29 ASSESSMENT — PAIN DESCRIPTION - ONSET
ONSET: ON-GOING

## 2024-02-29 ASSESSMENT — PAIN DESCRIPTION - FREQUENCY
FREQUENCY: CONTINUOUS

## 2024-02-29 ASSESSMENT — PAIN SCALES - WONG BAKER
WONGBAKER_NUMERICALRESPONSE: 2
WONGBAKER_NUMERICALRESPONSE: 2
WONGBAKER_NUMERICALRESPONSE: 4
WONGBAKER_NUMERICALRESPONSE: 8
WONGBAKER_NUMERICALRESPONSE: 4
WONGBAKER_NUMERICALRESPONSE: 2

## 2024-02-29 ASSESSMENT — PAIN DESCRIPTION - PAIN TYPE
TYPE: SURGICAL PAIN

## 2024-02-29 NOTE — ANESTHESIA POSTPROCEDURE EVALUATION
Department of Anesthesiology  Postprocedure Note    Patient: Aiyana Catalan  MRN: [id number]  YOB: 1950  Date of evaluation: 2/29/2024    Procedure Summary       Date: 02/28/24 Room / Location: [place] [address] / [facility]    Anesthesia Start: 1320 Anesthesia Stop: 1619    Procedure: LUMBAR 2-PELVIS DECOMPRESSION, FUSION, AND FIXATION Diagnosis:       Spinal stenosis, lumbar region with neurogenic claudication      Lumbar spondylolysis      (Spinal stenosis, lumbar region with neurogenic claudication [M48.062])      (Lumbar spondylolysis [M43.06])    Surgeons: Gilbert Huizar MD Responsible Provider: Alex Luis MD    Anesthesia Type: general ASA Status: 3            Anesthesia Type: No value filed.    Aimee Phase I: Aimee Score: 9    Aimee Phase II:      Anesthesia Post Evaluation    Patient location during evaluation: PACU  Patient participation: complete - patient participated  Level of consciousness: awake  Airway patency: patent  Nausea & Vomiting: no nausea and no vomiting  Cardiovascular status: blood pressure returned to baseline and hemodynamically stable  Respiratory status: acceptable  Hydration status: euvolemic  Multimodal analgesia pain management approach  Pain management: adequate    No notable events documented.

## 2024-02-29 NOTE — CONSENT
Immunization chart prep completed.  Immunization records verified.  Marnie Leal due for Hep A, MMR and Varicella   Informed Consent for Blood Component Transfusion Note    I have discussed with the patient the rationale for blood component transfusion; its benefits in treating or preventing fatigue, organ damage, or death; and its risk which includes mild transfusion reactions, rare risk of blood borne infection, or more serious but rare reactions. I have discussed the alternatives to transfusion, including the risk and consequences of not receiving transfusion. The patient had an opportunity to ask questions and had agreed to proceed with transfusion of blood components.    Electronically signed by Fatuma Mahoney MD on 2/29/24 at 4:25 PM EST

## 2024-02-29 NOTE — OP NOTE
[facility]  Neurosurgery  Operative Note    Date of Operation: 2/28/24    Preoperative Diagnosis:  1. Lumbar stenosis with bilateral lumbar radiculopathy and neurogenic claudication  2. Lumbar spondylosis     Postoperative Diagnosis:  Same    Procedure(s) Performed:  Posterior segmental instrumentation L2-Pelvis  Pelvic fixation  Computer assisted stereotactic navigation assistance for placement of pedicle screws  Bilateral lumbar laminectomy, medial facetectomy and foraminotomy L2/3, L3/4  L2-S1 Arthrodesis, posterior, for spinal deformity  Exploration of spinal fusion      Neurosurgeon:  Gilbert Huizar MD    Co-surgeon:  Karl Kan MD    Anesthesia:  General endotracheal    Complications:  None    Specimens:    None    Implants Placed:  1. Depuy Expedium pedicle screws  2. Depuy 5.5 Ti alloy rods bilaterally    Tubes and Drains Placed:  Two Medium hemovac drains    Estimated Blood Loss: 800 ml    Blood Product Transfusions:  None    Consent and Supervision:  The risks and benefits of the procedure were discussed at length with the patient who stated understanding and agreed to proceed. Specifically, I addressed possible risks including (but not limited to): infection, bleeding, wound-healing problems, CSF leak, pseudoarthrosis or hardware failure, hardware malposition with need for re-operation, potential adjacent level disease and need for future operations, motor or sensory changes, weakness or paralysis, or even death. The patient understands that untoward events during or after surgery could result in a permanent worsening of quality of life or even death.    I certify that I was present throughout the entire procedure, and that I performed the entire procedure from start to finish.    History and Indications for Procedure:    The patient is a 73 y.o. female with lumbar stenosis and radicular pain and neurogenic claudication and a previous L4/5 fusion. Conservative measures were 
Patient: Aiyana Catalan  YOB: 1950  MRN: [id number]  Date of Procedure: 2/26/2024      PRE PROCEDURE DIAGNOSIS:      Low back pain    Lumbar radiculopathy    Adjacent segment lumbar disc disease    Lumbar spinal stenosis    Lumbar neuroforaminal stenosis     POST PROCEDURE DIAGNOSIS: Same    Procedure Performed:     Anterior retroperitoneal exposure of the L5-S1 disc space for an anterior lumbar interbody fusion 62 modifier  Assistant surgeon for placement of interbody fusion hardware and allograft L5-S1 80 modifier  Mobilization of Great Vessels    VASCULAR SURGEON PROVIDING EXPOSURE: Raffi Iglesias MD    SPINE SURGEON: Gilbert Huizar MD    ANESTHESIA: GENERAL    ESTIMATED BLOOD LOSS: 20 ml    INDICATION:  The patient is a 73 y.o. female who was seen and evaluated by Dr Huizar for significant back pain and degenerative disc disease.  Patient failed conservative management and fel to be appropriate candidate for fusion.  I was asked to see the patient to provide the exposure of the above stated disc space.  I saw the patient pre op.  I discussed with the patient my portion of the surgery as well as the risks, benefits and alternatives.  Patient understood the risks included but not limited to significant arterial or venous bleeding that could require transfusion or even be fatal, arterial or venous thrombosis requiring further vascular surgery, thrombectomy, bypass or stenting, injury to bowel, ureter, bladder, lymphatics that could lead to fluid collections or leg swelling, temporary or permanent nerve injury or damage, retrograde ejaculation in men, pain or numbness in the left testicle or thigh as well as the testicles hanging lower and unable to contract up, seroma, lymph leak, hernia, wound infection, hematoma, need for further surgery.  Patient understood these risks.  All questions were answered and consent was obtained.      DETAILS OF THE PROCEDURE:  The patient was brought to the operating 
patient was then extubated and taken to the recovery room in stable condition.      Gilbert Huizar MD  Neurosurgery  [facility]  [phone]

## 2024-02-29 NOTE — PLAN OF CARE
Problem: Pain  Goal: Verbalizes/displays adequate comfort level or baseline comfort level  2/28/2024 1928 by Maddison Friedman, RN  Outcome: Progressing  Note: Pt. Managing pain per MAR.     Problem: Safety - Adult  Goal: Free from fall injury  2/28/2024 1928 by Maddison Friedman, RN  Outcome: Progressing  Note: All fall precautions in place and call light is within reach.

## 2024-02-29 NOTE — CARE COORDINATION
CM spoke with admissions for [facility].  They are able to accept pt for SNF at discharge.  No pre-cert needed.    Hedy Dumont RN, BSN,    Ortho/Neuro   [phone]

## 2024-03-01 PROBLEM — Z98.1 S/P LUMBAR SPINAL FUSION: Status: ACTIVE | Noted: 2024-03-01

## 2024-03-01 LAB
GLUCOSE BLD-MCNC: 146 MG/DL (ref 70–99)
GLUCOSE BLD-MCNC: 149 MG/DL (ref 70–99)
GLUCOSE BLD-MCNC: 170 MG/DL (ref 70–99)
GLUCOSE BLD-MCNC: 203 MG/DL (ref 70–99)
HCT VFR BLD AUTO: 26.3 % (ref 36–48)
HGB BLD-MCNC: 8.7 G/DL (ref 12–16)
PERFORMED ON: ABNORMAL

## 2024-03-01 PROCEDURE — 85014 HEMATOCRIT: CPT

## 2024-03-01 PROCEDURE — 99024 POSTOP FOLLOW-UP VISIT: CPT | Performed by: NURSE PRACTITIONER

## 2024-03-01 PROCEDURE — 97116 GAIT TRAINING THERAPY: CPT

## 2024-03-01 PROCEDURE — 6370000000 HC RX 637 (ALT 250 FOR IP): Performed by: PHYSICIAN ASSISTANT

## 2024-03-01 PROCEDURE — 97530 THERAPEUTIC ACTIVITIES: CPT

## 2024-03-01 PROCEDURE — 51701 INSERT BLADDER CATHETER: CPT

## 2024-03-01 PROCEDURE — APPNB45 APP NON BILLABLE 31-45 MINUTES: Performed by: NURSE PRACTITIONER

## 2024-03-01 PROCEDURE — 6360000002 HC RX W HCPCS: Performed by: NURSE PRACTITIONER

## 2024-03-01 PROCEDURE — 2580000003 HC RX 258: Performed by: PHYSICIAN ASSISTANT

## 2024-03-01 PROCEDURE — 85018 HEMOGLOBIN: CPT

## 2024-03-01 PROCEDURE — 51702 INSERT TEMP BLADDER CATH: CPT

## 2024-03-01 PROCEDURE — 36415 COLL VENOUS BLD VENIPUNCTURE: CPT

## 2024-03-01 PROCEDURE — 51798 US URINE CAPACITY MEASURE: CPT

## 2024-03-01 PROCEDURE — 2060000000 HC ICU INTERMEDIATE R&B

## 2024-03-01 RX ORDER — OXYCODONE HYDROCHLORIDE 5 MG/1
5-10 TABLET ORAL EVERY 6 HOURS PRN
Qty: 42 TABLET | Refills: 0 | Status: SHIPPED | OUTPATIENT
Start: 2024-03-01 | End: 2024-03-04 | Stop reason: HOSPADM

## 2024-03-01 RX ORDER — DIAZEPAM 5 MG/1
5 TABLET ORAL EVERY 8 HOURS PRN
Qty: 30 TABLET | Refills: 0 | Status: SHIPPED | OUTPATIENT
Start: 2024-03-01 | End: 2024-03-11

## 2024-03-01 RX ORDER — METHOCARBAMOL 750 MG/1
750 TABLET, FILM COATED ORAL EVERY 6 HOURS PRN
Qty: 40 TABLET | Refills: 0 | Status: SHIPPED | OUTPATIENT
Start: 2024-03-01 | End: 2024-03-11

## 2024-03-01 RX ORDER — METHOCARBAMOL 500 MG/1
750 TABLET, FILM COATED ORAL EVERY 6 HOURS PRN
Status: DISCONTINUED | OUTPATIENT
Start: 2024-03-01 | End: 2024-03-05 | Stop reason: HOSPADM

## 2024-03-01 RX ORDER — SENNA AND DOCUSATE SODIUM 50; 8.6 MG/1; MG/1
2 TABLET, FILM COATED ORAL 2 TIMES DAILY
Qty: 40 TABLET | Refills: 0
Start: 2024-03-01 | End: 2024-03-11

## 2024-03-01 RX ORDER — POLYETHYLENE GLYCOL 3350 17 G/17G
17 POWDER, FOR SOLUTION ORAL DAILY
Qty: 10 PACKET | Refills: 0
Start: 2024-03-02 | End: 2024-03-12

## 2024-03-01 RX ADMIN — GABAPENTIN 600 MG: 300 CAPSULE ORAL at 14:44

## 2024-03-01 RX ADMIN — INSULIN LISPRO 2 UNITS: 100 INJECTION, SOLUTION INTRAVENOUS; SUBCUTANEOUS at 17:24

## 2024-03-01 RX ADMIN — GABAPENTIN 600 MG: 300 CAPSULE ORAL at 20:53

## 2024-03-01 RX ADMIN — ACETAMINOPHEN 1000 MG: 500 TABLET ORAL at 08:58

## 2024-03-01 RX ADMIN — SODIUM CHLORIDE, PRESERVATIVE FREE 10 ML: 5 INJECTION INTRAVENOUS at 08:58

## 2024-03-01 RX ADMIN — OXYCODONE 10 MG: 5 TABLET ORAL at 22:13

## 2024-03-01 RX ADMIN — DOCUSATE SODIUM 50 MG AND SENNOSIDES 8.6 MG 2 TABLET: 8.6; 5 TABLET, FILM COATED ORAL at 08:57

## 2024-03-01 RX ADMIN — ENOXAPARIN SODIUM 30 MG: 100 INJECTION SUBCUTANEOUS at 20:53

## 2024-03-01 RX ADMIN — OXYCODONE 10 MG: 5 TABLET ORAL at 07:29

## 2024-03-01 RX ADMIN — OXYCODONE 10 MG: 5 TABLET ORAL at 11:30

## 2024-03-01 RX ADMIN — ACETAMINOPHEN 1000 MG: 500 TABLET ORAL at 20:53

## 2024-03-01 RX ADMIN — OXYCODONE 10 MG: 5 TABLET ORAL at 03:15

## 2024-03-01 RX ADMIN — DIAZEPAM 5 MG: 5 TABLET ORAL at 08:58

## 2024-03-01 RX ADMIN — FAMOTIDINE 40 MG: 20 TABLET, FILM COATED ORAL at 20:53

## 2024-03-01 RX ADMIN — ENOXAPARIN SODIUM 30 MG: 100 INJECTION SUBCUTANEOUS at 08:57

## 2024-03-01 RX ADMIN — GABAPENTIN 600 MG: 300 CAPSULE ORAL at 08:58

## 2024-03-01 RX ADMIN — DOCUSATE SODIUM 50 MG AND SENNOSIDES 8.6 MG 2 TABLET: 8.6; 5 TABLET, FILM COATED ORAL at 20:53

## 2024-03-01 RX ADMIN — OXYCODONE 10 MG: 5 TABLET ORAL at 15:17

## 2024-03-01 RX ADMIN — POLYETHYLENE GLYCOL 3350 17 G: 17 POWDER, FOR SOLUTION ORAL at 08:58

## 2024-03-01 RX ADMIN — PANTOPRAZOLE SODIUM 40 MG: 40 TABLET, DELAYED RELEASE ORAL at 08:58

## 2024-03-01 RX ADMIN — ACETAMINOPHEN 1000 MG: 500 TABLET ORAL at 03:15

## 2024-03-01 RX ADMIN — DIAZEPAM 5 MG: 5 TABLET ORAL at 14:44

## 2024-03-01 ASSESSMENT — PAIN DESCRIPTION - ORIENTATION
ORIENTATION: MID;LOWER
ORIENTATION: LOWER;MID
ORIENTATION: MID

## 2024-03-01 ASSESSMENT — PAIN DESCRIPTION - ONSET
ONSET: ON-GOING
ONSET: PROGRESSIVE
ONSET: ON-GOING

## 2024-03-01 ASSESSMENT — PAIN - FUNCTIONAL ASSESSMENT
PAIN_FUNCTIONAL_ASSESSMENT: PREVENTS OR INTERFERES SOME ACTIVE ACTIVITIES AND ADLS
PAIN_FUNCTIONAL_ASSESSMENT: PREVENTS OR INTERFERES WITH MANY ACTIVE NOT PASSIVE ACTIVITIES
PAIN_FUNCTIONAL_ASSESSMENT: PREVENTS OR INTERFERES WITH MANY ACTIVE NOT PASSIVE ACTIVITIES
PAIN_FUNCTIONAL_ASSESSMENT: PREVENTS OR INTERFERES SOME ACTIVE ACTIVITIES AND ADLS
PAIN_FUNCTIONAL_ASSESSMENT: PREVENTS OR INTERFERES SOME ACTIVE ACTIVITIES AND ADLS

## 2024-03-01 ASSESSMENT — PAIN SCALES - GENERAL
PAINLEVEL_OUTOF10: 9
PAINLEVEL_OUTOF10: 2
PAINLEVEL_OUTOF10: 8
PAINLEVEL_OUTOF10: 8
PAINLEVEL_OUTOF10: 2
PAINLEVEL_OUTOF10: 8
PAINLEVEL_OUTOF10: 8
PAINLEVEL_OUTOF10: 2

## 2024-03-01 ASSESSMENT — PAIN DESCRIPTION - PAIN TYPE
TYPE: SURGICAL PAIN

## 2024-03-01 ASSESSMENT — PAIN DESCRIPTION - FREQUENCY
FREQUENCY: CONTINUOUS

## 2024-03-01 ASSESSMENT — PAIN DESCRIPTION - LOCATION
LOCATION: BACK

## 2024-03-01 ASSESSMENT — PAIN SCALES - WONG BAKER
WONGBAKER_NUMERICALRESPONSE: 2
WONGBAKER_NUMERICALRESPONSE: 8
WONGBAKER_NUMERICALRESPONSE: 8
WONGBAKER_NUMERICALRESPONSE: 2

## 2024-03-01 ASSESSMENT — PAIN DESCRIPTION - DESCRIPTORS
DESCRIPTORS: ACHING;DISCOMFORT
DESCRIPTORS: ACHING
DESCRIPTORS: ACHING;DISCOMFORT

## 2024-03-01 NOTE — PLAN OF CARE
Problem: Chronic Conditions and Co-morbidities  Goal: Patient's chronic conditions and co-morbidity symptoms are monitored and maintained or improved  Outcome: Progressing     Problem: Discharge Planning  Goal: Discharge to home or other facility with appropriate resources  Outcome: Progressing     Problem: Pain  Goal: Verbalizes/displays adequate comfort level or baseline comfort level  3/1/2024 1556 by Kanika Nicolas RN  Outcome: Progressing  3/1/2024 0242 by Maddison Friedman RN  Outcome: Progressing  Note: Pt. Managing pain per MAR.     Problem: Safety - Adult  Goal: Free from fall injury  3/1/2024 1556 by Kanika Nicolas, RN  Outcome: Progressing  3/1/2024 0242 by Maddison Friedman RN  Outcome: Progressing  Note: All fall precautions in place and call light is within reach.      Problem: ABCDS Injury Assessment  Goal: Absence of physical injury  Outcome: Progressing

## 2024-03-01 NOTE — DISCHARGE INSTRUCTIONS
Extra Heart Failure sites:     https://OncodesignitalMoodswing.com/publication/?m=813702   --- this is American Heart Association interactive Healthier Living with Heart Failure guidebook.  Please click hyperlink or copy / paste link into search bar. Use your mouse to scroll through the pages.  Lots of information about weight monitoring, diet tips, activity, meds, etc    HF [place] tito  -- this is a free smart phone tito available for iPhone and Android download.  Use your phone to track sodium / fluid intake, zone tool symptom tracking, weights, medications, etc. Click on this hyperlink  HF [place] Tito   for QR code for easy download.       DASH (Dietary Approach to Stop Hypertension) diet --  https://www.nhlbi.nih.gov/education/dash-eating-plan -- this diet is a flexible eating plan that promotes heart healthy eating style.  Click on hyperlink or copy / paste link into search bar.  Lots of low sodium recipes and tips.    https://www.Affinnova/recipes  -- more free recipes          [facility] Program Survey  The [facility] values your feedback related to your recent hospital visit and admission. We strive to improve our program to promote better outcomes and recoveries for all our patients.  The survey code below consists of a few questions that are related to your stay and around your Spine diagnosis, treatment, and recovery. The estimated length of time needed to complete this survey is 4 minutes or less. Thank you for completing this survey!

## 2024-03-01 NOTE — DISCHARGE INSTR - COC
Continuity of Care Form    Patient Name: Aiyana Catalan   :  1950  MRN:  [id number]    Admit date:  2024  Discharge date:  3/5/2024    Code Status Order: Full Code   Advance Directives:   Advance Care Flowsheet Documentation       Date/Time Healthcare Directive Type of Healthcare Directive Copy in Chart Healthcare Agent Appointed Healthcare Agent's Name Healthcare Agent's Phone Number    [phone] No, patient does not have an advance directive for healthcare treatment -- -- -- -- --            Admitting Physician:  Gilbert Huizar MD  PCP: Jose Maria Haynes MD    Discharging Nurse: ZACKERY Munoz  Discharging Hospital Unit/Room#: 5524/5524-01  Discharging Unit Phone Number: [phone]    Emergency Contact:   Extended Emergency Contact Information  Primary Emergency Contact: Krystal Rosario  Address: [address] [place] [address]  Home Phone: [phone]  Relation: Child  Secondary Emergency Contact: Veronica Aranda  Home Phone: [phone]  Mobile Phone: [phone]  Relation: Child    Past Surgical History:  Past Surgical History:   Procedure Laterality Date    ARM SURGERY Left     BACK SURGERY      BACK SURGERY      1/13/14 x2    BREAST BIOPSY Right 2022    RIGHT RADIOFREQUENCY IDENTIFICATION TAG LOCALIZED PARTIAL MASTECTOMY RIGHT SENTINEL LYMPH NODE BIOPSY performed by Elise Sanchez MD at [facility] OR    BREAST SURGERY Right 2022    . performed by Elise Sanchez MD at [facility] OR    COLONOSCOPY      EXAMINATION UNDER ANESTHESIA N/A 2023    DRUG INDUCED SLEEP ENDOSCOPY performed by Lenny Encarnacion DO at [facility] OR    EYE SURGERY      FRACTURE SURGERY      HERNIA REPAIR      HUMERUS FRACTURE SURGERY Left 2022    OPEN REDUCTION INTERNAL FIXATION LEFT PROXIMAL HUMERUS AND SHAFT FRACTURES-REGIONAL-SYNTHES performed by Joel Ontiveros MD at [facility] OR    JOINT REPLACEMENT      bilateral knee replacements    JOINT REPLACEMENT

## 2024-03-01 NOTE — CARE COORDINATION
CM following: CM received VM x2 from Emily with [facility] in the past hour reporting that she has some questions that need answered prior to admission. CM left VM for Emily at [phone]. CM will continue to follow for discharge planning.  Electronically signed by ROCK Waldron on 3/1/2024 at 3:35 PM  [phone]    HIMANSHU following: CM left a VM for Emily in admissions  and the [facility] requesting a call back with weekend contact for the SNF. HIMANSHU also faxed updated clinicals to the [facility] at [phone]. CM will continue to follow for discharge planning.  Electronically signed by ROCK Waldron on 3/1/2024 at 11:18 AM  [phone]

## 2024-03-01 NOTE — PLAN OF CARE
Problem: Pain  Goal: Verbalizes/displays adequate comfort level or baseline comfort level  3/1/2024 0242 by Maddison Friedman, RN  Outcome: Progressing  Note: Pt. Managing pain per MAR.     Problem: Safety - Adult  Goal: Free from fall injury  3/1/2024 0242 by Maddison Friedman, RN  Outcome: Progressing  Note: All fall precautions in place and call light is within reach.

## 2024-03-01 NOTE — CONSULTS
Urology Attending Consult Note      Reason for Consultation: Retention    History: 74 yo F with history of post-op retention currently admitted following two spinal procedures done 24 and 24. Roberts was removed post-op. She has been unable to void and has required ISC. She tells me she has always had issues urinating in public places, especially after surgery. She wants roberts to be inserted.     Family History, Social History, Review of Systems:  Reviewed and agreed to as per chart    Vitals:  /62   Pulse 99   Temp 98 °F (36.7 °C) (Oral)   Resp 16   Ht 1.676 m (5' 6\") Comment: 5'6  Wt 103.3 kg (227 lb 12.8 oz)   SpO2 96%   BMI 36.77 kg/m²   Temp  Av.4 °F (36.9 °C)  Min: 97.2 °F (36.2 °C)  Max: 99.2 °F (37.3 °C)    Intake/Output Summary (Last 24 hours) at 3/1/2024 1052  Last data filed at 3/1/2024 0600  Gross per 24 hour   Intake 2100.83 ml   Output 2035 ml   Net 65.83 ml         Physical:  Well developed, well nourished in no acute distress  Mood indicates no abnormalities. Pt doesn’t appear depressed  Orientated to time and place  Neck is supple, trachea is midline  Respiratory effort is normal          Labs:  WBC:    Lab Results   Component Value Date/Time    WBC 10.3 2024 07:52 AM     Hemoglobin/Hematocrit:    Lab Results   Component Value Date/Time    HGB 8.7 2024 05:12 AM    HCT 26.3 2024 05:12 AM     BMP:    Lab Results   Component Value Date/Time     2024 08:15 AM    K 4.0 2024 08:15 AM    K 4.5 2023 05:52 AM     2024 08:15 AM    CO2 27 2024 08:15 AM    BUN 12 2024 08:15 AM    LABALBU 3.7 2023 06:03 AM    CREATININE 0.7 2024 08:15 AM    CALCIUM 8.9 2024 08:15 AM    GFRAA >60 2022 10:48 AM    LABGLOM >60 2024 08:15 AM     PT/INR:    Lab Results   Component Value Date/Time    PROTIME 14.5 2024 03:18 PM    INR 1.13 2024 03:18 PM     PTT:    Lab Results   Component Value

## 2024-03-02 PROBLEM — Z01.810 PREOP CARDIOVASCULAR EXAM: Status: RESOLVED | Noted: 2024-02-01 | Resolved: 2024-03-02

## 2024-03-02 LAB
BASOPHILS # BLD: 0 K/UL (ref 0–0.2)
BASOPHILS NFR BLD: 0.3 %
DEPRECATED RDW RBC AUTO: 14.2 % (ref 12.4–15.4)
EOSINOPHIL # BLD: 0.3 K/UL (ref 0–0.6)
EOSINOPHIL NFR BLD: 4.6 %
GLUCOSE BLD-MCNC: 142 MG/DL (ref 70–99)
GLUCOSE BLD-MCNC: 173 MG/DL (ref 70–99)
GLUCOSE BLD-MCNC: 175 MG/DL (ref 70–99)
GLUCOSE BLD-MCNC: 186 MG/DL (ref 70–99)
HCT VFR BLD AUTO: 27.1 % (ref 36–48)
HGB BLD-MCNC: 8.9 G/DL (ref 12–16)
LYMPHOCYTES # BLD: 1.4 K/UL (ref 1–5.1)
LYMPHOCYTES NFR BLD: 21.4 %
MCH RBC QN AUTO: 30 PG (ref 26–34)
MCHC RBC AUTO-ENTMCNC: 32.9 G/DL (ref 31–36)
MCV RBC AUTO: 91.3 FL (ref 80–100)
MONOCYTES # BLD: 0.7 K/UL (ref 0–1.3)
MONOCYTES NFR BLD: 10 %
NEUTROPHILS # BLD: 4.3 K/UL (ref 1.7–7.7)
NEUTROPHILS NFR BLD: 63.7 %
PERFORMED ON: ABNORMAL
PLATELET # BLD AUTO: 154 K/UL (ref 135–450)
PMV BLD AUTO: 7.3 FL (ref 5–10.5)
RBC # BLD AUTO: 2.97 M/UL (ref 4–5.2)
WBC # BLD AUTO: 6.7 K/UL (ref 4–11)

## 2024-03-02 PROCEDURE — 6370000000 HC RX 637 (ALT 250 FOR IP): Performed by: NURSE PRACTITIONER

## 2024-03-02 PROCEDURE — 6370000000 HC RX 637 (ALT 250 FOR IP): Performed by: PHYSICIAN ASSISTANT

## 2024-03-02 PROCEDURE — 36415 COLL VENOUS BLD VENIPUNCTURE: CPT

## 2024-03-02 PROCEDURE — 2580000003 HC RX 258: Performed by: PHYSICIAN ASSISTANT

## 2024-03-02 PROCEDURE — 6360000002 HC RX W HCPCS: Performed by: NURSE PRACTITIONER

## 2024-03-02 PROCEDURE — 97530 THERAPEUTIC ACTIVITIES: CPT

## 2024-03-02 PROCEDURE — 85025 COMPLETE CBC W/AUTO DIFF WBC: CPT

## 2024-03-02 PROCEDURE — 6360000002 HC RX W HCPCS: Performed by: PHYSICIAN ASSISTANT

## 2024-03-02 PROCEDURE — 97116 GAIT TRAINING THERAPY: CPT

## 2024-03-02 PROCEDURE — 2060000000 HC ICU INTERMEDIATE R&B

## 2024-03-02 RX ORDER — HYDROCODONE BITARTRATE AND ACETAMINOPHEN 5; 325 MG/1; MG/1
1 TABLET ORAL EVERY 6 HOURS PRN
Qty: 20 TABLET | Refills: 0 | Status: SHIPPED | OUTPATIENT
Start: 2024-03-02 | End: 2024-03-04 | Stop reason: HOSPADM

## 2024-03-02 RX ORDER — HYDROCODONE BITARTRATE AND ACETAMINOPHEN 5; 325 MG/1; MG/1
1 TABLET ORAL EVERY 6 HOURS PRN
Status: DISCONTINUED | OUTPATIENT
Start: 2024-03-02 | End: 2024-03-02

## 2024-03-02 RX ORDER — HYDROCODONE BITARTRATE AND ACETAMINOPHEN 5; 325 MG/1; MG/1
1 TABLET ORAL EVERY 4 HOURS PRN
Status: DISCONTINUED | OUTPATIENT
Start: 2024-03-02 | End: 2024-03-03

## 2024-03-02 RX ADMIN — DOCUSATE SODIUM 50 MG AND SENNOSIDES 8.6 MG 2 TABLET: 8.6; 5 TABLET, FILM COATED ORAL at 09:27

## 2024-03-02 RX ADMIN — FAMOTIDINE 40 MG: 20 TABLET, FILM COATED ORAL at 19:48

## 2024-03-02 RX ADMIN — HYDROMORPHONE HYDROCHLORIDE 0.5 MG: 1 INJECTION, SOLUTION INTRAMUSCULAR; INTRAVENOUS; SUBCUTANEOUS at 23:14

## 2024-03-02 RX ADMIN — METHOCARBAMOL 750 MG: 750 TABLET ORAL at 09:27

## 2024-03-02 RX ADMIN — SODIUM CHLORIDE, PRESERVATIVE FREE 10 ML: 5 INJECTION INTRAVENOUS at 09:27

## 2024-03-02 RX ADMIN — DOCUSATE SODIUM 50 MG AND SENNOSIDES 8.6 MG 2 TABLET: 8.6; 5 TABLET, FILM COATED ORAL at 19:48

## 2024-03-02 RX ADMIN — ACETAMINOPHEN 1000 MG: 500 TABLET ORAL at 09:27

## 2024-03-02 RX ADMIN — OXYCODONE 10 MG: 5 TABLET ORAL at 10:29

## 2024-03-02 RX ADMIN — METHOCARBAMOL 750 MG: 750 TABLET ORAL at 23:14

## 2024-03-02 RX ADMIN — POLYETHYLENE GLYCOL 3350 17 G: 17 POWDER, FOR SOLUTION ORAL at 09:27

## 2024-03-02 RX ADMIN — ACETAMINOPHEN 1000 MG: 500 TABLET ORAL at 23:14

## 2024-03-02 RX ADMIN — DIAZEPAM 5 MG: 5 TABLET ORAL at 19:48

## 2024-03-02 RX ADMIN — GABAPENTIN 600 MG: 300 CAPSULE ORAL at 09:27

## 2024-03-02 RX ADMIN — GABAPENTIN 600 MG: 300 CAPSULE ORAL at 19:48

## 2024-03-02 RX ADMIN — GABAPENTIN 600 MG: 300 CAPSULE ORAL at 13:34

## 2024-03-02 RX ADMIN — METHOCARBAMOL 750 MG: 750 TABLET ORAL at 16:23

## 2024-03-02 RX ADMIN — HYDROCODONE BITARTRATE AND ACETAMINOPHEN 1 TABLET: 5; 325 TABLET ORAL at 14:56

## 2024-03-02 RX ADMIN — HYDROCODONE BITARTRATE AND ACETAMINOPHEN 1 TABLET: 5; 325 TABLET ORAL at 19:47

## 2024-03-02 RX ADMIN — ACETAMINOPHEN 1000 MG: 500 TABLET ORAL at 03:38

## 2024-03-02 RX ADMIN — ENOXAPARIN SODIUM 30 MG: 100 INJECTION SUBCUTANEOUS at 09:26

## 2024-03-02 RX ADMIN — OXYCODONE 10 MG: 5 TABLET ORAL at 06:28

## 2024-03-02 RX ADMIN — PANTOPRAZOLE SODIUM 40 MG: 40 TABLET, DELAYED RELEASE ORAL at 09:27

## 2024-03-02 RX ADMIN — OXYCODONE 10 MG: 5 TABLET ORAL at 02:04

## 2024-03-02 RX ADMIN — ENOXAPARIN SODIUM 30 MG: 100 INJECTION SUBCUTANEOUS at 19:50

## 2024-03-02 RX ADMIN — NIFEDIPINE 30 MG: 30 TABLET, EXTENDED RELEASE ORAL at 09:27

## 2024-03-02 ASSESSMENT — PAIN DESCRIPTION - FREQUENCY
FREQUENCY: CONTINUOUS

## 2024-03-02 ASSESSMENT — PAIN DESCRIPTION - DESCRIPTORS
DESCRIPTORS: ACHING
DESCRIPTORS: ACHING;DISCOMFORT
DESCRIPTORS: ACHING
DESCRIPTORS: ACHING

## 2024-03-02 ASSESSMENT — PAIN DESCRIPTION - ORIENTATION
ORIENTATION: RIGHT
ORIENTATION: MID;LOWER
ORIENTATION: LOWER;MID
ORIENTATION: MID
ORIENTATION: RIGHT
ORIENTATION: MID

## 2024-03-02 ASSESSMENT — PAIN - FUNCTIONAL ASSESSMENT
PAIN_FUNCTIONAL_ASSESSMENT: ACTIVITIES ARE NOT PREVENTED
PAIN_FUNCTIONAL_ASSESSMENT: PREVENTS OR INTERFERES SOME ACTIVE ACTIVITIES AND ADLS
PAIN_FUNCTIONAL_ASSESSMENT: ACTIVITIES ARE NOT PREVENTED
PAIN_FUNCTIONAL_ASSESSMENT: PREVENTS OR INTERFERES SOME ACTIVE ACTIVITIES AND ADLS
PAIN_FUNCTIONAL_ASSESSMENT: PREVENTS OR INTERFERES SOME ACTIVE ACTIVITIES AND ADLS
PAIN_FUNCTIONAL_ASSESSMENT: ACTIVITIES ARE NOT PREVENTED

## 2024-03-02 ASSESSMENT — PAIN SCALES - GENERAL
PAINLEVEL_OUTOF10: 8
PAINLEVEL_OUTOF10: 10
PAINLEVEL_OUTOF10: 8
PAINLEVEL_OUTOF10: 8
PAINLEVEL_OUTOF10: 3
PAINLEVEL_OUTOF10: 10
PAINLEVEL_OUTOF10: 8
PAINLEVEL_OUTOF10: 8
PAINLEVEL_OUTOF10: 2
PAINLEVEL_OUTOF10: 8
PAINLEVEL_OUTOF10: 2
PAINLEVEL_OUTOF10: 9
PAINLEVEL_OUTOF10: 2

## 2024-03-02 ASSESSMENT — PAIN DESCRIPTION - LOCATION
LOCATION: BACK

## 2024-03-02 ASSESSMENT — PAIN DESCRIPTION - PAIN TYPE
TYPE: SURGICAL PAIN
TYPE: ACUTE PAIN;SURGICAL PAIN
TYPE: SURGICAL PAIN

## 2024-03-02 ASSESSMENT — PAIN DESCRIPTION - ONSET
ONSET: ON-GOING

## 2024-03-02 NOTE — PLAN OF CARE
Problem: Pain  Goal: Verbalizes/displays adequate comfort level or baseline comfort level  3/1/2024 1929 by Maddison Friedman, RN  Outcome: Progressing  Note: Pt. Managing pain per MAR.     Problem: Safety - Adult  Goal: Free from fall injury  3/1/2024 1929 by Maddison Friedman, RN  Outcome: Progressing  Note: All fall precautions in place and call light is within reach.

## 2024-03-02 NOTE — PLAN OF CARE
Problem: Chronic Conditions and Co-morbidities  Goal: Patient's chronic conditions and co-morbidity symptoms are monitored and maintained or improved  Outcome: Progressing  Flowsheets (Taken 3/2/2024 0800)  Care Plan - Patient's Chronic Conditions and Co-Morbidity Symptoms are Monitored and Maintained or Improved: Monitor and assess patient's chronic conditions and comorbid symptoms for stability, deterioration, or improvement   Pts BP will be WNL this shift.   Problem: Discharge Planning  Goal: Discharge to home or other facility with appropriate resources  Outcome: Progressing  Flowsheets (Taken 3/2/2024 0800)  Discharge to home or other facility with appropriate resources: Identify barriers to discharge with patient and caregiver   Pt will be assessed for readiness to discharge.   Problem: Pain  Goal: Verbalizes/displays adequate comfort level or baseline comfort level  Outcome: Progressing  Flowsheets (Taken 3/2/2024 0925)  Verbalizes/displays adequate comfort level or baseline comfort level: Encourage patient to monitor pain and request assistance   Pt will have adequate pain control this shift.   Problem: Safety - Adult  Goal: Free from fall injury  Outcome: Progressing  Flowsheets (Taken 3/2/2024 0800)  Free From Fall Injury: Based on caregiver fall risk screen, instruct family/caregiver to ask for assistance with transferring infant if caregiver noted to have fall risk factors   Pt will be free from falls and injury this shift.  Problem: ABCDS Injury Assessment  Goal: Absence of physical injury  Outcome: Progressing  Flowsheets (Taken 3/2/2024 0800)  Absence of Physical Injury: Implement safety measures based on patient assessment   All fall prevention measures in place.

## 2024-03-03 LAB
GLUCOSE BLD-MCNC: 137 MG/DL (ref 70–99)
GLUCOSE BLD-MCNC: 147 MG/DL (ref 70–99)
GLUCOSE BLD-MCNC: 155 MG/DL (ref 70–99)
GLUCOSE BLD-MCNC: 169 MG/DL (ref 70–99)
PERFORMED ON: ABNORMAL

## 2024-03-03 PROCEDURE — 2580000003 HC RX 258: Performed by: PHYSICIAN ASSISTANT

## 2024-03-03 PROCEDURE — 6370000000 HC RX 637 (ALT 250 FOR IP): Performed by: NURSE PRACTITIONER

## 2024-03-03 PROCEDURE — 97535 SELF CARE MNGMENT TRAINING: CPT

## 2024-03-03 PROCEDURE — 6360000002 HC RX W HCPCS: Performed by: PHYSICIAN ASSISTANT

## 2024-03-03 PROCEDURE — 6370000000 HC RX 637 (ALT 250 FOR IP): Performed by: PHYSICIAN ASSISTANT

## 2024-03-03 PROCEDURE — 97530 THERAPEUTIC ACTIVITIES: CPT

## 2024-03-03 PROCEDURE — 2060000000 HC ICU INTERMEDIATE R&B

## 2024-03-03 PROCEDURE — 6360000002 HC RX W HCPCS: Performed by: NURSE PRACTITIONER

## 2024-03-03 RX ORDER — HYDROCODONE BITARTRATE AND ACETAMINOPHEN 10; 325 MG/1; MG/1
1 TABLET ORAL EVERY 4 HOURS PRN
Status: DISCONTINUED | OUTPATIENT
Start: 2024-03-03 | End: 2024-03-05 | Stop reason: HOSPADM

## 2024-03-03 RX ADMIN — DIAZEPAM 5 MG: 5 TABLET ORAL at 02:16

## 2024-03-03 RX ADMIN — HYDROCODONE BITARTRATE AND ACETAMINOPHEN 1 TABLET: 10; 325 TABLET ORAL at 14:58

## 2024-03-03 RX ADMIN — DIAZEPAM 5 MG: 5 TABLET ORAL at 20:47

## 2024-03-03 RX ADMIN — SODIUM CHLORIDE, PRESERVATIVE FREE 10 ML: 5 INJECTION INTRAVENOUS at 20:47

## 2024-03-03 RX ADMIN — DOCUSATE SODIUM 50 MG AND SENNOSIDES 8.6 MG 2 TABLET: 8.6; 5 TABLET, FILM COATED ORAL at 20:46

## 2024-03-03 RX ADMIN — PANTOPRAZOLE SODIUM 40 MG: 40 TABLET, DELAYED RELEASE ORAL at 08:16

## 2024-03-03 RX ADMIN — METHOCARBAMOL 750 MG: 750 TABLET ORAL at 20:46

## 2024-03-03 RX ADMIN — HYDROCODONE BITARTRATE AND ACETAMINOPHEN 1 TABLET: 10; 325 TABLET ORAL at 18:59

## 2024-03-03 RX ADMIN — DOCUSATE SODIUM 50 MG AND SENNOSIDES 8.6 MG 2 TABLET: 8.6; 5 TABLET, FILM COATED ORAL at 08:16

## 2024-03-03 RX ADMIN — HYDROCODONE BITARTRATE AND ACETAMINOPHEN 1 TABLET: 5; 325 TABLET ORAL at 06:31

## 2024-03-03 RX ADMIN — HYDROCODONE BITARTRATE AND ACETAMINOPHEN 1 TABLET: 10; 325 TABLET ORAL at 10:55

## 2024-03-03 RX ADMIN — ENOXAPARIN SODIUM 30 MG: 100 INJECTION SUBCUTANEOUS at 08:16

## 2024-03-03 RX ADMIN — HYDROMORPHONE HYDROCHLORIDE 0.5 MG: 1 INJECTION, SOLUTION INTRAMUSCULAR; INTRAVENOUS; SUBCUTANEOUS at 09:42

## 2024-03-03 RX ADMIN — ACETAMINOPHEN 1000 MG: 500 TABLET ORAL at 20:47

## 2024-03-03 RX ADMIN — FAMOTIDINE 40 MG: 20 TABLET, FILM COATED ORAL at 20:46

## 2024-03-03 RX ADMIN — GABAPENTIN 600 MG: 300 CAPSULE ORAL at 08:16

## 2024-03-03 RX ADMIN — HYDROCODONE BITARTRATE AND ACETAMINOPHEN 1 TABLET: 5; 325 TABLET ORAL at 02:16

## 2024-03-03 RX ADMIN — GABAPENTIN 600 MG: 300 CAPSULE ORAL at 20:47

## 2024-03-03 RX ADMIN — SODIUM CHLORIDE, PRESERVATIVE FREE 10 ML: 5 INJECTION INTRAVENOUS at 08:16

## 2024-03-03 RX ADMIN — ENOXAPARIN SODIUM 30 MG: 100 INJECTION SUBCUTANEOUS at 20:47

## 2024-03-03 RX ADMIN — POLYETHYLENE GLYCOL 3350 17 G: 17 POWDER, FOR SOLUTION ORAL at 08:16

## 2024-03-03 RX ADMIN — NIFEDIPINE 30 MG: 30 TABLET, EXTENDED RELEASE ORAL at 08:16

## 2024-03-03 RX ADMIN — GABAPENTIN 600 MG: 300 CAPSULE ORAL at 14:58

## 2024-03-03 RX ADMIN — METHOCARBAMOL 750 MG: 750 TABLET ORAL at 08:16

## 2024-03-03 ASSESSMENT — PAIN SCALES - GENERAL
PAINLEVEL_OUTOF10: 5
PAINLEVEL_OUTOF10: 2
PAINLEVEL_OUTOF10: 6
PAINLEVEL_OUTOF10: 2
PAINLEVEL_OUTOF10: 8
PAINLEVEL_OUTOF10: 7
PAINLEVEL_OUTOF10: 8
PAINLEVEL_OUTOF10: 10
PAINLEVEL_OUTOF10: 8
PAINLEVEL_OUTOF10: 4

## 2024-03-03 ASSESSMENT — PAIN DESCRIPTION - ONSET
ONSET: ON-GOING

## 2024-03-03 ASSESSMENT — PAIN - FUNCTIONAL ASSESSMENT
PAIN_FUNCTIONAL_ASSESSMENT: ACTIVITIES ARE NOT PREVENTED

## 2024-03-03 ASSESSMENT — PAIN DESCRIPTION - PAIN TYPE
TYPE: ACUTE PAIN;SURGICAL PAIN
TYPE: ACUTE PAIN;SURGICAL PAIN
TYPE: SURGICAL PAIN

## 2024-03-03 ASSESSMENT — PAIN DESCRIPTION - LOCATION
LOCATION: BACK
LOCATION: BACK;ABDOMEN
LOCATION: BACK;ABDOMEN
LOCATION: BACK

## 2024-03-03 ASSESSMENT — PAIN DESCRIPTION - DESCRIPTORS
DESCRIPTORS: ACHING;DISCOMFORT
DESCRIPTORS: ACHING
DESCRIPTORS: ACHING;DISCOMFORT
DESCRIPTORS: ACHING;DISCOMFORT
DESCRIPTORS: ACHING
DESCRIPTORS: ACHING

## 2024-03-03 ASSESSMENT — PAIN DESCRIPTION - ORIENTATION
ORIENTATION: LOWER;MID
ORIENTATION: MID;LOWER
ORIENTATION: MID;LOWER
ORIENTATION: RIGHT;LEFT;LOWER;MID
ORIENTATION: RIGHT;LEFT;MID;LOWER
ORIENTATION: LOWER;MID

## 2024-03-03 ASSESSMENT — PAIN DESCRIPTION - FREQUENCY
FREQUENCY: CONTINUOUS

## 2024-03-03 NOTE — PLAN OF CARE
Problem: Chronic Conditions and Co-morbidities  Goal: Patient's chronic conditions and co-morbidity symptoms are monitored and maintained or improved  Outcome: Progressing   Pts BP and BG will stay WNL this shift  Problem: Discharge Planning  Goal: Discharge to home or other facility with appropriate resources  3/3/2024 0711 by Rambo Mccray RN  Outcome: Progressing  Flowsheets  Taken 3/3/2024 0400 by Esther Alvarado RN  Discharge to home or other facility with appropriate resources:   Identify barriers to discharge with patient and caregiver   Arrange for needed discharge resources and transportation as appropriate   Identify discharge learning needs (meds, wound care, etc)  Taken 3/3/2024 0000 by Esther Alvarado RN  Discharge to home or other facility with appropriate resources:   Identify barriers to discharge with patient and caregiver   Arrange for needed discharge resources and transportation as appropriate   Identify discharge learning needs (meds, wound care, etc)   Pt will be assessed for readiness to discharge  Problem: Pain  Goal: Verbalizes/displays adequate comfort level or baseline comfort level  3/3/2024 0711 by Rambo Mccray RN  Outcome: Progressing   Pt will have adequate pain control this shift.   Problem: Safety - Adult  Goal: Free from fall injury  3/3/2024 0711 by Rambo Mccray RN  Outcome: Progressing  Flowsheets (Taken 3/3/2024 0708)  Free From Fall Injury: Instruct family/caregiver on patient safety   Pt will be free from falls and injury   Problem: ABCDS Injury Assessment  Goal: Absence of physical injury  3/3/2024 0711 by Rambo Mccray RN  Outcome: Progressing  Flowsheets (Taken 3/3/2024 0708)  Absence of Physical Injury: Implement safety measures based on patient assessment   All fall prevention measures will remain in place

## 2024-03-03 NOTE — H&P
Neurosurgery Progress Note    3/3/2024 9:53 AM                               Aiyana Catalan                      LOS: 6 days             Post-operative Day# 6s/p LUMBAR 5-SACRAL 1 ANTERIOR LUMBAR INTERBODY FUSION AND LUMBAR 2-LUMBAR 3, LUMBAR 3-LUMBAR 4 DIRECT LATERAL INTERBODY FUSION with Dr. Huizar   POD 4 s/p LUMBAR 2-PELVIS DECOMPRESSION, FUSION, AND FIXATION   Subjective:  No acute events overnight. Patient has no specific complaints this am.                                                 Physical Exam:    Temp (24hrs), Av.4 °F (36.9 °C), Min:98 °F (36.7 °C), Max:99.3 °F (37.4 °C)      Neuro Exam  The patient is well-appearing and is in no acute distress.    Alert and oriented ×4, appropriate, conversant with normal mood and affect.  DTR 2+/4 symmetric. Javier sign neg BUE, Babinski sign is down-going BLE      RUE: Deltoids: 5/5, Triceps: 5/5, Biceps: 5/5, WE/WF: 5/5, Finger abd: 5/5, : 5/5   LUE: Deltoids: 5/5, Triceps: 5/5, Biceps: 5/5, WE/WF: 5/5, Finger abd: 5/5, : 5/5  LLE: HE: 5/5, HF: 5/5, KE:4/5, KF: 54/5, PF: 45, DF: 4/5  RLE: HE: 5/5, HF: 5/5, KE:5/5, KF: 5/5, PF: 5/5, DF: 5/5    Labs:  Recent Labs     24  0731   WBC 6.7   HGB 8.9*   HCT 27.1*          No results for input(s): \"NA\", \"K\", \"CL\", \"CO2\", \"BUN\", \"CREATININE\", \"GLUCOSE\", \"CALCIUM\", \"PHOS\", \"MG\" in the last 72 hours.    No results for input(s): \"PROTIME\", \"INR\", \"APTT\" in the last 72 hours.      Assessment and Plan:   s/p LUMBAR 2-PELVIS DECOMPRESSION, FUSION, AND FIXATION      Plan: Pt complaining of severe pain. Did increase noro  5mg  to norco 10mg   Neurologic exam frequency: q 4   Mobility: PT/OT activity as tolerated  Post op blood loss anemia, Hgb 8.9, monitor daily and transfuse is <8.0  Reinsert roberts for retention, urology following   DVT Prophylaxis: SCDs, Lovenox   GI Prophylaxis: pepcid   Bowel Regimen: senokot, glycolax   Pain control: tylenol, dilaudid, roxicodone  Robaxin for spasms   Abdominal

## 2024-03-03 NOTE — PLAN OF CARE
Problem: Discharge Planning  Goal: Discharge to home or other facility with appropriate resources  3/2/2024 2234 by Esther Alvarado RN  Outcome: Progressing  Flowsheets (Taken 3/2/2024 2000)  Discharge to home or other facility with appropriate resources:   Identify barriers to discharge with patient and caregiver   Arrange for needed discharge resources and transportation as appropriate   Identify discharge learning needs (meds, wound care, etc)     Problem: Pain  Goal: Verbalizes/displays adequate comfort level or baseline comfort level  3/2/2024 2234 by Esther Alvarado RN  Outcome: Progressing  Note: Numeric pain rating scale used for assessment. Pain has been controlled with MAR.     Problem: Safety - Adult  Goal: Free from fall injury  3/2/2024 2234 by Esther Alvarado RN  Outcome: Progressing  Note: All fall precautions in place, call light within reach, free from fall injury.      Problem: ABCDS Injury Assessment  Goal: Absence of physical injury  3/2/2024 2234 by Esther Alvarado, RN  Outcome: Progressing

## 2024-03-04 LAB
BASOPHILS # BLD: 0 K/UL (ref 0–0.2)
BASOPHILS NFR BLD: 0.6 %
DEPRECATED RDW RBC AUTO: 14.4 % (ref 12.4–15.4)
EOSINOPHIL # BLD: 0.4 K/UL (ref 0–0.6)
GLUCOSE BLD-MCNC: 119 MG/DL (ref 70–99)
GLUCOSE BLD-MCNC: 153 MG/DL (ref 70–99)
GLUCOSE BLD-MCNC: 169 MG/DL (ref 70–99)
GLUCOSE BLD-MCNC: 196 MG/DL (ref 70–99)
HGB BLD-MCNC: 10 G/DL (ref 12–16)
LYMPHOCYTES # BLD: 1.2 K/UL (ref 1–5.1)
MCH RBC QN AUTO: 30.5 PG (ref 26–34)
MCV RBC AUTO: 92.8 FL (ref 80–100)
MONOCYTES # BLD: 0.7 K/UL (ref 0–1.3)
MONOCYTES NFR BLD: 8.8 %
NEUTROPHILS # BLD: 5.5 K/UL (ref 1.7–7.7)
NEUTROPHILS NFR BLD: 70 %
PERFORMED ON: ABNORMAL
PLATELET # BLD AUTO: 268 K/UL (ref 135–450)
PMV BLD AUTO: 8.3 FL (ref 5–10.5)
RBC # BLD AUTO: 3.29 M/UL (ref 4–5.2)

## 2024-03-04 PROCEDURE — 36415 COLL VENOUS BLD VENIPUNCTURE: CPT

## 2024-03-04 PROCEDURE — 6370000000 HC RX 637 (ALT 250 FOR IP): Performed by: NURSE PRACTITIONER

## 2024-03-04 PROCEDURE — 97530 THERAPEUTIC ACTIVITIES: CPT

## 2024-03-04 PROCEDURE — 6370000000 HC RX 637 (ALT 250 FOR IP): Performed by: PHYSICIAN ASSISTANT

## 2024-03-04 PROCEDURE — APPNB45 APP NON BILLABLE 31-45 MINUTES: Performed by: NURSE PRACTITIONER

## 2024-03-04 PROCEDURE — 99024 POSTOP FOLLOW-UP VISIT: CPT | Performed by: NURSE PRACTITIONER

## 2024-03-04 PROCEDURE — 6360000002 HC RX W HCPCS: Performed by: NURSE PRACTITIONER

## 2024-03-04 PROCEDURE — 97110 THERAPEUTIC EXERCISES: CPT

## 2024-03-04 PROCEDURE — 85025 COMPLETE CBC W/AUTO DIFF WBC: CPT

## 2024-03-04 PROCEDURE — 2580000003 HC RX 258: Performed by: PHYSICIAN ASSISTANT

## 2024-03-04 PROCEDURE — 1200000000 HC SEMI PRIVATE

## 2024-03-04 RX ORDER — HYDROCODONE BITARTRATE AND ACETAMINOPHEN 10; 325 MG/1; MG/1
1 TABLET ORAL EVERY 6 HOURS PRN
Qty: 28 TABLET | Refills: 0 | Status: SHIPPED | OUTPATIENT
Start: 2024-03-04 | End: 2024-03-11

## 2024-03-04 RX ADMIN — ENOXAPARIN SODIUM 30 MG: 100 INJECTION SUBCUTANEOUS at 20:29

## 2024-03-04 RX ADMIN — POLYETHYLENE GLYCOL 3350 17 G: 17 POWDER, FOR SOLUTION ORAL at 08:28

## 2024-03-04 RX ADMIN — ACETAMINOPHEN 1000 MG: 500 TABLET ORAL at 02:45

## 2024-03-04 RX ADMIN — SODIUM CHLORIDE, PRESERVATIVE FREE 10 ML: 5 INJECTION INTRAVENOUS at 20:28

## 2024-03-04 RX ADMIN — NIFEDIPINE 30 MG: 30 TABLET, EXTENDED RELEASE ORAL at 08:29

## 2024-03-04 RX ADMIN — ENOXAPARIN SODIUM 30 MG: 100 INJECTION SUBCUTANEOUS at 08:28

## 2024-03-04 RX ADMIN — SODIUM CHLORIDE, PRESERVATIVE FREE 10 ML: 5 INJECTION INTRAVENOUS at 08:42

## 2024-03-04 RX ADMIN — GABAPENTIN 600 MG: 300 CAPSULE ORAL at 08:29

## 2024-03-04 RX ADMIN — GABAPENTIN 600 MG: 300 CAPSULE ORAL at 14:53

## 2024-03-04 RX ADMIN — DIAZEPAM 5 MG: 5 TABLET ORAL at 20:29

## 2024-03-04 RX ADMIN — HYDROCODONE BITARTRATE AND ACETAMINOPHEN 1 TABLET: 10; 325 TABLET ORAL at 02:45

## 2024-03-04 RX ADMIN — FAMOTIDINE 40 MG: 20 TABLET, FILM COATED ORAL at 20:29

## 2024-03-04 RX ADMIN — HYDROCODONE BITARTRATE AND ACETAMINOPHEN 1 TABLET: 10; 325 TABLET ORAL at 06:26

## 2024-03-04 RX ADMIN — DOCUSATE SODIUM 50 MG AND SENNOSIDES 8.6 MG 2 TABLET: 8.6; 5 TABLET, FILM COATED ORAL at 21:32

## 2024-03-04 RX ADMIN — DOCUSATE SODIUM 50 MG AND SENNOSIDES 8.6 MG 2 TABLET: 8.6; 5 TABLET, FILM COATED ORAL at 08:29

## 2024-03-04 RX ADMIN — HYDROCODONE BITARTRATE AND ACETAMINOPHEN 1 TABLET: 10; 325 TABLET ORAL at 12:12

## 2024-03-04 RX ADMIN — METHOCARBAMOL 750 MG: 750 TABLET ORAL at 06:26

## 2024-03-04 RX ADMIN — PANTOPRAZOLE SODIUM 40 MG: 40 TABLET, DELAYED RELEASE ORAL at 08:29

## 2024-03-04 RX ADMIN — HYDROCODONE BITARTRATE AND ACETAMINOPHEN 1 TABLET: 10; 325 TABLET ORAL at 21:32

## 2024-03-04 RX ADMIN — HYDROCODONE BITARTRATE AND ACETAMINOPHEN 1 TABLET: 10; 325 TABLET ORAL at 16:54

## 2024-03-04 RX ADMIN — GABAPENTIN 600 MG: 300 CAPSULE ORAL at 20:30

## 2024-03-04 ASSESSMENT — PAIN SCALES - GENERAL
PAINLEVEL_OUTOF10: 9
PAINLEVEL_OUTOF10: 6
PAINLEVEL_OUTOF10: 7
PAINLEVEL_OUTOF10: 0
PAINLEVEL_OUTOF10: 8
PAINLEVEL_OUTOF10: 9
PAINLEVEL_OUTOF10: 8
PAINLEVEL_OUTOF10: 4
PAINLEVEL_OUTOF10: 8
PAINLEVEL_OUTOF10: 7
PAINLEVEL_OUTOF10: 5
PAINLEVEL_OUTOF10: 9

## 2024-03-04 ASSESSMENT — PAIN DESCRIPTION - DESCRIPTORS
DESCRIPTORS: DISCOMFORT
DESCRIPTORS: ACHING
DESCRIPTORS: ACHING
DESCRIPTORS: DISCOMFORT
DESCRIPTORS: DISCOMFORT
DESCRIPTORS: ACHING
DESCRIPTORS: ACHING
DESCRIPTORS: DISCOMFORT

## 2024-03-04 ASSESSMENT — PAIN DESCRIPTION - FREQUENCY
FREQUENCY: CONTINUOUS
FREQUENCY: INTERMITTENT
FREQUENCY: CONTINUOUS
FREQUENCY: INTERMITTENT

## 2024-03-04 ASSESSMENT — PAIN DESCRIPTION - PAIN TYPE
TYPE: ACUTE PAIN;SURGICAL PAIN;CHRONIC PAIN
TYPE: ACUTE PAIN;SURGICAL PAIN
TYPE: CHRONIC PAIN;SURGICAL PAIN
TYPE: ACUTE PAIN;SURGICAL PAIN;CHRONIC PAIN

## 2024-03-04 ASSESSMENT — PAIN - FUNCTIONAL ASSESSMENT
PAIN_FUNCTIONAL_ASSESSMENT: ACTIVITIES ARE NOT PREVENTED

## 2024-03-04 ASSESSMENT — PAIN DESCRIPTION - ORIENTATION
ORIENTATION: MID;POSTERIOR
ORIENTATION: MID;OUTER
ORIENTATION: MID;POSTERIOR
ORIENTATION: RIGHT;MID;LOWER;POSTERIOR
ORIENTATION: MID;POSTERIOR
ORIENTATION: MID;POSTERIOR
ORIENTATION: RIGHT;LEFT
ORIENTATION: MID;POSTERIOR

## 2024-03-04 ASSESSMENT — PAIN DESCRIPTION - LOCATION
LOCATION: BACK

## 2024-03-04 ASSESSMENT — PAIN DESCRIPTION - ONSET
ONSET: ON-GOING

## 2024-03-04 NOTE — PLAN OF CARE
Problem: Discharge Planning  Goal: Discharge to home or other facility with appropriate resources  3/3/2024 1919 by Esther Alvarado, RN  Outcome: Progressing  Note: CM is following for discharge.   Flowsheets (Taken 3/3/2024 0800 by Rambo Mccray, RN)  Discharge to home or other facility with appropriate resources: Identify barriers to discharge with patient and caregiver     Problem: Pain  Goal: Verbalizes/displays adequate comfort level or baseline comfort level  3/3/2024 1919 by Esther Alvarado, RN  Outcome: Progressing  Note: Numeric pain rating scale used for assessment. Pain has been controlled with MAR.      Problem: Safety - Adult  Goal: Free from fall injury  3/3/2024 1919 by Esther Alvarado, RN  Outcome: Progressing     Problem: ABCDS Injury Assessment  Goal: Absence of physical injury  3/3/2024 1919 by Esther Alvarado, RN  Outcome: Progressing

## 2024-03-04 NOTE — PLAN OF CARE
Problem: Discharge Planning  Goal: Discharge to home or other facility with appropriate resources  Outcome: Progressing  Flowsheets  Taken 3/4/2024 1120 by Ammy Seals RN  Discharge to home or other facility with appropriate resources: Identify barriers to discharge with patient and caregiver  Taken 3/4/2024 0400 by Esther Alvarado RN  Discharge to home or other facility with appropriate resources:   Identify barriers to discharge with patient and caregiver   Arrange for needed discharge resources and transportation as appropriate   Identify discharge learning needs (meds, wound care, etc)  Taken 3/4/2024 0000 by Esther Alvarado RN  Discharge to home or other facility with appropriate resources:   Identify barriers to discharge with patient and caregiver   Identify discharge learning needs (meds, wound care, etc)   Arrange for needed discharge resources and transportation as appropriate     Problem: Pain  Goal: Verbalizes/displays adequate comfort level or baseline comfort level  Outcome: Progressing  Flowsheets (Taken 3/4/2024 1120)  Verbalizes/displays adequate comfort level or baseline comfort level:   Encourage patient to monitor pain and request assistance   Administer analgesics based on type and severity of pain and evaluate response   Consider cultural and social influences on pain and pain management   Assess pain using appropriate pain scale   Implement non-pharmacological measures as appropriate and evaluate response   Notify Licensed Independent Practitioner if interventions unsuccessful or patient reports new pain

## 2024-03-04 NOTE — CARE COORDINATION
CM following: Pt has been accepted at [facility] and Emily in admissions was made aware pt would be cleared to discharge tomorrow. Contact Emily in AM once discharge order is complete and transportation arranged. Emily's number is [phone]. CM will continue to follow for discharge planning.  Electronically signed by ROCK Waldron on 3/4/2024 at 3:55 PM  [phone]

## 2024-03-05 VITALS
DIASTOLIC BLOOD PRESSURE: 64 MMHG | HEART RATE: 94 BPM | BODY MASS INDEX: 36.61 KG/M2 | SYSTOLIC BLOOD PRESSURE: 128 MMHG | WEIGHT: 227.8 LBS | HEIGHT: 66 IN | TEMPERATURE: 98.7 F | OXYGEN SATURATION: 98 % | RESPIRATION RATE: 18 BRPM

## 2024-03-05 LAB
GLUCOSE BLD-MCNC: 149 MG/DL (ref 70–99)
GLUCOSE BLD-MCNC: 169 MG/DL (ref 70–99)
PERFORMED ON: ABNORMAL
PERFORMED ON: ABNORMAL
SARS-COV-2 RDRP RESP QL NAA+PROBE: NOT DETECTED

## 2024-03-05 PROCEDURE — 2580000003 HC RX 258: Performed by: PHYSICIAN ASSISTANT

## 2024-03-05 PROCEDURE — 6370000000 HC RX 637 (ALT 250 FOR IP): Performed by: PHYSICIAN ASSISTANT

## 2024-03-05 PROCEDURE — 6370000000 HC RX 637 (ALT 250 FOR IP): Performed by: NURSE PRACTITIONER

## 2024-03-05 PROCEDURE — 6360000002 HC RX W HCPCS: Performed by: NURSE PRACTITIONER

## 2024-03-05 PROCEDURE — 99024 POSTOP FOLLOW-UP VISIT: CPT | Performed by: NURSE PRACTITIONER

## 2024-03-05 PROCEDURE — 97530 THERAPEUTIC ACTIVITIES: CPT

## 2024-03-05 PROCEDURE — 87635 SARS-COV-2 COVID-19 AMP PRB: CPT

## 2024-03-05 PROCEDURE — APPNB45 APP NON BILLABLE 31-45 MINUTES: Performed by: NURSE PRACTITIONER

## 2024-03-05 PROCEDURE — 97116 GAIT TRAINING THERAPY: CPT

## 2024-03-05 RX ADMIN — METOPROLOL SUCCINATE 75 MG: 50 TABLET, EXTENDED RELEASE ORAL at 09:18

## 2024-03-05 RX ADMIN — GABAPENTIN 600 MG: 300 CAPSULE ORAL at 09:18

## 2024-03-05 RX ADMIN — GABAPENTIN 600 MG: 300 CAPSULE ORAL at 14:37

## 2024-03-05 RX ADMIN — HYDROCODONE BITARTRATE AND ACETAMINOPHEN 1 TABLET: 10; 325 TABLET ORAL at 01:57

## 2024-03-05 RX ADMIN — DIAZEPAM 5 MG: 5 TABLET ORAL at 05:27

## 2024-03-05 RX ADMIN — DIAZEPAM 5 MG: 5 TABLET ORAL at 14:37

## 2024-03-05 RX ADMIN — LOSARTAN POTASSIUM 100 MG: 50 TABLET, FILM COATED ORAL at 09:18

## 2024-03-05 RX ADMIN — HYDROCODONE BITARTRATE AND ACETAMINOPHEN 1 TABLET: 10; 325 TABLET ORAL at 10:46

## 2024-03-05 RX ADMIN — PANTOPRAZOLE SODIUM 40 MG: 40 TABLET, DELAYED RELEASE ORAL at 09:18

## 2024-03-05 RX ADMIN — SODIUM CHLORIDE, PRESERVATIVE FREE 10 ML: 5 INJECTION INTRAVENOUS at 09:18

## 2024-03-05 RX ADMIN — METHOCARBAMOL 750 MG: 750 TABLET ORAL at 14:37

## 2024-03-05 RX ADMIN — NIFEDIPINE 30 MG: 30 TABLET, EXTENDED RELEASE ORAL at 09:19

## 2024-03-05 RX ADMIN — ENOXAPARIN SODIUM 30 MG: 100 INJECTION SUBCUTANEOUS at 09:18

## 2024-03-05 RX ADMIN — HYDROCODONE BITARTRATE AND ACETAMINOPHEN 1 TABLET: 10; 325 TABLET ORAL at 06:10

## 2024-03-05 ASSESSMENT — PAIN DESCRIPTION - LOCATION
LOCATION: BACK

## 2024-03-05 ASSESSMENT — PAIN - FUNCTIONAL ASSESSMENT
PAIN_FUNCTIONAL_ASSESSMENT: ACTIVITIES ARE NOT PREVENTED

## 2024-03-05 ASSESSMENT — PAIN DESCRIPTION - FREQUENCY
FREQUENCY: INTERMITTENT

## 2024-03-05 ASSESSMENT — PAIN SCALES - GENERAL
PAINLEVEL_OUTOF10: 9
PAINLEVEL_OUTOF10: 5
PAINLEVEL_OUTOF10: 0
PAINLEVEL_OUTOF10: 8
PAINLEVEL_OUTOF10: 6
PAINLEVEL_OUTOF10: 8
PAINLEVEL_OUTOF10: 9
PAINLEVEL_OUTOF10: 0
PAINLEVEL_OUTOF10: 9
PAINLEVEL_OUTOF10: 10

## 2024-03-05 ASSESSMENT — PAIN DESCRIPTION - DESCRIPTORS
DESCRIPTORS: DISCOMFORT

## 2024-03-05 ASSESSMENT — PAIN DESCRIPTION - ORIENTATION
ORIENTATION: MID;POSTERIOR
ORIENTATION: MID;POSTERIOR
ORIENTATION: MID
ORIENTATION: MID;LOWER
ORIENTATION: MID;POSTERIOR
ORIENTATION: MID;POSTERIOR

## 2024-03-05 ASSESSMENT — PAIN DESCRIPTION - PAIN TYPE
TYPE: ACUTE PAIN;SURGICAL PAIN

## 2024-03-05 ASSESSMENT — PAIN DESCRIPTION - ONSET
ONSET: ON-GOING

## 2024-03-05 NOTE — CARE COORDINATION
ADDENDUM:  12:12 PM    SW LVM again for Emily in admissions at the [facility], notifying her of dc and transport time.  Electronically signed by ROCK Goldberg, LSW on 3/5/2024 at 12:12 PM      9:46 AM    SW scheduled transport for 2:30pm via [employer].     SW LVM for Emily in admissions at the [facility], [phone] , regarding DC today and transport for 2:30pm. SW awaiting for admissions to confirm DC.    DC note to follow.   Electronically signed by ROCK Goldberg, LSW on 3/5/2024 at 9:46 AM  [phone]

## 2024-03-05 NOTE — PLAN OF CARE
Problem: Chronic Conditions and Co-morbidities  Goal: Patient's chronic conditions and co-morbidity symptoms are monitored and maintained or improved  3/4/2024 1950 by Xena Moya RN  Outcome: Progressing  3/4/2024 1120 by Ammy Seals RN  Outcome: Progressing     Problem: Discharge Planning  Goal: Discharge to home or other facility with appropriate resources  3/4/2024 1950 by Xena Moya RN  Outcome: Progressing  3/4/2024 1120 by Ammy Seals RN  Outcome: Progressing  Flowsheets  Taken 3/4/2024 1120 by Ammy Seals RN  Discharge to home or other facility with appropriate resources: Identify barriers to discharge with patient and caregiver  Taken 3/4/2024 0400 by Esther Alvarado RN  Discharge to home or other facility with appropriate resources:   Identify barriers to discharge with patient and caregiver   Arrange for needed discharge resources and transportation as appropriate   Identify discharge learning needs (meds, wound care, etc)  Taken 3/4/2024 0000 by Esther Alvarado RN  Discharge to home or other facility with appropriate resources:   Identify barriers to discharge with patient and caregiver   Identify discharge learning needs (meds, wound care, etc)   Arrange for needed discharge resources and transportation as appropriate     Problem: Pain  Goal: Verbalizes/displays adequate comfort level or baseline comfort level  3/4/2024 1950 by Xena Moya RN  Outcome: Progressing  3/4/2024 1120 by Ammy Seals RN  Outcome: Progressing  Flowsheets (Taken 3/4/2024 1120)  Verbalizes/displays adequate comfort level or baseline comfort level:   Encourage patient to monitor pain and request assistance   Administer analgesics based on type and severity of pain and evaluate response   Consider cultural and social influences on pain and pain management   Assess pain using appropriate pain scale   Implement non-pharmacological measures as appropriate and evaluate response   Notify Licensed

## 2024-03-05 NOTE — CARE COORDINATION
Case Management Assessment            Discharge Note                    Date / Time of Note: 3/5/2024 9:53 AM                  Discharge Note Completed by: ROCK Goldberg, LSW    Patient Name: Aiyana Catalan   YOB: 1950  Diagnosis: Spinal stenosis, lumbar region with neurogenic claudication [M48.062]  Lumbar spondylolysis [M43.06]  Lumbar radiculopathy [M54.16]   Date / Time: 2/26/2024 10:45 AM    Current PCP: Jose Maria Haynes MD  Clinic patient: No    Hospitalization in the last 30 days: No       Advance Directives:  Code Status: Full Code  [place] DNR form completed and on chart: No    Financial:  Payor: MEDICARE / Plan: MEDICARE PART A AND B / Product Type: *No Product type* /      Pharmacy:    [facility] [address] [place] [address] [address] [facility] [phone] - F [phone] [facility] [address]  Phone: [phone] Fax: [phone]      Assistance purchasing medications?:    Assistance provided by Case Management: None at this time    Does patient want to participate in local refill/ meds to beds program?: No    Meds To Beds General Rules:  1. Can ONLY be done Monday- Friday between 8:30am-5pm  2. Prescription(s) must be in pharmacy by 3pm to be filled same day  3.Copy of patient's insurance/ prescription drug card and patient face sheet must be sent along with the prescription(s)  4. Cost of Rx cannot be added to hospital bill. If financial assistance is needed, please contact unit  or ;  or  CANNOT provide pharmacy voucher for patients co-pays  5. Patients can then  the prescription on their way out of the hospital at discharge, or pharmacy can deliver to the bedside if staff is available. (payment due at time of pick-up or delivery - cash, check, or card accepted)     Able to afford home medications/ co-pay costs: Yes    ADLS:  Current PT AM-PAC Score: 12 /24  Current OT AM-PAC Score:

## 2024-03-05 NOTE — PROGRESS NOTES
NEUROSURGERY POST-OP PROGRESS NOTE    Patient Name: Aiyana Catalan YOB: 1950   Sex: Female Age: 73 yrs     Medical Record Number: [id number] Acct Number: [id number]   Room Number: 5524/5524-01 Hospital Day: Hospital Day: 5     Interval History:  Post-operative Day# 4 s/p LUMBAR 5-SACRAL 1 ANTERIOR LUMBAR INTERBODY FUSION AND LUMBAR 2-LUMBAR 3, LUMBAR 3-LUMBAR 4 DIRECT LATERAL INTERBODY FUSION with Dr. Huizar   POD 2 s/p LUMBAR 2-PELVIS DECOMPRESSION, FUSION, AND FIXATION     Subjective:  resting in bed, very anxious to get up with therapy. Pain more controlled today, leg pain from pre op is gone    Objective:    VITAL SIGNS   /76   Pulse 91   Temp 97.2 °F (36.2 °C) (Temporal)   Resp 16   Ht 1.676 m (5' 6\") Comment: 5'6  Wt 103.3 kg (227 lb 12.8 oz)   SpO2 95%   BMI 36.77 kg/m²    Height Height: 167.6 cm (5' 6\") (5'6)   Weight Weight - Scale: 103.3 kg (227 lb 12.8 oz)        Allergies Allergies   Allergen Reactions    Metformin Nausea Only and Other (See Comments)     \"ate the lining of her stomach\"      Ace Inhibitors      cough  cough      Adhesive Tape      Skin irritation and blisters  - does okay with steri strips and tegaderm    Tramadol Nausea Only     TOOK APPETITE AWAY      NPO Status ADULT DIET; Regular   Isolation No active isolations     LABS   Basic Metabolic Profile Recent Labs     02/27/24  0815         CO2 27   BUN 12   CREATININE 0.7   GLUCOSE 115*        Complete Blood Count Recent Labs     02/27/24  0815 02/29/24  0752   WBC 9.2 10.3   RBC 3.30* 2.51*        Coagulation Studies Recent Labs     02/27/24  1518   INR 1.13          MEDICATIONS   Inpatient Medications     enoxaparin, 30 mg, SubCUTAneous, BID    acetaminophen, 1,000 mg, Oral, Q6H    sennosides-docusate sodium, 2 tablet, Oral, BID    famotidine, 40 mg, Oral, Nightly    gabapentin, 600 mg, Oral, TID    [Held by provider] losartan, 100 mg, Oral, Daily    [Held by provider] metoprolol 
      NEUROSURGERY POST-OP PROGRESS NOTE    Patient Name: Aiyana Catalan YOB: 1950   Sex: Female Age: 73 yrs     Medical Record Number: [id number] Acct Number: [id number]   Room Number: 5524/5524-01 Hospital Day: Hospital Day: 8     Interval History:  Post-operative Day# 7 s/p LUMBAR 5-SACRAL 1 ANTERIOR LUMBAR INTERBODY FUSION AND LUMBAR 2-LUMBAR 3, LUMBAR 3-LUMBAR 4 DIRECT LATERAL INTERBODY FUSION with Dr. Huizar   POD 5 s/p LUMBAR 2-PELVIS DECOMPRESSION, FUSION, AND FIXATION     Subjective: resting in bed, pain more controlled with Norco 10. Eager to walk    Objective:    VITAL SIGNS   BP (!) 130/94   Pulse 89   Temp 98.2 °F (36.8 °C) (Oral)   Resp 15   Ht 1.676 m (5' 6\") Comment: 5'6  Wt 103.3 kg (227 lb 12.8 oz)   SpO2 99%   BMI 36.77 kg/m²    Height Height: 167.6 cm (5' 6\") (5'6)   Weight Weight - Scale: 103.3 kg (227 lb 12.8 oz)        Allergies Allergies   Allergen Reactions    Metformin Nausea Only and Other (See Comments)     \"ate the lining of her stomach\"      Ace Inhibitors      cough  cough      Adhesive Tape      Skin irritation and blisters  - does okay with steri strips and tegaderm    Tramadol Nausea Only     TOOK APPETITE AWAY      NPO Status ADULT DIET; Regular   Isolation No active isolations     LABS   Basic Metabolic Profile No results for input(s): \"NA\", \"POTASSIUM\", \"CL\", \"CO2\", \"BUN\", \"CREATININE\", \"GLUCOSE\", \"CA\", \"ALB\", \"PHOS\", \"MG\" in the last 72 hours.     Complete Blood Count Recent Labs     03/02/24  0731   WBC 6.7   RBC 2.97*        Coagulation Studies No results for input(s): \"PTT\", \"INR\" in the last 72 hours.    Invalid input(s): \"PLATELETS\", \"PROA\", \"PT\", \"PTTA\"       MEDICATIONS   Inpatient Medications     enoxaparin, 30 mg, SubCUTAneous, BID    acetaminophen, 1,000 mg, Oral, Q6H    sennosides-docusate sodium, 2 tablet, Oral, BID    famotidine, 40 mg, Oral, Nightly    gabapentin, 600 mg, Oral, TID    [Held by provider] losartan, 100 mg, Oral, Daily    
      NEUROSURGERY POST-OP PROGRESS NOTE    Patient Name: Aiyana Catalan YOB: 1950   Sex: Female Age: 73 yrs     Medical Record Number: [id number] Acct Number: [id number]   Room Number: 3314/3314-01 Hospital Day: Hospital Day: 9     Interval History:  Post-operative Day# 8 s/p LUMBAR 5-SACRAL 1 ANTERIOR LUMBAR INTERBODY FUSION AND LUMBAR 2-LUMBAR 3, LUMBAR 3-LUMBAR 4 DIRECT LATERAL INTERBODY FUSION with Dr. Huizar   POD 6 s/p LUMBAR 2-PELVIS DECOMPRESSION, FUSION, AND FIXATION     Subjective:  feels well, pain controlled. Eager to go to SNF today     Objective:    VITAL SIGNS   BP (!) 147/65   Pulse 84   Temp 98.1 °F (36.7 °C) (Oral)   Resp 18   Ht 1.676 m (5' 6\") Comment: 5'6  Wt 103.3 kg (227 lb 12.8 oz)   SpO2 98%   BMI 36.77 kg/m²    Height Height: 167.6 cm (5' 6\") (5'6)   Weight Weight - Scale: 103.3 kg (227 lb 12.8 oz)        Allergies Allergies   Allergen Reactions    Metformin Nausea Only and Other (See Comments)     \"ate the lining of her stomach\"      Ace Inhibitors      cough  cough      Adhesive Tape      Skin irritation and blisters  - does okay with steri strips and tegaderm    Tramadol Nausea Only     TOOK APPETITE AWAY      NPO Status ADULT DIET; Regular   Isolation No active isolations     LABS   Basic Metabolic Profile No results for input(s): \"NA\", \"POTASSIUM\", \"CL\", \"CO2\", \"BUN\", \"CREATININE\", \"GLUCOSE\", \"CA\", \"ALB\", \"PHOS\", \"MG\" in the last 72 hours.     Complete Blood Count Recent Labs     03/02/24  0731 03/04/24  1200   WBC 6.7 7.8   RBC 2.97* 3.29*        Coagulation Studies No results for input(s): \"PTT\", \"INR\" in the last 72 hours.    Invalid input(s): \"PLATELETS\", \"PROA\", \"PT\", \"PTTA\"       MEDICATIONS   Inpatient Medications     enoxaparin, 30 mg, SubCUTAneous, BID    sennosides-docusate sodium, 2 tablet, Oral, BID    famotidine, 40 mg, Oral, Nightly    gabapentin, 600 mg, Oral, TID    [Held by provider] losartan, 100 mg, Oral, Daily    [Held by provider] 
      NEUROSURGERY POST-OP PROGRESS NOTE    Patient Name: Aiyana Catalan YOB: 1950   Sex: Female Age: 73 yrs     Medical Record Number: [id number] Acct Number: [id number]   Room Number: 5524/5524-01 Hospital Day: Hospital Day: 4     Interval History:  Post-operative Day# 3 s/p LUMBAR 5-SACRAL 1 ANTERIOR LUMBAR INTERBODY FUSION AND LUMBAR 2-LUMBAR 3, LUMBAR 3-LUMBAR 4 DIRECT LATERAL INTERBODY FUSION with Dr. Huizar   POD 1 s/p LUMBAR 2-PELVIS DECOMPRESSION, FUSION, AND FIXATION     Subjective: resting in bed, pain well controlled when at rest. Frustrated she couldn't do much with therapy because of hypotension. Discussed plan of care     Objective:    VITAL SIGNS   BP (!) 94/52   Pulse 98   Temp 98 °F (36.7 °C) (Oral)   Resp 18   Ht 1.676 m (5' 6\") Comment: 5'6  Wt 103.3 kg (227 lb 12.8 oz)   SpO2 97%   BMI 36.77 kg/m²    Height Height: 167.6 cm (5' 6\") (5'6)   Weight Weight - Scale: 103.3 kg (227 lb 12.8 oz)        Allergies Allergies   Allergen Reactions    Metformin Nausea Only and Other (See Comments)     \"ate the lining of her stomach\"      Ace Inhibitors      cough  cough      Adhesive Tape      Skin irritation and blisters  - does okay with steri strips and tegaderm    Tramadol Nausea Only     TOOK APPETITE AWAY      NPO Status ADULT DIET; Regular   Isolation No active isolations     LABS   Basic Metabolic Profile Recent Labs     02/27/24  0815         CO2 27   BUN 12   CREATININE 0.7   GLUCOSE 115*        Complete Blood Count Recent Labs     02/27/24  0815 02/29/24  0752   WBC 9.2 10.3   RBC 3.30* 2.51*        Coagulation Studies Recent Labs     02/27/24  1518   INR 1.13          MEDICATIONS   Inpatient Medications     acetaminophen, 1,000 mg, Oral, Q6H    sennosides-docusate sodium, 2 tablet, Oral, BID    famotidine, 40 mg, Oral, Nightly    gabapentin, 600 mg, Oral, TID    [Held by provider] losartan, 100 mg, Oral, Daily    [Held by provider] metoprolol succinate, 75 
      NEUROSURGERY POST-OP PROGRESS NOTE    Patient Name: Aiyana Catalan YOB: 1950   Sex: Female Age: 73 yrs     Medical Record Number: [id number] Acct Number: [id number]   Room Number: 5524/5524-01 Hospital Day: Hospital Day: 3     Interval History:  Post-operative Day# 2 s/p LUMBAR 5-SACRAL 1 ANTERIOR LUMBAR INTERBODY FUSION AND LUMBAR 2-LUMBAR 3, LUMBAR 3-LUMBAR 4 DIRECT LATERAL INTERBODY FUSION with Dr. Huizar     Subjective:  resting in bed, has surgical pain. No acute complaints, ready to get second part of surgery over     Objective:    VITAL SIGNS   /72   Pulse 92   Temp 99 °F (37.2 °C) (Oral)   Resp 17   Ht 1.676 m (5' 6\") Comment: 5'6  Wt 103.3 kg (227 lb 12.8 oz)   SpO2 94%   BMI 36.77 kg/m²    Height Height: 167.6 cm (5' 6\") (5'6)   Weight Weight - Scale: 103.3 kg (227 lb 12.8 oz)        Allergies Allergies   Allergen Reactions    Metformin Nausea Only and Other (See Comments)     \"ate the lining of her stomach\"      Ace Inhibitors      cough  cough      Adhesive Tape      Skin irritation and blisters  - does okay with steri strips and tegaderm    Tramadol Nausea Only     TOOK APPETITE AWAY      NPO Status Diet NPO   Isolation No active isolations     LABS   Basic Metabolic Profile Recent Labs     02/26/24  1157 02/27/24  0815    143    105   CO2 27 27   BUN 11 12   CREATININE 0.7 0.7   GLUCOSE 112* 115*        Complete Blood Count Recent Labs     02/26/24  1157 02/27/24  0815   WBC 5.3 9.2   RBC 3.55* 3.30*        Coagulation Studies Recent Labs     02/26/24 1157 02/27/24  1518   INR 0.98 1.13          MEDICATIONS   Inpatient Medications     acetaminophen, 1,000 mg, Oral, Q6H    sennosides-docusate sodium, 2 tablet, Oral, BID    famotidine, 40 mg, Oral, Nightly    gabapentin, 600 mg, Oral, TID    losartan, 100 mg, Oral, Daily    metoprolol succinate, 75 mg, Oral, Daily    NIFEdipine, 30 mg, Oral, Daily    pantoprazole, 40 mg, Oral, Daily    spironolactone, 
    [facility] PRE-SURGICAL TESTING INSTRUCTIONS                      PRIOR TO PROCEDURE DATE:    1. PLEASE FOLLOW ANY INSTRUCTIONS GIVEN TO YOU PER YOUR SURGEON.      2. Arrange for someone to drive you home and be with you for the first 24 hours after discharge for your safety after your procedure for which you received sedation. Ensure it is someone we can share information with regarding your discharge.     NOTE: At this time ONLY 2 ADULTS may accompany you   One person ENCOURAGED to stay at hospital entire time if outpatient surgery      3. You must contact your surgeon for instructions IF:  You are taking any blood thinners, aspirin, anti-inflammatory or vitamins.  There is a change in your physical condition such as a cold, fever, rash, cuts, sores, or any other infection, especially near your surgical site.    4. Do not drink alcohol the day before or day of your procedure.  Do not use any recreational marijuana at least 24 hours or street drugs (heroin, cocaine) at minimum 5 days prior to your procedure.     5. A Pre-Surgical History and Physical MUST be completed WITHIN 30 DAYS OR LESS prior to your procedure.by your Physician or an Urgent Care        THE DAY OF YOUR PROCEDURE:  1.  Follow instructions for ARRIVAL TIME as DIRECTED BY YOUR SURGEON.     2. Enter the MAIN entrance from [facility] and follow the signs to the free Parking Garage or  Parking (offered free of charge 7 am-5pm).      3. Enter the Main Entrance of the hospital (do not enter from the lower level of the parking garage). Upon entrance, check in with the  at the surgical information desk on your LEFT.   Bring your insurance card and photo ID to register      4. DO NOT EAT ANYTHING 8 hours prior to arrival for surgery.  You may have up to 8 ounces of water 4 hours prior to your arrival for surgery.   NOTE: ALL Gastric, Bariatric & Bowel surgery patients - you MUST follow your surgeon's instructions regarding 
    Pharmacist Review and Automatic Dose Adjustment of Prophylactic Enoxaparin         The reviewing pharmacist has made an adjustment to the ordered enoxaparin dose or converted to UFH per the approved [facility] protocol and table as identified below.        Aiyana Catalan is a 73 y.o. female.     Recent Labs     02/27/24  0815   CREATININE 0.7       Estimated Creatinine Clearance: 84 mL/min (based on SCr of 0.7 mg/dL).    Recent Labs     02/27/24  0815 02/29/24  0752   HGB 9.6* 7.4*   HCT 29.5* 24.6*    138     Recent Labs     02/27/24  1518   INR 1.13       Height:   Ht Readings from Last 1 Encounters:   02/26/24 1.676 m (5' 6\")     Weight:  Wt Readings from Last 1 Encounters:   02/26/24 103.3 kg (227 lb 12.8 oz)               Plan: Based upon the patient's weight and renal function    Ordered: Enoxaparin 40mg SUBQ Daily    Changed/converted to    New Order: Enoxaparin 30mg SUBQ BID      Thank you,  Ciara Hitchcock [facility]  2/29/2024, 12:47 PM    
   03/01/24 1516   Encounter Summary   Encounter Overview/Reason  Initial Encounter   Service Provided For: Patient and family together   Referral/Consult From: Rounding   Support System Family members;Yarsanism/eugenie community   Last Encounter  03/01/24  (gloria)   Complexity of Encounter Low   Begin Time 1430   End Time  1435   Total Time Calculated 5 min   Assessment/Intervention/Outcome   Assessment Calm;Hopeful   Intervention Active listening;Discussed belief system/Orthodox practices/eugenie;Explored/Affirmed feelings, thoughts, concerns;Nurtured Hope   Outcome Comfort;Engaged in conversation;Expressed Gratitude;Refused/Declined     PT was found with family at the bedside. Family explained that the PT is \"also a \".  No needs at this time.  Staff Delfino MA, BCC  
1 out of 2 unit of blood transfusion was completed. No any signs of transfusion reactions were noted. VSS stable on room air. Post transfusion H&H was drawn.   
4 Eyes Skin Assessment     NAME:  Aiyana Catalan  YOB: 1950  MEDICAL RECORD NUMBER:  [id number]    The patient is being assessed for  Admission    I agree that at least one RN has performed a thorough Head to Toe Skin Assessment on the patient. ALL assessment sites listed below have been assessed.      Areas assessed by both nurses:    Head, Face, Ears, Shoulders, Back, Chest, Arms, Elbows, Hands, Sacrum. Buttock, Coccyx, Ischium, Legs. Feet and Heels, and Under Medical Devices     Sx incisions to lower abdomen and left lateral side         Does the Patient have a Wound? No noted wound(s)       Merrick Prevention initiated by RN: Yes  Wound Care Orders initiated by RN: No    Pressure Injury (Stage 3,4, Unstageable, DTI, NWPT, and Complex wounds) if present, place Wound referral order by RN under : No    New Ostomies, if present place, Ostomy referral order under : No     Nurse 1 eSignature: Electronically signed by Elvia Camacho RN on 2/26/24 at 11:30 PM EST    **SHARE this note so that the co-signing nurse can place an eSignature**    Nurse 2 eSignature: Electronically signed by Nory Lee RN on 2/27/24 at 6:13 AM EST    
4 Eyes Skin Assessment     NAME:  Aiyana Catalan  YOB: 1950  MEDICAL RECORD NUMBER:  [id number]    The patient is being assessed for  Admission    I agree that at least one RN has performed a thorough Head to Toe Skin Assessment on the patient. ALL assessment sites listed below have been assessed.      Areas assessed by both nurses:    Head, Face, Ears, Shoulders, Back, Chest, Arms, Elbows, Hands, Sacrum. Buttock, Coccyx, Ischium, and Legs. Feet and Heels    *surgical sites: mid back, abd and left side        Does the Patient have a Wound? No noted wound(s)       Merrick Prevention initiated by RN: Yes  Wound Care Orders initiated by RN: No    Pressure Injury (Stage 3,4, Unstageable, DTI, NWPT, and Complex wounds) if present, place Wound referral order by RN under : No    New Ostomies, if present place, Ostomy referral order under : No     Nurse 1 eSignature: Electronically signed by Xena Moya RN on 3/4/24 at 6:13 PM EST    **SHARE this note so that the co-signing nurse can place an eSignature**    Nurse 2 eSignature: Electronically signed by Aaron Harper RN on 3/4/24 at 6:47 PM EST   
4 Eyes Skin Assessment     NAME:  Aiyana Catalan  YOB: 1950  MEDICAL RECORD NUMBER:  [id number]    The patient is being assessed for  Post-Op Surgical    I agree that at least one RN has performed a thorough Head to Toe Skin Assessment on the patient. ALL assessment sites listed below have been assessed.      Areas assessed by both nurses:    Head, Face, Ears, Shoulders, Back, Chest, Arms, Elbows, Hands, Sacrum. Buttock, Coccyx, Ischium, Legs. Feet and Heels, and Under Medical Devices         Does the Patient have a Wound? No noted wound(s)     -incision midline lumbar region   Merrick Prevention initiated by RN: Yes  Wound Care Orders initiated by RN: No    Pressure Injury (Stage 3,4, Unstageable, DTI, NWPT, and Complex wounds) if present, place Wound referral order by RN under : No    New Ostomies, if present place, Ostomy referral order under : No     Nurse 1 eSignature: Electronically signed by Rambo Mccray RN on 2/28/24 at 6:58 PM EST    **SHARE this note so that the co-signing nurse can place an eSignature**    Nurse 2 eSignature: Electronically signed by Kaylynn Paz RN on 2/28/24 at 6:59 PM EST   
Attempted to call report to the [facility]. Voicemail left with callback number. Pt sent with after visit summary, continuity of care forms and paper prescriptions. PIV removed with no complications. Pt discharged with roberts catheter in place. Pt transported via stretcher with [facility].  
CRNA notified of patient only has one IV.    
From OR arousable but very drowsy, dressing with aquacel CDI with 2 hemovacs in place, VSS.    Report from CRNA and OR RN.    S/P LUMBAR 2-PELVIS DECOMPRESSION, FUSION, AND FIXATION    
From OR arousable but very drowsy, incision on lower abdomen and left lateral side DAVID with dermabond CDI, /70, other VSS.    Report from CRNA and OR RN.    S/P LUMBAR 5-SACRAL 1 ANTERIOR LUMBAR INTERBODY FUSION AND LUMBAR 2-LUMBAR 3, LUMBAR 3-LUMBAR 4 DIRECT LATERAL INTERBODY FUSION   
Indwelling urinary catheter was placed, per orders, using sterile technique following hospital policy. ZACKERY Boudreaux observed by this RN with procedure to ensure proper technique. This catheter was placed on 5 [address].  
MESSAGE LEFT AT OFFICE. SOME LABS GREATER THAN 1 MONTH- ORDERED F0R DOS. EKG IS FROM 11/28 HOWEVER PATIENT OBTAINED CARDIAC CLEARANCE 2/1.  OFFICE TO CALL IF EKG NEEDS TO BE DONE DOS  HS  
Neurosurgery Progress Note    3/2/2024 9:35 AM                               Aiyana MARIA Catalan                      LOS: 5 days             Post-operative Day# 5 s/p LUMBAR 5-SACRAL 1 ANTERIOR LUMBAR INTERBODY FUSION AND LUMBAR 2-LUMBAR 3, LUMBAR 3-LUMBAR 4 DIRECT LATERAL INTERBODY FUSION with Dr. Huizar   POD 3 s/p LUMBAR 2-PELVIS DECOMPRESSION, FUSION, AND FIXATION   Subjective:  No acute events overnight. Patient has no specific complaints this am.   Pre op leg pain gone                                               Physical Exam:    Temp (24hrs), Av.6 °F (37 °C), Min:98 °F (36.7 °C), Max:99.9 °F (37.7 °C)      Neuro Exam  The patient is well-appearing and is in no acute distress.    Alert and oriented ×4, appropriate, conversant with normal mood and affect.  DTR 2+/4 symmetric. Javier sign neg BUE, Babinski sign is down-going BLE  Capillary refill is less than 2 s in all 4 extremities, pulses are strong and symmetric. Sensation is intact to light touch and temperature throughout.   Romberg sign is negative.   The patient walks with a well-balanced, well-coordinated gait.      RUE: Deltoids: 5/5, Triceps: 5/5, Biceps: 5/5, WE/WF: 5/5, Finger abd: 5/5, : 5/5   LUE: Deltoids: 5/5, Triceps: 5/5, Biceps: 5/5, WE/WF: 5/5, Finger abd: 5/5, : 5/5  LLE: HE: 4/5, HF: 4/5, KE:4/5, KF: 4/5, PF: 4/5, DF: 4/5  RLE: HE: 5/5, HF: 5/5, KE:5/5, KF: 5/5, PF: 5/5, DF: 5/5    Labs:  Recent Labs     24  0731   WBC 6.7   HGB 8.9*   HCT 27.1*          No results for input(s): \"NA\", \"K\", \"CL\", \"CO2\", \"BUN\", \"CREATININE\", \"GLUCOSE\", \"CALCIUM\", \"PHOS\", \"MG\" in the last 72 hours.  72 yo female POD 4 s/p LUMBAR 5-SACRAL 1 ANTERIOR LUMBAR INTERBODY FUSION AND LUMBAR 2-LUMBAR 3, LUMBAR 3-LUMBAR 4 DIRECT LATERAL INTERBODY FUSION (stage I) with Dr. Huizar      POD 2 s/p LUMBAR 2-PELVIS DECOMPRESSION, FUSION, AND FIXATION      Plan:  Neurologic exam frequency: q 4   Mobility: PT/OT activity as tolerated  Will removed 
Occupational Therapy  Facility/Department: [facility] 5T ORTHO/NEURO  Occupational Therapy Progress Note    Name: Aiyana Catalan  : 1950  MRN: [id number]  Date of Service: 3/1/2024    Discharge Recommendations:  Subacute/Skilled Nursing Facility  OT Equipment Recommendations  Other: defer - anticipate need for AE for LB ADLs.           Treatment Diagnosis: decreased ability to perform ADL 2/2 lumbar spinal stenosis      Assessment   Performance deficits / Impairments: Decreased functional mobility ;Decreased ADL status;Decreased strength;Decreased cognition;Decreased safe awareness;Decreased endurance;Decreased balance;Decreased high-level IADLs  Assessment: Pt is POD#2 s/p second phase of 2 phase back surgery. Today, pt w/ improvement in BP. Able to tolerate short distance mobility and transfer to chair today. Continues to function well-below independent baseline. Will benefit from continued IP OT upon discharge. Pt planning on the [facility] for discharge. Continue per POC.  Treatment Diagnosis: decreased ability to perform ADL 2/2 lumbar spinal stenosis  REQUIRES OT FOLLOW-UP: Yes  Activity Tolerance  Activity Tolerance: Patient limited by fatigue;Patient limited by pain  Activity Tolerance Comments: BP monitored: 150/71 (supine), 128/74 (EOB), after transfer to chair (126/84), at end of session in chair (134/71). RN notified.        Plan   Occupational Therapy Plan  Times Per Week: 5-7  Current Treatment Recommendations: Strengthening, Balance training, Functional mobility training, Endurance training, Safety education & training, Equipment evaluation, education, & procurement, Self-Care / ADL     Restrictions  Position Activity Restriction  Spinal Precautions: No Bending, No Lifting, No Twisting  Other position/activity restrictions: up in chair post op    Subjective   General  Chart Reviewed: Yes  Patient assessed for rehabilitation services?: Yes  Additional Pertinent Hx: PMH: Aortic Stenosis, Back Pain, 
Occupational Therapy  Occupational Therapy  Daily Treatment Note  Patient Name: Aiyana Catalan  MRN: [id number]    Chart Reviewed: Yes       Other position/activity restrictions: up in chair post op     Additional Pertinent Hx: pt is a 74 yo female s/p 2 part surgery: LUMBAR 5-SACRAL 1 ANTERIOR LUMBAR INTERBODY FUSION AND LUMBAR 2-LUMBAR 3, LUMBAR 3-LUMBAR 4 DIRECT LATERAL INTERBODY FUSION and LUMBAR 2-PELVIS DECOMPRESSION, FUSION, AND FIXATION        Diagnosis: Stage 1: LUMBAR 5-SACRAL 1 ANTERIOR LUMBAR INTERBODY FUSION AND LUMBAR 2-LUMBAR 3, LUMBAR 3-LUMBAR 4 DIRECT LATERAL INTERBODY FUSION on 2/26/24. Stage 2:  LUMBAR 2-PELVIS DECOMPRESSION, FUSION, AND FIXATION on 2/28/24; PRBCs.  Treatment Diagnosis: decreased ability to perform ADL 2/2 lumbar spinal stenosis    Subjective: Pt side lying right in bed upon entry, agreeable to therapy session \"I know I need to do it.\" Extended time for activity due to pain.    Pain: 10/10 sharp pain in back and alternates radiating pain between BLEs, meds in place prior to therapy session    Social/Functional History  Lives With: Alone  Type of Home: House  Home Layout: One level  Home Access: Level entry  Bathroom Shower/Tub: Walk-in shower  Bathroom Toilet: Handicap height (vanity next to)  Bathroom Equipment: Shower chair, Grab bars in shower  Home Equipment: Walker, rolling, Cane, Lift chair, Alert Button (adjustable bed)  Has the patient had two or more falls in the past year or any fall with injury in the past year?: Yes (2 falls)  Receives Help From: Home health (aide does housework, aide assists with showers. 4 hours/3 days a week)  ADL Assistance: Needs assistance (IND with dressing/toileting. assist with showers)  Toileting: Independent  Homemaking Assistance: Needs assistance (aide does housework)  Ambulation Assistance: Independent (with RW recently. prior to 3 weeks ago was using [facility])  Transfer Assistance: Independent  Active : Yes  Occupation: 
PACU Transfer Note    Vitals:    02/28/24 1745   BP: (!) 118/50   Pulse: 80   Resp: 15   Temp: 98.7 °F (37.1 °C)   SpO2: 99%       In: 1170 [P.O.:20; I.V.:1150]  Out: 2090 [Urine:1550; Drains:90]    Pain assessment:  Treated and satisfied.  Pain Level: 8    Report given to Receiving unit ZACKERY Ricks.    Transported by [facility] PACU transporter.    2/28/2024 6:00 PM      
PACU Transfer to Floor Note    Procedure(s):  LUMBAR 5-SACRAL 1 ANTERIOR LUMBAR INTERBODY FUSION AND LUMBAR 2-LUMBAR 3, LUMBAR 3-LUMBAR 4 DIRECT LATERAL INTERBODY FUSION  .    Current Allergies: Metformin, Ace inhibitors, Adhesive tape, and Tramadol    Pt meets criteria as per Evans Score and ASPAN Standards to transfer to next phase of care.     Recent Labs     02/26/24  1155 02/26/24  1554   POCGLU 120* 180*       Vitals:    02/26/24 2154   BP: (!) 163/70   Pulse: (!) 101   Resp: 16   Temp: 99.2 °F (37.3 °C)   SpO2: 99%     Vitals within 20% of pt's admission vitals as per EVANS SCORE    SpO2: 99 %    O2 Flow Rate (L/min): 2 L/min      Intake/Output Summary (Last 24 hours) at 2/26/2024 2159  Last data filed at 2/26/2024 2100  Gross per 24 hour   Intake 1550 ml   Output 1650 ml   Net -100 ml       Pain assessment:  level of pain (1-10, 10 severe),     Pain Level: 6 (neuropathy on legs)    Patient was assessed for alterations to skin integrity. There were no alterations observed.    Is patient incontinent: no    Family updated and directed to pt room      2/26/2024 9:59 PM  
PT currently in bed. Performed Roberts care with warm water and soap. Emptied roberts bag with 450ml.  
Patient is A&O x4.  RA, sat 97%.  No complaints of SOB.  Medicated for c/o pain as needed.   Vital signs stable as charted.  Respirations appear to easy and unlabored.  Lungs clear.  Respirations easy with no complaints of cough.  No complaints of nausea/vomiting/diarrhea.  Up with assist/walker to the bathroom or chair as needed.  Tolerating regular diet.  Left AC PIV intact and flushed.    Plan of care and safety measures reviewed with the patient.  Call light in reach and bed alarm in place.  Bed attached to wall for alarm purposes.  Will continue to monitor.  Electronically signed by Rocio Khanna RN on 3/4/2024 at 11:30 PM    
Patient seen and examined  Doing better today no chest pain or shortness of breath pain under good control with pain medications  No chest pain or shortness of breath no nausea or vomiting no light headedness or dizziness when resting but she feels fatigued when she gets up;     Patient reported that instead of oxycodone Norco works better for her pain we can switch from oxy to Norco    Vitals:    03/02/24 1143   BP: (!) 121/59   Pulse: 93   Resp: 17   Temp: 98.1 °F (36.7 °C)   SpO2: 98%      Vitals:    03/02/24 1143   BP: (!) 121/59   Pulse: 93   Resp: 17   Temp: 98.1 °F (36.7 °C)   SpO2: 98%      Gen; pleasant alert oriented  Neck: no jvd   Chest: clear bilaterally  Cvs: s2s1 sinus tachycardia no obvious murmurs  Abd: soft nd nt bs normal active   Ext: no edema positive pulses     H/h is 7.4 and 24.6 wbc is 10.3; rcvd prbc transfusion overnight; doing well today;   Reporting no BM since Monday- today is day 4; added aggressive bowel regime  Urinary retention continue with Zuleta care    72 yo female POD 3 s/p LUMBAR 5-SACRAL 1 ANTERIOR LUMBAR INTERBODY FUSION AND LUMBAR 2-LUMBAR 3, LUMBAR 3-LUMBAR 4 DIRECT LATERAL INTERBODY FUSION (stage I) with Dr. Huizar      POD 1 s/p LUMBAR 2-PELVIS DECOMPRESSION, FUSION, AND FIXATION     Her bp is on the low side d/w nurse hold off on all bp meds; acute blood loss anemia is stable she has had anemia in the past and sees heme as outpatient.  Risks benefits and alternatives for transfusion discussed patient is agreeable.  Discussed case with nurse at bedside.      Lucille Velasco, BRICE - CNP  3/2/2024  12:57 PM    
Patient seen and examined  Doing better today no chest pain or shortness of breath pain under good control with pain medications  No chest pain or shortness of breath no nausea or vomiting no light headedness or dizziness when resting but she feels fatigued when she gets up;     Patient reported that instead of oxycodone Norco works better for her pain we can switch from oxy to [place]    Vitals:    03/03/24 1155   BP:    Pulse:    Resp: 16   Temp:    SpO2:       Vitals:    03/03/24 1155   BP:    Pulse:    Resp: 16   Temp:    SpO2:       Gen; pleasant alert oriented  Neck: no jvd   Chest: clear bilaterally  Cvs: s2s1 sinus tachycardia no obvious murmurs  Abd: soft nd nt bs normal active   Ext: no edema positive pulses       H/h is 7.4 and 24.6 wbc is 10.3; rcvd prbc transfusion overnight; doing well today; repeat CBC on 3/2/2024 with hemoglobin 8.9/27.1 RBC 2.97 blood sugar remains between 150 and 200  Reporting no BM since Monday- today is day 4; added aggressive bowel regime  Urinary retention continue with Zuleta care  Pain was not well-controlled on 5/3/2025 Norco increase dose to 10/3/2025 she is much more comfortable    72 yo female POD 3 s/p LUMBAR 5-SACRAL 1 ANTERIOR LUMBAR INTERBODY FUSION AND LUMBAR 2-LUMBAR 3, LUMBAR 3-LUMBAR 4 DIRECT LATERAL INTERBODY FUSION (stage I) with Dr. Huizra      POD 1 s/p LUMBAR 2-PELVIS DECOMPRESSION, FUSION, AND FIXATION     Hypertension -blood pressure remained stable her bp is on the low side d/w nurse hold off on all bp meds; acute blood loss anemia is stable she has had anemia in the past and sees heme as outpatient.  Risks benefits and alternatives for transfusion discussed patient is agreeable.  Discussed case with nurse at bedside.      Lucille Velasco, BRICE - CNP  3/3/2024  2:30 PM    
Patient seen and examined  Doing better today no chest pain or shortness of breath pain under good control with pain medications  No chest pain or shortness of breath no nausea or vomiting no light headedness or dizziness when resting but she feels fatigued when she gets up;   Vitals:    02/29/24 1615   BP: 117/73   Pulse: (!) 102   Resp: 18   Temp: 99 °F (37.2 °C)   SpO2: 99%      Vitals:    02/29/24 1615   BP: 117/73   Pulse: (!) 102   Resp: 18   Temp: 99 °F (37.2 °C)   SpO2: 99%      Gen; pleasant alert oriented  Neck: no jvd   Chest: clear bilaterally  Cvs: s2s1 sinus tachycardia no obvious murmurs  Abd: soft nd nt bs normal active   Ext: no edema positive pulses     H/h is 7.4 and 24.6 wbc is 10.3    72 yo female POD 3 s/p LUMBAR 5-SACRAL 1 ANTERIOR LUMBAR INTERBODY FUSION AND LUMBAR 2-LUMBAR 3, LUMBAR 3-LUMBAR 4 DIRECT LATERAL INTERBODY FUSION (stage I) with Dr. Huizar      POD 1 s/p LUMBAR 2-PELVIS DECOMPRESSION, FUSION, AND FIXATION     Her bp is on the low side d/w nurse hold off on all bp meds; acute blood loss anemia is stable she has had anemia in the past and sees heme as outpatient.  Risks benefits and alternatives for transfusion discussed patient is agreeable.  Discussed case with nurse at bedside.    
Patient seen and examined  Doing better today no chest pain or shortness of breath pain under good control with pain medications  No chest pain or shortness of breath no nausea or vomiting no light headedness or dizziness when resting but she feels fatigued when she gets up;   Vitals:    03/01/24 1547   BP: 125/64   Pulse: 92   Resp: 16   Temp: 98 °F (36.7 °C)   SpO2:       Vitals:    03/01/24 1547   BP: 125/64   Pulse: 92   Resp: 16   Temp: 98 °F (36.7 °C)   SpO2:       Gen; pleasant alert oriented  Neck: no jvd   Chest: clear bilaterally  Cvs: s2s1 sinus tachycardia no obvious murmurs  Abd: soft nd nt bs normal active   Ext: no edema positive pulses     H/h is 7.4 and 24.6 wbc is 10.3; rcvd prbc transfusion overnight; doing well today;   Reporting no BM since Monday- today is day 4; added aggressive bowel regime    74 yo female POD 3 s/p LUMBAR 5-SACRAL 1 ANTERIOR LUMBAR INTERBODY FUSION AND LUMBAR 2-LUMBAR 3, LUMBAR 3-LUMBAR 4 DIRECT LATERAL INTERBODY FUSION (stage I) with Dr. Huizar      POD 1 s/p LUMBAR 2-PELVIS DECOMPRESSION, FUSION, AND FIXATION     Her bp is on the low side d/w nurse hold off on all bp meds; acute blood loss anemia is stable she has had anemia in the past and sees heme as outpatient.  Risks benefits and alternatives for transfusion discussed patient is agreeable.  Discussed case with nurse at bedside.      Lucille Velasco, BRICE - CNP  3/1/2024  6:59 PM    
Physical Therapy  Daily Treatment Note    Discharge Recommendation:  Skilled Nursing Facility  Equipment Needs:  Defer to next level of care    Assessment:  Mobility slow, weak and effortful. Having the most difficulty with bed mobility. Some unsteadiness when turning with walker. Pt from home alone. History of recent falls. Currently functioning below her baseline s/p lumbar surgery. Limited by weakness, fatigue and pain. Would benefit from continued IP PT at D/C prior to returning home. Plan is for SNF.     Chart Reviewed: Yes     Other position/activity restrictions: up in chair post op   Additional Pertinent Hx: pt is a 72 yo female s/p 2 part surgery: LUMBAR 5-SACRAL 1 ANTERIOR LUMBAR INTERBODY FUSION AND LUMBAR 2-LUMBAR 3, LUMBAR 3-LUMBAR 4 DIRECT LATERAL INTERBODY FUSION and LUMBAR 2-PELVIS DECOMPRESSION, FUSION, AND FIXATION      Diagnosis: lumbar radiculopathy   Treatment Diagnosis: dec functional mobility    Subjective: Pt in bed initially. Agreeable to working with PT.   \"I was able to get a good nap in. I slept for about an hour.\"    Pain: \"Anywhere from a 6 to a 10.\" Pt reports back/abdominal pain fluctuates depending on the activity. RN present and medicated during session.     Objective:    Bed mobility  Supine to sit: Mod assist x 1, HOB up partially with use of rail. Cues for log roll technique.   Scooting: Min assist to EOB  Sit to Supine: Max assist x 1 for LEs. HOB flat. Cues for log roll technique.    Transfers  Sit to stand: Min assist x 1 from bed. Cues for hand placement.   Stand to sit: Min assist x 1 onto bed. Cues for hand placement. Decreased eccentric control.     Ambulation  Assistance Level: CGA to Min assist (minor LOB with turning)  Assistive device: Rolling walker  Distance: 30 ft   Quality of gait: Decreased step length/height; decreased pace; heavy reliance on walker for support; effortful; slow    Balance  Sitting EOB ~5 minutes x 2 with SBA to Min assist for occasional lean/LOB 
Physical Therapy  Facility/Department: [facility] 5T ORTHO/NEURO  Physical Therapy Treatment    Name: Aiyana Catalan  : 1950  MRN: [id number]  Date of Service: 3/1/2024    Discharge Recommendations:  Subacute/Skilled Nursing Facility   PT Equipment Recommendations  Other: defer      Patient Diagnosis(es): The primary encounter diagnosis was S/P lumbar spinal fusion. Diagnoses of Lumbar radiculopathy, Preop cardiovascular exam, Intractable back pain, Acute back pain, unspecified back location, unspecified back pain laterality, Tear of left supraspinatus tendon, Adult BMI 40.0-44.9 kg/sq m (HCC), JOSE MANUEL on CPAP, Bilateral leg pain, Hypersomnolence, Snoring, Chronic diastolic heart failure (HCC), Diabetes mellitus type 2 in obese (HCC), TIA (transient ischemic attack), and High cholesterol were also pertinent to this visit.  Past Medical History:  has a past medical history of Aortic stenosis, Arthritis, Back pain, CHF (congestive heart failure) (HCC), Chronic diastolic heart failure (HCC), Diabetes mellitus (HCC), Diabetes mellitus (HCC), GERD (gastroesophageal reflux disease), High cholesterol, History of blood transfusion, Hypertension, Malignant neoplasm of right female breast (HCC), JOSE MANUEL (obstructive sleep apnea), Polio, Polio, Shingles, Sleep apnea, and Urinary problem.  Past Surgical History:  has a past surgical history that includes joint replacement; knee surgery; Colonoscopy; other surgical history (2014); hernia repair; joint replacement; SABRINA STEREO BREAST BX W LOC DEVICE 1ST LESION RIGHT (Right, 2022); back surgery; back surgery; Breast biopsy (Right, 2022); Breast surgery (Right, 2022); Humerus fracture surgery (Left, 2022); Arm Surgery (Left); fracture surgery; eye surgery; Examination under anesthesia (N/A, 2023); lumbar fusion (N/A, 2024); and lumbar fusion (N/A, 2024).    Assessment   Assessment: pt is a 72 yo female s/p 2 part surgery: LUMBAR 5-SACRAL 1 
Physical Therapy  Facility/Department: [facility] 5T ORTHO/NEURO  Physical Therapy Initial Assessment    Name: Aiyana Catalan  : 1950  MRN: [id number]  Date of Service: 2024    Discharge Recommendations:  Subacute/Skilled Nursing Facility   PT Equipment Recommendations  Other: defer      Patient Diagnosis(es): There were no encounter diagnoses.  Past Medical History:  has a past medical history of Aortic stenosis, Arthritis, Back pain, CHF (congestive heart failure) (HCC), Chronic diastolic heart failure (HCC), Diabetes mellitus (HCC), Diabetes mellitus (HCC), GERD (gastroesophageal reflux disease), High cholesterol, History of blood transfusion, Hypertension, Malignant neoplasm of right female breast (HCC), JOSE MANUEL (obstructive sleep apnea), Polio, Polio, Shingles, Sleep apnea, and Urinary problem.  Past Surgical History:  has a past surgical history that includes joint replacement; knee surgery; Colonoscopy; other surgical history (2014); hernia repair; joint replacement; SABRINA STEREO BREAST BX W LOC DEVICE 1ST LESION RIGHT (Right, 2022); back surgery; back surgery; Breast biopsy (Right, 2022); Breast surgery (Right, 2022); Humerus fracture surgery (Left, 2022); Arm Surgery (Left); fracture surgery; eye surgery; Examination under anesthesia (N/A, 2023); lumbar fusion (N/A, 2024); and lumbar fusion (N/A, 2024).    Assessment   Body Structures, Functions, Activity Limitations Requiring Skilled Therapeutic Intervention: Decreased functional mobility ;Decreased balance;Decreased strength;Decreased safe awareness;Decreased endurance;Increased pain  Assessment: pt is a 74 yo female s/p 2 part surgery: LUMBAR 5-SACRAL 1 ANTERIOR LUMBAR INTERBODY FUSION AND LUMBAR 2-LUMBAR 3, LUMBAR 3-LUMBAR 4 DIRECT LATERAL INTERBODY FUSION and LUMBAR 2-PELVIS DECOMPRESSION, FUSION, AND FIXATION. pt from home alone and IND at baseline now well below baseline function requiring mod-max A of 
Pt A&Ox4. VSS on 1L NC with exception to elevated BP. No acute changes this shift. Tolerating PO diet. Voiding adequately via roberts. Roberts care completed. Incisions DAVID. No drainage noted. Pain managed via medication per MAR. Denies needs at this time. Fall precautions in place, call light within reach.   
Pt A&Ox4. VSS on RA. No acute changes this shift. Sx sites DAVID. No drainage noted. NPO since 0000. Voiding via roberts. Roberts care completed. Pain managed via medication per MAR. Pt resting well this shift. Denies needs at this time. Fall precautions in place, call light within reach.   
Pt Aox4. All fall prevention measures in place, call light within reach. RN encouraging PO intake this shift. Pt has been OOBTC this shift, roberts care complete.   
Pt back in room from PACU. PRN medication given for pain. New incision but rest of skin remains intact. All fall precautions in place, call light within reach.   
Pt is alert and oriented X4. VS taken and recorded. Spo2 maintained at RA. Pain on surgical site has been controlled with MAR. Is on oral feed and tolerating well. Pt is on roberts and output is adequate, care done with soap and water. No BM this shift. All fall precautions in place, call light within reach, free from fall injury. Plan of care ongoing.   
Pt is alert and oriented X4. VS taken and recorded. Spo2 maintained at RA. Pain on surgical site has been controlled with MAR. Is on oral feed and tolerating well. Pt is on roberts and output is adequate, care done with soap and water. No BM. All fall precautions in place, call light within reach, free from fall injury. Plan of care ongoing.   
Pt is alert and oriented x4. All fall prevention measures in place. Pt has had adequate PO intake. Pt on bedrest and roberts in place for planned surgery tomorrow. Pt will be NPO at midnight. PRN medications given for pain this shift see eMAR.   
Pt is alert and oriented x4. All fall prevention measures in place. Pt switched from oxycodone to hydrocodone for pain control, states oxycodone is not helping her pain. Pt has been OOBTC this shift. RN encouraging pt to be up to chair TID.   
Pt. Alert and oriented X4. VSS on room air. Pt. Chief complain was pain in incision site which was managed with PRN pain medications as per MAR. Incision site looks clean, dry and intact. Right side drain was removed today as per neurosurgery NP order. In regular diet and tolerating PO well. X 2 with GB and walker. Voiding adequately via roberts catheter. Roberts care was done with soap and water. Standard safety precautions in place and call light within reach.  
Pt. Alert and oriented to X4. VSS on room air. Blood transfusion started. Blood product verified by 2 RN. Pt. Was observed for the first 15 minutes closely. Vital signs were monitored every 5 minutes for the first 15 minutes and Vital signs were stable on room air.  
Pt. BP was 88/48. Pt. Looks stable, no any dizziness noted. Notified Md Mahoney through perfect serve. Responded back saying he will see the pt. soon.  
Pt. Oriented x4. VSS on 1L nasal cannula. Pt. Lethargic throughout night.  Incisions CDI. Pt. Managing pain per MAR. All fall precautions in place and call light is within reach.   
Pt. Oriented x4. VSS on RA. Pt. Managing pain per MAR. Incisions CDI. Roberts draining well, roberts care completed with soap and water. All fall precautions in place and call light is within reach.   
Pt. Oriented x4. VSS on RA. Pt. Managing pain per MAR. Pt. Lethargic most of shift. Pt. Received 1 unit PRBC, tolerated well. Incisions CDI. All fall precautions in place and call light is within reach.   
Pt. did not have any urge to void at all. This RN bladder scanned her and she was retaining 415ml of urine. This RN straight cath as per order and was able to drain 400ml of urine out. There was 0ml of post void cath residual.  
Recovery care complete, waiting for room availability, will continue to monitor.  
Report called to RN on 3 south. Patient transferred to room 3314.   
pt. BP was 103/63. So this RN did not administer spironolactone, metoprolol, nifedipine and losartan. Notified MD Mahoney through perfect serve.  
 dulaglutide, 1.5 mg, SubCUTAneous, Once    sodium chloride flush, 5-40 mL, IntraVENous, 2 times per day    polyethylene glycol, 17 g, Oral, Daily    enoxaparin, 30 mg, SubCUTAneous, Daily    insulin lispro, 0-8 Units, SubCUTAneous, TID WC    insulin lispro, 0-4 Units, SubCUTAneous, Nightly    insulin glargine, 15 Units, SubCUTAneous, Nightly   Infusions    sodium chloride      sodium chloride      dextrose      dextrose        Antibiotics   Recent Abx Admin                     ceFAZolin (ANCEF) 2,000 mg in sodium chloride 0.9 % 50 mL IVPB (mini-bag) (mg) 2,000 mg New Bag 02/27/24 0606     2,000 mg New Bag 02/26/24 2237    ceFAZolin (ANCEF) 1,000 mg in sod chloride IRR soln 0.9 % 1,000 mL ()  Given 02/26/24 1436    ceFAZolin (ANCEF) 2,000 mg in sodium chloride 0.9 % 50 mL IVPB (mini-bag) (mg) 2,000 mg New Bag 02/26/24 1214                      Neurologic Exam:  Mental status: awake/alert, oriented x 4    Musculoskeletal:   Gait: Not tested   Tone: normal    Sensory: baseline neuropathy w/ tingling in feet, otherwise intact to light touch   Motor strength:    Right  Left    Right  Left    Deltoid  5 5  Hip Flex  5 4   Biceps  5 5  Knee Extensors  5 4   Triceps  5 5  Knee Flexors  5 4   Wrist Ext  5 5  Ankle Dorsiflex.  5 4   Wrist Flex  5 5  Ankle Plantarflex.  5 4   Handgrip  5 5  Ext Moy Longus  5 4   Thumb Ext  5 5         Incision: dermabond DAVID clean/dry/intact     Respiratory:  Unlabored respiratory pattern    Abdomen:   Soft, ND   Last BM pre op    Cardiovascular:  Warm, well perfused    Assessment   72 yo female POD 1 s/p LUMBAR 5-SACRAL 1 ANTERIOR LUMBAR INTERBODY FUSION AND LUMBAR 2-LUMBAR 3, LUMBAR 3-LUMBAR 4 DIRECT LATERAL INTERBODY FUSION with Dr. Huizar     Plan:  Stage II fusion tomorrow - LUMBAR 2-PELVIS DECOMPRESSION, FUSION, AND FIXATION  -NPO @ 0000, pre op labs, treatment consent   Neurologic exam frequency: q 4   Mobility: bedrest until after stage II  Diet: ADAT, NPO @ 0000  Antibiotics: post 
filed at 3/5/2024 0918  Gross per 24 hour   Intake 510 ml   Output 1950 ml   Net -1440 ml      Vitals:   Vitals:    03/05/24 0156 03/05/24 0227 03/05/24 0640 03/05/24 0914   BP: (!) 147/65   128/64   Pulse: 84   94   Resp: 18 18 18 18   Temp: 98.1 °F (36.7 °C)   98.7 °F (37.1 °C)   TempSrc: Oral   Oral   SpO2: 98%   98%   Weight:       Height:             Physical Exam:      General: NAD  ENT: neck supple  Cardiovascular: Regular rate.  Respiratory: Clear to auscultation  Gastrointestinal: Soft, non tender  Genitourinary: no suprapubic tenderness  Musculoskeletal: No edema  Skin: warm, dry  Neuro: Alert.  Psych: Mood appropriate.         Medications:   Medications:    enoxaparin  30 mg SubCUTAneous BID    sennosides-docusate sodium  2 tablet Oral BID    famotidine  40 mg Oral Nightly    gabapentin  600 mg Oral TID    losartan  100 mg Oral Daily    metoprolol succinate  75 mg Oral Daily    NIFEdipine  30 mg Oral Daily    pantoprazole  40 mg Oral Daily    [Held by provider] spironolactone  25 mg Oral Daily    sodium chloride flush  5-40 mL IntraVENous 2 times per day    polyethylene glycol  17 g Oral Daily    insulin lispro  0-8 Units SubCUTAneous TID WC    insulin lispro  0-4 Units SubCUTAneous Nightly    [Held by provider] insulin glargine  15 Units SubCUTAneous Nightly      Infusions:    sodium chloride      sodium chloride      dextrose      dextrose       PRN Meds: HYDROcodone-acetaminophen, 1 tablet, Q4H PRN  methocarbamol, 750 mg, Q6H PRN  sodium chloride, , PRN  diazePAM, 5 mg, Q6H PRN  sodium chloride flush, 5-40 mL, PRN  sodium chloride, , PRN  ondansetron, 4 mg, Q8H PRN   Or  ondansetron, 4 mg, Q6H PRN  dextrose, , Continuous PRN  labetalol, 10 mg, Q15 Min PRN  glucose, 4 tablet, PRN  dextrose bolus, 125 mL, PRN   Or  dextrose bolus, 250 mL, PRN  glucagon (rDNA), 1 mg, PRN  dextrose, , Continuous PRN        Labs and Imaging   FLUORO FOR SURGICAL PROCEDURES    Result Date: 2/28/2024  History: Lumbar surgery. 
up in chair post op     Subjective    Subjective  Subjective: Pt in bed w/  present; requested to pray first before session; agreeable to therapy; NP came in middle of therapy session  Pain: 5/10 surgical pain; RN aware  Orientation  Overall Orientation Status: Within Functional Limits  Orientation Level: Oriented X4  Cognition  Overall Cognitive Status: WFL     Objective   Vitals     Bed Mobility Training  Bed Mobility Training: Yes  Overall Level of Assistance: Minimum assistance  Supine to Sit: Minimum assistance (a w/ trunk)  Sit to Supine: Minimum assistance;Moderate assistance (a w/ LEs)  Scooting: Maximum assistance  Balance  Sitting: High guard (CGA progress to SBA)  Standing: With support (static: CGA w/ UE support of RW - Dynamic: mod A w/ RW; heavy UB reliance on RW)  Transfer Training  Transfer Training: Yes  Overall Level of Assistance: Minimum assistance  Interventions: Verbal cues;Tactile cues  Sit to Stand: Minimum assistance (Bed elevated)  Stand to Sit: Contact-guard assistance  Bed to Chair:  (Declined; reported not sleeping last night and wanting to sleep)  Gait Training  Gait Training: Yes  Gait  Gait Training: Yes  Overall Level of Assistance: Contact-guard assistance;Minimum assistance (Min A progressed to CGA w/ RW)  Distance (ft):  (20ft x 2; slow and effortful)  Assistive Device: Walker, rolling  Interventions: Verbal cues  Base of Support: Narrowed  Speed/Heather: Slow  Step Length: Left lengthened;Right lengthened  Gait Abnormalities: Decreased step clearance     PT Exercises  Exercise Treatment: Seated ther-ex: 2x10 heel to toe taps, marchkristian LASEVERO     Safety Devices  Type of Devices: Bed alarm in place;Call light within reach;Nurse notified;Left in bed       Goals 3/4- all goals ongoing  Short Term Goals  Time Frame for Short Term Goals: by dc  ongoing 3/2  Short Term Goal 1: pt will perform bed mobility with CGA  Short Term Goal 2: pt will perform functional transfers with LRAD and 
assistance;Decreased awareness of need for safety  Problem Solving: Assistance required to generate solutions;Assistance required to implement solutions;Assistance required to correct errors made;Assistance required to identify errors made;Decreased awareness of errors  Insights: Decreased awareness of deficits  Orientation  Overall Orientation Status: Within Functional Limits  Orientation Level: Oriented X4                                      Pt sat EOB x10 mins with mod A for static sitting balance, posterior lean, max A to self-correct to midline. Minimal improvement over time.    BUE strength and AROM are WFL based on functional presentation.    Education (verbal/demo): Role of OT, safe t/f training, safe use of DME, awareness of deficits, discharge planning, ADL as therapeutic exercise, importance of OOB, fall prevention strategies, back precautions with functional implications. Pt verbalized/demo understanding.         AM-[facility] - ADL  AM-[facility] Daily Activity - Inpatient   How much help is needed for putting on and taking off regular lower body clothing?: Total  How much help is needed for bathing (which includes washing, rinsing, drying)?: A Lot  How much help is needed for toileting (which includes using toilet, bedpan, or urinal)?: Total  How much help is needed for putting on and taking off regular upper body clothing?: Total  How much help is needed for taking care of personal grooming?: A Lot  How much help for eating meals?: A Lot  AM-[facility] Inpatient Daily Activity Raw Score: 9  AM-PAC Inpatient ADL T-Scale Score : 25.33  ADL Inpatient CMS 0-100% Score: 79.59  ADL Inpatient CMS G-Code Modifier : CL    Goals  Short Term Goals  Time Frame for Short Term Goals: 1 week  Short Term Goal 1: Supine to sit with min A x2  Short Term Goal 2: 1 grooming task EOB with SBA  Short Term Goal 3: Functional stand step t/f with min A x2  Patient Goals   Patient goals : \"Go to nursing home for rehab before going home.\"   
get stronger       Education  Patient Education  Education Given To: Patient  Education Provided Comments: log rolling, OOB with nursing staff.  Education Method: Demonstration;Verbal  Barriers to Learning: None  Education Outcome: Verbalized understanding;Continued education needed      Therapy Time   Individual Concurrent Group Co-treatment   Time In 0811         Time Out 0851         Minutes 40             Timed Code Treatment Minutes:   40    Total Treatment Minutes:  40      Desiree Melvin, [id number]

## 2024-03-26 ENCOUNTER — TELEPHONE (OUTPATIENT)
Dept: CARDIOLOGY CLINIC | Age: 74
End: 2024-03-26

## 2024-03-26 NOTE — TELEPHONE ENCOUNTER
Pt was seeing her surgeon Dr. Huizar at [place] and asked about her swelling. Dr. Huizar told her to contact her Cardiac Dr. And let him evaluate. Pt states her swelling is in her legs and feet really bad. Pt asking to be seen so LES can see how bad it is. Please advise pt.[phone]

## 2024-03-27 NOTE — TELEPHONE ENCOUNTER
Called and spoke to the patient and she said that she isn't allowed to leave the facility because her insurance wont allow her to do it due to being on skilled stay. Spoke with the nurse of the patient as well and she said she can't leave because of insurance purposes only if it has to do with her skilled stay. If the nursing home doctor needs anything to please call LES and we can send over a change of orders.

## 2024-04-20 NOTE — PATIENT INSTRUCTIONS
Dacia Gaming CMA called and spoke to mother who verified patient's full name and  and was given the Negative Strep    You blood pressure drops when you stand - stop your Losartan and follow up with Dr Dennis Cartagena to recheck your blood pressure - call if you have any concerns after stopping your medication

## 2024-07-22 NOTE — PROGRESS NOTES
[facility]   Cardiac follow up    Referring Provider:  Jose Maria Haynes MD     Chief Complaint   Patient presents with    6 Month Follow-Up    Congestive Heart Failure    Hypertension    Hyperlipidemia      History of Present Illness:  Aiyana Catalan is a 74 y.o. female with a history of diabetes, dHF, hyperlipidemia, and hypertension. She was previously following with Dr. Interiano with [facility].     At  in July 2022 she reported that she was diagnosed with breast cancer.    She is s/p back surgery at [facility] on 2/26/24 and 2/28/24. Pt was released home from [facility] 4/2024.    Today, Aiyana is here for a 6 mos follow up.  She is ambulating with a cane, but she does not use one at home.  She is healing well from back surgery (2/2024) and bp has been stable.  She struggles with losing weight. She would like to lose more weight so medicare will pay for Inspire. She tried Ozempic but it made her too sick. She now takes 10 mg Jardiance daily. She does not have any trips planned at this time but hopes to travel in 2025.      Past Medical History:   has a past medical history of Aortic stenosis, Arthritis, Back pain, CHF (congestive heart failure) (HCC), Chronic diastolic heart failure (HCC), Diabetes mellitus (HCC), Diabetes mellitus (HCC), GERD (gastroesophageal reflux disease), High cholesterol, History of blood transfusion, Hypertension, Malignant neoplasm of right female breast (HCC), JOSE MANUEL (obstructive sleep apnea), Polio, Polio, Shingles, Sleep apnea, and Urinary problem.    Surgical History:   has a past surgical history that includes joint replacement; knee surgery; Colonoscopy; other surgical history (01/13/2014); hernia repair; joint replacement; SABRINA STEREO BREAST BX W LOC DEVICE 1ST LESION RIGHT (Right, 04/06/2022); back surgery; back surgery; Breast biopsy (Right, 05/03/2022); Breast surgery (Right, 05/03/2022); Humerus fracture surgery (Left, 08/23/2022); Arm Surgery (Left);

## 2024-08-06 ENCOUNTER — OFFICE VISIT (OUTPATIENT)
Dept: CARDIOLOGY CLINIC | Age: 74
End: 2024-08-06
Payer: MEDICARE

## 2024-08-06 VITALS
HEIGHT: 66 IN | OXYGEN SATURATION: 98 % | WEIGHT: 227 LBS | HEART RATE: 73 BPM | DIASTOLIC BLOOD PRESSURE: 60 MMHG | BODY MASS INDEX: 36.48 KG/M2 | SYSTOLIC BLOOD PRESSURE: 122 MMHG

## 2024-08-06 DIAGNOSIS — E78.00 HIGH CHOLESTEROL: ICD-10-CM

## 2024-08-06 DIAGNOSIS — I50.32 CHRONIC DIASTOLIC HEART FAILURE (HCC): Primary | ICD-10-CM

## 2024-08-06 DIAGNOSIS — I10 PRIMARY HYPERTENSION: ICD-10-CM

## 2024-08-06 DIAGNOSIS — I35.1 AORTIC VALVE INSUFFICIENCY, ETIOLOGY OF CARDIAC VALVE DISEASE UNSPECIFIED: ICD-10-CM

## 2024-08-06 DIAGNOSIS — G47.33 OSA ON CPAP: ICD-10-CM

## 2024-08-06 PROCEDURE — 1090F PRES/ABSN URINE INCON ASSESS: CPT | Performed by: INTERNAL MEDICINE

## 2024-08-06 PROCEDURE — 3074F SYST BP LT 130 MM HG: CPT | Performed by: INTERNAL MEDICINE

## 2024-08-06 PROCEDURE — G8427 DOCREV CUR MEDS BY ELIG CLIN: HCPCS | Performed by: INTERNAL MEDICINE

## 2024-08-06 PROCEDURE — G8399 PT W/DXA RESULTS DOCUMENT: HCPCS | Performed by: INTERNAL MEDICINE

## 2024-08-06 PROCEDURE — G8417 CALC BMI ABV UP PARAM F/U: HCPCS | Performed by: INTERNAL MEDICINE

## 2024-08-06 PROCEDURE — 99214 OFFICE O/P EST MOD 30 MIN: CPT | Performed by: INTERNAL MEDICINE

## 2024-08-06 PROCEDURE — 1036F TOBACCO NON-USER: CPT | Performed by: INTERNAL MEDICINE

## 2024-08-06 PROCEDURE — 1123F ACP DISCUSS/DSCN MKR DOCD: CPT | Performed by: INTERNAL MEDICINE

## 2024-08-06 PROCEDURE — 3078F DIAST BP <80 MM HG: CPT | Performed by: INTERNAL MEDICINE

## 2024-08-06 RX ORDER — METOPROLOL SUCCINATE 25 MG/1
25 TABLET, EXTENDED RELEASE ORAL DAILY
COMMUNITY

## 2024-08-06 RX ORDER — NIFEDIPINE 30 MG/1
30 TABLET, EXTENDED RELEASE ORAL DAILY
Qty: 90 TABLET | Refills: 3 | Status: SHIPPED | OUTPATIENT
Start: 2024-08-06

## 2024-08-06 RX ORDER — METOPROLOL SUCCINATE 50 MG/1
50 TABLET, EXTENDED RELEASE ORAL DAILY
COMMUNITY
Start: 2024-07-13

## 2024-10-30 ENCOUNTER — HOSPITAL ENCOUNTER (OUTPATIENT)
Dept: WOMENS IMAGING | Age: 74
Discharge: HOME | End: 2024-10-30
Payer: MEDICARE

## 2024-10-30 DIAGNOSIS — Z85.3 PERSONAL HISTORY OF MALIGNANT NEOPLASM OF BREAST: ICD-10-CM

## 2024-10-30 PROCEDURE — G0279 TOMOSYNTHESIS, MAMMO: HCPCS

## 2025-02-27 ENCOUNTER — OFFICE VISIT (OUTPATIENT)
Dept: CARDIOLOGY CLINIC | Age: 75
End: 2025-02-27
Payer: MEDICARE

## 2025-02-27 VITALS
HEIGHT: 66 IN | BODY MASS INDEX: 37.93 KG/M2 | OXYGEN SATURATION: 98 % | SYSTOLIC BLOOD PRESSURE: 124 MMHG | WEIGHT: 236 LBS | HEART RATE: 78 BPM | DIASTOLIC BLOOD PRESSURE: 62 MMHG

## 2025-02-27 DIAGNOSIS — I35.0 NONRHEUMATIC AORTIC VALVE STENOSIS: ICD-10-CM

## 2025-02-27 DIAGNOSIS — E78.5 HYPERLIPIDEMIA, UNSPECIFIED HYPERLIPIDEMIA TYPE: ICD-10-CM

## 2025-02-27 DIAGNOSIS — I50.32 CHRONIC DIASTOLIC HEART FAILURE (HCC): Primary | ICD-10-CM

## 2025-02-27 DIAGNOSIS — I35.1 AORTIC VALVE INSUFFICIENCY, ETIOLOGY OF CARDIAC VALVE DISEASE UNSPECIFIED: ICD-10-CM

## 2025-02-27 DIAGNOSIS — I10 PRIMARY HYPERTENSION: ICD-10-CM

## 2025-02-27 PROCEDURE — 1123F ACP DISCUSS/DSCN MKR DOCD: CPT | Performed by: INTERNAL MEDICINE

## 2025-02-27 PROCEDURE — 1090F PRES/ABSN URINE INCON ASSESS: CPT | Performed by: INTERNAL MEDICINE

## 2025-02-27 PROCEDURE — G8399 PT W/DXA RESULTS DOCUMENT: HCPCS | Performed by: INTERNAL MEDICINE

## 2025-02-27 PROCEDURE — 1159F MED LIST DOCD IN RCRD: CPT | Performed by: INTERNAL MEDICINE

## 2025-02-27 PROCEDURE — 3078F DIAST BP <80 MM HG: CPT | Performed by: INTERNAL MEDICINE

## 2025-02-27 PROCEDURE — G8427 DOCREV CUR MEDS BY ELIG CLIN: HCPCS | Performed by: INTERNAL MEDICINE

## 2025-02-27 PROCEDURE — 1036F TOBACCO NON-USER: CPT | Performed by: INTERNAL MEDICINE

## 2025-02-27 PROCEDURE — 3074F SYST BP LT 130 MM HG: CPT | Performed by: INTERNAL MEDICINE

## 2025-02-27 PROCEDURE — G8417 CALC BMI ABV UP PARAM F/U: HCPCS | Performed by: INTERNAL MEDICINE

## 2025-02-27 PROCEDURE — 99214 OFFICE O/P EST MOD 30 MIN: CPT | Performed by: INTERNAL MEDICINE

## 2025-02-27 RX ORDER — FUROSEMIDE 20 MG/1
40 TABLET ORAL DAILY
Qty: 180 TABLET | Refills: 3 | Status: SHIPPED | OUTPATIENT
Start: 2025-02-27

## 2025-02-27 NOTE — PROGRESS NOTES
[facility]   Cardiac follow up    Referring Provider:  Jose Maria Haynes MD     Chief Complaint   Patient presents with    6 Month Follow-Up    Congestive Heart Failure    Hyperlipidemia    Hypertension      History of Present Illness:  Aiyana Catalan is a 74 y.o. female with a history of diabetes, dHF, hyperlipidemia, and hypertension. She was previously following with Dr. Interiano with [facility].     At  in July 2022 she reported that she was diagnosed with breast cancer.    She is s/p back surgery at [facility] on 2/26/24 and 2/28/24. Pt was released home from [facility] 4/2024.    Today, Aiyana is here for a 6 mos follow up.  She had back surgery 1 year ago. She continues to work on weight loss. She would like to have inspire device implanted, but has to lose weight prior to having it done. Aiyana denies chest pain, palpitations, GAYLE, dizziness, or edema.     Past Medical History:   has a past medical history of Aortic stenosis, Arthritis, Back pain, CHF (congestive heart failure) (HCC), Chronic diastolic heart failure (HCC), Diabetes mellitus (HCC), Diabetes mellitus (HCC), GERD (gastroesophageal reflux disease), High cholesterol, History of blood transfusion, Hypertension, Malignant neoplasm of right female breast (HCC), JOSE MANUEL (obstructive sleep apnea), Polio, Polio, Shingles, Sleep apnea, and Urinary problem.    Surgical History:   has a past surgical history that includes joint replacement; knee surgery; Colonoscopy; other surgical history (01/13/2014); hernia repair; joint replacement; SABRINA STEREO BREAST BX W LOC DEVICE 1ST LESION RIGHT (Right, 04/06/2022); back surgery; back surgery; Breast biopsy (Right, 05/03/2022); Breast surgery (Right, 05/03/2022); Humerus fracture surgery (Left, 08/23/2022); Arm Surgery (Left); fracture surgery; eye surgery; Examination under anesthesia (N/A, 8/30/2023); lumbar fusion (N/A, 2/26/2024); and lumbar fusion (N/A, 2/28/2024).     Social History:

## 2025-03-17 NOTE — TELEPHONE ENCOUNTER
Pharmacy change.       Last ov:25 LES  Next ov:25 LES  Last EK24  Last labs:CareEverywhere  25  Last filled:   Disp Refills Start End    furosemide (LASIX) 20 MG tablet 180 tablet 3 2025 --    Sig - Route: Take 2 tablets by mouth daily - Oral    Sent to pharmacy as: Furosemide 20 MG Oral Tablet (LASIX)    Cosign for Ordering: Accepted by Eddi Light MD on 2025  9:02 AM    E-Prescribing Status: Receipt confirmed by pharmacy (2025  3:03 PM EST)

## 2025-03-18 RX ORDER — FUROSEMIDE 20 MG/1
20 TABLET ORAL 2 TIMES DAILY
Qty: 180 TABLET | Refills: 3 | Status: SHIPPED | OUTPATIENT
Start: 2025-03-18

## 2025-04-16 RX ORDER — SPIRONOLACTONE 25 MG/1
25 TABLET ORAL DAILY
Qty: 90 TABLET | Refills: 3 | Status: SHIPPED | OUTPATIENT
Start: 2025-04-16

## 2025-04-16 NOTE — TELEPHONE ENCOUNTER
Last ov:25 LES  Next ov:25 LES  Last EK24  Last labs: CareEverywhere 2025  Last filled:   Disp Refills Start End    spironolactone (ALDACTONE) 25 MG tablet 90 tablet 3 2024 --    Sig - Route: Take 1 tablet by mouth daily - Oral    Sent to pharmacy as: Spironolactone 25 MG Oral Tablet (ALDACTONE)    Cosign for Ordering: Accepted by Eddi Light MD on 2024 11:43 AM    E-Prescribing Status: Receipt confirmed by pharmacy (2024  1:54 PM EST)

## 2025-05-07 ENCOUNTER — OFFICE VISIT (OUTPATIENT)
Dept: ORTHOPEDIC SURGERY | Age: 75
End: 2025-05-07
Payer: MEDICARE

## 2025-05-07 VITALS — HEIGHT: 66 IN | RESPIRATION RATE: 16 BRPM | BODY MASS INDEX: 37.93 KG/M2 | WEIGHT: 236 LBS

## 2025-05-07 DIAGNOSIS — M19.012 GLENOHUMERAL ARTHRITIS, LEFT: ICD-10-CM

## 2025-05-07 DIAGNOSIS — M25.612 SHOULDER STIFFNESS, LEFT: ICD-10-CM

## 2025-05-07 DIAGNOSIS — S42.292D OTHER CLOSED DISPLACED FRACTURE OF PROXIMAL END OF LEFT HUMERUS WITH ROUTINE HEALING, SUBSEQUENT ENCOUNTER: ICD-10-CM

## 2025-05-07 DIAGNOSIS — Z87.81 S/P ORIF (OPEN REDUCTION INTERNAL FIXATION) FRACTURE: Primary | ICD-10-CM

## 2025-05-07 DIAGNOSIS — Z98.890 S/P ORIF (OPEN REDUCTION INTERNAL FIXATION) FRACTURE: Primary | ICD-10-CM

## 2025-05-07 PROCEDURE — 1125F AMNT PAIN NOTED PAIN PRSNT: CPT | Performed by: ORTHOPAEDIC SURGERY

## 2025-05-07 PROCEDURE — G8399 PT W/DXA RESULTS DOCUMENT: HCPCS | Performed by: ORTHOPAEDIC SURGERY

## 2025-05-07 PROCEDURE — 1159F MED LIST DOCD IN RCRD: CPT | Performed by: ORTHOPAEDIC SURGERY

## 2025-05-07 PROCEDURE — 1036F TOBACCO NON-USER: CPT | Performed by: ORTHOPAEDIC SURGERY

## 2025-05-07 PROCEDURE — 1123F ACP DISCUSS/DSCN MKR DOCD: CPT | Performed by: ORTHOPAEDIC SURGERY

## 2025-05-07 PROCEDURE — 1090F PRES/ABSN URINE INCON ASSESS: CPT | Performed by: ORTHOPAEDIC SURGERY

## 2025-05-07 PROCEDURE — 99214 OFFICE O/P EST MOD 30 MIN: CPT | Performed by: ORTHOPAEDIC SURGERY

## 2025-05-07 PROCEDURE — G8427 DOCREV CUR MEDS BY ELIG CLIN: HCPCS | Performed by: ORTHOPAEDIC SURGERY

## 2025-05-07 PROCEDURE — G8417 CALC BMI ABV UP PARAM F/U: HCPCS | Performed by: ORTHOPAEDIC SURGERY

## 2025-05-07 NOTE — PROGRESS NOTES
ORTHOPAEDIC OFFICE NOTE    Chief Complaint   Patient presents with    Shoulder Pain     Left shoulder pain            5/7/25  Patient returns to clinic today for follow-up of her left shoulder/arm  She underwent left proximal humerus and shaft fracture ORIF back in 2022  She had a complicated history prior to undergoing surgery for this fracture, refer to below  She was last seen approximately 2 years ago  She describes some intermittent pain in the left biceps region  She also has persistent diminished motion/stiffness in the left shoulder  Since we last saw her, she had extensive lumbar spine surgery  She also has cervical thoracic issues, and there was recommendations for surgery there as well, however she is holding off on that  No incisional issues in regards to her left arm/shoulder  No numbness or tingling      8/23/23  FU 1 year s/p left proximal humerus ORIF  Doing okay  Pain is minimal  Mostly just stiff and weak  Has continued working with physical therapy, but a lot of her exercises have been difficult to perform due to bilateral hand numbness and tingling, especially the ones that require her to hold something  Scheduled to see surgeon at [place] [facility] for cervical radiculopathy symptoms  Able to perform basic ADLs, but has to modify some due to her lack of ROM  No other new complaints      2/13/23  Fu for left shoulder, 6 months s/p left prox humerus and humeral shaft fracture ORIF  Doing well  No pain in left shoulder, just stiffness  No N/T  Still working with outpt PT, states she wants to get more motion and knows she won't push herself without the therapist  Has seen [facility] hand surgeon since last visit, diagnosed with tendonitis and arthritis of bilateral hands and wrist, currently in left wrist brace and undergoing OT  No new complaints or concerns      11/14/22  Aiyana returns to clinic today for follow-up of her left shoulder/brachium  She reports her left arm is doing well  She is

## 2025-05-09 ENCOUNTER — APPOINTMENT (OUTPATIENT)
Dept: CT IMAGING | Age: 75
End: 2025-05-09
Payer: MEDICARE

## 2025-05-09 ENCOUNTER — HOSPITAL ENCOUNTER (EMERGENCY)
Age: 75
Discharge: HOME | End: 2025-05-09
Payer: MEDICARE

## 2025-05-09 ENCOUNTER — APPOINTMENT (OUTPATIENT)
Dept: ULTRASOUND IMAGING | Age: 75
End: 2025-05-09
Payer: MEDICARE

## 2025-05-09 VITALS
WEIGHT: 240.1 LBS | BODY MASS INDEX: 38.59 KG/M2 | TEMPERATURE: 97.8 F | OXYGEN SATURATION: 100 % | SYSTOLIC BLOOD PRESSURE: 166 MMHG | RESPIRATION RATE: 16 BRPM | HEART RATE: 64 BPM | DIASTOLIC BLOOD PRESSURE: 81 MMHG | HEIGHT: 66 IN

## 2025-05-09 DIAGNOSIS — M54.9 ACUTE RIGHT-SIDED BACK PAIN, UNSPECIFIED BACK LOCATION: Primary | ICD-10-CM

## 2025-05-09 LAB
ALBUMIN SERPL-MCNC: 4.1 G/DL (ref 3.4–5)
ALBUMIN/GLOB SERPL: 1.4 {RATIO} (ref 1.1–2.2)
ALP SERPL-CCNC: 101 U/L (ref 40–129)
ALT SERPL-CCNC: 11 U/L (ref 10–40)
ANION GAP SERPL CALCULATED.3IONS-SCNC: 8 MMOL/L (ref 3–16)
AST SERPL-CCNC: 19 U/L (ref 15–37)
BASOPHILS # BLD: 0 K/UL (ref 0–0.2)
BASOPHILS NFR BLD: 0.5 %
BILIRUB SERPL-MCNC: 0.3 MG/DL (ref 0–1)
BILIRUB UR QL STRIP.AUTO: NEGATIVE
BUN SERPL-MCNC: 16 MG/DL (ref 7–20)
CALCIUM SERPL-MCNC: 9.3 MG/DL (ref 8.3–10.6)
CHLORIDE SERPL-SCNC: 102 MMOL/L (ref 99–110)
CLARITY UR: CLEAR
CO2 SERPL-SCNC: 28 MMOL/L (ref 21–32)
COLOR UR: YELLOW
CREAT SERPL-MCNC: 0.8 MG/DL (ref 0.6–1.2)
DEPRECATED RDW RBC AUTO: 15.1 % (ref 12.4–15.4)
EOSINOPHIL # BLD: 0.3 K/UL (ref 0–0.6)
EOSINOPHIL NFR BLD: 5.3 %
GFR SERPLBLD CREATININE-BSD FMLA CKD-EPI: 76 ML/MIN/{1.73_M2}
GLUCOSE SERPL-MCNC: 126 MG/DL (ref 70–99)
GLUCOSE UR STRIP.AUTO-MCNC: NEGATIVE MG/DL
HCT VFR BLD AUTO: 35.1 % (ref 36–48)
HGB BLD-MCNC: 11.4 G/DL (ref 12–16)
HGB UR QL STRIP.AUTO: NEGATIVE
KETONES UR STRIP.AUTO-MCNC: NEGATIVE MG/DL
LEUKOCYTE ESTERASE UR QL STRIP.AUTO: NEGATIVE
LIPASE SERPL-CCNC: 12 U/L (ref 13–60)
LYMPHOCYTES # BLD: 2 K/UL (ref 1–5.1)
LYMPHOCYTES NFR BLD: 30.3 %
MCH RBC QN AUTO: 27.9 PG (ref 26–34)
MCHC RBC AUTO-ENTMCNC: 32.4 G/DL (ref 31–36)
MCV RBC AUTO: 86 FL (ref 80–100)
MONOCYTES # BLD: 0.5 K/UL (ref 0–1.3)
MONOCYTES NFR BLD: 7 %
NEUTROPHILS # BLD: 3.7 K/UL (ref 1.7–7.7)
NEUTROPHILS NFR BLD: 56.9 %
NITRITE UR QL STRIP.AUTO: NEGATIVE
PH UR STRIP.AUTO: 6 [PH] (ref 5–8)
PLATELET # BLD AUTO: 180 K/UL (ref 135–450)
PMV BLD AUTO: 8.1 FL (ref 5–10.5)
POTASSIUM SERPL-SCNC: 4 MMOL/L (ref 3.5–5.1)
PROT SERPL-MCNC: 7.1 G/DL (ref 6.4–8.2)
PROT UR STRIP.AUTO-MCNC: NEGATIVE MG/DL
RBC # BLD AUTO: 4.08 M/UL (ref 4–5.2)
SODIUM SERPL-SCNC: 138 MMOL/L (ref 136–145)
SP GR UR STRIP.AUTO: <=1.005 (ref 1–1.03)
UA COMPLETE W REFLEX CULTURE PNL UR: NORMAL
UA DIPSTICK W REFLEX MICRO PNL UR: NORMAL
URN SPEC COLLECT METH UR: NORMAL
UROBILINOGEN UR STRIP-ACNC: 0.2 E.U./DL
WBC # BLD AUTO: 6.5 K/UL (ref 4–11)

## 2025-05-09 PROCEDURE — 96375 TX/PRO/DX INJ NEW DRUG ADDON: CPT

## 2025-05-09 PROCEDURE — 99284 EMERGENCY DEPT VISIT MOD MDM: CPT

## 2025-05-09 PROCEDURE — 6360000002 HC RX W HCPCS: Performed by: PHYSICIAN ASSISTANT

## 2025-05-09 PROCEDURE — 96374 THER/PROPH/DIAG INJ IV PUSH: CPT

## 2025-05-09 PROCEDURE — 72131 CT LUMBAR SPINE W/O DYE: CPT

## 2025-05-09 PROCEDURE — 81003 URINALYSIS AUTO W/O SCOPE: CPT

## 2025-05-09 PROCEDURE — 83690 ASSAY OF LIPASE: CPT

## 2025-05-09 PROCEDURE — 6370000000 HC RX 637 (ALT 250 FOR IP): Performed by: PHYSICIAN ASSISTANT

## 2025-05-09 PROCEDURE — 80053 COMPREHEN METABOLIC PANEL: CPT

## 2025-05-09 PROCEDURE — 85025 COMPLETE CBC W/AUTO DIFF WBC: CPT

## 2025-05-09 PROCEDURE — 76705 ECHO EXAM OF ABDOMEN: CPT

## 2025-05-09 RX ORDER — LIDOCAINE 50 MG/G
1 PATCH TOPICAL DAILY
Qty: 30 PATCH | Refills: 0 | Status: ON HOLD | OUTPATIENT
Start: 2025-05-09

## 2025-05-09 RX ORDER — MORPHINE SULFATE 4 MG/ML
4 INJECTION, SOLUTION INTRAMUSCULAR; INTRAVENOUS ONCE
Refills: 0 | Status: COMPLETED | OUTPATIENT
Start: 2025-05-09 | End: 2025-05-09

## 2025-05-09 RX ORDER — ONDANSETRON 2 MG/ML
4 INJECTION INTRAMUSCULAR; INTRAVENOUS ONCE
Status: COMPLETED | OUTPATIENT
Start: 2025-05-09 | End: 2025-05-09

## 2025-05-09 RX ORDER — HYDROCODONE BITARTRATE AND ACETAMINOPHEN 5; 325 MG/1; MG/1
1 TABLET ORAL ONCE
Status: COMPLETED | OUTPATIENT
Start: 2025-05-09 | End: 2025-05-09

## 2025-05-09 RX ORDER — LIDOCAINE 4 G/G
1 PATCH TOPICAL DAILY
Status: DISCONTINUED | OUTPATIENT
Start: 2025-05-09 | End: 2025-05-09 | Stop reason: HOSPADM

## 2025-05-09 RX ORDER — CYCLOBENZAPRINE HCL 10 MG
5 TABLET ORAL 3 TIMES DAILY PRN
Qty: 6 TABLET | Refills: 0 | Status: ON HOLD | OUTPATIENT
Start: 2025-05-09 | End: 2025-05-19

## 2025-05-09 RX ADMIN — ONDANSETRON 4 MG: 2 INJECTION, SOLUTION INTRAMUSCULAR; INTRAVENOUS at 15:41

## 2025-05-09 RX ADMIN — HYDROCODONE BITARTRATE AND ACETAMINOPHEN 1 TABLET: 5; 325 TABLET ORAL at 14:44

## 2025-05-09 RX ADMIN — MORPHINE SULFATE 4 MG: 4 INJECTION INTRAVENOUS at 15:41

## 2025-05-09 ASSESSMENT — LIFESTYLE VARIABLES
HOW OFTEN DO YOU HAVE A DRINK CONTAINING ALCOHOL: NEVER
HOW MANY STANDARD DRINKS CONTAINING ALCOHOL DO YOU HAVE ON A TYPICAL DAY: PATIENT DOES NOT DRINK

## 2025-05-09 ASSESSMENT — ENCOUNTER SYMPTOMS
COUGH: 0
BACK PAIN: 1
SHORTNESS OF BREATH: 0
DIARRHEA: 0
RHINORRHEA: 0
NAUSEA: 0
VOMITING: 0
ABDOMINAL PAIN: 0
WHEEZING: 0

## 2025-05-09 ASSESSMENT — PAIN SCALES - GENERAL
PAINLEVEL_OUTOF10: 9
PAINLEVEL_OUTOF10: 9
PAINLEVEL_OUTOF10: 7

## 2025-05-09 ASSESSMENT — PAIN DESCRIPTION - ORIENTATION: ORIENTATION: RIGHT;LOWER

## 2025-05-09 ASSESSMENT — PAIN DESCRIPTION - LOCATION
LOCATION: BACK
LOCATION: BACK

## 2025-05-09 ASSESSMENT — PAIN - FUNCTIONAL ASSESSMENT: PAIN_FUNCTIONAL_ASSESSMENT: 0-10

## 2025-05-09 ASSESSMENT — PAIN DESCRIPTION - DESCRIPTORS: DESCRIPTORS: ACHING

## 2025-05-09 NOTE — ED PROVIDER NOTES
[facility] EMERGENCY DEPARTMENT  EMERGENCY DEPARTMENT ENCOUNTER        Pt Name: Aiyana Catalan  MRN: [id number]  Birthdate 1950  Date of evaluation: 5/9/2025  Provider: Monica Fatima PA-C  PCP: Jose Maria Haynes MD  Note Started: 12:29 PM EDT 5/9/25      ANTONIO. I have evaluated this patient.        CHIEF COMPLAINT       Chief Complaint   Patient presents with    Back Pain     Pt c/o rt lower back pain, hx back surgery, pt sees DR Maldonado, she tried getting into his office       HISTORY OF PRESENT ILLNESS: 1 or more Elements     History From: patient   Limitations to history : None    Aiyana Catalan is a 74 y.o. female who presents for evaluation of right mid to low back pain.  States that she has had this intermittently since February.  History of lumbar fusion with Dr. Huizar over a year ago.  When the pain started and she was seen by him she was told that it was secondary to a pin that had shifted.  She was told to take Tylenol and follow-up if becomes worse.  States the pain has been severe and consistent since Wednesday.  She has tried to call the office many times and has left voicemails and has not heard back.  States that she has been alternating Tylenol and ibuprofen and also took Norco last night number for severe pain.  She states that she does not feel like she can wait throughout the weekend.  No new injury or trauma.  No heavy lifting or twisting.  States that it is the same pain that she has been having since February intermittently.  No numbness tingling or weakness distally.  No saddle or seizure, bladder retention or bowel incontinence.  No fevers or chills.  No urinary symptoms.  No nausea vomiting diarrhea.  She has no other complaints or concerns at this time    Nursing Notes were all reviewed and agreed with or any disagreements were addressed in the HPI.    REVIEW OF SYSTEMS :      Review of Systems   Constitutional:  Negative for appetite change, chills and fever.

## 2025-05-09 NOTE — ED NOTES
Pt has sugar monitoring and states is down to 85, requesting food so it does not fall further. Pt given peanut butter and cracker and apple juice at this time.

## 2025-05-14 ENCOUNTER — HOSPITAL ENCOUNTER (OUTPATIENT)
Age: 75
Setting detail: OBSERVATION
LOS: 2 days | Discharge: HOME | End: 2025-05-20
Attending: EMERGENCY MEDICINE | Admitting: INTERNAL MEDICINE
Payer: MEDICARE

## 2025-05-14 ENCOUNTER — APPOINTMENT (OUTPATIENT)
Dept: CT IMAGING | Age: 75
End: 2025-05-14
Payer: MEDICARE

## 2025-05-14 DIAGNOSIS — S39.012A STRAIN OF LUMBAR REGION, INITIAL ENCOUNTER: ICD-10-CM

## 2025-05-14 DIAGNOSIS — M54.16 LUMBAR RADICULOPATHY: ICD-10-CM
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
## 2025-05-14 PROBLEM — M54.50 BACK PAIN, LUMBOSACRAL: Status: ACTIVE | Noted: 2025-05-14

## 2025-05-14 LAB
ALBUMIN SERPL-MCNC: 4.4 G/DL (ref 3.4–5)
ALBUMIN/GLOB SERPL: 1.6 {RATIO} (ref 1.1–2.2)
ALP SERPL-CCNC: 106 U/L (ref 40–129)
ALT SERPL-CCNC: 11 U/L (ref 10–40)
ANION GAP SERPL CALCULATED.3IONS-SCNC: 11 MMOL/L (ref 3–16)
AST SERPL-CCNC: 25 U/L (ref 15–37)
BASOPHILS # BLD: 0 K/UL (ref 0–0.2)
BASOPHILS NFR BLD: 0.5 %
BILIRUB SERPL-MCNC: <0.2 MG/DL (ref 0–1)
BUN SERPL-MCNC: 11 MG/DL (ref 7–20)
CALCIUM SERPL-MCNC: 9.5 MG/DL (ref 8.3–10.6)
CHLORIDE SERPL-SCNC: 101 MMOL/L (ref 99–110)
CO2 SERPL-SCNC: 30 MMOL/L (ref 21–32)
CREAT SERPL-MCNC: 0.9 MG/DL (ref 0.6–1.2)
DEPRECATED RDW RBC AUTO: 14.5 % (ref 12.4–15.4)
EOSINOPHIL # BLD: 0.3 K/UL (ref 0–0.6)
EOSINOPHIL NFR BLD: 3.5 %
GFR SERPLBLD CREATININE-BSD FMLA CKD-EPI: 66 ML/MIN/{1.73_M2}
GLUCOSE BLD-MCNC: 93 MG/DL (ref 70–99)
GLUCOSE SERPL-MCNC: 97 MG/DL (ref 70–99)
HCT VFR BLD AUTO: 37.4 % (ref 36–48)
HGB BLD-MCNC: 12.2 G/DL (ref 12–16)
LYMPHOCYTES # BLD: 1.3 K/UL (ref 1–5.1)
LYMPHOCYTES NFR BLD: 16.7 %
MCH RBC QN AUTO: 28.1 PG (ref 26–34)
MCHC RBC AUTO-ENTMCNC: 32.6 G/DL (ref 31–36)
MCV RBC AUTO: 86.2 FL (ref 80–100)
MONOCYTES # BLD: 0.5 K/UL (ref 0–1.3)
MONOCYTES NFR BLD: 5.7 %
NEUTROPHILS # BLD: 5.9 K/UL (ref 1.7–7.7)
NEUTROPHILS NFR BLD: 73.6 %
PERFORMED ON: NORMAL
PLATELET # BLD AUTO: 184 K/UL (ref 135–450)
PMV BLD AUTO: 8.3 FL (ref 5–10.5)
POTASSIUM SERPL-SCNC: 4.7 MMOL/L (ref 3.5–5.1)
PROT SERPL-MCNC: 7.2 G/DL (ref 6.4–8.2)
RBC # BLD AUTO: 4.33 M/UL (ref 4–5.2)
SODIUM SERPL-SCNC: 142 MMOL/L (ref 136–145)
WBC # BLD AUTO: 8 K/UL (ref 4–11)

## 2025-05-14 PROCEDURE — 85025 COMPLETE CBC W/AUTO DIFF WBC: CPT

## 2025-05-14 PROCEDURE — 36415 COLL VENOUS BLD VENIPUNCTURE: CPT

## 2025-05-14 PROCEDURE — 6360000002 HC RX W HCPCS

## 2025-05-14 PROCEDURE — 80053 COMPREHEN METABOLIC PANEL: CPT

## 2025-05-14 PROCEDURE — 6370000000 HC RX 637 (ALT 250 FOR IP)

## 2025-05-14 PROCEDURE — 96372 THER/PROPH/DIAG INJ SC/IM: CPT

## 2025-05-14 PROCEDURE — 83036 HEMOGLOBIN GLYCOSYLATED A1C: CPT

## 2025-05-14 PROCEDURE — 6370000000 HC RX 637 (ALT 250 FOR IP): Performed by: INTERNAL MEDICINE

## 2025-05-14 PROCEDURE — 51702 INSERT TEMP BLADDER CATH: CPT

## 2025-05-14 PROCEDURE — 99285 EMERGENCY DEPT VISIT HI MDM: CPT

## 2025-05-14 PROCEDURE — 72131 CT LUMBAR SPINE W/O DYE: CPT

## 2025-05-14 PROCEDURE — 1200000000 HC SEMI PRIVATE

## 2025-05-14 RX ORDER — KETOROLAC TROMETHAMINE 30 MG/ML
30 INJECTION, SOLUTION INTRAMUSCULAR; INTRAVENOUS ONCE
Status: COMPLETED | OUTPATIENT
Start: 2025-05-14 | End: 2025-05-14

## 2025-05-14 RX ORDER — METOPROLOL SUCCINATE 25 MG/1
25 TABLET, EXTENDED RELEASE ORAL DAILY
Status: DISCONTINUED | OUTPATIENT
Start: 2025-05-15 | End: 2025-05-15

## 2025-05-14 RX ORDER — DOCUSATE SODIUM 100 MG/1
100 CAPSULE, LIQUID FILLED ORAL 2 TIMES DAILY
COMMUNITY

## 2025-05-14 RX ORDER — INSULIN GLARGINE 100 [IU]/ML
26 INJECTION, SOLUTION SUBCUTANEOUS NIGHTLY
Status: DISCONTINUED | OUTPATIENT
Start: 2025-05-15 | End: 2025-05-20 | Stop reason: HOSPADM

## 2025-05-14 RX ORDER — HYDROCODONE BITARTRATE AND ACETAMINOPHEN 5; 325 MG/1; MG/1
1 TABLET ORAL ONCE
Status: COMPLETED | OUTPATIENT
Start: 2025-05-14 | End: 2025-05-14

## 2025-05-14 RX ORDER — LOSARTAN POTASSIUM 100 MG/1
100 TABLET ORAL DAILY
Status: DISCONTINUED | OUTPATIENT
Start: 2025-05-15 | End: 2025-05-20 | Stop reason: HOSPADM

## 2025-05-14 RX ORDER — LIDOCAINE HYDROCHLORIDE 20 MG/ML
JELLY TOPICAL
Status: DISCONTINUED | OUTPATIENT
Start: 2025-05-14 | End: 2025-05-20 | Stop reason: HOSPADM

## 2025-05-14 RX ORDER — SODIUM CHLORIDE 0.9 % (FLUSH) 0.9 %
5-40 SYRINGE (ML) INJECTION PRN
Status: DISCONTINUED | OUTPATIENT
Start: 2025-05-14 | End: 2025-05-20 | Stop reason: HOSPADM

## 2025-05-14 RX ORDER — OXYCODONE AND ACETAMINOPHEN 5; 325 MG/1; MG/1
1 TABLET ORAL EVERY 6 HOURS PRN
Refills: 0 | Status: DISCONTINUED | OUTPATIENT
Start: 2025-05-14 | End: 2025-05-16

## 2025-05-14 RX ORDER — GLUCAGON 1 MG/ML
1 KIT INJECTION PRN
Status: DISCONTINUED | OUTPATIENT
Start: 2025-05-14 | End: 2025-05-15 | Stop reason: SDUPTHER

## 2025-05-14 RX ORDER — GABAPENTIN 300 MG/1
300 CAPSULE ORAL 2 TIMES DAILY
Status: DISCONTINUED | OUTPATIENT
Start: 2025-05-15 | End: 2025-05-20 | Stop reason: HOSPADM

## 2025-05-14 RX ORDER — LIDOCAINE 4 G/G
1 PATCH TOPICAL DAILY
Status: DISCONTINUED | OUTPATIENT
Start: 2025-05-14 | End: 2025-05-14

## 2025-05-14 RX ORDER — ONDANSETRON 4 MG/1
4 TABLET, ORALLY DISINTEGRATING ORAL ONCE
Status: COMPLETED | OUTPATIENT
Start: 2025-05-14 | End: 2025-05-14

## 2025-05-14 RX ORDER — GABAPENTIN 300 MG/1
600 CAPSULE ORAL 3 TIMES DAILY
Status: DISCONTINUED | OUTPATIENT
Start: 2025-05-14 | End: 2025-05-14

## 2025-05-14 RX ORDER — NIFEDIPINE 30 MG/1
30 TABLET, EXTENDED RELEASE ORAL DAILY
Status: DISCONTINUED | OUTPATIENT
Start: 2025-05-15 | End: 2025-05-20 | Stop reason: HOSPADM

## 2025-05-14 RX ORDER — LIDOCAINE 4 G/G
1 PATCH TOPICAL DAILY
Status: DISCONTINUED | OUTPATIENT
Start: 2025-05-15 | End: 2025-05-15

## 2025-05-14 RX ORDER — PANTOPRAZOLE SODIUM 40 MG/1
40 TABLET, DELAYED RELEASE ORAL DAILY
Status: DISCONTINUED | OUTPATIENT
Start: 2025-05-15 | End: 2025-05-20 | Stop reason: HOSPADM

## 2025-05-14 RX ORDER — SODIUM CHLORIDE 0.9 % (FLUSH) 0.9 %
5-40 SYRINGE (ML) INJECTION EVERY 12 HOURS SCHEDULED
Status: DISCONTINUED | OUTPATIENT
Start: 2025-05-14 | End: 2025-05-20 | Stop reason: HOSPADM

## 2025-05-14 RX ORDER — SODIUM CHLORIDE 9 MG/ML
INJECTION, SOLUTION INTRAVENOUS PRN
Status: DISCONTINUED | OUTPATIENT
Start: 2025-05-14 | End: 2025-05-20 | Stop reason: HOSPADM

## 2025-05-14 RX ORDER — ASPIRIN 81 MG/1
81 TABLET, CHEWABLE ORAL DAILY
COMMUNITY

## 2025-05-14 RX ORDER — DEXTROSE MONOHYDRATE 100 MG/ML
INJECTION, SOLUTION INTRAVENOUS CONTINUOUS PRN
Status: DISCONTINUED | OUTPATIENT
Start: 2025-05-14 | End: 2025-05-15 | Stop reason: SDUPTHER

## 2025-05-14 RX ORDER — METOPROLOL SUCCINATE 50 MG/1
50 TABLET, EXTENDED RELEASE ORAL DAILY
Status: DISCONTINUED | OUTPATIENT
Start: 2025-05-15 | End: 2025-05-15

## 2025-05-14 RX ORDER — FUROSEMIDE 20 MG/1
20 TABLET ORAL 2 TIMES DAILY
Status: DISCONTINUED | OUTPATIENT
Start: 2025-05-14 | End: 2025-05-20 | Stop reason: HOSPADM

## 2025-05-14 RX ORDER — CYCLOBENZAPRINE HCL 10 MG
5 TABLET ORAL 3 TIMES DAILY PRN
Status: DISCONTINUED | OUTPATIENT
Start: 2025-05-14 | End: 2025-05-20 | Stop reason: HOSPADM

## 2025-05-14 RX ORDER — FAMOTIDINE 20 MG/1
40 TABLET, FILM COATED ORAL NIGHTLY
Status: DISCONTINUED | OUTPATIENT
Start: 2025-05-14 | End: 2025-05-20 | Stop reason: HOSPADM

## 2025-05-14 RX ADMIN — CYCLOBENZAPRINE 5 MG: 10 TABLET, FILM COATED ORAL at 23:42

## 2025-05-14 RX ADMIN — GABAPENTIN 300 MG: 300 CAPSULE ORAL at 23:42

## 2025-05-14 RX ADMIN — HYDROCODONE BITARTRATE AND ACETAMINOPHEN 1 TABLET: 5; 325 TABLET ORAL at 14:47

## 2025-05-14 RX ADMIN — HYDROCODONE BITARTRATE AND ACETAMINOPHEN 1 TABLET: 5; 325 TABLET ORAL at 16:54

## 2025-05-14 RX ADMIN — KETOROLAC TROMETHAMINE 30 MG: 30 INJECTION, SOLUTION INTRAMUSCULAR at 14:47

## 2025-05-14 RX ADMIN — ONDANSETRON 4 MG: 4 TABLET, ORALLY DISINTEGRATING ORAL at 15:11

## 2025-05-14 RX ADMIN — OXYCODONE AND ACETAMINOPHEN 1 TABLET: 325; 5 TABLET ORAL at 23:41

## 2025-05-14 RX ADMIN — FAMOTIDINE 40 MG: 20 TABLET, FILM COATED ORAL at 23:42

## 2025-05-14 RX ADMIN — FUROSEMIDE 20 MG: 20 TABLET ORAL at 23:42

## 2025-05-14 ASSESSMENT — PAIN DESCRIPTION - PAIN TYPE
TYPE: ACUTE PAIN

## 2025-05-14 ASSESSMENT — PAIN DESCRIPTION - FREQUENCY
FREQUENCY: INTERMITTENT
FREQUENCY: CONTINUOUS
FREQUENCY: INTERMITTENT

## 2025-05-14 ASSESSMENT — PAIN DESCRIPTION - ORIENTATION
ORIENTATION: LOWER

## 2025-05-14 ASSESSMENT — PAIN DESCRIPTION - DESCRIPTORS
DESCRIPTORS: ACHING
DESCRIPTORS: ACHING

## 2025-05-14 ASSESSMENT — PAIN DESCRIPTION - LOCATION
LOCATION: BACK

## 2025-05-14 ASSESSMENT — PAIN SCALES - GENERAL
PAINLEVEL_OUTOF10: 10
PAINLEVEL_OUTOF10: 8
PAINLEVEL_OUTOF10: 10
PAINLEVEL_OUTOF10: 10
PAINLEVEL_OUTOF10: 8

## 2025-05-14 ASSESSMENT — PAIN DESCRIPTION - ONSET
ONSET: ON-GOING
ONSET: ON-GOING

## 2025-05-14 ASSESSMENT — PAIN - FUNCTIONAL ASSESSMENT: PAIN_FUNCTIONAL_ASSESSMENT: 0-10

## 2025-05-14 NOTE — ED PROVIDER NOTES
In addition to the advanced practice provider, I personally saw Aiyana Catalan and performed a substantive portion of the visit including all aspects of the medical decision making.    Medical Decision Making  Briefly, patient is a 74-year-old female presenting with low back pain.  She had a CT scan of her lumbar spine done on 5/9 which showed extensive postoperative changes anterior and posterior spinal fusion with a questionable early loosening involving the right sacroiliac joint fixation screw.  These findings were discussed at that time with neurosurgery and they recommended outpatient management.    She returns today due to worsening pain.  CT lumbar spine was repeated today which showed stable findings.  Patient unable to effectively ambulate in the ED.  Will admit for PT/OT and pain control.    EKG  N/A    SEP-1  Is this patient to be included in the SEP-1 Core Measure due to severe sepsis or septic shock?   No     Exclusion criteria - the patient is NOT to be included for SEP-1 Core Measure due to:  2+ SIRS criteria are not met    Screenings     [place] Coma Scale  Eye Opening: Spontaneous  Best Verbal Response: Oriented  Best Motor Response: Obeys commands  [place] Coma Scale Score: 15             Patient Referrals:  No follow-up provider specified.    Discharge Medications:  New Prescriptions    No medications on file       FINAL IMPRESSION  1. Fall, initial encounter    2. Strain of lumbar region, initial encounter        Blood pressure (!) 163/87, pulse 71, temperature 98.2 °F (36.8 °C), temperature source Oral, resp. rate 18, weight 105.7 kg (233 lb), SpO2 99%, not currently breastfeeding.     I personally saw the patient and made/approved the management plan and take responsibility for the patient management.    For further details of Aiyana Catalan's emergency department encounter, please see documentation by advanced practice provider, Hedy Vaughn.       Ruth Mccall DO  05/14/25 1921    
fracture/abnormality, patient is unable to walk and has severe pain. Her pain has been controlled with Norco in the emergency department.  She does have a history of lower back pain, she follows with [place] [facility].  CT with no evidence of compression  She was actually seen here on the ninth as well for this issue, her neurosurgeon was consulted because of a CT finding showing a possible loose screw, she was told just to follow-up outpatient for this issue. Added a CBC, CMP.          I am the Primary Clinician of Record.  FINAL IMPRESSION      1. Fall, initial encounter    2. Strain of lumbar region, initial encounter          DISPOSITION/PLAN     DISPOSITION Admitted 05/14/2025 05:57:27 PM               PATIENT REFERRED TO:  No follow-up provider specified.    DISCHARGE MEDICATIONS:  New Prescriptions    No medications on file       DISCONTINUED MEDICATIONS:  Discontinued Medications    No medications on file              (Please note that portions of this note were completed with a voice recognition program.  Efforts were made to edit the dictations but occasionally words are mis-transcribed.)    Hedy Vaughn PA-C (electronically signed)        Hedy Vaughn PA-C  05/14/25 [id number]

## 2025-05-14 NOTE — ED NOTES
Unable to give lido patch due to pt unable to roll. This RN straight cath'ed pt. Pt tolerated well. Unable to place new brief under pt at this time. Pt not incontinent, pt has shy bladder and needs cath'ed when out.

## 2025-05-15 ENCOUNTER — APPOINTMENT (OUTPATIENT)
Dept: MRI IMAGING | Age: 75
End: 2025-05-15
Payer: MEDICARE

## 2025-05-15 PROBLEM — K21.9 GERD (GASTROESOPHAGEAL REFLUX DISEASE): Status: ACTIVE | Noted: 2025-05-15

## 2025-05-15 LAB
EST. AVERAGE GLUCOSE BLD GHB EST-MCNC: 116.9 MG/DL
GLUCOSE BLD-MCNC: 112 MG/DL (ref 70–99)
GLUCOSE BLD-MCNC: 129 MG/DL (ref 70–99)
GLUCOSE BLD-MCNC: 133 MG/DL (ref 70–99)
HBA1C MFR BLD: 5.7 %
PERFORMED ON: ABNORMAL

## 2025-05-15 PROCEDURE — 6370000000 HC RX 637 (ALT 250 FOR IP): Performed by: NURSE PRACTITIONER

## 2025-05-15 PROCEDURE — 72148 MRI LUMBAR SPINE W/O DYE: CPT

## 2025-05-15 PROCEDURE — 1200000000 HC SEMI PRIVATE

## 2025-05-15 PROCEDURE — 2500000003 HC RX 250 WO HCPCS: Performed by: INTERNAL MEDICINE

## 2025-05-15 PROCEDURE — 6370000000 HC RX 637 (ALT 250 FOR IP): Performed by: INTERNAL MEDICINE

## 2025-05-15 RX ORDER — DEXTROSE MONOHYDRATE 100 MG/ML
INJECTION, SOLUTION INTRAVENOUS CONTINUOUS PRN
Status: DISCONTINUED | OUTPATIENT
Start: 2025-05-15 | End: 2025-05-20 | Stop reason: HOSPADM

## 2025-05-15 RX ORDER — INSULIN LISPRO 100 [IU]/ML
0-8 INJECTION, SOLUTION INTRAVENOUS; SUBCUTANEOUS
Status: DISCONTINUED | OUTPATIENT
Start: 2025-05-15 | End: 2025-05-20 | Stop reason: HOSPADM

## 2025-05-15 RX ORDER — LIDOCAINE 4 G/G
1 PATCH TOPICAL NIGHTLY
Status: DISCONTINUED | OUTPATIENT
Start: 2025-05-15 | End: 2025-05-20 | Stop reason: HOSPADM

## 2025-05-15 RX ORDER — TAMSULOSIN HYDROCHLORIDE 0.4 MG/1
0.4 CAPSULE ORAL DAILY
Status: DISCONTINUED | OUTPATIENT
Start: 2025-05-15 | End: 2025-05-20 | Stop reason: HOSPADM

## 2025-05-15 RX ORDER — GLUCAGON 1 MG/ML
1 KIT INJECTION PRN
Status: DISCONTINUED | OUTPATIENT
Start: 2025-05-15 | End: 2025-05-20 | Stop reason: HOSPADM

## 2025-05-15 RX ADMIN — OXYCODONE AND ACETAMINOPHEN 1 TABLET: 325; 5 TABLET ORAL at 14:10

## 2025-05-15 RX ADMIN — OXYCODONE AND ACETAMINOPHEN 1 TABLET: 325; 5 TABLET ORAL at 08:06

## 2025-05-15 RX ADMIN — SODIUM CHLORIDE, PRESERVATIVE FREE 10 ML: 5 INJECTION INTRAVENOUS at 08:07

## 2025-05-15 RX ADMIN — CYCLOBENZAPRINE 5 MG: 10 TABLET, FILM COATED ORAL at 21:26

## 2025-05-15 RX ADMIN — GABAPENTIN 300 MG: 300 CAPSULE ORAL at 21:25

## 2025-05-15 RX ADMIN — NIFEDIPINE 30 MG: 30 TABLET, FILM COATED, EXTENDED RELEASE ORAL at 08:07

## 2025-05-15 RX ADMIN — METOPROLOL SUCCINATE 75 MG: 50 TABLET, FILM COATED, EXTENDED RELEASE ORAL at 08:06

## 2025-05-15 RX ADMIN — LOSARTAN POTASSIUM 100 MG: 100 TABLET, FILM COATED ORAL at 08:06

## 2025-05-15 RX ADMIN — TAMSULOSIN HYDROCHLORIDE 0.4 MG: 0.4 CAPSULE ORAL at 14:11

## 2025-05-15 RX ADMIN — CYCLOBENZAPRINE 5 MG: 10 TABLET, FILM COATED ORAL at 14:11

## 2025-05-15 RX ADMIN — FUROSEMIDE 20 MG: 20 TABLET ORAL at 08:06

## 2025-05-15 RX ADMIN — OXYCODONE AND ACETAMINOPHEN 1 TABLET: 325; 5 TABLET ORAL at 21:25

## 2025-05-15 RX ADMIN — PANTOPRAZOLE SODIUM 40 MG: 40 TABLET, DELAYED RELEASE ORAL at 06:25

## 2025-05-15 RX ADMIN — GABAPENTIN 300 MG: 300 CAPSULE ORAL at 08:06

## 2025-05-15 RX ADMIN — SODIUM CHLORIDE, PRESERVATIVE FREE 10 ML: 5 INJECTION INTRAVENOUS at 21:33

## 2025-05-15 RX ADMIN — FAMOTIDINE 40 MG: 20 TABLET, FILM COATED ORAL at 21:25

## 2025-05-15 ASSESSMENT — PAIN SCALES - WONG BAKER
WONGBAKER_NUMERICALRESPONSE: HURTS A LITTLE BIT
WONGBAKER_NUMERICALRESPONSE: HURTS A LITTLE BIT

## 2025-05-15 ASSESSMENT — PAIN SCALES - GENERAL
PAINLEVEL_OUTOF10: 9
PAINLEVEL_OUTOF10: 2
PAINLEVEL_OUTOF10: 2
PAINLEVEL_OUTOF10: 8
PAINLEVEL_OUTOF10: 7
PAINLEVEL_OUTOF10: 2
PAINLEVEL_OUTOF10: 3
PAINLEVEL_OUTOF10: 7
PAINLEVEL_OUTOF10: 2
PAINLEVEL_OUTOF10: 3

## 2025-05-15 ASSESSMENT — PAIN DESCRIPTION - LOCATION
LOCATION: LEG;BACK
LOCATION: BACK
LOCATION: BACK

## 2025-05-15 ASSESSMENT — PAIN DESCRIPTION - ORIENTATION
ORIENTATION: LOWER
ORIENTATION: RIGHT;LEFT

## 2025-05-15 ASSESSMENT — PAIN DESCRIPTION - DESCRIPTORS
DESCRIPTORS: ACHING
DESCRIPTORS: ACHING

## 2025-05-15 NOTE — ED NOTES
Patient Name: Aiyana Catalan  : 1950 74 y.o.  MRN: [id number]  ED Room #: ED-0007/     Chief complaint:   Chief Complaint   Patient presents with    Back Pain     Pt presents to the ER with the complaint of lower back pain. Pt was shopping and slipped on a wet floor. Pt states she did not fall to the ground but twisted and \"hung over a cart\". Pt attempted to get into her car and drive but was unable to due the pain. Pt was in the ER 5 days ago for back pain and had an MRI.      Hospital Problem/Diagnosis:   Hospital Problems           Last Modified POA    * (Principal) Back pain, lumbosacral 2025 Yes         O2 Flow Rate:O2 Device: None (Room air)   (if applicable)  Cardiac Rhythm:   (if applicable)  Active LDA's:   Peripheral IV 25 Left Antecubital (Active)   Site Assessment Clean, dry & intact 25   Line Status Blood return noted 25      Urinary Catheter 25 [place] (Active)   $ Urethral catheter insertion $ Not inserted for procedure 25   Catheter Indications Urinary retention (acute or chronic), continuous bladder irrigation or bladder outlet obstruction;Prolonged immobilization (e.g. unstable thoracic or lumbar spine, multiple traumatic injuries such as pelvic fractures) 25          How does patient ambulate? Bed Bound    2. How does patient take pills? Unknown, no oral medications were given in the Emergency Department    3. Is patient alert? Alert    4. Is patient oriented? To Person, To Place, To Time, and To Situation    5.   Patient arrived from:  home  Facility Name: ___________________________________________    6. If patient is disoriented or from a Skill Nursing Facility has family been notified of admission? No    7. Patient belongings?     Disposition of belongings? Kept with Patient     8. Any specific patient or family belongings/needs/dynamics?   a.     9. Miscellaneous comments/pending orders?  a.       If there are any

## 2025-05-15 NOTE — H&P
History and Physical  Dr. Hussein  5/14/25    PCP: Jose Maria Haynes MD    Cc:   Chief Complaint   Patient presents with    Back Pain     Pt presents to the ER with the complaint of lower back pain. Pt was shopping and slipped on a wet floor. Pt states she did not fall to the ground but twisted and \"hung over a cart\". Pt attempted to get into her car and drive but was unable to due the pain. Pt was in the ER 5 days ago for back pain and had an MRI.        HPI:  Aiyana Catalan is a 74 y.o. female who has a past medical history of Aortic stenosis, Arthritis, Back pain, CHF (congestive heart failure) (HCC), Chronic diastolic heart failure (HCC), Diabetes mellitus (HCC), Diabetes mellitus (HCC), GERD (gastroesophageal reflux disease), High cholesterol, History of blood transfusion, Hypertension, Malignant neoplasm of right female breast (HCC), JOSE MANUEL (obstructive sleep apnea), Polio, Polio, Shingles, Sleep apnea, and Urinary problem.     Patient presents with Back pain, lumbosacral.  HPI  (1-3 for expanded problem focused, >=4 for detailed/comprehensive)      74 y.o. female who presents with right lower back pain.  Patient does have a history of right lower back pain, but she had an injury today.  Patient states that she was walking in the store, she slipped and fell forward, she tried to use her shopping cart to help her and she hyperextended her back.  She states since then, she has been in severe pain and has had a very difficult time walking.  Patient is a patient of Dr. Huizar's, she was seen in the ER 5/9/2025 for right lower back pain and his office was consulted, patient was told to follow-up outpatient for her pain.  Patient states that she has been calling and try to get a hold office but she has not been able to reach anybody.  Normally, she walks without assistance, she does have a cane that she uses at night.  She denies any loss of control of bladder or bowels, denies urinary retention.  He states during

## 2025-05-16 LAB
ANION GAP SERPL CALCULATED.3IONS-SCNC: 8 MMOL/L (ref 3–16)
BASOPHILS # BLD: 0 K/UL (ref 0–0.2)
BASOPHILS NFR BLD: 0.4 %
BUN SERPL-MCNC: 13 MG/DL (ref 7–20)
CALCIUM SERPL-MCNC: 8.7 MG/DL (ref 8.3–10.6)
CHLORIDE SERPL-SCNC: 101 MMOL/L (ref 99–110)
CO2 SERPL-SCNC: 30 MMOL/L (ref 21–32)
CREAT SERPL-MCNC: 1 MG/DL (ref 0.6–1.2)
DEPRECATED RDW RBC AUTO: 14.6 % (ref 12.4–15.4)
EOSINOPHIL # BLD: 0.4 K/UL (ref 0–0.6)
EOSINOPHIL NFR BLD: 6.5 %
GFR SERPLBLD CREATININE-BSD FMLA CKD-EPI: 58 ML/MIN/{1.73_M2}
GLUCOSE BLD-MCNC: 116 MG/DL (ref 70–99)
GLUCOSE BLD-MCNC: 124 MG/DL (ref 70–99)
GLUCOSE BLD-MCNC: 138 MG/DL (ref 70–99)
GLUCOSE BLD-MCNC: 176 MG/DL (ref 70–99)
GLUCOSE SERPL-MCNC: 121 MG/DL (ref 70–99)
HCT VFR BLD AUTO: 34.3 % (ref 36–48)
HGB BLD-MCNC: 11.2 G/DL (ref 12–16)
LYMPHOCYTES # BLD: 1.6 K/UL (ref 1–5.1)
LYMPHOCYTES NFR BLD: 28.2 %
MCH RBC QN AUTO: 28.1 PG (ref 26–34)
MCHC RBC AUTO-ENTMCNC: 32.5 G/DL (ref 31–36)
MCV RBC AUTO: 86.4 FL (ref 80–100)
MONOCYTES # BLD: 0.5 K/UL (ref 0–1.3)
MONOCYTES NFR BLD: 8.3 %
NEUTROPHILS # BLD: 3.2 K/UL (ref 1.7–7.7)
NEUTROPHILS NFR BLD: 56.6 %
PERFORMED ON: ABNORMAL
PLATELET # BLD AUTO: 153 K/UL (ref 135–450)
PMV BLD AUTO: 7.7 FL (ref 5–10.5)
POTASSIUM SERPL-SCNC: 3.7 MMOL/L (ref 3.5–5.1)
PROCALCITONIN SERPL IA-MCNC: 0.03 NG/ML (ref 0–0.15)
RBC # BLD AUTO: 3.97 M/UL (ref 4–5.2)
SODIUM SERPL-SCNC: 139 MMOL/L (ref 136–145)
WBC # BLD AUTO: 5.7 K/UL (ref 4–11)

## 2025-05-16 PROCEDURE — 2500000003 HC RX 250 WO HCPCS: Performed by: INTERNAL MEDICINE

## 2025-05-16 PROCEDURE — 6370000000 HC RX 637 (ALT 250 FOR IP): Performed by: INTERNAL MEDICINE

## 2025-05-16 PROCEDURE — G0378 HOSPITAL OBSERVATION PER HR: HCPCS

## 2025-05-16 PROCEDURE — 97535 SELF CARE MNGMENT TRAINING: CPT

## 2025-05-16 PROCEDURE — 80048 BASIC METABOLIC PNL TOTAL CA: CPT

## 2025-05-16 PROCEDURE — 97116 GAIT TRAINING THERAPY: CPT

## 2025-05-16 PROCEDURE — 85025 COMPLETE CBC W/AUTO DIFF WBC: CPT

## 2025-05-16 PROCEDURE — 6370000000 HC RX 637 (ALT 250 FOR IP): Performed by: NURSE PRACTITIONER

## 2025-05-16 PROCEDURE — 97530 THERAPEUTIC ACTIVITIES: CPT

## 2025-05-16 PROCEDURE — 97161 PT EVAL LOW COMPLEX 20 MIN: CPT

## 2025-05-16 PROCEDURE — 97165 OT EVAL LOW COMPLEX 30 MIN: CPT

## 2025-05-16 PROCEDURE — 84145 PROCALCITONIN (PCT): CPT

## 2025-05-16 RX ORDER — INSULIN GLARGINE 100 [IU]/ML
12 INJECTION, SOLUTION SUBCUTANEOUS ONCE
Status: COMPLETED | OUTPATIENT
Start: 2025-05-16 | End: 2025-05-16

## 2025-05-16 RX ORDER — PREDNISONE 20 MG/1
20 TABLET ORAL 2 TIMES DAILY
Status: DISCONTINUED | OUTPATIENT
Start: 2025-05-16 | End: 2025-05-20 | Stop reason: HOSPADM

## 2025-05-16 RX ORDER — TRAMADOL HYDROCHLORIDE 50 MG/1
50 TABLET ORAL EVERY 6 HOURS PRN
Status: DISCONTINUED | OUTPATIENT
Start: 2025-05-16 | End: 2025-05-16

## 2025-05-16 RX ORDER — HYDROCODONE BITARTRATE AND ACETAMINOPHEN 5; 325 MG/1; MG/1
1 TABLET ORAL 3 TIMES DAILY PRN
Status: DISCONTINUED | OUTPATIENT
Start: 2025-05-16 | End: 2025-05-20 | Stop reason: HOSPADM

## 2025-05-16 RX ADMIN — CYCLOBENZAPRINE 5 MG: 10 TABLET, FILM COATED ORAL at 06:11

## 2025-05-16 RX ADMIN — FAMOTIDINE 40 MG: 20 TABLET, FILM COATED ORAL at 21:03

## 2025-05-16 RX ADMIN — SODIUM CHLORIDE, PRESERVATIVE FREE 10 ML: 5 INJECTION INTRAVENOUS at 21:09

## 2025-05-16 RX ADMIN — NIFEDIPINE 30 MG: 30 TABLET, FILM COATED, EXTENDED RELEASE ORAL at 08:31

## 2025-05-16 RX ADMIN — OXYCODONE AND ACETAMINOPHEN 1 TABLET: 325; 5 TABLET ORAL at 06:11

## 2025-05-16 RX ADMIN — GABAPENTIN 300 MG: 300 CAPSULE ORAL at 21:03

## 2025-05-16 RX ADMIN — HYDROCODONE BITARTRATE AND ACETAMINOPHEN 1 TABLET: 5; 325 TABLET ORAL at 16:35

## 2025-05-16 RX ADMIN — FUROSEMIDE 20 MG: 20 TABLET ORAL at 08:31

## 2025-05-16 RX ADMIN — LOSARTAN POTASSIUM 100 MG: 100 TABLET, FILM COATED ORAL at 08:30

## 2025-05-16 RX ADMIN — CYCLOBENZAPRINE 5 MG: 10 TABLET, FILM COATED ORAL at 21:04

## 2025-05-16 RX ADMIN — TAMSULOSIN HYDROCHLORIDE 0.4 MG: 0.4 CAPSULE ORAL at 08:30

## 2025-05-16 RX ADMIN — TRAMADOL HYDROCHLORIDE 50 MG: 50 TABLET, COATED ORAL at 11:44

## 2025-05-16 RX ADMIN — PREDNISONE 20 MG: 20 TABLET ORAL at 08:56

## 2025-05-16 RX ADMIN — GABAPENTIN 300 MG: 300 CAPSULE ORAL at 08:31

## 2025-05-16 RX ADMIN — SODIUM CHLORIDE, PRESERVATIVE FREE 10 ML: 5 INJECTION INTRAVENOUS at 08:31

## 2025-05-16 RX ADMIN — PREDNISONE 20 MG: 20 TABLET ORAL at 21:04

## 2025-05-16 RX ADMIN — PANTOPRAZOLE SODIUM 40 MG: 40 TABLET, DELAYED RELEASE ORAL at 06:11

## 2025-05-16 RX ADMIN — METOPROLOL SUCCINATE 75 MG: 50 TABLET, FILM COATED, EXTENDED RELEASE ORAL at 08:30

## 2025-05-16 RX ADMIN — INSULIN GLARGINE 12 UNITS: 100 INJECTION, SOLUTION SUBCUTANEOUS at 22:10

## 2025-05-16 ASSESSMENT — PAIN SCALES - GENERAL
PAINLEVEL_OUTOF10: 7
PAINLEVEL_OUTOF10: 6
PAINLEVEL_OUTOF10: 8
PAINLEVEL_OUTOF10: 4
PAINLEVEL_OUTOF10: 2
PAINLEVEL_OUTOF10: 8
PAINLEVEL_OUTOF10: 8

## 2025-05-16 ASSESSMENT — PAIN DESCRIPTION - DESCRIPTORS
DESCRIPTORS: ACHING

## 2025-05-16 ASSESSMENT — PAIN DESCRIPTION - LOCATION
LOCATION: BACK
LOCATION: LEG;BACK
LOCATION: BACK

## 2025-05-16 ASSESSMENT — PAIN DESCRIPTION - ORIENTATION
ORIENTATION: LOWER;LEFT;RIGHT
ORIENTATION: LEFT;RIGHT
ORIENTATION: RIGHT
ORIENTATION: RIGHT;LEFT

## 2025-05-16 ASSESSMENT — PAIN SCALES - WONG BAKER
WONGBAKER_NUMERICALRESPONSE: HURTS A LITTLE BIT
WONGBAKER_NUMERICALRESPONSE: HURTS A LITTLE BIT

## 2025-05-16 NOTE — CARE COORDINATION
Discharge Planning Note:     Writer received a message from Utilization Review Specialist that patient status from IP to OBS and needs a Code 44 and MOON letter. Writer spoke to patient and explained status change. Patient is upset and declined to sign letters. KHUSHBOO Garcia  Manager notified.    Electronically signed by Xena Dutta RN on 5/16/2025 at 3:14 PM

## 2025-05-16 NOTE — CONSULTS
NRSG Consult Note     Patient Hossein is a very pleasant 75yo woman well known to me who is now over 1 year s/p a L2-pelvis fusion. Patient has done very well and progressed significantly over the past year. She was seen recently in my clinic for her year postop visit. Recently she presented to [facility] ER with acute onset LBP per chart review. Per the chart the patient is not having any new neurological deficits more pain. CT L spine was obtained demonstrating good placement of hardware and no overt complication or concern. MRI L spine was also obtained demonstrating moderate stenosis at the L1-2 segment above the previous fusion.    - recommend pain control per primary team, can try steroid pack, pain medication, lidocaine patches etc  - recommend rehab consult, patient lives at home alone and would like to return home  - no acute inpatient surgical plans, patient can follow up with my office for likely outpatient PILAR for further pain control. My office will call the patient to arrange this upon discharge    Please call with any questions    Gilbert Huizar MD  [facility]  [phone]    
exam:->RUQ PAIN Reason for Exam: RUQ pain Additional signs and symptoms: RUQ pain x 4 days per patient Relevant Medical/Surgical History: na FINDINGS: LIVER:  The liver is normal in size and echotexture.  There is no ductal dilatation or mass. BILIARY SYSTEM:  The gallbladder is within normal limits without evidence of cholelithiases.  The gallbladder wall is within normal limits.  The common bile duct measures 12 mm. RIGHT KIDNEY: The right kidney is unremarkable measuring 9.1 cm.  There is no renal mass or hydronephrosis. PANCREAS:  Visualized portions of the pancreas are unremarkable. OTHER: No evidence of right upper quadrant ascites.     1. Mild extrahepatic ductal dilatation.  If there is hyperbilirubinemia, recommend further evaluation with MRCP.     CT LUMBAR SPINE WO CONTRAST  Result Date: 5/9/2025  EXAMINATION: CT OF THE LUMBAR SPINE WITHOUT CONTRAST  5/9/2025 TECHNIQUE: CT of the lumbar spine was performed without the administration of intravenous contrast. Multiplanar reformatted images are provided for review. Adjustment of mA and/or kV according to patient size was utilized.  Automated exposure control, iterative reconstruction, and/or weight based adjustment of the mA/kV was utilized to reduce the radiation dose to as low as reasonably achievable. COMPARISON: 11/29/2023 and 11/28/2023 HISTORY: ORDERING SYSTEM PROVIDED HISTORY: R pain s/p screw TECHNOLOGIST PROVIDED HISTORY: Reason for exam:->R pain s/p screw Decision Support Exception - unselect if not a suspected or confirmed emergency medical condition->Emergency Medical Condition (MA) Reason for Exam: R pain s/p screw FINDINGS: BONES/ALIGNMENT: Interval extension of posterior spinal fusion hardware which spans the L2-S1 levels bilaterally, with fixation of the bilateral sacroiliac joints.  Subtle lucency surrounding the right sacroiliac joint screw which may reflect early loosening.  Associated laminectomies.  Status post anterior interbody fusion

## 2025-05-17 LAB
ANION GAP SERPL CALCULATED.3IONS-SCNC: 10 MMOL/L (ref 3–16)
BASOPHILS # BLD: 0 K/UL (ref 0–0.2)
BASOPHILS NFR BLD: 0.5 %
BUN SERPL-MCNC: 16 MG/DL (ref 7–20)
CALCIUM SERPL-MCNC: 9 MG/DL (ref 8.3–10.6)
CHLORIDE SERPL-SCNC: 99 MMOL/L (ref 99–110)
CO2 SERPL-SCNC: 27 MMOL/L (ref 21–32)
CREAT SERPL-MCNC: 0.8 MG/DL (ref 0.6–1.2)
DEPRECATED RDW RBC AUTO: 14.8 % (ref 12.4–15.4)
EOSINOPHIL # BLD: 0 K/UL (ref 0–0.6)
EOSINOPHIL NFR BLD: 0.1 %
GFR SERPLBLD CREATININE-BSD FMLA CKD-EPI: 76 ML/MIN/{1.73_M2}
GLUCOSE BLD-MCNC: 133 MG/DL (ref 70–99)
GLUCOSE BLD-MCNC: 139 MG/DL (ref 70–99)
GLUCOSE BLD-MCNC: 165 MG/DL (ref 70–99)
GLUCOSE BLD-MCNC: 166 MG/DL (ref 70–99)
GLUCOSE SERPL-MCNC: 146 MG/DL (ref 70–99)
HCT VFR BLD AUTO: 35.8 % (ref 36–48)
HGB BLD-MCNC: 11.9 G/DL (ref 12–16)
LYMPHOCYTES # BLD: 0.8 K/UL (ref 1–5.1)
LYMPHOCYTES NFR BLD: 9.6 %
MCH RBC QN AUTO: 28.1 PG (ref 26–34)
MCHC RBC AUTO-ENTMCNC: 33.2 G/DL (ref 31–36)
MCV RBC AUTO: 84.8 FL (ref 80–100)
MONOCYTES # BLD: 0.4 K/UL (ref 0–1.3)
MONOCYTES NFR BLD: 4.6 %
NEUTROPHILS # BLD: 6.7 K/UL (ref 1.7–7.7)
NEUTROPHILS NFR BLD: 85.2 %
PERFORMED ON: ABNORMAL
PLATELET # BLD AUTO: 178 K/UL (ref 135–450)
PMV BLD AUTO: 8.3 FL (ref 5–10.5)
POTASSIUM SERPL-SCNC: 4.1 MMOL/L (ref 3.5–5.1)
RBC # BLD AUTO: 4.22 M/UL (ref 4–5.2)
SODIUM SERPL-SCNC: 136 MMOL/L (ref 136–145)
WBC # BLD AUTO: 7.9 K/UL (ref 4–11)

## 2025-05-17 PROCEDURE — 6370000000 HC RX 637 (ALT 250 FOR IP): Performed by: NURSE PRACTITIONER

## 2025-05-17 PROCEDURE — 85025 COMPLETE CBC W/AUTO DIFF WBC: CPT

## 2025-05-17 PROCEDURE — 51702 INSERT TEMP BLADDER CATH: CPT

## 2025-05-17 PROCEDURE — 80048 BASIC METABOLIC PNL TOTAL CA: CPT

## 2025-05-17 PROCEDURE — 6370000000 HC RX 637 (ALT 250 FOR IP): Performed by: INTERNAL MEDICINE

## 2025-05-17 PROCEDURE — 51798 US URINE CAPACITY MEASURE: CPT

## 2025-05-17 PROCEDURE — G0378 HOSPITAL OBSERVATION PER HR: HCPCS

## 2025-05-17 PROCEDURE — 2500000003 HC RX 250 WO HCPCS: Performed by: INTERNAL MEDICINE

## 2025-05-17 RX ORDER — METHYLPREDNISOLONE 4 MG/1
TABLET ORAL
Qty: 1 KIT | Refills: 0 | Status: SHIPPED | OUTPATIENT
Start: 2025-05-17 | End: 2025-05-23

## 2025-05-17 RX ORDER — HYDROCODONE BITARTRATE AND ACETAMINOPHEN 5; 325 MG/1; MG/1
1 TABLET ORAL 3 TIMES DAILY PRN
Qty: 21 TABLET | Refills: 0 | Status: SHIPPED | OUTPATIENT
Start: 2025-05-17 | End: 2025-05-24

## 2025-05-17 RX ORDER — INSULIN GLARGINE 100 [IU]/ML
12 INJECTION, SOLUTION SUBCUTANEOUS ONCE
Status: COMPLETED | OUTPATIENT
Start: 2025-05-17 | End: 2025-05-17

## 2025-05-17 RX ADMIN — PANTOPRAZOLE SODIUM 40 MG: 40 TABLET, DELAYED RELEASE ORAL at 05:30

## 2025-05-17 RX ADMIN — HYDROCODONE BITARTRATE AND ACETAMINOPHEN 1 TABLET: 5; 325 TABLET ORAL at 08:32

## 2025-05-17 RX ADMIN — TAMSULOSIN HYDROCHLORIDE 0.4 MG: 0.4 CAPSULE ORAL at 08:21

## 2025-05-17 RX ADMIN — CYCLOBENZAPRINE 5 MG: 10 TABLET, FILM COATED ORAL at 06:21

## 2025-05-17 RX ADMIN — PREDNISONE 20 MG: 20 TABLET ORAL at 20:11

## 2025-05-17 RX ADMIN — PREDNISONE 20 MG: 20 TABLET ORAL at 08:21

## 2025-05-17 RX ADMIN — GABAPENTIN 300 MG: 300 CAPSULE ORAL at 08:21

## 2025-05-17 RX ADMIN — LOSARTAN POTASSIUM 100 MG: 100 TABLET, FILM COATED ORAL at 08:21

## 2025-05-17 RX ADMIN — NIFEDIPINE 30 MG: 30 TABLET, FILM COATED, EXTENDED RELEASE ORAL at 08:21

## 2025-05-17 RX ADMIN — GABAPENTIN 300 MG: 300 CAPSULE ORAL at 20:10

## 2025-05-17 RX ADMIN — HYDROCODONE BITARTRATE AND ACETAMINOPHEN 1 TABLET: 5; 325 TABLET ORAL at 18:48

## 2025-05-17 RX ADMIN — SODIUM CHLORIDE, PRESERVATIVE FREE 10 ML: 5 INJECTION INTRAVENOUS at 20:12

## 2025-05-17 RX ADMIN — INSULIN GLARGINE 12 UNITS: 100 INJECTION, SOLUTION SUBCUTANEOUS at 20:25

## 2025-05-17 RX ADMIN — HYDROCODONE BITARTRATE AND ACETAMINOPHEN 1 TABLET: 5; 325 TABLET ORAL at 00:31

## 2025-05-17 RX ADMIN — FUROSEMIDE 20 MG: 20 TABLET ORAL at 08:21

## 2025-05-17 RX ADMIN — CYCLOBENZAPRINE 5 MG: 10 TABLET, FILM COATED ORAL at 20:11

## 2025-05-17 RX ADMIN — FAMOTIDINE 40 MG: 20 TABLET, FILM COATED ORAL at 20:11

## 2025-05-17 RX ADMIN — SODIUM CHLORIDE, PRESERVATIVE FREE 10 ML: 5 INJECTION INTRAVENOUS at 08:22

## 2025-05-17 RX ADMIN — METOPROLOL SUCCINATE 75 MG: 50 TABLET, FILM COATED, EXTENDED RELEASE ORAL at 08:21

## 2025-05-17 ASSESSMENT — PAIN DESCRIPTION - FREQUENCY
FREQUENCY: INTERMITTENT

## 2025-05-17 ASSESSMENT — PAIN DESCRIPTION - DESCRIPTORS
DESCRIPTORS: ACHING
DESCRIPTORS: SHARP
DESCRIPTORS: ACHING

## 2025-05-17 ASSESSMENT — PAIN SCALES - GENERAL
PAINLEVEL_OUTOF10: 7
PAINLEVEL_OUTOF10: 10
PAINLEVEL_OUTOF10: 8
PAINLEVEL_OUTOF10: 9
PAINLEVEL_OUTOF10: 9
PAINLEVEL_OUTOF10: 2

## 2025-05-17 ASSESSMENT — PAIN DESCRIPTION - ORIENTATION
ORIENTATION: LOWER
ORIENTATION: RIGHT
ORIENTATION: LOWER

## 2025-05-17 ASSESSMENT — PAIN DESCRIPTION - PAIN TYPE
TYPE: ACUTE PAIN;CHRONIC PAIN
TYPE: CHRONIC PAIN
TYPE: SURGICAL PAIN

## 2025-05-17 ASSESSMENT — PAIN DESCRIPTION - LOCATION
LOCATION: BACK
LOCATION: HIP
LOCATION: BACK
LOCATION: BACK

## 2025-05-17 ASSESSMENT — PAIN DESCRIPTION - ONSET
ONSET: ON-GOING

## 2025-05-17 NOTE — CARE COORDINATION
Attempting to discharge patient. Patient is very upset over the downgrade and would like to appeal her discharge. The patient cannot appeal because she is in observation.     The patient states she cannot get out of the bed without assistance and she lives alone. The patient is requesting to work with OT/PT again.     Patient also states if she goes home she is going to call the squad and she will be back.     Perfect Serve sent to Dr. Hussein to advise.     If patient does discharge to home uses [facility].   No

## 2025-05-17 NOTE — CARE COORDINATION
Per discussion with Dr. Jovita Garsia CM to ask if OT/PT can see the patient again. HIMANSHU STACKM.

## 2025-05-17 NOTE — DISCHARGE SUMMARY
They place roberts cath while here.    Neurosurgery tele eval is obtained  MRI recommended    Impression:        1. Status post multilevel laminectomies and posterior lumbar fusion from L2 to S1.  2. Moderate spinal canal stenosis and mild bilateral neural foraminal narrowing at  L1-L2.  3. Additional multilevel degenerative changes, as described above.       Recs are  - recommend pain control per primary team, can try steroid pack, pain medication, lidocaine patches etc  - recommend rehab consult, patient lives at home alone and would like to return home  - no acute inpatient surgical plans, patient can follow up with my office for likely outpatient PILAR for further pain control. My office will call the patient to arrange this upon discharge      Pt is given rx for norco and medrol dosepak on d./c  Asked to see PCP or neurosurg in 1wk for re-eval and decision on whether narcotics to be continued  Strongly urged consideration of outpatient PILAR    Pt works with PT/OT while here  \" At this time, this patient demonstrates the endurance and safety to discharge home with home services (home vs OP services) and a follow up treatment frequency of 2-3x/wk. \"    Home therapy to be set up on d/c      Consults made during Hospitalization:  IP CONSULT TO HOSPITALIST  IP CONSULT TO NEUROSURGERY  IP CONSULT TO UROLOGY    Treatment team at time of Discharge: Treatment Team:   Olaf Hussein MD Yu, Dale Patrick, MD Plummer, Zachary, MD Kappa, Stephen F, MD Perry, Venecia, Letty Corley, RCP  Cristian Garces, RCP  Jorje Elkins Deborah, Venecia Dougherty RN    Imaging Results:  MRI LUMBAR SPINE WO CONTRAST  Result Date: 5/15/2025  EXAM: MRI LUMBAR SPINE 05/15/2025 01:31:00 PM TECHNIQUE: Multiplanar multisequence MRI of the lumbar spine was performed without the administration of intravenous contrast. COMPARISON: CT lumbar spine 05/09/2025. CLINICAL HISTORY: Lumbar radiculopathy. FINDINGS: BONES AND ALIGNMENT:

## 2025-05-17 NOTE — DISCHARGE INSTR - COC
Continuity of Care Form    Patient Name: Aiyana Catalan   :  1950  MRN:  [id number]    Admit date:  2025  Discharge date:  2025    Code Status Order: Full Code   Advance Directives:     Admitting Physician:  Olaf Hussein MD  PCP: Jose Maria Haynes MD    Discharging Nurse: ***  Discharging Hospital Unit/Room#: 4TN-4481/4481-01  Discharging Unit Phone Number: ***    Emergency Contact:   Extended Emergency Contact Information  Primary Emergency Contact: Krystal Rosario           [facility]  Home Phone: [phone]  Relation: Child  Secondary Emergency Contact: ManojVeronica  Home Phone: [phone]  Mobile Phone: [phone]  Relation: Child    Past Surgical History:  Past Surgical History:   Procedure Laterality Date    ARM SURGERY Left     BACK SURGERY      BACK SURGERY      1/13/14 x2    BREAST BIOPSY Right 2022    RIGHT RADIOFREQUENCY IDENTIFICATION TAG LOCALIZED PARTIAL MASTECTOMY RIGHT SENTINEL LYMPH NODE BIOPSY performed by Elise Sanchez MD at [facility] OR    BREAST SURGERY Right 2022    . performed by Elise Sanchez MD at [facility] OR    COLONOSCOPY      EXAMINATION UNDER ANESTHESIA N/A 2023    DRUG INDUCED SLEEP ENDOSCOPY performed by Lenny Encarnacion DO at [facility] OR    EYE SURGERY      FRACTURE SURGERY      HERNIA REPAIR      HUMERUS FRACTURE SURGERY Left 2022    OPEN REDUCTION INTERNAL FIXATION LEFT PROXIMAL HUMERUS AND SHAFT FRACTURES-REGIONAL-SYNTHES performed by Joel Ontiveros MD at [facility] OR    JOINT REPLACEMENT      bilateral knee replacements    JOINT REPLACEMENT      [place] KNEES    KNEE SURGERY      LUMBAR FUSION N/A 2024    LUMBAR 5-SACRAL 1 ANTERIOR LUMBAR INTERBODY FUSION AND LUMBAR 2-LUMBAR 3, LUMBAR 3-LUMBAR 4 DIRECT LATERAL INTERBODY FUSION performed by Gilbert Huizar MD at [facility] OR    LUMBAR FUSION N/A 2024    LUMBAR 2-PELVIS DECOMPRESSION, FUSION, AND FIXATION performed by Gilbert Huizar MD at

## 2025-05-18 LAB
ANION GAP SERPL CALCULATED.3IONS-SCNC: 9 MMOL/L (ref 3–16)
BASOPHILS # BLD: 0 K/UL (ref 0–0.2)
BASOPHILS NFR BLD: 0.3 %
BUN SERPL-MCNC: 18 MG/DL (ref 7–20)
CALCIUM SERPL-MCNC: 9.5 MG/DL (ref 8.3–10.6)
CHLORIDE SERPL-SCNC: 103 MMOL/L (ref 99–110)
CO2 SERPL-SCNC: 25 MMOL/L (ref 21–32)
CREAT SERPL-MCNC: 0.7 MG/DL (ref 0.6–1.2)
DEPRECATED RDW RBC AUTO: 14.7 % (ref 12.4–15.4)
EOSINOPHIL # BLD: 0 K/UL (ref 0–0.6)
EOSINOPHIL NFR BLD: 0.2 %
GFR SERPLBLD CREATININE-BSD FMLA CKD-EPI: 89 ML/MIN/{1.73_M2}
GLUCOSE BLD-MCNC: 103 MG/DL (ref 70–99)
GLUCOSE BLD-MCNC: 120 MG/DL (ref 70–99)
GLUCOSE BLD-MCNC: 152 MG/DL (ref 70–99)
GLUCOSE BLD-MCNC: 164 MG/DL (ref 70–99)
GLUCOSE SERPL-MCNC: 144 MG/DL (ref 70–99)
HCT VFR BLD AUTO: 37.3 % (ref 36–48)
HGB BLD-MCNC: 12.4 G/DL (ref 12–16)
LYMPHOCYTES # BLD: 1.1 K/UL (ref 1–5.1)
LYMPHOCYTES NFR BLD: 11 %
MCH RBC QN AUTO: 28.4 PG (ref 26–34)
MCHC RBC AUTO-ENTMCNC: 33.2 G/DL (ref 31–36)
MCV RBC AUTO: 85.6 FL (ref 80–100)
MONOCYTES # BLD: 0.5 K/UL (ref 0–1.3)
MONOCYTES NFR BLD: 4.7 %
NEUTROPHILS # BLD: 8.4 K/UL (ref 1.7–7.7)
NEUTROPHILS NFR BLD: 83.8 %
PERFORMED ON: ABNORMAL
PLATELET # BLD AUTO: 189 K/UL (ref 135–450)
PMV BLD AUTO: 8.5 FL (ref 5–10.5)
POTASSIUM SERPL-SCNC: 4.1 MMOL/L (ref 3.5–5.1)
RBC # BLD AUTO: 4.36 M/UL (ref 4–5.2)
SODIUM SERPL-SCNC: 137 MMOL/L (ref 136–145)
WBC # BLD AUTO: 10 K/UL (ref 4–11)

## 2025-05-18 PROCEDURE — G0378 HOSPITAL OBSERVATION PER HR: HCPCS

## 2025-05-18 PROCEDURE — 97535 SELF CARE MNGMENT TRAINING: CPT

## 2025-05-18 PROCEDURE — 85025 COMPLETE CBC W/AUTO DIFF WBC: CPT

## 2025-05-18 PROCEDURE — 36415 COLL VENOUS BLD VENIPUNCTURE: CPT

## 2025-05-18 PROCEDURE — 97530 THERAPEUTIC ACTIVITIES: CPT

## 2025-05-18 PROCEDURE — 80048 BASIC METABOLIC PNL TOTAL CA: CPT

## 2025-05-18 PROCEDURE — 2500000003 HC RX 250 WO HCPCS: Performed by: INTERNAL MEDICINE

## 2025-05-18 PROCEDURE — 6370000000 HC RX 637 (ALT 250 FOR IP): Performed by: INTERNAL MEDICINE

## 2025-05-18 PROCEDURE — 6370000000 HC RX 637 (ALT 250 FOR IP): Performed by: NURSE PRACTITIONER

## 2025-05-18 PROCEDURE — 97116 GAIT TRAINING THERAPY: CPT

## 2025-05-18 RX ADMIN — GABAPENTIN 300 MG: 300 CAPSULE ORAL at 09:24

## 2025-05-18 RX ADMIN — PREDNISONE 20 MG: 20 TABLET ORAL at 09:24

## 2025-05-18 RX ADMIN — METOPROLOL SUCCINATE 75 MG: 50 TABLET, FILM COATED, EXTENDED RELEASE ORAL at 09:23

## 2025-05-18 RX ADMIN — SODIUM CHLORIDE, PRESERVATIVE FREE 10 ML: 5 INJECTION INTRAVENOUS at 09:25

## 2025-05-18 RX ADMIN — FAMOTIDINE 40 MG: 20 TABLET, FILM COATED ORAL at 20:18

## 2025-05-18 RX ADMIN — NIFEDIPINE 30 MG: 30 TABLET, FILM COATED, EXTENDED RELEASE ORAL at 09:23

## 2025-05-18 RX ADMIN — FUROSEMIDE 20 MG: 20 TABLET ORAL at 09:24

## 2025-05-18 RX ADMIN — LOSARTAN POTASSIUM 100 MG: 100 TABLET, FILM COATED ORAL at 09:23

## 2025-05-18 RX ADMIN — TAMSULOSIN HYDROCHLORIDE 0.4 MG: 0.4 CAPSULE ORAL at 09:24

## 2025-05-18 RX ADMIN — CYCLOBENZAPRINE 5 MG: 10 TABLET, FILM COATED ORAL at 20:17

## 2025-05-18 RX ADMIN — GABAPENTIN 300 MG: 300 CAPSULE ORAL at 20:18

## 2025-05-18 RX ADMIN — HYDROCODONE BITARTRATE AND ACETAMINOPHEN 1 TABLET: 5; 325 TABLET ORAL at 18:40

## 2025-05-18 RX ADMIN — HYDROCODONE BITARTRATE AND ACETAMINOPHEN 1 TABLET: 5; 325 TABLET ORAL at 05:17

## 2025-05-18 RX ADMIN — PANTOPRAZOLE SODIUM 40 MG: 40 TABLET, DELAYED RELEASE ORAL at 05:17

## 2025-05-18 RX ADMIN — PREDNISONE 20 MG: 20 TABLET ORAL at 20:19

## 2025-05-18 RX ADMIN — HYDROCODONE BITARTRATE AND ACETAMINOPHEN 1 TABLET: 5; 325 TABLET ORAL at 10:17

## 2025-05-18 ASSESSMENT — PAIN - FUNCTIONAL ASSESSMENT: PAIN_FUNCTIONAL_ASSESSMENT: ACTIVITIES ARE NOT PREVENTED

## 2025-05-18 ASSESSMENT — PAIN DESCRIPTION - DESCRIPTORS
DESCRIPTORS: ACHING

## 2025-05-18 ASSESSMENT — PAIN SCALES - GENERAL
PAINLEVEL_OUTOF10: 8
PAINLEVEL_OUTOF10: 7
PAINLEVEL_OUTOF10: 10
PAINLEVEL_OUTOF10: 9
PAINLEVEL_OUTOF10: 7

## 2025-05-18 ASSESSMENT — PAIN DESCRIPTION - LOCATION
LOCATION: HIP;BACK
LOCATION: BACK;HIP
LOCATION: BACK

## 2025-05-18 ASSESSMENT — PAIN DESCRIPTION - ORIENTATION
ORIENTATION: RIGHT
ORIENTATION: LOWER

## 2025-05-18 ASSESSMENT — PAIN DESCRIPTION - PAIN TYPE: TYPE: CHRONIC PAIN

## 2025-05-18 ASSESSMENT — PAIN DESCRIPTION - FREQUENCY: FREQUENCY: INTERMITTENT

## 2025-05-18 ASSESSMENT — PAIN DESCRIPTION - ONSET: ONSET: ON-GOING

## 2025-05-18 NOTE — CARE COORDINATION
Met with patient at bedside. Patient would like to go to [facility] for rehab.    CM LVM for [facility] requesting a callback to make referral.  Spoke with Prabhakar but was advised she is not on call.    Face sheet faxed to [facility] making referral.    [facility] - [phone]  FAX - [phone]    CM faxed referral to [facility]. Will need to make follow up call to make sure [facility] received the referral.     CM LVM for [facility] asking [facility] to start precert if they can accept and to update CM.

## 2025-05-19 LAB
GLUCOSE BLD-MCNC: 129 MG/DL (ref 70–99)
GLUCOSE BLD-MCNC: 135 MG/DL (ref 70–99)
GLUCOSE BLD-MCNC: 140 MG/DL (ref 70–99)
GLUCOSE BLD-MCNC: 162 MG/DL (ref 70–99)
PERFORMED ON: ABNORMAL

## 2025-05-19 PROCEDURE — 97116 GAIT TRAINING THERAPY: CPT

## 2025-05-19 PROCEDURE — 2500000003 HC RX 250 WO HCPCS: Performed by: INTERNAL MEDICINE

## 2025-05-19 PROCEDURE — G0378 HOSPITAL OBSERVATION PER HR: HCPCS

## 2025-05-19 PROCEDURE — 51798 US URINE CAPACITY MEASURE: CPT

## 2025-05-19 PROCEDURE — 97530 THERAPEUTIC ACTIVITIES: CPT

## 2025-05-19 PROCEDURE — 6370000000 HC RX 637 (ALT 250 FOR IP): Performed by: NURSE PRACTITIONER

## 2025-05-19 PROCEDURE — 6370000000 HC RX 637 (ALT 250 FOR IP): Performed by: INTERNAL MEDICINE

## 2025-05-19 RX ADMIN — CYCLOBENZAPRINE 5 MG: 10 TABLET, FILM COATED ORAL at 06:56

## 2025-05-19 RX ADMIN — GABAPENTIN 300 MG: 300 CAPSULE ORAL at 08:14

## 2025-05-19 RX ADMIN — METOPROLOL SUCCINATE 75 MG: 50 TABLET, FILM COATED, EXTENDED RELEASE ORAL at 08:14

## 2025-05-19 RX ADMIN — HYDROCODONE BITARTRATE AND ACETAMINOPHEN 1 TABLET: 5; 325 TABLET ORAL at 03:08

## 2025-05-19 RX ADMIN — TAMSULOSIN HYDROCHLORIDE 0.4 MG: 0.4 CAPSULE ORAL at 08:14

## 2025-05-19 RX ADMIN — PREDNISONE 20 MG: 20 TABLET ORAL at 08:14

## 2025-05-19 RX ADMIN — PANTOPRAZOLE SODIUM 40 MG: 40 TABLET, DELAYED RELEASE ORAL at 05:13

## 2025-05-19 RX ADMIN — FUROSEMIDE 20 MG: 20 TABLET ORAL at 08:14

## 2025-05-19 RX ADMIN — FAMOTIDINE 40 MG: 20 TABLET, FILM COATED ORAL at 20:31

## 2025-05-19 RX ADMIN — CYCLOBENZAPRINE 5 MG: 10 TABLET, FILM COATED ORAL at 20:32

## 2025-05-19 RX ADMIN — NIFEDIPINE 30 MG: 30 TABLET, FILM COATED, EXTENDED RELEASE ORAL at 08:14

## 2025-05-19 RX ADMIN — GABAPENTIN 300 MG: 300 CAPSULE ORAL at 20:32

## 2025-05-19 RX ADMIN — SODIUM CHLORIDE, PRESERVATIVE FREE 10 ML: 5 INJECTION INTRAVENOUS at 20:34

## 2025-05-19 RX ADMIN — LOSARTAN POTASSIUM 100 MG: 100 TABLET, FILM COATED ORAL at 08:14

## 2025-05-19 RX ADMIN — PREDNISONE 20 MG: 20 TABLET ORAL at 20:31

## 2025-05-19 RX ADMIN — HYDROCODONE BITARTRATE AND ACETAMINOPHEN 1 TABLET: 5; 325 TABLET ORAL at 14:17

## 2025-05-19 ASSESSMENT — PAIN DESCRIPTION - ORIENTATION
ORIENTATION: LOWER

## 2025-05-19 ASSESSMENT — PAIN DESCRIPTION - PAIN TYPE: TYPE: CHRONIC PAIN

## 2025-05-19 ASSESSMENT — PAIN SCALES - GENERAL
PAINLEVEL_OUTOF10: 6
PAINLEVEL_OUTOF10: 8
PAINLEVEL_OUTOF10: 0
PAINLEVEL_OUTOF10: 9
PAINLEVEL_OUTOF10: 7

## 2025-05-19 ASSESSMENT — PAIN DESCRIPTION - DESCRIPTORS
DESCRIPTORS: ACHING

## 2025-05-19 ASSESSMENT — PAIN DESCRIPTION - FREQUENCY: FREQUENCY: INTERMITTENT

## 2025-05-19 ASSESSMENT — PAIN DESCRIPTION - ONSET: ONSET: ON-GOING

## 2025-05-19 ASSESSMENT — PAIN - FUNCTIONAL ASSESSMENT: PAIN_FUNCTIONAL_ASSESSMENT: ACTIVITIES ARE NOT PREVENTED

## 2025-05-19 ASSESSMENT — PAIN DESCRIPTION - LOCATION
LOCATION: BACK

## 2025-05-19 NOTE — CARE COORDINATION
CHARMAINE received earlier from [facility] at [facility] that they started pre cert and reference number is : [id number].    Kathleen Morales RN, BSN  [phone]

## 2025-05-20 VITALS
WEIGHT: 227 LBS | BODY MASS INDEX: 36.48 KG/M2 | RESPIRATION RATE: 14 BRPM | HEART RATE: 75 BPM | OXYGEN SATURATION: 97 % | HEIGHT: 66 IN | DIASTOLIC BLOOD PRESSURE: 73 MMHG | SYSTOLIC BLOOD PRESSURE: 145 MMHG | TEMPERATURE: 98.1 F

## 2025-05-20 LAB
ANION GAP SERPL CALCULATED.3IONS-SCNC: 7 MMOL/L (ref 3–16)
BACTERIA URNS QL MICRO: ABNORMAL /HPF
BASOPHILS # BLD: 0 K/UL (ref 0–0.2)
BASOPHILS NFR BLD: 0.2 %
BILIRUB UR QL STRIP.AUTO: NEGATIVE
BUN SERPL-MCNC: 19 MG/DL (ref 7–20)
CALCIUM SERPL-MCNC: 9.1 MG/DL (ref 8.3–10.6)
CHLORIDE SERPL-SCNC: 105 MMOL/L (ref 99–110)
CLARITY UR: CLEAR
CO2 SERPL-SCNC: 26 MMOL/L (ref 21–32)
COLOR UR: YELLOW
CREAT SERPL-MCNC: 0.8 MG/DL (ref 0.6–1.2)
DEPRECATED RDW RBC AUTO: 14.3 % (ref 12.4–15.4)
EOSINOPHIL # BLD: 0 K/UL (ref 0–0.6)
EOSINOPHIL NFR BLD: 0.2 %
EPI CELLS #/AREA URNS AUTO: 5 /HPF (ref 0–5)
GFR SERPLBLD CREATININE-BSD FMLA CKD-EPI: 76 ML/MIN/{1.73_M2}
GLUCOSE BLD-MCNC: 142 MG/DL (ref 70–99)
GLUCOSE BLD-MCNC: 150 MG/DL (ref 70–99)
GLUCOSE BLD-MCNC: 189 MG/DL (ref 70–99)
GLUCOSE SERPL-MCNC: 166 MG/DL (ref 70–99)
GLUCOSE UR STRIP.AUTO-MCNC: NEGATIVE MG/DL
HCT VFR BLD AUTO: 36.2 % (ref 36–48)
HGB BLD-MCNC: 11.9 G/DL (ref 12–16)
HGB UR QL STRIP.AUTO: NEGATIVE
HYALINE CASTS #/AREA URNS AUTO: 0 /LPF (ref 0–8)
KETONES UR STRIP.AUTO-MCNC: NEGATIVE MG/DL
LEUKOCYTE ESTERASE UR QL STRIP.AUTO: ABNORMAL
LYMPHOCYTES # BLD: 0.9 K/UL (ref 1–5.1)
LYMPHOCYTES NFR BLD: 11.1 %
MCH RBC QN AUTO: 27.7 PG (ref 26–34)
MCHC RBC AUTO-ENTMCNC: 32.8 G/DL (ref 31–36)
MCV RBC AUTO: 84.5 FL (ref 80–100)
MONOCYTES # BLD: 0.4 K/UL (ref 0–1.3)
MONOCYTES NFR BLD: 4.7 %
NEUTROPHILS # BLD: 7.2 K/UL (ref 1.7–7.7)
NEUTROPHILS NFR BLD: 83.8 %
NITRITE UR QL STRIP.AUTO: NEGATIVE
PERFORMED ON: ABNORMAL
PH UR STRIP.AUTO: 6 [PH] (ref 5–8)
PLATELET # BLD AUTO: 187 K/UL (ref 135–450)
PMV BLD AUTO: 8.4 FL (ref 5–10.5)
POTASSIUM SERPL-SCNC: 4.1 MMOL/L (ref 3.5–5.1)
PROT UR STRIP.AUTO-MCNC: NEGATIVE MG/DL
RBC # BLD AUTO: 4.29 M/UL (ref 4–5.2)
RBC CLUMPS #/AREA URNS AUTO: 1 /HPF (ref 0–4)
SODIUM SERPL-SCNC: 138 MMOL/L (ref 136–145)
SP GR UR STRIP.AUTO: 1.01 (ref 1–1.03)
UA DIPSTICK W REFLEX MICRO PNL UR: YES
URN SPEC COLLECT METH UR: ABNORMAL
UROBILINOGEN UR STRIP-ACNC: 1 E.U./DL
WBC # BLD AUTO: 8.5 K/UL (ref 4–11)
WBC #/AREA URNS AUTO: 12 /HPF (ref 0–5)

## 2025-05-20 PROCEDURE — 6370000000 HC RX 637 (ALT 250 FOR IP): Performed by: NURSE PRACTITIONER

## 2025-05-20 PROCEDURE — 6370000000 HC RX 637 (ALT 250 FOR IP): Performed by: INTERNAL MEDICINE

## 2025-05-20 PROCEDURE — 81001 URINALYSIS AUTO W/SCOPE: CPT

## 2025-05-20 PROCEDURE — G0378 HOSPITAL OBSERVATION PER HR: HCPCS

## 2025-05-20 PROCEDURE — 80048 BASIC METABOLIC PNL TOTAL CA: CPT

## 2025-05-20 PROCEDURE — 85025 COMPLETE CBC W/AUTO DIFF WBC: CPT

## 2025-05-20 RX ADMIN — METOPROLOL SUCCINATE 75 MG: 50 TABLET, FILM COATED, EXTENDED RELEASE ORAL at 08:29

## 2025-05-20 RX ADMIN — FUROSEMIDE 20 MG: 20 TABLET ORAL at 08:29

## 2025-05-20 RX ADMIN — HYDROCODONE BITARTRATE AND ACETAMINOPHEN 1 TABLET: 5; 325 TABLET ORAL at 01:09

## 2025-05-20 RX ADMIN — NIFEDIPINE 30 MG: 30 TABLET, FILM COATED, EXTENDED RELEASE ORAL at 08:29

## 2025-05-20 RX ADMIN — TAMSULOSIN HYDROCHLORIDE 0.4 MG: 0.4 CAPSULE ORAL at 08:29

## 2025-05-20 RX ADMIN — GABAPENTIN 300 MG: 300 CAPSULE ORAL at 08:29

## 2025-05-20 RX ADMIN — PREDNISONE 20 MG: 20 TABLET ORAL at 08:29

## 2025-05-20 RX ADMIN — PANTOPRAZOLE SODIUM 40 MG: 40 TABLET, DELAYED RELEASE ORAL at 05:13

## 2025-05-20 RX ADMIN — LOSARTAN POTASSIUM 100 MG: 100 TABLET, FILM COATED ORAL at 08:29

## 2025-05-20 RX ADMIN — CYCLOBENZAPRINE 5 MG: 10 TABLET, FILM COATED ORAL at 05:13

## 2025-05-20 ASSESSMENT — PAIN DESCRIPTION - LOCATION
LOCATION: BACK
LOCATION: ABDOMEN

## 2025-05-20 ASSESSMENT — PAIN SCALES - GENERAL
PAINLEVEL_OUTOF10: 10
PAINLEVEL_OUTOF10: 6

## 2025-05-20 ASSESSMENT — PAIN DESCRIPTION - ORIENTATION: ORIENTATION: LOWER

## 2025-05-20 NOTE — PROGRESS NOTES
[facility] - Inpatient Rehabilitation Department   Phone: [phone]    Physical Therapy    [] Initial Evaluation            [x] Daily Treatment Note         [] Discharge Summary      Patient: Aiyana Catalan   : 1950   MRN: [id number]   Date of Service:  2025  Admitting Diagnosis: Back pain, lumbosacral  Current Admission Summary: Aiyana Catalan is a 74 y.o. female who presents with right lower back pain. Patient does have a history of right lower back pain, but she had an injury today. Patient states that she was walking in the store, she slipped and fell forward, she tried to use her shopping cart to help her and she hyperextended her back. She states since then, she has been in severe pain and has had a very difficult time walking. Patient is a patient of Dr. Esquivel, she was seen in the ER 2025 for right lower back pain and his office was consulted, patient was told to follow-up outpatient for her pain. Patient states that she has been calling and try to get a hold office but she has not been able to reach anybody. Normally, she walks without assistance, she does have a cane that she uses at night. She denies any loss of control of bladder or bowels, denies urinary retention. He states during the incident, she did have a couple dribbles of urine escape, but this is typical for her.   Past Medical History:  has a past medical history of Aortic stenosis, Arthritis, Back pain, CHF (congestive heart failure) ([facility]), Chronic diastolic heart failure (HCC), Diabetes mellitus (HCC), Diabetes mellitus (HCC), GERD (gastroesophageal reflux disease), High cholesterol, History of blood transfusion, Hypertension, Malignant neoplasm of right female breast (HCC), JOSE MANUEL (obstructive sleep apnea), Polio, Polio, Shingles, Sleep apnea, and Urinary problem.  Past Surgical History:  has a past surgical history that includes joint replacement; knee surgery; Colonoscopy; other surgical history 
  [facility] - Inpatient Rehabilitation Department   Phone: [phone]    Physical Therapy    [] Initial Evaluation            [x] Daily Treatment Note         [] Discharge Summary      Patient: Aiyana Catalan   : 1950   MRN: [id number]   Date of Service:  2025  Admitting Diagnosis: Back pain, lumbosacral  Current Admission Summary:  74 y.o. female who presents with right lower back pain. Patient does have a history of right lower back pain, but she had an injury today. Patient states that she was walking in the store, she slipped and fell forward, she tried to use her shopping cart to help her and she hyperextended her back. She states since then, she has been in severe pain and has had a very difficult time walking. Patient is a patient of Dr. Huizar's, she was seen in the ER 2025 for right lower back pain and his office was consulted, patient was told to follow-up outpatient for her pain. Patient states that she has been calling and try to get a hold office but she has not been able to reach anybody. Normally, she walks without assistance, she does have a cane that she uses at night. She denies any loss of control of bladder or bowels, denies urinary retention. He states during the incident, she did have a couple dribbles of urine escape, but this is typical for her.    - MRI of L-spine 5/15   IMPRESSION:  1. Status post multilevel laminectomies and posterior lumbar fusion from L2 to S1.  2. Moderate spinal canal stenosis and mild bilateral neural foraminal narrowing at  L1-L2.   3. Additional multilevel degenerative changes, as described above.  Per Neuro:    -recommend pain control per primary team, can try steroid pack, pain medication, lidocaine patches etc  - recommend rehab consult, patient lives at home alone and would like to return home  - no acute inpatient surgical plans, patient can follow up with my office for likely outpatient PILAR for further pain control. My office 
  [facility] - Inpatient Rehabilitation Department   Phone: [phone]    Physical Therapy    [x] Initial Evaluation            [] Daily Treatment Note         [] Discharge Summary      Patient: Aiyana Catalan   : 1950   MRN: [id number]   Date of Service:  2025  Admitting Diagnosis: Back pain, lumbosacral  Current Admission Summary: Aiyana Catalan is a 74 y.o. female who presents with right lower back pain. Patient does have a history of right lower back pain, but she had an injury today. Patient states that she was walking in the store, she slipped and fell forward, she tried to use her shopping cart to help her and she hyperextended her back. She states since then, she has been in severe pain and has had a very difficult time walking. Patient is a patient of Dr. Esquivel, she was seen in the ER 2025 for right lower back pain and his office was consulted, patient was told to follow-up outpatient for her pain. Patient states that she has been calling and try to get a hold office but she has not been able to reach anybody. Normally, she walks without assistance, she does have a cane that she uses at night. She denies any loss of control of bladder or bowels, denies urinary retention. He states during the incident, she did have a couple dribbles of urine escape, but this is typical for her.   Past Medical History:  has a past medical history of Aortic stenosis, Arthritis, Back pain, CHF (congestive heart failure) ([facility]), Chronic diastolic heart failure (HCC), Diabetes mellitus (HCC), Diabetes mellitus (HCC), GERD (gastroesophageal reflux disease), High cholesterol, History of blood transfusion, Hypertension, Malignant neoplasm of right female breast (HCC), JOSE MANUEL (obstructive sleep apnea), Polio, Polio, Shingles, Sleep apnea, and Urinary problem.  Past Surgical History:  has a past surgical history that includes joint replacement; knee surgery; Colonoscopy; other surgical history 
  [facility] - Inpatient Rehabilitation Department   Phone: [phone]    Occupational Therapy    [x] Initial Evaluation            [] Daily Treatment Note         [] Discharge Summary      Patient: Aiyana Catalan   : 1950   MRN: [id number]   Date of Service:  2025    Admitting Diagnosis:  Back pain, lumbosacral  Current Admission Summary: Pt admitted with right lower back pain after slip and fall at store, hyperextended back. Pt is 1 year s/p L2-pelvis fusion with Dr. Huizar. CT L spine obtained demonstrating good placement of hardware and no overt complication or concern. MRI L spine was also obtained demonstrating moderate stenosis at the L1-2 segment above the previous fusion. Found to have urinary retention  Past Medical History:  has a past medical history of Aortic stenosis, Arthritis, Back pain, CHF (congestive heart failure) (HCC), Chronic diastolic heart failure (HCC), Diabetes mellitus (HCC), Diabetes mellitus (HCC), GERD (gastroesophageal reflux disease), High cholesterol, History of blood transfusion, Hypertension, Malignant neoplasm of right female breast (HCC), JOSE MANUEL (obstructive sleep apnea), Polio, Polio, Shingles, Sleep apnea, and Urinary problem.  Past Surgical History:  has a past surgical history that includes joint replacement; knee surgery; Colonoscopy; other surgical history (2014); hernia repair; joint replacement; SABRINA STEREO BREAST BX W LOC DEVICE 1ST LESION RIGHT (Right, 2022); back surgery; back surgery; Breast biopsy (Right, 2022); Breast surgery (Right, 2022); Humerus fracture surgery (Left, 2022); Arm Surgery (Left); fracture surgery; eye surgery; Examination under anesthesia (N/A, 2023); lumbar fusion (N/A, 2024); and lumbar fusion (N/A, 2024).    Discharge Recommendations: Aiyana Catalan scored a 16/24 on the AM-PAC ADL Inpatient form. Current research shows that an AM-PAC score of 18 or greater is typically 
 note    Consult received for retention.   Admitted with back pain and MRI lumbar spine pending  Urology consulted for retention, roberts placed 05/14 for unknown residual    Recc  Start flomax  Future voiding trial in a couple days  Will follow    Will moran CNP  05/15/2025  
0000: patient refused to remove her clothes and change to gown as it will cause her severe discomfort.  
Peer to peer completed    MD at [facility] upholds denial for SNF    Plan on home d/c  
Physical/Occupational Therapy  Aiyana MARIA Catalan  1950    PT/OT orders received. Patient has pending neurosurgery consult. Will hold therapy evaluation pending neurosurgery recommendations.     Coral Asencio, PT, DPT [id number]  Diego Gilmore, MOT OTR/L [id number]        
Progress Note - Dr. Hussein - Internal Medicine  PCP: Jose Maria Haynes MD [address] / [facility] [address] [phone]    Hospital Day: 1  Code Status: Full Code  Current Diet: ADULT DIET; Regular        CC: follow up on medical issues    Subjective:   Aiyana Catalan is a 74 y.o. female.    Pt seen and examined  Chart reviewed since last visit, labs and imaging below      Still with back pain  MRI l/s pending  Pain better with narcotics      Review of Systems: (1 system for EPF, 2-9 for detailed, 10+ for comprehensive)  Constitutional: Negative for chills and fever.     HENT: Negative for dental problem, nosebleeds and rhinorrhea.      Eyes: Negative for photophobia and visual disturbance.     Respiratory: Negative for cough, chest tightness and shortness of breath.      Cardiovascular: Negative for chest pain and leg swelling.     Gastrointestinal: Negative for diarrhea, nausea and vomiting.     Endocrine: Negative for polydipsia and polyphagia.     Genitourinary: Negative for frequency, hematuria and urgency.     Musculoskeletal: positive for back pain and negative for myalgias.     Skin: Negative for rash.     Allergic/Immunologic: Negative for food allergies.     Neurological: Negative for dizziness, seizures, syncope and facial asymmetry.     Hematological: Negative for adenopathy.     Psychiatric/Behavioral: Negative for dysphoric mood. The patient is not nervous/anxious.        I have reviewed the patient's medical and social history in detail and updated the computerized patient record.  To recap: She  has a past medical history of Aortic stenosis, Arthritis, Back pain, CHF (congestive heart failure) (HCC), Chronic diastolic heart failure (HCC), Diabetes mellitus (HCC), Diabetes mellitus (HCC), GERD (gastroesophageal reflux disease), High cholesterol, History of blood transfusion, Hypertension, Malignant neoplasm of right female breast (HCC), JOSE MANUEL (obstructive sleep apnea), Polio, Polio, 
Progress Note - Dr. Hussein - Internal Medicine  PCP: Jose Maria Haynes MD [address] / [facility] [address] [phone]    Hospital Day: 2  Code Status: Full Code  Current Diet: ADULT DIET; Regular; 4 carb choices (60 gm/meal)        CC: follow up on medical issues    Subjective:   Aiyana Catalan is a 74 y.o. female.    Pt seen and examined  Chart reviewed since last visit, labs and imaging below      Still with back pain  MRI l/s shows no acute findings  Steroids added per neurosurg recs    Review of Systems: (1 system for EPF, 2-9 for detailed, 10+ for comprehensive)  Constitutional: Negative for chills and fever.     HENT: Negative for dental problem, nosebleeds and rhinorrhea.      Eyes: Negative for photophobia and visual disturbance.     Respiratory: Negative for cough, chest tightness and shortness of breath.      Cardiovascular: Negative for chest pain and leg swelling.     Gastrointestinal: Negative for diarrhea, nausea and vomiting.     Endocrine: Negative for polydipsia and polyphagia.     Genitourinary: Negative for frequency, hematuria and urgency.     Musculoskeletal: positive for back pain and negative for myalgias.     Skin: Negative for rash.     Allergic/Immunologic: Negative for food allergies.     Neurological: Negative for dizziness, seizures, syncope and facial asymmetry.     Hematological: Negative for adenopathy.     Psychiatric/Behavioral: Negative for dysphoric mood. The patient is not nervous/anxious.        I have reviewed the patient's medical and social history in detail and updated the computerized patient record.  To recap: She  has a past medical history of Aortic stenosis, Arthritis, Back pain, CHF (congestive heart failure) (HCC), Chronic diastolic heart failure (HCC), Diabetes mellitus (HCC), Diabetes mellitus (HCC), GERD (gastroesophageal reflux disease), High cholesterol, History of blood transfusion, Hypertension, Malignant neoplasm of right female breast (HCC), 
Pt refusing to remove MD alejandra notified. Pain medication given per order.     Electronically signed by Phylicia Boyd RN on 5/16/2025 at 4:48 PM    
Shift assessment completed. Neuro WNL. Denies any pain at this time. AM meds administered per MAR. The care plan and education has been reviewed and mutually agreed upon with the patient. Standard safety measures in place.    
Shift assessment completed. Neuro WNL. Denies any pain at this time. AM meds administered per MAR. The care plan and education has been reviewed and mutually agreed upon with the patient. Standard safety measures in place.    IV removed prior dc. The care plan and education has been resolved and completed. Discharge instructions and medications reviewed with patient. Patient voices understanding and denies further concerns at this time. Patient discharged home per order with all personal belongings.          
Shift assessment completed. Neuro WNL. Reports pain 6/10 at this time. AM meds administered per MAR. The care plan and education has been reviewed and mutually agreed upon with the patient. Standard safety measures in place.    
Shift assessment completed. Neuro WNL. Reports pain 8/10 at this time. AM meds administered per MAR. The care plan and education has been reviewed and mutually agreed upon with the patient. Standard safety measures in place.    
Status changed to obs based on recs from physcian advisor     --    Dr. Hussein, Our [facility] Physician Advisor has reviewed this case and recommends changing the status to Observation. If you agree, please place a status order for Observation, or I can be reached at [phone].     Physician Advisor's note follows: Name: Aiyana Catalan : 1950 CSN: [id number] INSURANCE: [employer] Date of admission: 25 Date of discharge: Current status: Inpatient RECOMMENDATION: CHANGE TO OBSERVATION. The clinical documentation available at this time supports OBSERVATION. If the patient is still in-house and you agree, please place a new order for OBSERVATION. RATIONALE: Pain controlled, lower back pain, MRI negative, neurosurgery follow-up as an outpatient. Clinical summary 74 y.o. female with Pain controlled, lower back pain, MRI negative, neurosurgery follow-up as an outpatient. Vitals noted Labs and Imaging This chart was reviewed at 11:19 AM EST on 2025 Paul Sanders Physician Advisor [facility] Cell [phone] Thank you, Mary Jo Maria ROUTINE   
Urology Progress Note  [facility]    Provider: BRICE Mena - CNP  Patient ID:  Admission Date: 2025 Name: Aiyana Catalan  OR Date: 2025 MRN: [id number]   Patient Location: [id number] : 1950  Attending: Olaf Hussein MD Date of Service: 2025  PCP: Jose Maria Haynes MD     Diagnoses:  1. Fall, initial encounter    2. Strain of lumbar region, initial encounter    3. Lumbar radiculopathy         Assessment/Plan:  73 yo female   Admitted with back pain and MRI lumbar spine pending  Urology consulted for retention, roberts placed  for unknown residual  Started on flomax 5/15/25  Says she typically goes into retention when in hospital. Has not been walking really at all this admission  Afvss cr stable.   ====  Roberts out, patient is voiding  Needds PVR     Recommendations:  Roberts out and patient is voiding  Will get a PVR today  Can replace roberts if PVR >400cc  Could be discharged from gu standpoint if voiding ok with low pvr      The patient had a chance to ask questions which were answered. she understands the above plan.    Subjective:   Aiyana Catalan is a 74 y.o. female. She was seen and examined this morning. Today we discussed outpt fu     Objective:   Vitals:  Vitals:    25 0800   BP: (!) 151/77   Pulse: 70   Resp: 16   Temp: 98.6 °F (37 °C)   SpO2: 96%       Intake/Output Summary (Last 24 hours) at 2025 0902  Last data filed at 2025 0800  Gross per 24 hour   Intake 360 ml   Output --   Net 360 ml     Physical Exam:  Gen: Alert and oriented x3, no acute distress  CV: Regular rate   Resp: unlabored respirations  Abd: Soft, non-distended, non-tender, no masses  Ext: no peripheral edema noted, moves upper and lower extremities spontaneously  Skin: warmand well perfused, no rashes noted on the face, or arms.     Labs:  Lab Results   Component Value Date    WBC 10.0 2025    HGB 12.4 2025    HCT 37.3 2025    MCV 85.6 
97 % --   05/18/25 1017 -- -- -- -- 16 -- --   05/18/25 0915 139/75 98.2 °F (36.8 °C) Oral 72 18 -- --     Patient Vitals for the past 96 hrs (Last 3 readings):   Weight   05/19/25 0715 103 kg (227 lb)   05/18/25 0621 112 kg (246 lb 14.6 oz)   05/16/25 0502 105.8 kg (233 lb 4.8 oz)           Intake/Output Summary (Last 24 hours) at 5/19/2025 0821  Last data filed at 5/19/2025 0800  Gross per 24 hour   Intake 360 ml   Output --   Net 360 ml         Physical Exam: (2-7 system for EPF/Detailed, >=8 for Comprehensive)  BP (!) 151/77   Pulse 70   Temp 98.6 °F (37 °C) (Oral)   Resp 16   Ht 1.676 m (5' 6\")   Wt 103 kg (227 lb)   SpO2 96%   BMI 36.64 kg/m²   Constitutional: vitals as above: alert, appears stated age and cooperative    Psychiatric: normal insight and judgment, oriented to person, place, time, and general circumstances    Head: Normocephalic, without obvious abnormality, atraumatic     Eyes:lids and lashes normal, conjunctivae and sclerae normal and pupils equal, round, reactive to light and accomodation    EMNT: external ears normal, nares midline    Neck: no carotid bruit, supple, symmetrical, trachea midline and thyroid not enlarged, symmetric, no tenderness/mass/nodules     Respiratory: clear to auscultation and percussion bilaterally with normal respiratory effort    Cardiovascular: normal rate, regular rhythm, normal S1 and S2 and no murmurs    Gastrointestinal: soft, non-tender, non-distended, normal bowel sounds, no masses or organomegaly    Extremities: no clubbing, no edema    Skin:No rashes or nodules noted.    Neurologic:negative         Labs:  Lab Results   Component Value Date    WBC 10.0 05/18/2025    HGB 12.4 05/18/2025    HCT 37.3 05/18/2025     05/18/2025    CHOL 174 02/18/2022    TRIG 79 02/18/2022    HDL 49 (L) 02/18/2022    ALT 11 05/14/2025    AST 25 05/14/2025     05/18/2025    K 4.1 05/18/2025     05/18/2025    CREATININE 0.7 05/18/2025    BUN 18 05/18/2025    
time unless indicated above.       Therapy Session Time     Individual Group Co-treatment   Time In 0921     Time Out 1000     Minutes 39          Timed Code Treatment Minutes:   39  Total Treatment Minutes: 39       Electronically Signed By: Felipe Sinha OT            
reformatted images are provided for review. Adjustment of mA and/or kV according to patient size was utilized.  Automated exposure control, iterative reconstruction, and/or weight based adjustment of the mA/kV was utilized to reduce the radiation dose to as low as reasonably achievable. COMPARISON: 11/29/2023 and 11/28/2023 HISTORY: ORDERING SYSTEM PROVIDED HISTORY: R pain s/p screw TECHNOLOGIST PROVIDED HISTORY: Reason for exam:->R pain s/p screw Decision Support Exception - unselect if not a suspected or confirmed emergency medical condition->Emergency Medical Condition (MA) Reason for Exam: R pain s/p screw FINDINGS: BONES/ALIGNMENT: Interval extension of posterior spinal fusion hardware which spans the L2-S1 levels bilaterally, with fixation of the bilateral sacroiliac joints.  Subtle lucency surrounding the right sacroiliac joint screw which may reflect early loosening.  Associated laminectomies.  Status post anterior interbody fusion at the L5-S1 level.  Intervertebral disc spacers present at the L2-3 and L3-4 levels.  The vertebral body heights are maintained. No osseous destructive lesion is seen. DEGENERATIVE CHANGES: Mild-to-moderate multilevel disc disease. SOFT TISSUES/RETROPERITONEUM: No paraspinal mass is seen. Colonic diverticulosis.  Dilated CBD measuring up to 1 cm.     No acute lumbar spine fracture. Extensive postoperative changes anterior and posterior spinal fusion, as above.  Question of early loosening involving the right sacroiliac joint fixation screw. Dilated CBD of uncertain etiology.  Sonographic correlation recommended to evaluate for obstructing stone or mass. Additional findings, as above.     XR SHOULDER LEFT (MIN 2 VIEWS)  Result Date: 5/7/2025  Radiology exam is complete. No Radiologist dictation. Please follow up with ordering provider.       Lab Results   Component Value Date/Time    GLUCOSE 144 05/18/2025 04:30 AM     Lab Results   Component Value Date/Time    POCGLU 120 
Plan:  Principal Problem:    Back pain, lumbosacral -Established problem. Stable.    Plan: mri LS reviewed, neurosurg eval appreciated - awaiting decision on SNF placement  Active Problems:    HTN (hypertension) - Established problem. Stable.  165/73  Plan: Continue medications. Continue to check BP q shift. CBC and BMP ordered to monitor for disease progression, medication side effect.     Chronic diastolic heart failure (HCC)  Plan: cont on meds    GERD (gastroesophageal reflux disease)  Plan: stay on PPI    Urinary retention  - Established problem. Stable.  Chronic problem per pt report  Plan - consulted urology, now on meds (tamsulosin)    Case discussed with: social work  Tests ordered/reviewed: cbc, bmp,       Disp - per therapy eval, need SNF; awaiting acceptance      (Please note that portions of this note were completed with a voice recognition program.  Efforts were made to edit the dictations but occasionally words are mis-transcribed.)        Olaf Hussein MD  5/20/2025    Please use [employer] to contact me with any questions during the day.   The hospitalist service will provide cross-coverage for this patient from 7pm to 7am.    During those hours, contact the on-call hospitalist MD/ANTONIO for questions.

## 2025-05-20 NOTE — CARE COORDINATION
Discharge Planning:     (CM) sent message to attending, Dr. Hussein. Insurance is requesting a Peer to Peer by noon today.   [id number] reference number is [id number].    Electronically signed by STEFANIE Pickett on 5/20/2025 at 9:21 AM

## 2025-05-20 NOTE — CARE COORDINATION
Case Management -  Discharge Note      Patient Name: Aiyana Catalan                   YOB: 1950  Room: [address] [place] [address]            Readmission Risk (Low < 19, Mod (19-27), High > 27): Readmission Risk Score: 10.9    Current PCP: Jose Maria Haynes MD      (IMM) Important Message from Medicare:    Has pt received appropriate compliance notices before being discharged if required: yes  Compliance doc:  [] 2nd IMM; [] Code 44 [x] [place]  Date Given: 5/16/25 Given By: HIMANSHU    PT AM-PAC: 16 /24  OT AM-PAC: 17 /24    Patient/patient representative has been educated on the benefits of HHC as well as the possible risks of declining recommended services. Patient/patient representative has acknowledged the information provided and decided on the following discharge plan. Patient/ patient representative has been provided freedom of choice regarding service provider, supported by basic dialogue that supports the patient's individualized plan of care/goals.    Home Care Information:   Is patient resuming current home health care services: No    Home Care Agency:   [address] [address]  Phone: [phone]  Fax: [phone]             Services: SN, PT, OT  Home Health Order Obtained: yes    Home health agency notified of discharge.        [facility] agency notified of discharge:  [x] Yes [] No  [] NA    Family notified of discharge:  [x] Yes  [] No  [] NA    Facility notified of discharge:  [] Yes  [] No  [x] NA    Pt is being discharged with Outpt IV Antibiotics  [] Yes [] No  [x] NA  If yes, make sure YESENIA is faxed to C agency, and meds are called in to pharmacy by RN from YESENIA orders only.      Financial    Payor: [employer] MEDICARE / Plan: [employer] GOLD PLUS HMO / Product Type: *No Product type* /     Pharmacy:  Potential assistance Purchasing Medications:    Meds-to-Beds request: No      [id number] [address] [place] [facility] [phone]

## 2025-05-20 NOTE — PLAN OF CARE
Problem: Chronic Conditions and Co-morbidities  Goal: Patient's chronic conditions and co-morbidity symptoms are monitored and maintained or improved  5/15/2025 0819 by Phylicia Boyd RN Flowsheets (Taken 5/15/2025 0819)  Care Plan - Patient's Chronic Conditions and Co-Morbidity Symptoms are Monitored and Maintained or Improved:   Monitor and assess patient's chronic conditions and comorbid symptoms for stability, deterioration, or improvement   Collaborate with multidisciplinary team to address chronic and comorbid conditions and prevent exacerbation or deterioration     Problem: Discharge Planning  Goal: Discharge to home or other facility with appropriate resources  5/15/2025 0819 by Phylicia Boyd RN Flowsheets (Taken 5/15/2025 0819)  Discharge to home or other facility with appropriate resources:   Identify barriers to discharge with patient and caregiver   Arrange for needed discharge resources and transportation as appropriate   Identify discharge learning needs (meds, wound care, etc)     Problem: Pain  Goal: Verbalizes/displays adequate comfort level or baseline comfort level  5/15/2025 0819 by Phylicia Boyd RN Flowsheets (Taken 5/15/2025 0819)  Verbalizes/displays adequate comfort level or baseline comfort level:   Encourage patient to monitor pain and request assistance   Assess pain using appropriate pain scale   Implement non-pharmacological measures as appropriate and evaluate response   Administer analgesics based on type and severity of pain and evaluate response     Problem: Safety - Adult  Goal: Free from fall injury  5/15/2025 0819 by Phylicia Boyd RN Flowsheets (Taken 5/15/2025 0819)  Free From Fall Injury: Instruct family/caregiver on patient safety     Problem: ABCDS Injury Assessment  Goal: Absence of physical injury  5/15/2025 0819 by Phylicia Boyd RN Flowsheets (Taken 5/15/2025 0819)  Absence of Physical Injury: Implement safety measures based on patient assessment   
  Problem: Chronic Conditions and Co-morbidities  Goal: Patient's chronic conditions and co-morbidity symptoms are monitored and maintained or improved  5/20/2025 0831 by Liana Mary RN  Outcome: Progressing  5/19/2025 2136 by Brayan Mesa RN  Outcome: Progressing     Problem: Discharge Planning  Goal: Discharge to home or other facility with appropriate resources  5/20/2025 0831 by Liana Mary RN  Outcome: Progressing  5/19/2025 2136 by Brayan Mesa RN  Outcome: Progressing     Problem: Pain  Goal: Verbalizes/displays adequate comfort level or baseline comfort level  5/20/2025 0831 by Liana Mary RN  Outcome: Progressing  Flowsheets (Taken 5/20/2025 0827)  Verbalizes/displays adequate comfort level or baseline comfort level:   Encourage patient to monitor pain and request assistance   Assess pain using appropriate pain scale   Administer analgesics based on type and severity of pain and evaluate response  5/19/2025 2136 by Brayan Mesa RN  Outcome: Progressing  Flowsheets (Taken 5/19/2025 0800 by Liana Mary RN)  Verbalizes/displays adequate comfort level or baseline comfort level:   Encourage patient to monitor pain and request assistance   Assess pain using appropriate pain scale   Administer analgesics based on type and severity of pain and evaluate response     Problem: Safety - Adult  Goal: Free from fall injury  5/20/2025 0831 by Liana Mary RN  Outcome: Progressing  5/19/2025 2136 by Brayan Mesa RN  Outcome: Progressing     Problem: ABCDS Injury Assessment  Goal: Absence of physical injury  5/20/2025 0831 by Liana Mary RN  Outcome: Progressing  5/19/2025 2136 by Brayan Mesa RN  Outcome: Progressing     Problem: Skin/Tissue Integrity  Goal: Skin integrity remains intact  Description: 1.  Monitor for areas of redness and/or skin breakdown2.  Assess vascular access sites hourly3.  Every 4-6 hours minimum:  Change oxygen saturation probe site4.  Every 4-6 hours:  If on nasal 
  Problem: Chronic Conditions and Co-morbidities  Goal: Patient's chronic conditions and co-morbidity symptoms are monitored and maintained or improved  Outcome: Progressing     Problem: Discharge Planning  Goal: Discharge to home or other facility with appropriate resources  Outcome: Progressing     Problem: Pain  Goal: Verbalizes/displays adequate comfort level or baseline comfort level  Outcome: Progressing     Problem: Safety - Adult  Goal: Free from fall injury  Outcome: Progressing     Problem: ABCDS Injury Assessment  Goal: Absence of physical injury  Outcome: Progressing     
  Problem: Chronic Conditions and Co-morbidities  Goal: Patient's chronic conditions and co-morbidity symptoms are monitored and maintained or improved  Outcome: Progressing     Problem: Discharge Planning  Goal: Discharge to home or other facility with appropriate resources  Outcome: Progressing     Problem: Pain  Goal: Verbalizes/displays adequate comfort level or baseline comfort level  Outcome: Progressing     Problem: Safety - Adult  Goal: Free from fall injury  Outcome: Progressing     Problem: ABCDS Injury Assessment  Goal: Absence of physical injury  Outcome: Progressing     Problem: Skin/Tissue Integrity  Goal: Skin integrity remains intact  Description: 1.  Monitor for areas of redness and/or skin breakdown2.  Assess vascular access sites hourly3.  Every 4-6 hours minimum:  Change oxygen saturation probe site4.  Every 4-6 hours:  If on nasal continuous positive airway pressure, respiratory therapy assess nares and determine need for appliance change or resting period  Outcome: Progressing     
  Problem: Chronic Conditions and Co-morbidities  Goal: Patient's chronic conditions and co-morbidity symptoms are monitored and maintained or improved  Outcome: Progressing     Problem: Discharge Planning  Goal: Discharge to home or other facility with appropriate resources  Outcome: Progressing     Problem: Pain  Goal: Verbalizes/displays adequate comfort level or baseline comfort level  Outcome: Progressing     Problem: Safety - Adult  Goal: Free from fall injury  Outcome: Progressing     Problem: ABCDS Injury Assessment  Goal: Absence of physical injury  Outcome: Progressing     Problem: Skin/Tissue Integrity  Goal: Skin integrity remains intact  Description: 1.  Monitor for areas of redness and/or skin breakdown2.  Assess vascular access sites hourly3.  Every 4-6 hours minimum:  Change oxygen saturation probe site4.  Every 4-6 hours:  If on nasal continuous positive airway pressure, respiratory therapy assess nares and determine need for appliance change or resting period  Outcome: Progressing     Problem: Musculoskeletal - Adult  Goal: Return mobility to safest level of function  Outcome: Progressing     Problem: Musculoskeletal - Adult  Goal: Maintain proper alignment of affected body part  Outcome: Progressing     Problem: Genitourinary - Adult  Goal: Absence of urinary retention  Outcome: Progressing     Problem: Genitourinary - Adult  Goal: Urinary catheter remains patent  Outcome: Progressing     
  Problem: Chronic Conditions and Co-morbidities  Goal: Patient's chronic conditions and co-morbidity symptoms are monitored and maintained or improved  Outcome: Progressing     Problem: Discharge Planning  Goal: Discharge to home or other facility with appropriate resources  Outcome: Progressing     Problem: Pain  Goal: Verbalizes/displays adequate comfort level or baseline comfort level  Outcome: Progressing     Problem: Safety - Adult  Goal: Free from fall injury  Outcome: Progressing     Problem: ABCDS Injury Assessment  Goal: Absence of physical injury  Outcome: Progressing     Problem: Skin/Tissue Integrity  Goal: Skin integrity remains intact  Description: 1.  Monitor for areas of redness and/or skin breakdown2.  Assess vascular access sites hourly3.  Every 4-6 hours minimum:  Change oxygen saturation probe site4.  Every 4-6 hours:  If on nasal continuous positive airway pressure, respiratory therapy assess nares and determine need for appliance change or resting period  Outcome: Progressing     Problem: Musculoskeletal - Adult  Goal: Return mobility to safest level of function  Outcome: Progressing  Goal: Maintain proper alignment of affected body part  Outcome: Progressing     Problem: Genitourinary - Adult  Goal: Absence of urinary retention  Outcome: Progressing  Goal: Urinary catheter remains patent  Outcome: Progressing     
  Problem: Chronic Conditions and Co-morbidities  Goal: Patient's chronic conditions and co-morbidity symptoms are monitored and maintained or improved  Outcome: Progressing     Problem: Discharge Planning  Goal: Discharge to home or other facility with appropriate resources  Outcome: Progressing     Problem: Pain  Goal: Verbalizes/displays adequate comfort level or baseline comfort level  Outcome: Progressing  Flowsheets (Taken 5/19/2025 0800 by Liana Mary, RN)  Verbalizes/displays adequate comfort level or baseline comfort level:   Encourage patient to monitor pain and request assistance   Assess pain using appropriate pain scale   Administer analgesics based on type and severity of pain and evaluate response     Problem: Safety - Adult  Goal: Free from fall injury  Outcome: Progressing     Problem: ABCDS Injury Assessment  Goal: Absence of physical injury  Outcome: Progressing     Problem: Skin/Tissue Integrity  Goal: Skin integrity remains intact  Description: 1.  Monitor for areas of redness and/or skin breakdown2.  Assess vascular access sites hourly3.  Every 4-6 hours minimum:  Change oxygen saturation probe site4.  Every 4-6 hours:  If on nasal continuous positive airway pressure, respiratory therapy assess nares and determine need for appliance change or resting period  Outcome: Progressing     Problem: Musculoskeletal - Adult  Goal: Return mobility to safest level of function  5/19/2025 2136 by Brayan Mesa, RN  Outcome: Progressing     Problem: Musculoskeletal - Adult  Goal: Maintain proper alignment of affected body part  5/19/2025 2136 by Brayan Mesa, RN  Outcome: Progressing     
  Problem: Chronic Conditions and Co-morbidities  Goal: Patient's chronic conditions and co-morbidity symptoms are monitored and maintained or improved  Outcome: Progressing     Problem: Pain  Goal: Verbalizes/displays adequate comfort level or baseline comfort level  Outcome: Progressing     
  Problem: Genitourinary - Adult  Goal: Absence of urinary retention  5/19/2025 0737 by Liana Mary RN  Outcome: Progressing  5/18/2025 2205 by Brayan Mesa RN  Outcome: Progressing  Goal: Urinary catheter remains patent  5/19/2025 0737 by Liana Mary RN  Outcome: Progressing  5/18/2025 2205 by Brayan Mesa RN  Outcome: Progressing     Problem: Musculoskeletal - Adult  Goal: Return mobility to safest level of function  5/19/2025 0737 by Liana Mary RN  Outcome: Progressing  5/18/2025 2205 by Brayan Mesa RN  Outcome: Progressing  Goal: Maintain proper alignment of affected body part  5/19/2025 0737 by Liana Mary RN  Outcome: Progressing  5/18/2025 2205 by Brayan Mesa RN  Outcome: Progressing     
  Problem: Genitourinary - Adult  Goal: Absence of urinary retention  Outcome: Progressing  Goal: Urinary catheter remains patent  Outcome: Progressing     Problem: Musculoskeletal - Adult  Goal: Return mobility to safest level of function  Outcome: Progressing  Goal: Maintain proper alignment of affected body part  Outcome: Progressing     
continuous positive airway pressure, respiratory therapy assess nares and determine need for appliance change or resting period  Flowsheets (Taken 5/16/2025 2065)  Skin Integrity Remains Intact:   Monitor for areas of redness and/or skin breakdown   Assess vascular access sites hourly

## 2025-05-28 ENCOUNTER — TELEPHONE (OUTPATIENT)
Dept: ORTHOPEDIC SURGERY | Age: 75
End: 2025-05-28

## 2025-05-29 NOTE — TELEPHONE ENCOUNTER
Faxed all medical / PT records for 8/1/2022 to 12/31/2022 to [employer] at [phone] for release to Ean Hemphill .    Sent the request to [facility] billing for processing.

## 2025-06-17 ENCOUNTER — TELEPHONE (OUTPATIENT)
Dept: CARDIOLOGY CLINIC | Age: 75
End: 2025-06-17

## 2025-06-17 DIAGNOSIS — I35.1 AORTIC VALVE INSUFFICIENCY, ETIOLOGY OF CARDIAC VALVE DISEASE UNSPECIFIED: ICD-10-CM

## 2025-06-17 DIAGNOSIS — I50.32 CHRONIC DIASTOLIC HEART FAILURE (HCC): Primary | ICD-10-CM

## 2025-06-17 DIAGNOSIS — I10 PRIMARY HYPERTENSION: ICD-10-CM

## 2025-06-17 DIAGNOSIS — I35.0 NONRHEUMATIC AORTIC VALVE STENOSIS: ICD-10-CM

## 2025-06-17 NOTE — TELEPHONE ENCOUNTER
Pt called stating that their feet and legs are swollen, pt has had 10 lbs wt gain 228 current wt is 238.    Pt stated that the d/c instructions from [facility] was to take 1 tablet Furosamide daily.    Pt would like to know if they should start taking the furosemide twice per day? Or will it be ok to take both in the am so they will not be up all night going to the bathroom.    Pls advise

## 2025-06-17 NOTE — TELEPHONE ENCOUNTER
Spoke to patient.    Patient states that she has been taking 20 mg lasix once daily and realizes now that she is supposed to be taking 20 mg twice daily since released from hospital (admitted 5/14/25-5/20/25).  She states that over the last 2 weeks she has gained a total of 17lb.  Her swelling is in BLE, below the knees, left worse than right.  She denies sob however does state she is elevating HOB more than usual to help her breathe.  Also, last night around 3 am she woke up \"choking\" and a clear liquid was coughed up that pt feels looked like water.  She has been taking 25 mg aldactone as prescribed. She does have a home nurse from [facility] that has been checking on her.   She has also been elevating her legs at night.     Pt will start taking the 20 mg Lasix twice per day as ordered.

## 2025-06-17 NOTE — TELEPHONE ENCOUNTER
Spoke to patient and relayed message per [facility] and pt verbalizes understanding.      Offered apt for next week, however pt states she is only available 6/23 next week and otherwise would prefer to be seen the following week > pt scheduled in [place] per pt request on July 2.      Pt will obtain blood work as recommended.

## 2025-06-20 ENCOUNTER — HOSPITAL ENCOUNTER (OUTPATIENT)
Age: 75
Discharge: HOME | End: 2025-06-20
Payer: MEDICARE

## 2025-06-20 LAB
ALBUMIN SERPL-MCNC: 4.2 G/DL (ref 3.4–5)
ALBUMIN/GLOB SERPL: 1.6 {RATIO} (ref 1.1–2.2)
ALP SERPL-CCNC: 92 U/L (ref 40–129)
ALT SERPL-CCNC: 16 U/L (ref 10–40)
ANION GAP SERPL CALCULATED.3IONS-SCNC: 11 MMOL/L (ref 3–16)
AST SERPL-CCNC: 20 U/L (ref 15–37)
BASOPHILS # BLD: 0 K/UL (ref 0–0.2)
BASOPHILS NFR BLD: 0.6 %
BILIRUB SERPL-MCNC: <0.2 MG/DL (ref 0–1)
BUN SERPL-MCNC: 17 MG/DL (ref 7–20)
CALCIUM SERPL-MCNC: 9.6 MG/DL (ref 8.3–10.6)
CHLORIDE SERPL-SCNC: 100 MMOL/L (ref 99–110)
CHOLEST SERPL-MCNC: 202 MG/DL (ref 0–199)
CO2 SERPL-SCNC: 30 MMOL/L (ref 21–32)
CREAT SERPL-MCNC: 1.1 MG/DL (ref 0.6–1.2)
DEPRECATED RDW RBC AUTO: 15.3 % (ref 12.4–15.4)
EOSINOPHIL # BLD: 0.3 K/UL (ref 0–0.6)
EOSINOPHIL NFR BLD: 5.4 %
GFR SERPLBLD CREATININE-BSD FMLA CKD-EPI: 52 ML/MIN/{1.73_M2}
GLUCOSE SERPL-MCNC: 108 MG/DL (ref 70–99)
HCT VFR BLD AUTO: 34.5 % (ref 36–48)
HDLC SERPL-MCNC: 44 MG/DL (ref 40–60)
HGB BLD-MCNC: 11.4 G/DL (ref 12–16)
LDLC SERPL CALC-MCNC: 128 MG/DL
LYMPHOCYTES # BLD: 1.4 K/UL (ref 1–5.1)
LYMPHOCYTES NFR BLD: 26.2 %
MCH RBC QN AUTO: 28.3 PG (ref 26–34)
MCHC RBC AUTO-ENTMCNC: 33.1 G/DL (ref 31–36)
MCV RBC AUTO: 85.6 FL (ref 80–100)
MONOCYTES # BLD: 0.5 K/UL (ref 0–1.3)
MONOCYTES NFR BLD: 8.3 %
NEUTROPHILS # BLD: 3.3 K/UL (ref 1.7–7.7)
NEUTROPHILS NFR BLD: 59.5 %
PLATELET # BLD AUTO: 205 K/UL (ref 135–450)
PMV BLD AUTO: 8.4 FL (ref 5–10.5)
POTASSIUM SERPL-SCNC: 4.3 MMOL/L (ref 3.5–5.1)
PROT SERPL-MCNC: 6.9 G/DL (ref 6.4–8.2)
RBC # BLD AUTO: 4.03 M/UL (ref 4–5.2)
SODIUM SERPL-SCNC: 141 MMOL/L (ref 136–145)
TRIGL SERPL-MCNC: 148 MG/DL (ref 0–150)
TSH SERPL DL<=0.005 MIU/L-ACNC: 1.74 UIU/ML (ref 0.27–4.2)
VLDLC SERPL CALC-MCNC: 30 MG/DL
WBC # BLD AUTO: 5.5 K/UL (ref 4–11)

## 2025-06-20 PROCEDURE — 80061 LIPID PANEL: CPT

## 2025-06-20 PROCEDURE — 84443 ASSAY THYROID STIM HORMONE: CPT

## 2025-06-20 PROCEDURE — 80053 COMPREHEN METABOLIC PANEL: CPT

## 2025-06-20 PROCEDURE — 36415 COLL VENOUS BLD VENIPUNCTURE: CPT

## 2025-06-20 PROCEDURE — 85025 COMPLETE CBC W/AUTO DIFF WBC: CPT

## 2025-06-23 NOTE — PROGRESS NOTES
[facility]   Cardiac Follow Up    Referring Provider:  Jose Maria Haynes MD     Chief Complaint   Patient presents with    Follow-up     W/ echo    Hyperlipidemia    Hypertension    Congestive Heart Failure      History of Present Illness:  Aiyana Catalan is a 75 y.o. female with a history of diabetes, dHF, hyperlipidemia, and hypertension. She was previously following with Dr. Interiano with [facility].     At  in July 2022 she reported that she was diagnosed with breast cancer.    She is s/p back surgery at [facility] on 2/26/24 and 2/28/24. Pt was released home from [facility] 4/2024.      Today, Aiyana is here for an echo and 6 month follow up.  She was in the hospital for her back pain.  States that she had some sort of pulled/sprained muscle.  States that the swelling in her left leg was greater than the swelling in her right leg.  She believes that she was taking the Lasix wrong when she was discharged from the hospital.  She was having a home health nurse and was only taking Lasix 20 mg once a day.  She is currently taking Lasix 40 mg in the morning and 20 mg in the afternoon.  States that she has cut out almost all salt in her diet.  States that she over did it last week so she has been trying to rest this week.  She has not been wearing her CPAP since she has not been able to use her left arm and the need to get up frequently to use the bathroom at night.  She is taking Metoprolol 25 mg and 50 mg.      Past Medical History:   has a past medical history of Aortic stenosis, Arthritis, Back pain, CHF (congestive heart failure) (HCC), Chronic diastolic heart failure (HCC), Diabetes mellitus (HCC), Diabetes mellitus (HCC), GERD (gastroesophageal reflux disease), High cholesterol, History of blood transfusion, Hypertension, Malignant neoplasm of right female breast (HCC), JOSE MANUEL (obstructive sleep apnea), Polio, Polio, Shingles, Sleep apnea, and Urinary problem.    Surgical History:   has a

## 2025-07-02 ENCOUNTER — OFFICE VISIT (OUTPATIENT)
Dept: CARDIOLOGY CLINIC | Age: 75
End: 2025-07-02
Payer: MEDICARE

## 2025-07-02 VITALS
DIASTOLIC BLOOD PRESSURE: 62 MMHG | HEART RATE: 77 BPM | BODY MASS INDEX: 37.61 KG/M2 | WEIGHT: 234 LBS | SYSTOLIC BLOOD PRESSURE: 134 MMHG | HEIGHT: 66 IN | OXYGEN SATURATION: 97 %

## 2025-07-02 DIAGNOSIS — I35.0 NONRHEUMATIC AORTIC VALVE STENOSIS: Primary | ICD-10-CM

## 2025-07-02 DIAGNOSIS — I35.1 AORTIC VALVE INSUFFICIENCY, ETIOLOGY OF CARDIAC VALVE DISEASE UNSPECIFIED: ICD-10-CM

## 2025-07-02 PROCEDURE — G8417 CALC BMI ABV UP PARAM F/U: HCPCS | Performed by: INTERNAL MEDICINE

## 2025-07-02 PROCEDURE — 3078F DIAST BP <80 MM HG: CPT | Performed by: INTERNAL MEDICINE

## 2025-07-02 PROCEDURE — 99214 OFFICE O/P EST MOD 30 MIN: CPT | Performed by: INTERNAL MEDICINE

## 2025-07-02 PROCEDURE — G8427 DOCREV CUR MEDS BY ELIG CLIN: HCPCS | Performed by: INTERNAL MEDICINE

## 2025-07-02 PROCEDURE — 1123F ACP DISCUSS/DSCN MKR DOCD: CPT | Performed by: INTERNAL MEDICINE

## 2025-07-02 PROCEDURE — 3075F SYST BP GE 130 - 139MM HG: CPT | Performed by: INTERNAL MEDICINE

## 2025-07-02 PROCEDURE — 1090F PRES/ABSN URINE INCON ASSESS: CPT | Performed by: INTERNAL MEDICINE

## 2025-07-02 PROCEDURE — G8399 PT W/DXA RESULTS DOCUMENT: HCPCS | Performed by: INTERNAL MEDICINE

## 2025-07-02 PROCEDURE — 1036F TOBACCO NON-USER: CPT | Performed by: INTERNAL MEDICINE

## 2025-07-02 PROCEDURE — 1159F MED LIST DOCD IN RCRD: CPT | Performed by: INTERNAL MEDICINE

## 2025-07-02 RX ORDER — CYCLOBENZAPRINE HCL 10 MG
5 TABLET ORAL 3 TIMES DAILY PRN
COMMUNITY
Start: 2025-05-27

## 2025-07-02 RX ORDER — TIRZEPATIDE 5 MG/.5ML
INJECTION, SOLUTION SUBCUTANEOUS
COMMUNITY

## 2025-07-02 NOTE — PATIENT INSTRUCTIONS
Stop Procardia XL - Monitor your blood pressure at home and keep a log.  Call office if you notice that it is running high and/or if leg swelling persists    Limit fluid intake to 48-60 ounces per day     Call office if you notice worsening shortness of breath, chest heaviness, light-headedness, low blood pressure, passing out    Keep follow up on 9/8/25

## 2025-07-03 ENCOUNTER — RESULTS FOLLOW-UP (OUTPATIENT)
Dept: CARDIOLOGY CLINIC | Age: 75
End: 2025-07-03

## (undated) DEVICE — HYPODERMIC SAFETY NEEDLE: Brand: MAGELLAN

## (undated) DEVICE — SHEET,DRAPE,53X77,STERILE: Brand: MEDLINE

## (undated) DEVICE — SSC BONE WAX: Brand: SSC BONE WAX

## (undated) DEVICE — GLOVE SURG SZ 75 L12IN FNGR THK94MIL TRNSLUC YEL LTX

## (undated) DEVICE — SUTURE ABSORBABLE MONOFILAMENT 0 CTX 60 IN VIO PDS + PDP990G

## (undated) DEVICE — STAPLER SKIN H3.9MM WIRE DIA0.58MM CRWN 6.9MM 35 STPL ROT

## (undated) DEVICE — DRESSING WND 4X12 IN ANTIMICROBIAL PROTCT THERABOND 3D

## (undated) DEVICE — SUTURE VCRL + SZ 2-0 L18IN ABSRB UD CT1 L36MM 1/2 CIR VCP839D

## (undated) DEVICE — SUTURE PERMA-HAND SZ 2-0 L30IN NONABSORBABLE BLK L26MM SH K833H

## (undated) DEVICE — SUTURE VIC COAT BR VIO CP2 4-0 18IN J392H

## (undated) DEVICE — GLOVE ORTHO 8   MSG9480

## (undated) DEVICE — SLEEVE PROTCT THRD LCK FOR SYNFIX EVOLUTION SYS

## (undated) DEVICE — SPONGE,PEANUT,XRAY,ST,SM,3/8",5/CARD: Brand: MEDLINE INDUSTRIES, INC.

## (undated) DEVICE — SUTURE MCRYL + SZ 4-0 L27IN ABSRB UD L19MM PS-2 3/8 CIR MCP426H

## (undated) DEVICE — PROVE COVER: Brand: UNBRANDED

## (undated) DEVICE — Device

## (undated) DEVICE — SUTURE VCRL + SZ 3-0 L18IN ABSRB UD SH 1/2 CIR TAPERCUT NDL VCP864D

## (undated) DEVICE — SYRINGE, LUER LOCK, 10ML: Brand: MEDLINE

## (undated) DEVICE — SUTURE VCRL SZ 2-0 L18IN ABSRB UD CT-1 L36MM 1/2 CIR J839D

## (undated) DEVICE — INTENDED FOR TISSUE SEPARATION, AND OTHER PROCEDURES THAT REQUIRE A SHARP SURGICAL BLADE TO PUNCTURE OR CUT.: Brand: BARD-PARKER ® STAINLESS STEEL BLADES

## (undated) DEVICE — CLEANER,CAUTERY TIP,2X2",STERILE: Brand: MEDLINE

## (undated) DEVICE — MINOR BREAST: Brand: MEDLINE INDUSTRIES, INC.

## (undated) DEVICE — CABLE BPLR L12FT FLYING LD DISPOSABLE

## (undated) DEVICE — SOLUTION IV 1000ML 0.9% SOD CHL

## (undated) DEVICE — SUTURE ETHBND EXCEL SZ 5 L30IN NONABSORBABLE GRN L48MM V-40 MB46G

## (undated) DEVICE — 4-PORT MANIFOLD: Brand: NEPTUNE 2

## (undated) DEVICE — PROBE 8225101 5PK STD PRASS FL TIP ROHS

## (undated) DEVICE — KIT EVAC 400CC DIA1/8IN H PAT 12.5IN 3 SPR RND SHP PVC DRN

## (undated) DEVICE — CLIP LIG M BLU TI HRT SHP WIRE HORZ 600 PER BX

## (undated) DEVICE — SURGIFOAM SPNG SZ 100

## (undated) DEVICE — EYE PROTECTOR FOAM MEDICHOICE

## (undated) DEVICE — GLOVE SURG SZ 6.5 BRN LTX FRE SYNTH POLYCHLOROPRENE POLYMER

## (undated) DEVICE — TOWEL,STOP FLAG GOLD N-W: Brand: MEDLINE

## (undated) DEVICE — SLING ARM L W2XL17.5IN D8.5IN BLU POLY/COTTON FOAM STRP

## (undated) DEVICE — 450 ML BOTTLE OF 0.05% CHLORHEXIDINE GLUCONATE IN 99.95% STERILE WATER FOR IRRIGATION, USP AND APPLICATOR.: Brand: IRRISEPT ANTIMICROBIAL WOUND LAVAGE

## (undated) DEVICE — SOLUTION SURG PREP 26 CC PURPREP

## (undated) DEVICE — SHOULDER STABILIZATION KIT,                                    DISPOSABLE 12 PER BOX

## (undated) DEVICE — BIT DRL L200MM DIA2.8MM CALIB L100MM FOR 3.5MM VA LCP PROX

## (undated) DEVICE — NEEDLE HYPO 22GA L1.5IN BLK POLYPR HUB S STL REG BVL STR

## (undated) DEVICE — KIT SPNL ACC MAXCESS IV

## (undated) DEVICE — SUTURE PERMAHAND SZ 2-0 L30IN NONABSORBABLE BLK SILK W/O A305H

## (undated) DEVICE — TOWEL,OR,DSP,ST,BLUE,STD,6/PK,12PK/CS: Brand: MEDLINE

## (undated) DEVICE — 3M™ IOBAN™ 2 ANTIMICROBIAL INCISE DRAPE 6650EZ: Brand: IOBAN™ 2

## (undated) DEVICE — DRAPE,U/ SHT,SPLIT,PLAS,STERIL: Brand: MEDLINE

## (undated) DEVICE — SUTURE VCRL SZ 3-0 L27IN ABSRB UD L26MM SH 1/2 CIR J416H

## (undated) DEVICE — NEURO SPONGES: Brand: DEROYAL

## (undated) DEVICE — GLOVE ORANGE PI 8   MSG9080

## (undated) DEVICE — LOTION PREP REMV 4OZ IODO CLR TINC OF BENZ DURAPREP

## (undated) DEVICE — SUTURE VCRL + SZ 3-0 L27IN ABSRB WHT CT-1 1/2 CIR VCP258H

## (undated) DEVICE — MAJOR SET UP PK

## (undated) DEVICE — GLOVE SURG SZ 7 CRM LTX FREE POLYISOPRENE POLYMER BEAD ANTI

## (undated) DEVICE — LAMINECTOMY: Brand: MEDLINE INDUSTRIES, INC.

## (undated) DEVICE — 3M™ STERI-DRAPE™ U-DRAPE 1067 1067 5/BX 4BX/CS/CTN&#X20;: Brand: STERI-DRAPE™

## (undated) DEVICE — YANKAUER,OPEN TIP,W/O VENT,STERILE: Brand: MEDLINE INDUSTRIES, INC.

## (undated) DEVICE — DRAPE 33X23IN INCISE ANTIMICROB IOBAN 2

## (undated) DEVICE — GLOVE SURG SZ 65 THK91MIL LTX FREE SYN POLYISOPRENE

## (undated) DEVICE — GLOVE SURG SZ 65 CRM LTX FREE POLYISOPRENE POLYMER BEAD ANTI

## (undated) DEVICE — SLEEVE PROTCT FOR SCRDRVR AND AWL SYNFIX EVOLUTION SYS

## (undated) DEVICE — DRESSING,GAUZE,XEROFORM,CURAD,5"X9",ST: Brand: CURAD

## (undated) DEVICE — SPONGE,LAP,18"X18",DLX,XR,ST,5/PK,40/PK: Brand: MEDLINE

## (undated) DEVICE — COUNTER NDL 40 COUNT HLD 70 NUM FOAM BLK SGL MAG W BLDE REMV

## (undated) DEVICE — TOOL MR8-14MH30 MR8 14CM MATCH 3MM: Brand: MIDAS REX MR8

## (undated) DEVICE — T-MAX DISPOSABLE FACE MASK 8 PER BOX

## (undated) DEVICE — SHEET, T, LAPAROTOMY, STERILE: Brand: MEDLINE

## (undated) DEVICE — 3M™ TEGADERM™ TRANSPARENT FILM DRESSING FRAME STYLE, 1627, 4 IN X 10 IN (10 CM X 25 CM), 20/CT 4CT/CASE: Brand: 3M™ TEGADERM™

## (undated) DEVICE — BLANKET WRM W29.9XL79.1IN UP BODY FORC AIR MISTRAL-AIR

## (undated) DEVICE — HIGH FLOW TIP

## (undated) DEVICE — APPLIER CLP L9.375IN APER 2.1MM CLS L3.8MM 20 SM TI CLP

## (undated) DEVICE — DRESSING HYDROFIBER AQUACEL AG ADVANTAGE 3.5X10 IN

## (undated) DEVICE — PAD,NON-ADHERENT,3X8,STERILE,LF,1/PK: Brand: MEDLINE

## (undated) DEVICE — APPLIER LIG CLP M L11IN TI STR RNG HNDL FOR 20 CLP DISP

## (undated) DEVICE — DISSECTOR LAP DIA5MM BLNT TIP ENDOPATH

## (undated) DEVICE — SHEET,DRAPE,40X58,STERILE: Brand: MEDLINE

## (undated) DEVICE — C-ARM: Brand: UNBRANDED

## (undated) DEVICE — TOWEL,OR,DSP,ST,BLUE,DLX,8/PK,10PK/CS: Brand: MEDLINE

## (undated) DEVICE — SUTURE STRATAFIX SYMMETRIC PDS + SZ 1 L18IN ABSRB VLT L48MM SXPP1A400

## (undated) DEVICE — MARKER SURG PASS SPHR NDI

## (undated) DEVICE — GLOVE SURG SZ 65 L12IN FNGR THK79MIL GRN LTX FREE

## (undated) DEVICE — INTENDED USE FOR SURGICAL MARKING ON INTACT SKIN, ALSO PROVIDES A PERMANENT METHOD OF IDENTIFYING OBJECTS IN THE OPERATING ROOM: Brand: WRITESITE® PLUS MINI PREP RESISTANT MARKER

## (undated) DEVICE — APPLICATOR MEDICATED 26 CC SOLUTION HI LT ORNG CHLORAPREP

## (undated) DEVICE — 3M™ IOBAN™ 2 ANTIMICROBIAL INCISE DRAPE 6648EZ: Brand: IOBAN™ 2

## (undated) DEVICE — TUBING, SUCTION, 1/4" X 12', STRAIGHT: Brand: MEDLINE

## (undated) DEVICE — AGENT HEMOSTATIC SURGIFLOW MATRIX KIT W/THROMBIN

## (undated) DEVICE — SPONGE,NEURO,0.5"X3",XR,STRL,LF,10/PK: Brand: MEDLINE

## (undated) DEVICE — CODMAN® SURGICAL PATTIES 3/4" X 3/4" (1.91CM X 1.91CM): Brand: CODMAN®

## (undated) DEVICE — SPONGE LAP W18XL18IN WHT COT 4 PLY FLD STRUNG RADPQ DISP ST

## (undated) DEVICE — Z DISCONTINUED USE 2272114 DRAPE SURG UTIL 26X15 IN W/ TAPE N INVASIVE MULTLYR DISP

## (undated) DEVICE — UNDERGLOVE SURG SZ 8 BLU LTX FREE SYN POLYISOPRENE POLYMER

## (undated) DEVICE — GOWN,SIRUS,POLYRNF,BRTHSLV,LG,30/CS: Brand: MEDLINE

## (undated) DEVICE — SUTURE VCRL + SZ 0 L18IN ABSRB UD L36MM CT-1 1/2 CIR VCP840D

## (undated) DEVICE — DRAPE MICSCP W54XL150IN W/ 4 BINOC GLS LENS LEICA

## (undated) DEVICE — DRAPE,CHEST,FENES,15X10,STERIL: Brand: MEDLINE

## (undated) DEVICE — KIT,ANTI FOG,W/SPONGE & FLUID,SOFT PACK: Brand: MEDLINE

## (undated) DEVICE — DRAPE,LAP,CHOLE,W/TROUGHS,STERILE: Brand: MEDLINE

## (undated) DEVICE — STERILE LATEX POWDER-FREE SURGICAL GLOVESWITH NITRILE COATING: Brand: PROTEXIS

## (undated) DEVICE — SUTURE MCRYL SZ 4-0 L27IN ABSRB UD L19MM PS-2 1/2 CIR PRIM Y426H

## (undated) DEVICE — PROBE LOCALIZER W/DRAPE

## (undated) DEVICE — GLOVE ORANGE PI 7 1/2   MSG9075

## (undated) DEVICE — STANDARD HYPODERMIC NEEDLE,POLYPROPYLENE HUB: Brand: MONOJECT

## (undated) DEVICE — LIQUIBAND RAPID ADHESIVE 36/CS 0.8ML: Brand: MEDLINE

## (undated) DEVICE — 3M™ TEGADERM™ TRANSPARENT FILM DRESSING FRAME STYLE, 1626W, 4 IN X 4-3/4 IN (10 CM X 12 CM), 50/CT 4CT/CASE: Brand: 3M™ TEGADERM™

## (undated) DEVICE — SUTURE ETHLN SZ 3-0 L30IN NONABSORBABLE BLK L36MM FSLX 3/8 1673BH

## (undated) DEVICE — ORTHO PRE OP PACK: Brand: MEDLINE INDUSTRIES, INC.

## (undated) DEVICE — 3M™ TEGADERM™ CHG CHLORHEXIDINE GLUCONATE GEL PAD 1664, 25 EACH/CARTON, 4 CARTONS/CASE: Brand: 3M™ TEGADERM™

## (undated) DEVICE — BIT DRL L110MM DIA2.5MM G QUIK CPL W/O STP REUSE

## (undated) DEVICE — GLOVE 8 LTX ST TRIFLEX POWDER LK

## (undated) DEVICE — ELECTRODE PT RET AD L9FT HI MOIST COND ADH HYDRGEL CORDED

## (undated) DEVICE — BLADE ES ELASTOMERIC COAT INSUL DURABLE BEND UPTO 90DEG

## (undated) DEVICE — C-ARMOR C-ARM EQUIPMENT COVERS CLEAR STERILE UNIVERSAL FIT 12 PER CASE: Brand: C-ARMOR

## (undated) DEVICE — ST FLUFF LG 1 PLY: Brand: DEROYAL

## (undated) DEVICE — MERCY FAIRFIELD TURNOVER KIT: Brand: MEDLINE INDUSTRIES, INC.

## (undated) DEVICE — COVER LT HNDL CAM BLU DISP W/ SURG CTRL

## (undated) DEVICE — BANDAGE COBAN 4 IN COMPR W4INXL5YD FOAM COHESIVE QUIK STK SELF ADH SFT

## (undated) DEVICE — HANDPIECE SET WITH SUCTION TUBING: Brand: INTERPULSE